# Patient Record
Sex: FEMALE | Race: WHITE | NOT HISPANIC OR LATINO | Employment: OTHER | URBAN - METROPOLITAN AREA
[De-identification: names, ages, dates, MRNs, and addresses within clinical notes are randomized per-mention and may not be internally consistent; named-entity substitution may affect disease eponyms.]

---

## 2017-02-07 DIAGNOSIS — I10 ESSENTIAL HYPERTENSION: ICD-10-CM

## 2017-02-07 DIAGNOSIS — I51.89 DIASTOLIC DYSFUNCTION: ICD-10-CM

## 2017-02-09 RX ORDER — FUROSEMIDE 20 MG/1
20 TABLET ORAL DAILY
Qty: 30 TAB | Refills: 6 | Status: SHIPPED | OUTPATIENT
Start: 2017-02-09 | End: 2017-08-29

## 2017-03-22 ENCOUNTER — OFFICE VISIT (OUTPATIENT)
Dept: CARDIOLOGY | Facility: MEDICAL CENTER | Age: 82
End: 2017-03-22
Payer: MEDICARE

## 2017-03-22 VITALS
BODY MASS INDEX: 32.06 KG/M2 | OXYGEN SATURATION: 91 % | WEIGHT: 192.4 LBS | SYSTOLIC BLOOD PRESSURE: 102 MMHG | HEART RATE: 56 BPM | HEIGHT: 65 IN | DIASTOLIC BLOOD PRESSURE: 60 MMHG

## 2017-03-22 DIAGNOSIS — I65.23 BILATERAL CAROTID ARTERY STENOSIS: ICD-10-CM

## 2017-03-22 DIAGNOSIS — I25.84 CORONARY ARTERY DISEASE DUE TO CALCIFIED CORONARY LESION: ICD-10-CM

## 2017-03-22 DIAGNOSIS — I25.10 CORONARY ARTERY DISEASE DUE TO CALCIFIED CORONARY LESION: ICD-10-CM

## 2017-03-22 DIAGNOSIS — I10 ESSENTIAL HYPERTENSION: ICD-10-CM

## 2017-03-22 DIAGNOSIS — E78.5 HYPERLIPIDEMIA, UNSPECIFIED HYPERLIPIDEMIA TYPE: ICD-10-CM

## 2017-03-22 DIAGNOSIS — I51.89 DIASTOLIC DYSFUNCTION: ICD-10-CM

## 2017-03-22 DIAGNOSIS — I35.0 MODERATE AORTIC STENOSIS: ICD-10-CM

## 2017-03-22 PROCEDURE — G8484 FLU IMMUNIZE NO ADMIN: HCPCS | Performed by: NURSE PRACTITIONER

## 2017-03-22 PROCEDURE — G8419 CALC BMI OUT NRM PARAM NOF/U: HCPCS | Performed by: NURSE PRACTITIONER

## 2017-03-22 PROCEDURE — 1101F PT FALLS ASSESS-DOCD LE1/YR: CPT | Mod: 8P | Performed by: NURSE PRACTITIONER

## 2017-03-22 PROCEDURE — G8598 ASA/ANTIPLAT THER USED: HCPCS | Performed by: NURSE PRACTITIONER

## 2017-03-22 PROCEDURE — 99214 OFFICE O/P EST MOD 30 MIN: CPT | Performed by: NURSE PRACTITIONER

## 2017-03-22 PROCEDURE — 1036F TOBACCO NON-USER: CPT | Performed by: NURSE PRACTITIONER

## 2017-03-22 PROCEDURE — G8432 DEP SCR NOT DOC, RNG: HCPCS | Performed by: NURSE PRACTITIONER

## 2017-03-22 PROCEDURE — 4040F PNEUMOC VAC/ADMIN/RCVD: CPT | Performed by: NURSE PRACTITIONER

## 2017-03-22 RX ORDER — CITALOPRAM 40 MG/1
TABLET ORAL
COMMUNITY
Start: 2017-03-16 | End: 2017-03-22

## 2017-03-22 RX ORDER — AMLODIPINE BESYLATE 5 MG/1
5 TABLET ORAL DAILY
Qty: 30 TAB | Refills: 11 | COMMUNITY
Start: 2017-03-22 | End: 2017-03-22

## 2017-03-22 RX ORDER — METOPROLOL TARTRATE 50 MG/1
50 TABLET, FILM COATED ORAL
Refills: 3 | COMMUNITY
Start: 2017-01-19 | End: 2017-03-22

## 2017-03-22 RX ORDER — ATORVASTATIN CALCIUM 40 MG/1
40 TABLET, FILM COATED ORAL
Refills: 5 | COMMUNITY
Start: 2017-03-05 | End: 2017-03-22

## 2017-03-22 RX ORDER — CHOLECALCIFEROL (VITAMIN D3) 125 MCG
5000 CAPSULE ORAL DAILY
COMMUNITY
End: 2017-08-29

## 2017-03-22 ASSESSMENT — ENCOUNTER SYMPTOMS
ORTHOPNEA: 0
PND: 0
COUGH: 0
DIZZINESS: 0
FEVER: 0
PALPITATIONS: 0
CLAUDICATION: 0
ABDOMINAL PAIN: 0
FALLS: 0
WEAKNESS: 1
SHORTNESS OF BREATH: 1

## 2017-03-22 NOTE — PROGRESS NOTES
Subjective:   Silvia Orellana is a 84 y.o. female who presents today for 3 month follow up.    She is a patient of Dr. Aguirre in our office. Hx of CAD with previous CABG, HTN, HLD, carotid artery disease, and falls with multiple orthopedic surgeries requiring wheelchair for mobility at times.    She has some mild exertional shortness of breath.     Her daughter helps her with her medications. We discussed her medication changes.     She has had no episodes of chest pain, palpitations, orthopnea, or peripheral edema.    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   • Myocardial infarct (CMS-HCC) 2002   • Pneumonia 2014   • Cold 4/2016   • Stroke (CMS-HCC) 2001; 2012     no deficits, general weakness   • Cataract      patria IOL   • Hx of Clostridium difficile infection 2015   • CAD (coronary artery disease)      CABG    • Aortic stenosis      moderate on echo in '16     Past Surgical History   Procedure Laterality Date   • Cholecystectomy     • Lumpectomy       L   • Bladder biopsy     • Pr revise total hip replacement  3/24/2015     R   • Pr revise acetabular part of total hip  2010     L   • Orbital fracture orif Left 7/6/2015     Procedure: ORBITAL FRACTURE ORIF;  Surgeon: Fabrice Daniel Jr., M.D.;  Location: SURGERY [facility];  Service:    • Femur orif Right 8/10/2015     Procedure: FEMUR ORIF;  Surgeon: Umer Guevara M.D.;  Location: SURGERY [facility];  Service:    • Hip arthroplasty total Left    • Angiogram     • Other cardiac surgery  2002     CABG   • Other cardiac surgery   2012     cardiac cath with stent placement   • Gyn surgery  1961     hysterectomy   • Orbital fracture orif Left 5/23/2016     Procedure: ORBITAL FRACTURE ORIF FOR: LATERAL CANTHOPEXY;  Surgeon: Fabrice Daniel Jr., M.D.;  Location: SURGERY [facility];  Service:    • Multiple coronary artery bypass     • Cardiac cath       History reviewed. No pertinent family history.  History   Smoking status   • Never Smoker    Smokeless tobacco   • Never Used     No Known Allergies  Outpatient Encounter Prescriptions as of 3/22/2017   Medication Sig Dispense Refill   • cyanocobalamin (VITAMIN B-12) 500 MCG Tab Take 500 mcg by mouth every day.     • multivitamin (THERAGRAN) Tab Take 1 Tab by mouth every day.     • furosemide (LASIX) 20 MG Tab Take 1 Tab by mouth every day. 30 Tab 6   • lisinopril (PRINIVIL) 20 MG Tab Take 1 Tab by mouth 2 times a day. 60 Tab 6   • clopidogrel (PLAVIX) 75 MG Tab Take 1 Tab by mouth every day. 90 Tab 3   • metoprolol (LOPRESSOR) 25 MG Tab Take 1 Tab by mouth 2 times a day. 90 Tab 3   • atorvastatin (LIPITOR) 20 MG Tab Take 20 mg by mouth every evening.     • calcium citrate (CALCITRATE) 950 MG Tab Take 950 mg by mouth 2 times a day.     • gabapentin (NEURONTIN) 600 MG tablet Take 600 mg by mouth 3 times a day.  5   • meloxicam (MOBIC) 15 MG tablet Take 15 mg by mouth See Admin Instructions. takes Mon thru Fri  5   • aspirin EC (ECOTRIN) 81 MG Tablet Delayed Response Take 1 Tab by mouth every day. 30 Tab 1   • citalopram (CELEXA) 20 MG TABS Take 20 mg by mouth every day.     • carisoprodol (SOMA) 350 MG TABS Take 350 mg by mouth 2 times a day as needed for Muscle Spasms.     • [DISCONTINUED] atorvastatin (LIPITOR) 40 MG Tab Take 40 mg by mouth every day.  5   • [DISCONTINUED] citalopram (CELEXA) 40 MG Tab      • [DISCONTINUED] metoprolol (LOPRESSOR) 50 MG Tab Take 50 mg by mouth every day. TAKE 1 TAB BY MOUTH EVERY DAY.  3   • [DISCONTINUED] CRANBERRY PO Take  by mouth.     • [DISCONTINUED]  "amlodipine (NORVASC) 5 MG Tab Take 1 Tab by mouth every day. 30 Tab 11   • [DISCONTINUED] amlodipine (NORVASC) 5 MG Tab Take 1 Tab by mouth 2 Times a Day. 60 Tab 11   • hydrocodone/acetaminophen (NORCO)  MG Tab Take 1 Tab by mouth every 8 hours as needed. AS NEEDED FOR PAIN  0   • temazepam (RESTORIL) 15 MG CAPS Take 15 mg by mouth at bedtime as needed for Sleep.       No facility-administered encounter medications on file as of 3/22/2017.     Review of Systems   Constitutional: Positive for malaise/fatigue. Negative for fever.   Respiratory: Positive for shortness of breath. Negative for cough.         Exertional mild   Cardiovascular: Negative for chest pain, palpitations, orthopnea, claudication, leg swelling and PND.   Gastrointestinal: Negative for abdominal pain.   Musculoskeletal: Negative for joint pain and falls.   Neurological: Positive for weakness. Negative for dizziness.        Wheelchair for mobility at times   All other systems reviewed and are negative.       Objective:   /60 mmHg  Pulse 56  Ht 1.651 m (5' 5\")  Wt 87.272 kg (192 lb 6.4 oz)  BMI 32.02 kg/m2  SpO2 91%    Physical Exam   Constitutional: She is oriented to person, place, and time. She appears well-developed and well-nourished. No distress.   Gaining weight   HENT:   Head: Normocephalic and atraumatic.   Eyes: EOM are normal.   Neck: Normal range of motion. No JVD present.   Cardiovascular: Regular rhythm, intact distal pulses and normal pulses.  Bradycardia present.    Murmur heard.   Systolic murmur is present with a grade of 1/6   Pulses:       Carotid pulses are on the right side with bruit, and on the left side with bruit.  Pulmonary/Chest: Effort normal and breath sounds normal. No respiratory distress.   Abdominal: Soft. Bowel sounds are normal.   Musculoskeletal: She exhibits no edema.   Wheelchair today for mobility    Neurological: She is alert and oriented to person, place, and time.   Skin: Skin is warm and " dry.   Psychiatric: She has a normal mood and affect.   Nursing note and vitals reviewed.    Reviewed with patient  Lab Results   Component Value Date/Time    CHOLESTEROL, 03/08/2016 10:39 AM    * 03/08/2016 10:39 AM    HDL 49 03/08/2016 10:39 AM    TRIGLYCERIDES 123 03/08/2016 10:39 AM       Lab Results   Component Value Date/Time    SODIUM 136 05/19/2016 12:30 PM    POTASSIUM 5.0 05/19/2016 12:30 PM    CHLORIDE 107 05/19/2016 12:30 PM    CO2 23 05/19/2016 12:30 PM    GLUCOSE 105* 05/19/2016 12:30 PM    BUN 21 05/19/2016 12:30 PM    CREATININE 1.05 05/19/2016 12:30 PM    BUN-CREATININE RATIO 16 03/14/2016 09:36 AM     Lab Results   Component Value Date/Time    ALKALINE PHOSPHATASE 104 03/14/2016 09:36 AM    AST(SGOT) 20 03/14/2016 09:36 AM    ALT(SGPT) 13 03/14/2016 09:36 AM    TOTAL BILIRUBIN 0.6 03/14/2016 09:36 AM      3/4/16 MPI IMPRESSIONS  PVCs on baseline ECG.  No evidence of significant jeopardized viable myocardium or prior myocardial   infarction.  Normal left ventricular wall motion. LV ejection fraction = 71%.  123    3/4/16 CAROTID DUPLEX CONCLUSIONS  <50% bilateral ICA stenoses  Nl subclavians and vertebrals    Assessment:     1. CAD  DISCONTINUED: amlodipine (NORVASC) 5 MG Tab   2. Bilateral carotid artery stenosis     3. Diastolic dysfunction  DISCONTINUED: amlodipine (NORVASC) 5 MG Tab   4. Essential hypertension  DISCONTINUED: amlodipine (NORVASC) 5 MG Tab   5. Hyperlipidemia, unspecified hyperlipidemia type     6. Moderate aortic stenosis  ECHOCARDIOGRAM COMP W/O CONT     Medical Decision Making:  Today's Assessment / Status / Plan:     1. CAD, no angina, NGUYEN mild. Continue ASA, plavix, metoprolol, and lipitor. Follow clinically.    2. Syncope and recurrent falls with lightheadedness, improved with medication changes. Remains bradycardic-follow clinically.    3. HTN, remains borderline hypotensive. Continue lisinopril 20 mg BID, lasix 20 mg QD, and metoprolol 25 mg BID. Stop  amlodipine 5 mg QD. FU with BP log with daughter, SBP goal between 120-140.    4. HLD, Lipitor. FU with lipid and CMP, getting labs done soon.    5. Carotid artery stenosis, mild < 50%. ASA, plavix, and Lipitor.  Follow with duplex every 2-5 years.    6. CKD, stable. Followed by nephro.    7. Moderate aortic stenosis, mild NGUYEN. Recommend repeat echo this year, if gradients have increased and symptoms remain-consider dobutamine stress echo.    FU in clinic in 6 months with TW with repeat echo and labs    Patient verbalizes understanding and agrees with the plan of care.     Collaborating MD: Jose Alejandro MARRERO    Spent 25 minutes in face-to-face patient contact in which greater than 50% of the visit was spent in counseling/coordination of care of medication management, symptom management, re-assurance, med changes, labs, review of symptoms, order echo, repeat labs as planned.

## 2017-03-22 NOTE — Clinical Note
[facility]   [address], [address]  [place] [address]  Phone: [phone]  Fax: [phone]              Silvia Orellana  6/19/1931    Encounter Date: 3/22/2017    CAESAR Santacruz          PROGRESS NOTE:  Subjective:   Silvia Orellana is a 84 y.o. female who presents today for 3 month follow up.    She is a patient of Dr. Aguirre in our office. Hx of CAD with previous CABG, HTN, HLD, carotid artery disease, and falls with multiple orthopedic surgeries requiring wheelchair for mobility at times.    She has some mild exertional shortness of breath.     Her daughter helps her with her medications. We discussed her medication changes.     She has had no episodes of chest pain, palpitations, orthopnea, or peripheral edema.    Past Medical History   Diagnosis Date   • Breast cancer (CMS-[facility])    • Fall    • Hypertension    • Bradycardia    • Chronic pain    • GERD (gastroesophageal reflux disease)    • Arthritis    • Urinary incontinence    • Heart burn    • Indigestion    • Bowel habit changes    • Dental disorder    • Breath shortness    • Cancer (CMS-HCC) 2003     left breast lumpectomy   • Cancer (CMS-[facility]) 2007     bladder ca   • Cancer (CMS-[facility]) 2016     basal cell ca to nose   • Myocardial infarct (CMS-[facility]) 2002   • Pneumonia 2014   • Cold 4/2016   • Stroke (CMS-[facility]) 2001; 2012     no deficits, general weakness   • Cataract      patria IOL   • Hx of Clostridium difficile infection 2015   • CAD (coronary artery disease)      CABG    • Aortic stenosis      moderate on echo in '16     Past Surgical History   Procedure Laterality Date   • Cholecystectomy     • Lumpectomy       L   • Bladder biopsy     • Pr revise total hip replacement  3/24/2015     R   • Pr revise acetabular part of total hip  2010     L   • Orbital fracture orif Left 7/6/2015     Procedure: ORBITAL FRACTURE ORIF;  Surgeon: Fabrice Daniel Jr., M.D.;  Location: SURGERY [facility];  Service:    • Femur orif Right 8/10/2015     Procedure: FEMUR ORIF;  Surgeon: Umer Guevara M.D.;  Location: SURGERY [facility];  Service:    • Hip arthroplasty total Left    • Angiogram     • Other cardiac surgery  2002     CABG   • Other cardiac surgery  2012     cardiac cath with stent placement   • Gyn surgery  1961     hysterectomy   • Orbital fracture orif Left 5/23/2016     Procedure: ORBITAL FRACTURE ORIF FOR: LATERAL CANTHOPEXY;  Surgeon: Fabrice Daniel Jr., M.D.;  Location: SURGERY [facility];  Service:    • Multiple coronary artery bypass     • Cardiac cath       History reviewed. No pertinent family history.  History   Smoking status   • Never Smoker    Smokeless tobacco   • Never Used     No Known Allergies  Outpatient Encounter Prescriptions as of 3/22/2017   Medication Sig Dispense Refill   • cyanocobalamin (VITAMIN B-12) 500 MCG Tab Take 500 mcg by mouth every day.     • multivitamin (THERAGRAN) Tab Take 1 Tab by mouth every day.     • furosemide (LASIX) 20 MG Tab Take 1 Tab by mouth every day. 30 Tab 6   • lisinopril (PRINIVIL) 20 MG Tab Take 1 Tab by mouth 2 times a day. 60 Tab 6   • clopidogrel (PLAVIX) 75 MG Tab Take 1 Tab by mouth every day. 90 Tab 3   • metoprolol (LOPRESSOR) 25 MG Tab Take 1 Tab by mouth 2 times a day. 90 Tab 3   • atorvastatin (LIPITOR) 20 MG Tab Take 20 mg by mouth every evening.     • calcium citrate (CALCITRATE) 950 MG Tab Take 950 mg by mouth 2 times a day.     • gabapentin (NEURONTIN) 600 MG tablet Take 600 mg by mouth 3 times a day.  5   • meloxicam (MOBIC) 15 MG tablet Take 15 mg by mouth See Admin Instructions. takes Mon thru Fri  5   • aspirin EC (ECOTRIN) 81 MG Tablet Delayed Response Take 1 Tab by mouth every day. 30 Tab 1   • citalopram (CELEXA) 20 MG TABS Take 20 mg by mouth every day.     • carisoprodol (SOMA) 350 MG TABS Take 350 mg by mouth 2 times a day as needed for Muscle Spasms.     • [DISCONTINUED] atorvastatin (LIPITOR) 40 MG Tab  "Take 40 mg by mouth every day.  5   • [DISCONTINUED] citalopram (CELEXA) 40 MG Tab      • [DISCONTINUED] metoprolol (LOPRESSOR) 50 MG Tab Take 50 mg by mouth every day. TAKE 1 TAB BY MOUTH EVERY DAY.  3   • [DISCONTINUED] CRANBERRY PO Take  by mouth.     • [DISCONTINUED] amlodipine (NORVASC) 5 MG Tab Take 1 Tab by mouth every day. 30 Tab 11   • [DISCONTINUED] amlodipine (NORVASC) 5 MG Tab Take 1 Tab by mouth 2 Times a Day. 60 Tab 11   • hydrocodone/acetaminophen (NORCO)  MG Tab Take 1 Tab by mouth every 8 hours as needed. AS NEEDED FOR PAIN  0   • temazepam (RESTORIL) 15 MG CAPS Take 15 mg by mouth at bedtime as needed for Sleep.       No facility-administered encounter medications on file as of 3/22/2017.     Review of Systems   Constitutional: Positive for malaise/fatigue. Negative for fever.   Respiratory: Positive for shortness of breath. Negative for cough.         Exertional mild   Cardiovascular: Negative for chest pain, palpitations, orthopnea, claudication, leg swelling and PND.   Gastrointestinal: Negative for abdominal pain.   Musculoskeletal: Negative for joint pain and falls.   Neurological: Positive for weakness. Negative for dizziness.        Wheelchair for mobility at times   All other systems reviewed and are negative.       Objective:   /60 mmHg  Pulse 56  Ht 1.651 m (5' 5\")  Wt 87.272 kg (192 lb 6.4 oz)  BMI 32.02 kg/m2  SpO2 91%    Physical Exam   Constitutional: She is oriented to person, place, and time. She appears well-developed and well-nourished. No distress.   Gaining weight   HENT:   Head: Normocephalic and atraumatic.   Eyes: EOM are normal.   Neck: Normal range of motion. No JVD present.   Cardiovascular: Regular rhythm, intact distal pulses and normal pulses.  Bradycardia present.    Murmur heard.   Systolic murmur is present with a grade of 1/6   Pulses:       Carotid pulses are on the right side with bruit, and on the left side with bruit.  Pulmonary/Chest: Effort " normal and breath sounds normal. No respiratory distress.   Abdominal: Soft. Bowel sounds are normal.   Musculoskeletal: She exhibits no edema.   Wheelchair today for mobility    Neurological: She is alert and oriented to person, place, and time.   Skin: Skin is warm and dry.   Psychiatric: She has a normal mood and affect.   Nursing note and vitals reviewed.    Reviewed with patient  Lab Results   Component Value Date/Time    CHOLESTEROL, 03/08/2016 10:39 AM    * 03/08/2016 10:39 AM    HDL 49 03/08/2016 10:39 AM    TRIGLYCERIDES 123 03/08/2016 10:39 AM       Lab Results   Component Value Date/Time    SODIUM 136 05/19/2016 12:30 PM    POTASSIUM 5.0 05/19/2016 12:30 PM    CHLORIDE 107 05/19/2016 12:30 PM    CO2 23 05/19/2016 12:30 PM    GLUCOSE 105* 05/19/2016 12:30 PM    BUN 21 05/19/2016 12:30 PM    CREATININE 1.05 05/19/2016 12:30 PM    BUN-CREATININE RATIO 16 03/14/2016 09:36 AM     Lab Results   Component Value Date/Time    ALKALINE PHOSPHATASE 104 03/14/2016 09:36 AM    AST(SGOT) 20 03/14/2016 09:36 AM    ALT(SGPT) 13 03/14/2016 09:36 AM    TOTAL BILIRUBIN 0.6 03/14/2016 09:36 AM      3/4/16 MPI IMPRESSIONS  PVCs on baseline ECG.  No evidence of significant jeopardized viable myocardium or prior myocardial   infarction.  Normal left ventricular wall motion. LV ejection fraction = 71%.  123    3/4/16 CAROTID DUPLEX CONCLUSIONS  <50% bilateral ICA stenoses  Nl subclavians and vertebrals    Assessment:     1. CAD  DISCONTINUED: amlodipine (NORVASC) 5 MG Tab   2. Bilateral carotid artery stenosis     3. Diastolic dysfunction  DISCONTINUED: amlodipine (NORVASC) 5 MG Tab   4. Essential hypertension  DISCONTINUED: amlodipine (NORVASC) 5 MG Tab   5. Hyperlipidemia, unspecified hyperlipidemia type     6. Moderate aortic stenosis  ECHOCARDIOGRAM COMP W/O CONT     Medical Decision Making:  Today's Assessment / Status / Plan:     1. CAD, no angina, NGUYEN mild. Continue ASA, plavix, metoprolol, and lipitor.  Follow clinically.    2. Syncope and recurrent falls with lightheadedness, improved with medication changes. Remains bradycardic-follow clinically.    3. HTN, remains borderline hypotensive. Continue lisinopril 20 mg BID, lasix 20 mg QD, and metoprolol 25 mg BID. Stop amlodipine 5 mg QD. FU with BP log with daughter, SBP goal between 120-140.    4. HLD, Lipitor. FU with lipid and CMP, getting labs done soon.    5. Carotid artery stenosis, mild < 50%. ASA, plavix, and Lipitor.  Follow with duplex every 2-5 years.    6. CKD, stable. Followed by nephro.    7. Moderate aortic stenosis, mild NGUYEN. Recommend repeat echo this year, if gradients have increased and symptoms remain-consider dobutamine stress echo.    FU in clinic in 6 months with TW with repeat echo and labs    Patient verbalizes understanding and agrees with the plan of care.     Collaborating MD: Jose Alejandro MARRERO    Spent 25 minutes in face-to-face patient contact in which greater than 50% of the visit was spent in counseling/coordination of care of medication management, symptom management, re-assurance, med changes, labs, review of symptoms, order echo, repeat labs as planned.        No Recipients

## 2017-03-22 NOTE — MR AVS SNAPSHOT
"Silvia Orellana   3/22/2017 11:00 AM   Office Visit   MRN: [id number]    Department:  [facility]   Dept Phone:  [phone]    Description:  Female : 1931   Provider:  CAESAR Santacruz           Reason for Visit     Follow-Up           Allergies as of 3/22/2017     No Known Allergies      You were diagnosed with     Coronary artery disease due to calcified coronary lesion   [0623881]       Bilateral carotid artery stenosis   [878783]       Diastolic dysfunction   [384736]       Essential hypertension   [8348497]       Hyperlipidemia, unspecified hyperlipidemia type   [1823200]       Moderate aortic stenosis   [263378]         Vital Signs     Blood Pressure Pulse Height Weight Body Mass Index Oxygen Saturation    102/60 mmHg 56 1.651 m (5' 5\") 87.272 kg (192 lb 6.4 oz) 32.02 kg/m2 91%    Smoking Status                   Never Smoker            Basic Information     Date Of Birth Sex Race Ethnicity Preferred Language    1931 Female White Non- English      Your appointments     2017  1:15 PM   [place] [place] [facility] [place] [facility]   [place] [facility] [place]    [address] [facility] [address]   [phone]           No prep            Sep 26, 2017 11:00 AM   FOLLOW UP with James Aguirre M.D.   [facility] (--)    [address], [facility] [address]  [facility] [id number] [phone]              Problem List              ICD-10-CM Priority Class Noted - Resolved    Hyperlipidemia E78.5 Medium  3/3/2015 - Present    CKD (chronic kidney disease) stage 3, GFR 30-59 ml/min N18.3 Low  3/3/2015 - Present    Breast cancer, Left radiation therapy C50.919 Low  3/3/2015 - Present    Hip pain M25.559 Low  3/3/2015 - Present    History of hip fracture Z87.81 Low  2015 - Present    HTN (hypertension) I10 Medium  2015 - Present    Orbital fracture (HCC) S02.80XA Low  2015 - Present    GIB (gastrointestinal bleeding) K92.2 Low  " 7/2/2015 - Present    Fall W19.XXXA Low  7/3/2015 - Present    CAD I25.10, I25.84 Medium  7/21/2015 - Present    Carotid artery stenosis I65.29 Medium  7/21/2015 - Present    Femur fracture (CMS-HCC) S72.90XA Low  8/8/2015 - Present    Diastolic dysfunction I51.9 Medium  3/7/2016 - Present    Moderate aortic stenosis I35.0 Medium  3/22/2017 - Present      Health Maintenance        Date Due Completion Dates    IMM DTaP/Tdap/Td Vaccine (1 - Tdap) 6/19/1950 ---    PAP SMEAR 6/19/1952 ---    MAMMOGRAM 6/19/1971 ---    COLONOSCOPY 6/19/1981 ---    IMM ZOSTER VACCINE 6/19/1991 ---    BONE DENSITY 6/19/1996 ---    IMM PNEUMOCOCCAL 65+ (ADULT) LOW/MEDIUM RISK SERIES (2 of 2 - PCV13) 10/1/2015 10/1/2014    IMM INFLUENZA (1) 9/1/2016 10/1/2014            Current Immunizations     Influenza TIV (IM) 10/1/2014    Pneumococcal polysaccharide vaccine (PPSV-23) 10/1/2014      Below and/or attached are the medications your provider expects you to take. Review all of your home medications and newly ordered medications with your provider and/or pharmacist. Follow medication instructions as directed by your provider and/or pharmacist. Please keep your medication list with you and share with your provider. Update the information when medications are discontinued, doses are changed, or new medications (including over-the-counter products) are added; and carry medication information at all times in the event of emergency situations     Allergies:  No Known Allergies          Medications  Valid as of: March 22, 2017 - 11:36 AM    Generic Name Brand Name Tablet Size Instructions for use    Aspirin (Tablet Delayed Response) ECOTRIN 81 MG Take 1 Tab by mouth every day.        Atorvastatin Calcium (Tab) LIPITOR 20 MG Take 20 mg by mouth every evening.        Calcium Citrate (Tab) CALCITRATE 950 MG Take 950 mg by mouth 2 times a day.        Carisoprodol (Tab) SOMA 350 MG Take 350 mg by mouth 2 times a day as needed for Muscle Spasms.         Citalopram Hydrobromide (Tab) CELEXA 20 MG Take 20 mg by mouth every day.        Clopidogrel Bisulfate (Tab) PLAVIX 75 MG Take 1 Tab by mouth every day.        Cyanocobalamin (Tab) VITAMIN B-12 500 MCG Take 500 mcg by mouth every day.        Furosemide (Tab) LASIX 20 MG Take 1 Tab by mouth every day.        Gabapentin (Tab) NEURONTIN 600 MG Take 600 mg by mouth 3 times a day.        Hydrocodone-Acetaminophen (Tab) NORCO  MG Take 1 Tab by mouth every 8 hours as needed. AS NEEDED FOR PAIN        Lisinopril (Tab) PRINIVIL 20 MG Take 1 Tab by mouth 2 times a day.        Meloxicam (Tab) MOBIC 15 MG Take 15 mg by mouth See Admin Instructions. takes Mon thru Fri        Metoprolol Tartrate (Tab) LOPRESSOR 25 MG Take 1 Tab by mouth 2 times a day.        Multiple Vitamin (Tab) THERAGRAN  Take 1 Tab by mouth every day.        Temazepam (Cap) RESTORIL 15 MG Take 15 mg by mouth at bedtime as needed for Sleep.        .                 Medicines prescribed today were sent to:     [facility]/PHARMACY #[id number] - [place], [place] - [address]    [address] [place] [address]    Phone: [phone] Fax: [phone]    Open 24 Hours?: No      Medication refill instructions:       If your prescription bottle indicates you have medication refills left, it is not necessary to call your provider’s office. Please contact your pharmacy and they will refill your medication.    If your prescription bottle indicates you do not have any refills left, you may request refills at any time through one of the following ways: The online [employer] system (except Urgent Care), by calling your provider’s office, or by asking your pharmacy to contact your provider’s office with a refill request. Medication refills are processed only during regular business hours and may not be available until the next business day. Your provider may request additional information or to have a follow-up visit with you prior to refilling your medication.   *Please Note:  Medication refills are assigned a new Rx number when refilled electronically. Your pharmacy may indicate that no refills were authorized even though a new prescription for the same medication is available at the pharmacy. Please request the medicine by name with the pharmacy before contacting your provider for a refill.        Your To Do List     Future Labs/Procedures Complete By Expires    ECHOCARDIOGRAM COMP W/O CONT  As directed 3/23/2018         [employer] Access Code: Activation code not generated  Current [employer] Status: Active

## 2017-04-19 ENCOUNTER — HOSPITAL ENCOUNTER (OUTPATIENT)
Dept: CARDIOLOGY | Facility: MEDICAL CENTER | Age: 82
End: 2017-04-19
Attending: NURSE PRACTITIONER
Payer: MEDICARE

## 2017-04-19 DIAGNOSIS — I35.0 MODERATE AORTIC STENOSIS: ICD-10-CM

## 2017-04-19 LAB
LV EJECT FRACT  99904: 60
LV EJECT FRACT MOD 2C 99903: 61.78
LV EJECT FRACT MOD 4C 99902: 55.91
LV EJECT FRACT MOD BP 99901: 55.84

## 2017-04-19 PROCEDURE — 93306 TTE W/DOPPLER COMPLETE: CPT

## 2017-04-19 PROCEDURE — 93306 TTE W/DOPPLER COMPLETE: CPT | Mod: 26 | Performed by: INTERNAL MEDICINE

## 2017-04-20 DIAGNOSIS — I10 ESSENTIAL HYPERTENSION: ICD-10-CM

## 2017-04-20 LAB
ALBUMIN SERPL-MCNC: 4.1 G/DL (ref 3.5–4.7)
ALBUMIN/GLOB SERPL: 1.6 {RATIO} (ref 1.2–2.2)
ALP SERPL-CCNC: 95 IU/L (ref 39–117)
ALT SERPL-CCNC: 12 IU/L (ref 0–32)
AST SERPL-CCNC: 19 IU/L (ref 0–40)
BILIRUB SERPL-MCNC: 0.4 MG/DL (ref 0–1.2)
BNP SERPL-MCNC: 239.1 PG/ML (ref 0–100)
BUN SERPL-MCNC: 18 MG/DL (ref 8–27)
BUN/CREAT SERPL: 15 (ref 12–28)
CALCIUM SERPL-MCNC: 9.4 MG/DL (ref 8.7–10.3)
CHLORIDE SERPL-SCNC: 103 MMOL/L (ref 96–106)
CHOLEST SERPL-MCNC: 154 MG/DL (ref 100–199)
CO2 SERPL-SCNC: 24 MMOL/L (ref 18–29)
COMMENT 011824: NORMAL
CREAT SERPL-MCNC: 1.18 MG/DL (ref 0.57–1)
GLOBULIN SER CALC-MCNC: 2.5 G/DL (ref 1.5–4.5)
GLUCOSE SERPL-MCNC: 107 MG/DL (ref 65–99)
HDLC SERPL-MCNC: 49 MG/DL
LDLC SERPL CALC-MCNC: 79 MG/DL (ref 0–99)
POTASSIUM SERPL-SCNC: 4.8 MMOL/L (ref 3.5–5.2)
PROT SERPL-MCNC: 6.6 G/DL (ref 6–8.5)
SODIUM SERPL-SCNC: 142 MMOL/L (ref 134–144)
TRIGL SERPL-MCNC: 128 MG/DL (ref 0–149)
VLDLC SERPL CALC-MCNC: 26 MG/DL (ref 5–40)

## 2017-04-21 ENCOUNTER — TELEPHONE (OUTPATIENT)
Dept: CARDIOLOGY | Facility: MEDICAL CENTER | Age: 82
End: 2017-04-21

## 2017-04-21 NOTE — TELEPHONE ENCOUNTER
"S/w pts dtr who describes that the left side of pts neck had some distension or lump so they took her see PCP yesterday. It is slightly tender. Per dtr, they aren't doing any further testing at this point but wanted dtr to update cardiology since she does \"have a valve problem\". Asked if PCP had talked about doing a CT scan to make sure its not malignant. Dtr explains since its a new lump they want to wait and see if it goes away on its own before proceeding with further testing.     Dtr also asking about echo from 4/19. Explained to dtr that echo is stable but does show a slight increase in the aortic valve gradient however it is still moderate in severity. Explained that the results were sent to TW for any further recommendations. Dtr appreciated.     "

## 2017-04-21 NOTE — TELEPHONE ENCOUNTER
"----- Message from Jose Alcantara sent at 4/21/2017  9:38 AM PDT -----  Regarding: Pt daughter wants call back   SC/Abram,    Patient daughter Camila is calling. States they noticed at \"lump\" on left side of neck area and went to PCP did ultrasound. She would please like a call back to discuss at [phone].   "

## 2017-05-01 DIAGNOSIS — I10 ESSENTIAL HYPERTENSION: ICD-10-CM

## 2017-05-01 DIAGNOSIS — I25.84 CORONARY ARTERY DISEASE DUE TO CALCIFIED CORONARY LESION: ICD-10-CM

## 2017-05-01 DIAGNOSIS — I25.10 CORONARY ARTERY DISEASE DUE TO CALCIFIED CORONARY LESION: ICD-10-CM

## 2017-05-01 DIAGNOSIS — I51.89 DIASTOLIC DYSFUNCTION: ICD-10-CM

## 2017-05-01 RX ORDER — LISINOPRIL 20 MG/1
20 TABLET ORAL 2 TIMES DAILY
Qty: 60 TAB | Refills: 3
Start: 2017-05-01 | End: 2017-09-14 | Stop reason: SDUPTHER

## 2017-05-15 NOTE — TELEPHONE ENCOUNTER
Noted.     Message  Received: 6 days ago       BRENDA Ocampo R.N.                   Can wait for her return            Previous Messages       ----- Message -----      From: Patty Hernández R.N.      Sent: 4/21/2017   8:14 AM        To: James Aguirre M.D.     To TW as SC on maternity leave. F/u w/ SC 9/26                         ECHOCARDIOGRAM COMP W/O CONT   Status: Final result     Visible to patient:  Reji     Dx:  Moderate aortic stenosis               Notes Recorded by Patty Hernández R.N. on 4/21/2017 at 8:14 AM  To TW as SC on maternity leave. F/u w/ SC 9/26

## 2017-05-28 DIAGNOSIS — I25.10 CORONARY ARTERY DISEASE INVOLVING NATIVE CORONARY ARTERY OF NATIVE HEART WITHOUT ANGINA PECTORIS: ICD-10-CM

## 2017-05-28 DIAGNOSIS — Z95.1 HX OF CABG: ICD-10-CM

## 2017-05-29 RX ORDER — CLOPIDOGREL BISULFATE 75 MG/1
75 TABLET ORAL DAILY
Qty: 90 TAB | Refills: 3 | OUTPATIENT
Start: 2017-05-29 | End: 2017-08-29

## 2017-05-31 ENCOUNTER — HOSPITAL ENCOUNTER (OUTPATIENT)
Dept: HOSPITAL 8 - CVU | Age: 82
Discharge: HOME | End: 2017-05-31
Attending: PODIATRIST
Payer: MEDICARE

## 2017-05-31 DIAGNOSIS — E78.5: ICD-10-CM

## 2017-05-31 DIAGNOSIS — I25.10: ICD-10-CM

## 2017-05-31 DIAGNOSIS — I77.1: ICD-10-CM

## 2017-05-31 DIAGNOSIS — I10: ICD-10-CM

## 2017-05-31 DIAGNOSIS — G45.9: ICD-10-CM

## 2017-05-31 DIAGNOSIS — Z95.1: ICD-10-CM

## 2017-05-31 DIAGNOSIS — I74.8: ICD-10-CM

## 2017-05-31 DIAGNOSIS — I73.9: Primary | ICD-10-CM

## 2017-05-31 PROCEDURE — 93925 LOWER EXTREMITY STUDY: CPT

## 2017-05-31 PROCEDURE — 93922 UPR/L XTREMITY ART 2 LEVELS: CPT

## 2017-06-20 ENCOUNTER — TELEPHONE (OUTPATIENT)
Dept: CARDIOLOGY | Facility: MEDICAL CENTER | Age: 82
End: 2017-06-20

## 2017-06-20 NOTE — TELEPHONE ENCOUNTER
----- Message from Marva Hansen sent at 6/19/2017 12:29 PM PDT -----  Regarding: code issue for BNP  Contact: [phone]  TW/jan     Pt's son in law Mathew Mert calling for codes to be resubmitted for pt's B Natriuretic Peptide because [facility] charged patient $221.00  [facility] invoice #[id number] dated 5/30/17.      Please call Mathew  when code is resubmitted.    Spoke with daughter in law. Dyspnea on exertion. R06.09      Diastolic Dysfunction   I 51.9         Note with above mailed to patient to send into lab danny.  charlene

## 2017-06-20 NOTE — TELEPHONE ENCOUNTER
----- Message from Marva Hansen sent at 6/19/2017 12:29 PM PDT -----  Regarding: code issue for BNP  Contact: [phone]  TW/jan     Pt's son in law Mathew Mert calling for codes to be resubmitted for pt's B Natriuretic Peptide because [facility] charged patient $221.00  [facility] invoice #[id number] dated 5/30/17.      Please call Mathew  when code is resubmitted.

## 2017-07-14 ENCOUNTER — TELEPHONE (OUTPATIENT)
Dept: CARDIOLOGY | Facility: MEDICAL CENTER | Age: 82
End: 2017-07-14

## 2017-07-14 NOTE — TELEPHONE ENCOUNTER
"S/w pts dtr Guadalupe. Dtr reports she has noticed her mother being more SOB lately and had me talk to her about this. S/w pt who at first said she was feeling fine and talked about having a foot surgery on the 21st. After further discussion pt admitted to not being able to \"do as much\" as she used to and getting SOB when walking up stairs. She would like to see SC sooner. Appt scheduled for 7/19. Pt confirmed and will tell her dtr.   "

## 2017-07-14 NOTE — TELEPHONE ENCOUNTER
----- Message from CAESAR Santacruz sent at 7/13/2017  3:10 PM PDT -----  AS remains moderate but this at rest. If she is symptomatic or worsening, to get in sooner with myself. SC

## 2017-07-18 ENCOUNTER — TELEPHONE (OUTPATIENT)
Dept: CARDIOLOGY | Facility: MEDICAL CENTER | Age: 82
End: 2017-07-18

## 2017-07-19 ENCOUNTER — OFFICE VISIT (OUTPATIENT)
Dept: CARDIOLOGY | Facility: MEDICAL CENTER | Age: 82
End: 2017-07-19
Payer: MEDICARE

## 2017-07-19 VITALS
SYSTOLIC BLOOD PRESSURE: 114 MMHG | BODY MASS INDEX: 35.44 KG/M2 | OXYGEN SATURATION: 93 % | DIASTOLIC BLOOD PRESSURE: 80 MMHG | HEART RATE: 51 BPM | WEIGHT: 200 LBS | HEIGHT: 63 IN

## 2017-07-19 DIAGNOSIS — I25.84 CORONARY ARTERY DISEASE DUE TO CALCIFIED CORONARY LESION: ICD-10-CM

## 2017-07-19 DIAGNOSIS — I25.10 CORONARY ARTERY DISEASE DUE TO CALCIFIED CORONARY LESION: ICD-10-CM

## 2017-07-19 DIAGNOSIS — I35.0 MODERATE AORTIC STENOSIS: ICD-10-CM

## 2017-07-19 DIAGNOSIS — I10 ESSENTIAL HYPERTENSION: ICD-10-CM

## 2017-07-19 DIAGNOSIS — E78.00 PURE HYPERCHOLESTEROLEMIA: ICD-10-CM

## 2017-07-19 DIAGNOSIS — I51.89 DIASTOLIC DYSFUNCTION: ICD-10-CM

## 2017-07-19 DIAGNOSIS — I65.23 BILATERAL CAROTID ARTERY STENOSIS: ICD-10-CM

## 2017-07-19 PROCEDURE — 99214 OFFICE O/P EST MOD 30 MIN: CPT | Performed by: NURSE PRACTITIONER

## 2017-07-19 ASSESSMENT — ENCOUNTER SYMPTOMS
SHORTNESS OF BREATH: 1
FALLS: 0
ABDOMINAL PAIN: 0
COUGH: 0
WEAKNESS: 1
PALPITATIONS: 0
CLAUDICATION: 0
FEVER: 0
ORTHOPNEA: 0
DIZZINESS: 0
PND: 0

## 2017-07-19 NOTE — MR AVS SNAPSHOT
"        Silvia Cha Orellana   2017 12:40 PM   Office Visit   MRN: [id number]    Department:  [facility]   Dept Phone:  [phone]    Description:  Female : 1931   Provider:  CAESAR Santacruz           Reason for Visit     Follow-Up           Allergies as of 2017     No Known Allergies      You were diagnosed with     Coronary artery disease due to calcified coronary lesion   [5011132]       Bilateral carotid artery stenosis   [126203]       Moderate aortic stenosis   [953118]       Pure hypercholesterolemia   [272.0.ICD-9-CM]       Essential hypertension   [1746006]       Diastolic dysfunction   [485345]         Vital Signs     Blood Pressure Pulse Height Weight Body Mass Index Oxygen Saturation    114/80 mmHg 51 1.6 m (5' 2.99\") 90.719 kg (200 lb) 35.44 kg/m2 93%    Smoking Status                   Never Smoker            Basic Information     Date Of Birth Sex Race Ethnicity Preferred Language    1931 Female White Non- English      Problem List              ICD-10-CM Priority Class Noted - Resolved    Hyperlipidemia E78.5 Medium  3/3/2015 - Present    CKD (chronic kidney disease) stage 3, GFR 30-59 ml/min N18.3 Low  3/3/2015 - Present    Breast cancer, Left radiation therapy C50.919 Low  3/3/2015 - Present    Hip pain M25.559 Low  3/3/2015 - Present    History of hip fracture Z87.81 Low  2015 - Present    HTN (hypertension) I10 Medium  2015 - Present    Orbital fracture (CMS-HCC) S02.80XA Low  2015 - Present    GIB (gastrointestinal bleeding) K92.2 Low  2015 - Present    Fall W19.XXXA Low  7/3/2015 - Present    CAD I25.10, I25.84 Medium  2015 - Present    Carotid artery stenosis I65.29 Medium  2015 - Present    Femur fracture (CMS-HCC) S72.90XA Low  2015 - Present    Diastolic dysfunction I51.9 Medium  3/7/2016 - Present    Moderate aortic stenosis I35.0 Medium  3/22/2017 - Present      Health Maintenance        Date Due Completion " Dates    IMM DTaP/Tdap/Td Vaccine (1 - Tdap) 6/19/1950 ---    PAP SMEAR 6/19/1952 ---    MAMMOGRAM 6/19/1971 ---    COLONOSCOPY 6/19/1981 ---    IMM ZOSTER VACCINE 6/19/1991 ---    BONE DENSITY 6/19/1996 ---    IMM PNEUMOCOCCAL 65+ (ADULT) LOW/MEDIUM RISK SERIES (2 of 2 - PCV13) 10/1/2015 10/1/2014    IMM INFLUENZA (1) 9/1/2017 10/1/2014            Current Immunizations     Influenza TIV (IM) 10/1/2014    Pneumococcal polysaccharide vaccine (PPSV-23) 10/1/2014      Below and/or attached are the medications your provider expects you to take. Review all of your home medications and newly ordered medications with your provider and/or pharmacist. Follow medication instructions as directed by your provider and/or pharmacist. Please keep your medication list with you and share with your provider. Update the information when medications are discontinued, doses are changed, or new medications (including over-the-counter products) are added; and carry medication information at all times in the event of emergency situations     Allergies:  No Known Allergies          Medications  Valid as of: July 19, 2017 -  1:17 PM    Generic Name Brand Name Tablet Size Instructions for use    Aspirin (Tablet Delayed Response) ECOTRIN 81 MG Take 1 Tab by mouth every day.        Atorvastatin Calcium (Tab) LIPITOR 20 MG Take 20 mg by mouth every evening.        Calcium Citrate (Tab) CALCITRATE 950 MG Take 950 mg by mouth 2 times a day.        Carisoprodol (Tab) SOMA 350 MG Take 350 mg by mouth 2 times a day as needed for Muscle Spasms.        Citalopram Hydrobromide (Tab) CELEXA 20 MG Take 20 mg by mouth every day.        Clopidogrel Bisulfate (Tab) PLAVIX 75 MG Take 1 Tab by mouth every day.        Cyanocobalamin (Tab) VITAMIN B-12 500 MCG Take 500 mcg by mouth every day.        Furosemide (Tab) LASIX 20 MG Take 1 Tab by mouth every day.        Gabapentin (Tab) NEURONTIN 600 MG Take 600 mg by mouth 3 times a day.         Hydrocodone-Acetaminophen (Tab) NORCO  MG Take 1 Tab by mouth every 8 hours as needed. AS NEEDED FOR PAIN        Lisinopril (Tab) PRINIVIL 20 MG Take 1 Tab by mouth 2 times a day.        Meloxicam (Tab) MOBIC 15 MG Take 15 mg by mouth See Admin Instructions. takes Mon thru Fri        Metoprolol Tartrate (Tab) LOPRESSOR 25 MG Take 1 Tab by mouth 2 times a day.        Multiple Vitamin (Tab) THERAGRAN  Take 1 Tab by mouth every day.        Temazepam (Cap) RESTORIL 15 MG Take 15 mg by mouth at bedtime as needed for Sleep.        .                 Medicines prescribed today were sent to:     [facility]/PHARMACY #[id number] - [place], [place] - [address]    [address] [place] [address]    Phone: [phone] Fax: [phone]    Open 24 Hours?: No      Medication refill instructions:       If your prescription bottle indicates you have medication refills left, it is not necessary to call your provider’s office. Please contact your pharmacy and they will refill your medication.    If your prescription bottle indicates you do not have any refills left, you may request refills at any time through one of the following ways: The online [employer]we[employer] system (except Urgent Care), by calling your provider’s office, or by asking your pharmacy to contact your provider’s office with a refill request. Medication refills are processed only during regular business hours and may not be available until the next business day. Your provider may request additional information or to have a follow-up visit with you prior to refilling your medication.   *Please Note: Medication refills are assigned a new Rx number when refilled electronically. Your pharmacy may indicate that no refills were authorized even though a new prescription for the same medication is available at the pharmacy. Please request the medicine by name with the pharmacy before contacting your provider for a refill.        Your To Do List     Future Labs/Procedures Complete By  Expires    ECHO-REST/STRESS W/ CONTRAST  As directed 7/19/2018         FireID Access Code: Activation code not generated  Current FireID Status: Active

## 2017-07-19 NOTE — PROGRESS NOTES
Subjective:   Silvia Orellana is a 84 y.o. female who presents today for follow up for    She is a patient of Dr. Aguirre in our office. Hx of CAD with previous CABG, HTN, HLD, carotid artery disease, and falls with multiple orthopedic surgeries requiring wheelchair for mobility at times.    Her shortness of breath is now becoming more moderate. She is having more trouble walking up the stairs and doing her daily activities.    She does use a cane and a wheelchair for long distances. She does have some LE edema today.    Her daughter helps her with her medications.     She has had no episodes of chest pain, palpitations, or orthopnea.    Past Medical History   Diagnosis Date   • Breast cancer (CMS-HCC)    • Fall    • Hypertension    • Bradycardia    • Chronic pain    • GERD (gastroesophageal reflux disease)    • Arthritis    • Urinary incontinence    • Heart burn    • Indigestion    • Bowel habit changes    • Dental disorder    • Breath shortness    • Cancer (CMS-[facility]) 2003     left breast lumpectomy   • Cancer (CMS-[facility]) 2007     bladder ca   • Cancer (CMS-[facility]) 2016     basal cell ca to nose   • Myocardial infarct (CMS-[facility]) 2002   • Pneumonia 2014   • Cold 4/2016   • Stroke (CMS-[facility]) 2001; 2012     no deficits, general weakness   • Cataract      patria IOL   • Hx of Clostridium difficile infection 2015   • CAD (coronary artery disease)      CABG    • Aortic stenosis      moderate on echo in '16     Past Surgical History   Procedure Laterality Date   • Cholecystectomy     • Lumpectomy       L   • Bladder biopsy     • Pr revise total hip replacement  3/24/2015     R   • Pr revise acetabular part of total hip  2010     L   • Orbital fracture orif Left 7/6/2015     Procedure: ORBITAL FRACTURE ORIF;  Surgeon: Fabrice Daniel Jr., M.D.;  Location: SURGERY [facility];  Service:    • Femur orif Right 8/10/2015     Procedure: FEMUR ORIF;  Surgeon: Umer Guevara M.D.;  Location: SURGERY [facility];  Service:     • Hip arthroplasty total Left    • Angiogram     • Other cardiac surgery  2002     CABG   • Other cardiac surgery  2012     cardiac cath with stent placement   • Gyn surgery  1961     hysterectomy   • Orbital fracture orif Left 5/23/2016     Procedure: ORBITAL FRACTURE ORIF FOR: LATERAL CANTHOPEXY;  Surgeon: Fabrice Daniel Jr., M.D.;  Location: SURGERY [facility];  Service:    • Multiple coronary artery bypass     • Cardiac cath       History reviewed. No pertinent family history.  History   Smoking status   • Never Smoker    Smokeless tobacco   • Never Used     No Known Allergies  Outpatient Encounter Prescriptions as of 7/19/2017   Medication Sig Dispense Refill   • clopidogrel (PLAVIX) 75 MG Tab Take 1 Tab by mouth every day. 90 Tab 3   • lisinopril (PRINIVIL) 20 MG Tab Take 1 Tab by mouth 2 times a day. 60 Tab 3   • metoprolol (LOPRESSOR) 25 MG Tab Take 1 Tab by mouth 2 times a day. 180 Tab 3   • cyanocobalamin (VITAMIN B-12) 500 MCG Tab Take 500 mcg by mouth every day.     • multivitamin (THERAGRAN) Tab Take 1 Tab by mouth every day.     • furosemide (LASIX) 20 MG Tab Take 1 Tab by mouth every day. 30 Tab 6   • atorvastatin (LIPITOR) 20 MG Tab Take 20 mg by mouth every evening.     • calcium citrate (CALCITRATE) 950 MG Tab Take 950 mg by mouth 2 times a day.     • gabapentin (NEURONTIN) 600 MG tablet Take 600 mg by mouth 3 times a day.  5   • meloxicam (MOBIC) 15 MG tablet Take 15 mg by mouth See Admin Instructions. takes Mon thru Fri  5   • hydrocodone/acetaminophen (NORCO)  MG Tab Take 1 Tab by mouth every 8 hours as needed. AS NEEDED FOR PAIN  0   • aspirin EC (ECOTRIN) 81 MG Tablet Delayed Response Take 1 Tab by mouth every day. 30 Tab 1   • citalopram (CELEXA) 20 MG TABS Take 20 mg by mouth every day.     • carisoprodol (SOMA) 350 MG TABS Take 350 mg by mouth 2 times a day as needed for Muscle Spasms.     • temazepam (RESTORIL) 15 MG CAPS Take 15 mg by mouth at bedtime as needed for  "Sleep.       No facility-administered encounter medications on file as of 7/19/2017.     Review of Systems   Constitutional: Positive for malaise/fatigue. Negative for fever.   Respiratory: Positive for shortness of breath. Negative for cough.         Exertional moderate   Cardiovascular: Positive for leg swelling. Negative for chest pain, palpitations, orthopnea, claudication and PND.        Mild edema both legs   Gastrointestinal: Negative for abdominal pain.   Musculoskeletal: Negative for joint pain and falls.   Neurological: Positive for weakness. Negative for dizziness.        Wheelchair for mobility at times   All other systems reviewed and are negative.       Objective:   /80 mmHg  Pulse 51  Ht 1.6 m (5' 2.99\")  Wt 90.719 kg (200 lb)  BMI 35.44 kg/m2  SpO2 93%    Physical Exam   Constitutional: She is oriented to person, place, and time. She appears well-developed. No distress.   Obese   HENT:   Head: Normocephalic and atraumatic.   Eyes: EOM are normal.   Neck: Normal range of motion. No JVD present.   Cardiovascular: Regular rhythm, intact distal pulses and normal pulses.  Bradycardia present.    Murmur heard.   Systolic murmur is present with a grade of 2/6   Pulses:       Carotid pulses are on the right side with bruit, and on the left side with bruit.  Pulmonary/Chest: Effort normal and breath sounds normal. No respiratory distress.   Abdominal: Soft. Bowel sounds are normal.   Musculoskeletal: She exhibits edema.   Wheelchair today for mobility-cane at home; bilateral LE edema   Neurological: She is alert and oriented to person, place, and time.   Skin: Skin is warm and dry.   Psychiatric: She has a normal mood and affect.   Nursing note and vitals reviewed.    Reviewed with patient  Lab Results   Component Value Date/Time    CHOLESTEROL, 04/19/2017 10:00 AM    LDL 79 04/19/2017 10:00 AM    HDL 49 04/19/2017 10:00 AM    TRIGLYCERIDES 128 04/19/2017 10:00 AM       Lab Results "   Component Value Date/Time    SODIUM 142 04/19/2017 10:00 AM    POTASSIUM 4.8 04/19/2017 10:00 AM    CHLORIDE 103 04/19/2017 10:00 AM    CO2 24 04/19/2017 10:00 AM    GLUCOSE 107* 04/19/2017 10:00 AM    BUN 18 04/19/2017 10:00 AM    CREATININE 1.18* 04/19/2017 10:00 AM    BUN-CREATININE RATIO 15 04/19/2017 10:00 AM     Lab Results   Component Value Date/Time    ALKALINE PHOSPHATASE 95 04/19/2017 10:00 AM    AST(SGOT) 19 04/19/2017 10:00 AM    ALT(SGPT) 12 04/19/2017 10:00 AM    TOTAL BILIRUBIN 0.4 04/19/2017 10:00 AM      3/4/16 MPI IMPRESSIONS  PVCs on baseline ECG.  No evidence of significant jeopardized viable myocardium or prior myocardial   infarction.  Normal left ventricular wall motion. LV ejection fraction = 71%.  123    3/4/16 CAROTID DUPLEX CONCLUSIONS  <50% bilateral ICA stenoses  Nl subclavians and vertebrals    2017 ECHO CONCLUSIONS  Compared to the report of the prior study done 7/4/2015 -  there has   been no significant change.   Normal left ventricular size and systolic function.  Left ventricular ejection fraction is visually estimated to be 65%.  Grade I diastolic dysfunction.  Moderate aortic stenosis.  Transvalvular gradients are - Peak: 34 mmHg, Mean: 21 mmHg.  Mild aortic insufficiency.    Assessment:     1. CAD     2. Bilateral carotid artery stenosis     3. Moderate aortic stenosis  ECHO-REST/STRESS W/ CONTRAST   4. Pure hypercholesterolemia     5. Essential hypertension     6. Diastolic dysfunction       Medical Decision Making:  Today's Assessment / Status / Plan:     1. CAD, no angina, NGUYEN mild-moderate. Continue ASA, plavix, metoprolol, and lipitor. Follow clinically.    2. Syncope and recurrent falls with lightheadedness, improved with medication changes. Remains bradycardic-reduce metoprolol to 12.5 mg BID. Dobutamine stress to assess AV.    3. HTN, good control. Continue lisinopril 20 mg BID, lasix 20 mg QD, and metoprolol 12.5 mg BID. Take BP/HR daily with med change. Okay to take  extra lasix 20 mg in the AM with worsening NGUYEN/edema.    4. HLD, Lipitor. Good control.    5. Carotid artery stenosis, mild < 50%. ASA, plavix, and Lipitor.  Follow with duplex prior to TAVR if warranted.    6. CKD, stable. Followed by nephro.    7. Moderate aortic stenosis, moderate NGUYEN. Dobutamine stress echo ordered for borderline gradients and worsening symptoms. If meets criteria, follow up with Dr. Elder for potential TAVR.    FU in clinic in 1-2 weeks with BRENNA for review of dobutamine stress test and plan of care for potential TAVR; will call with stress test results    Patient verbalizes understanding and agrees with the plan of care.     Collaborating MD: Jose Alejandro MARRERO

## 2017-07-19 NOTE — TELEPHONE ENCOUNTER
----- Message from Jose Alcantara sent at 7/18/2017  1:58 PM PDT -----  Regarding: Pt son calling about coding issue with [employer]   Contact: [phone]  TW/Zack    Pt son Mathew Painting is calling in regards to 6/20 phone call. [facility] [employer] did not receive a call about changing diagnosis code on lab order. He would please like a call back and can be reached at [phone]        Nurses note on 6/20/17 noted ICD codes to be sent to [employer].  Apparently patient did not get these in the mail.   She has an appointment with ROSA MEADE tomorrow and will  copy of note to mail to [employer] at ;  johnnie

## 2017-08-07 ENCOUNTER — HOSPITAL ENCOUNTER (OUTPATIENT)
Dept: CARDIOLOGY | Facility: MEDICAL CENTER | Age: 82
End: 2017-08-07
Attending: NURSE PRACTITIONER
Payer: MEDICARE

## 2017-08-07 VITALS — WEIGHT: 199.96 LBS | BODY MASS INDEX: 35.43 KG/M2

## 2017-08-07 LAB — LV EJECT FRACT  99904: 65

## 2017-08-07 PROCEDURE — 93350 STRESS TTE ONLY: CPT

## 2017-08-07 PROCEDURE — 93350 STRESS TTE ONLY: CPT | Mod: 26 | Performed by: INTERNAL MEDICINE

## 2017-08-07 NOTE — PROGRESS NOTES
Dobutamine Stress Test    PRE-PROCEDURE:   Clinical History: Completed  Medical Diagnosis: {MEDICAL DIAGNOSIS ( STRESS TEST): AV disease  Pertinent Previous Physical Findings: Murmurs  History of Symptoms: Shortness of Breath  Recent Health: Illnesses  Other Habits: Caffeine  Family History: Cardiac  NPO Status: yes    Medications Taken Day of Test: lisinopril, lasix, asa, Plavix, and Lipitor   Consent: Completed  Time Out: Completed    ASSESSMENT:  Neuro: Within Normal Limits  Pulse Rate: Regular   Resting Blood Pressure: 133/66  Lung Auscultation: Clear to Auscultation, Good Aeration, No Wheezes, Rales or Rhonchi  Palpation and Auscultation of Carotid Arteries: done  Auscultation of Heart Sounds: Midsystolic Click  Palpation and Inspection of Lower Extremities: No edema     CONTRAINDICATIONS: none    PROCEDURE PREPARATION:  IV started: # 20 gauge IV started in R Antecubital   Explanation of procedure to patient: done  Crash cart: present    PRE-PROCEDURE:  HR: 80  BP: 133/66    DURING PROCEDURE:  Please refer to flow sheet for vital signs during procedure.  Please refer to MAR for medications.     POST PROCEDURE  HR: 119  BP:137/49  IV Discontinued: Completed    TEST TERMINATION: Patient started to experience jaw pain and dyspnea. Test was terminated at 10 mcg/kg/min. Achieved 85% of Maximum Predicted Heart Rate. Peak 51, mean 30. Velocity 3.6 m/s. After stop of dobutamine, after couple seconds velocity reached 4 m/s.     During recovery, upon sitting up patients blood pressure dropped to SBP 77 briefly,  Patient was lightheaded, and brought back to lying position. Then after 5 minute patient blood pressure came back to baseline.     She was discharged in stable condition, completely asymptomatic

## 2017-08-09 ENCOUNTER — TELEPHONE (OUTPATIENT)
Dept: CARDIOLOGY | Facility: MEDICAL CENTER | Age: 82
End: 2017-08-09

## 2017-08-09 NOTE — TELEPHONE ENCOUNTER
Pt notified of results per SC. She will FU with BRENNA on 8/23 to discuss further.     ECHO-REST/STRESS W/O CONTRAST   Status: Edited Result - FINAL     Visible to patient:  Romat               Notes Recorded by CAESAR Santacruz on 8/8/2017 at 12:55 PM  FU with BRENNA with plan of care with dobutamine stress test. Gradients are borderline but most likely would benefit from TAVR. SC

## 2017-08-23 ENCOUNTER — TELEPHONE (OUTPATIENT)
Dept: CARDIOLOGY | Facility: MEDICAL CENTER | Age: 82
End: 2017-08-23

## 2017-08-23 ENCOUNTER — OFFICE VISIT (OUTPATIENT)
Dept: CARDIOLOGY | Facility: MEDICAL CENTER | Age: 82
End: 2017-08-23
Payer: MEDICARE

## 2017-08-23 VITALS
HEIGHT: 63 IN | DIASTOLIC BLOOD PRESSURE: 70 MMHG | OXYGEN SATURATION: 94 % | WEIGHT: 195 LBS | SYSTOLIC BLOOD PRESSURE: 148 MMHG | BODY MASS INDEX: 34.55 KG/M2 | HEART RATE: 63 BPM

## 2017-08-23 DIAGNOSIS — I65.23 BILATERAL CAROTID ARTERY STENOSIS: ICD-10-CM

## 2017-08-23 DIAGNOSIS — I35.0 SEVERE AORTIC STENOSIS: ICD-10-CM

## 2017-08-23 DIAGNOSIS — Z95.1 HX OF CABG: ICD-10-CM

## 2017-08-23 DIAGNOSIS — R42 LIGHTHEADEDNESS: ICD-10-CM

## 2017-08-23 DIAGNOSIS — Z95.5 STENTED CORONARY ARTERY: ICD-10-CM

## 2017-08-23 DIAGNOSIS — G45.9 TRANSIENT CEREBRAL ISCHEMIA, UNSPECIFIED TYPE: ICD-10-CM

## 2017-08-23 DIAGNOSIS — I25.10 CORONARY ARTERY DISEASE DUE TO CALCIFIED CORONARY LESION: ICD-10-CM

## 2017-08-23 DIAGNOSIS — R06.02 SHORTNESS OF BREATH: ICD-10-CM

## 2017-08-23 DIAGNOSIS — I50.32 CHRONIC DIASTOLIC CHF (CONGESTIVE HEART FAILURE) (HCC): ICD-10-CM

## 2017-08-23 DIAGNOSIS — R42 DIZZINESS: ICD-10-CM

## 2017-08-23 DIAGNOSIS — I25.84 CORONARY ARTERY DISEASE DUE TO CALCIFIED CORONARY LESION: ICD-10-CM

## 2017-08-23 LAB — EKG IMPRESSION: NORMAL

## 2017-08-23 PROCEDURE — 93000 ELECTROCARDIOGRAM COMPLETE: CPT | Performed by: INTERNAL MEDICINE

## 2017-08-23 PROCEDURE — 99205 OFFICE O/P NEW HI 60 MIN: CPT | Mod: 25 | Performed by: INTERNAL MEDICINE

## 2017-08-23 RX ORDER — PROMETHAZINE HYDROCHLORIDE 25 MG/1
25 TABLET ORAL EVERY 6 HOURS PRN
COMMUNITY
End: 2017-08-29

## 2017-08-23 RX ORDER — NITROGLYCERIN 0.4 MG/1
0.4 TABLET SUBLINGUAL
Status: ON HOLD | COMMUNITY
End: 2017-09-25

## 2017-08-23 RX ORDER — LIDOCAINE 50 MG/G
OINTMENT TOPICAL PRN
COMMUNITY
End: 2017-08-29

## 2017-08-23 RX ORDER — DICYCLOMINE HCL 20 MG
20 TABLET ORAL EVERY 6 HOURS
COMMUNITY
End: 2017-08-29

## 2017-08-23 ASSESSMENT — ENCOUNTER SYMPTOMS
BLURRED VISION: 0
WEAKNESS: 1
PND: 0
ORTHOPNEA: 0
ABDOMINAL PAIN: 0
INSOMNIA: 0
PALPITATIONS: 0
MYALGIAS: 0
CHILLS: 0
DIZZINESS: 1
LOSS OF CONSCIOUSNESS: 0
FEVER: 0
SHORTNESS OF BREATH: 1

## 2017-08-23 NOTE — Clinical Note
[facility]   [address]  [place] [address]  Phone: [phone]  Fax: [phone]              Silvia Orellana  6/19/1931    Encounter Date: 8/23/2017    Sherif Elder M.D.          PROGRESS NOTE:  Subjective:   Silvia Orellana is a 86 y.o. female who presents today for interventional consult/TAVR evaluation requested by Pamela Deal for severe symptomatic aortic stenosis.      Thank you for allowing me to evaluate Mrs. Orellana, who as you know is a 86 year old female with severe symptomatic aortic stenosis. She was well until eight months years ago when she began to experience progressive fatigue, shortness of breath, dyspnea on exertion, chest and jaw pain and dizziness. She denies syncope.     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   • Myocardial infarct (CMS-[facility]) 2002   • Pneumonia 2014   • Cold 4/2016   • Stroke (CMS-[facility]) 2001; 2012     no deficits, general weakness   • Cataract      patria IOL   • Hx of Clostridium difficile infection 2015   • CAD (coronary artery disease)      CABG    • Aortic stenosis      moderate on echo in '16     Past Surgical History   Procedure Laterality Date   • Cholecystectomy     • Lumpectomy       L   • Bladder biopsy     • Pr revise total hip replacement  3/24/2015     R   • Pr revise acetabular part of total hip  2010     L   • Orbital fracture orif Left 7/6/2015     Procedure: ORBITAL FRACTURE ORIF;  Surgeon: Fabrice Daniel Jr., M.D.;  Location: SURGERY [facility];   Service:    • Femur orif Right 8/10/2015     Procedure: FEMUR ORIF;  Surgeon: Umer Guevara M.D.;  Location: SURGERY [facility];  Service:    • Hip arthroplasty total Left    • Angiogram     • Other cardiac surgery  2002     CABG   • Other cardiac surgery  2012     cardiac cath with stent placement   • Gyn surgery  1961     hysterectomy   • Orbital fracture orif Left 5/23/2016     Procedure: ORBITAL FRACTURE ORIF FOR: LATERAL CANTHOPEXY;  Surgeon: Fabrice Daniel Jr., M.D.;  Location: SURGERY [facility];  Service:    • Multiple coronary artery bypass     • Cardiac cath       History reviewed. No pertinent family history.  History   Smoking status   • Never Smoker    Smokeless tobacco   • Never Used     No Known Allergies     Medications reviewed.    Outpatient Encounter Prescriptions as of 8/23/2017   Medication Sig Dispense Refill   • dicyclomine (BENTYL) 20 MG Tab Take 20 mg by mouth every 6 hours. 3 TIMES A DAY AS NEEDED     • lidocaine (XYLOCAINE) 5 % Ointment Apply  to affected area(s) as needed.     • nitroglycerin (NITROSTAT) 0.4 MG SL Tab Place 0.4 mg under tongue every 5 minutes as needed for Chest Pain.     • promethazine (PHENERGAN) 25 MG Tab Take 25 mg by mouth every 6 hours as needed for Nausea/Vomiting.     • clopidogrel (PLAVIX) 75 MG Tab Take 1 Tab by mouth every day. 90 Tab 3   • lisinopril (PRINIVIL) 20 MG Tab Take 1 Tab by mouth 2 times a day. 60 Tab 3   • metoprolol (LOPRESSOR) 25 MG Tab Take 1 Tab by mouth 2 times a day. 180 Tab 3   • cyanocobalamin (VITAMIN B-12) 500 MCG Tab Take 5,000 mcg by mouth every day.     • multivitamin (THERAGRAN) Tab Take 1 Tab by mouth every day.     • furosemide (LASIX) 20 MG Tab Take 1 Tab by mouth every day. 30 Tab 6   • atorvastatin (LIPITOR) 20 MG Tab Take 20 mg by mouth every evening.     • calcium citrate (CALCITRATE) 950 MG Tab Take 950 mg by mouth 2 times a day.     • gabapentin (NEURONTIN) 600 MG tablet Take 600 mg by mouth 3 times a day.   "5   • meloxicam (MOBIC) 15 MG tablet Take 15 mg by mouth See Admin Instructions. takes Mon thru Fri  5   • hydrocodone/acetaminophen (NORCO)  MG Tab Take 1 Tab by mouth every 8 hours as needed. AS NEEDED FOR PAIN  0   • aspirin EC (ECOTRIN) 81 MG Tablet Delayed Response Take 1 Tab by mouth every day. 30 Tab 1   • citalopram (CELEXA) 20 MG TABS Take 20 mg by mouth every day.     • carisoprodol (SOMA) 350 MG TABS Take 350 mg by mouth 2 times a day as needed for Muscle Spasms.     • [DISCONTINUED] temazepam (RESTORIL) 15 MG CAPS Take 15 mg by mouth at bedtime as needed for Sleep.       No facility-administered encounter medications on file as of 8/23/2017.     Review of Systems   Constitutional: Positive for malaise/fatigue. Negative for fever and chills.   HENT: Negative for congestion.    Eyes: Negative for blurred vision.   Respiratory: Positive for shortness of breath.    Cardiovascular: Positive for chest pain. Negative for palpitations, orthopnea, leg swelling and PND.   Gastrointestinal: Negative for abdominal pain.   Genitourinary: Positive for frequency. Negative for dysuria.   Musculoskeletal: Positive for joint pain. Negative for myalgias.   Skin: Negative for rash.   Neurological: Positive for dizziness and weakness. Negative for loss of consciousness.   Psychiatric/Behavioral: The patient does not have insomnia.         Objective:   /70 mmHg  Pulse 63  Ht 1.6 m (5' 2.99\")  Wt 88.451 kg (195 lb)  BMI 34.55 kg/m2  SpO2 94%    Physical Exam   Constitutional: She is oriented to person, place, and time. She appears well-developed and well-nourished.   Using wheelchair.   HENT:   Head: Normocephalic and atraumatic.   Eyes: Conjunctivae are normal. Pupils are equal, round, and reactive to light.   Neck: Normal range of motion. Neck supple.   Cardiovascular: Normal rate and regular rhythm.    Murmur heard.  Pulmonary/Chest: Effort normal and breath sounds normal.   Abdominal: Soft. Bowel sounds " are normal.   Musculoskeletal: Normal range of motion. She exhibits edema.   Neurological: She is alert and oriented to person, place, and time.   Skin: Skin is warm and dry.   Psychiatric: She has a normal mood and affect.     CARDIAC STUDIES/PROCEDURES:    CAROTID ULTRASOUND (03/04/16)  <50% bilateral ICA stenoses   Nl subclavians and vertebrals    DOBUTAMINE STRESS ECHOCARDIOGRAM (08/07/17)  Resting Echo: LVEF 65% with AoV Vmax  of 2.52 m/s; peak gradient 25.5 mmHg and mean 16.11 mmHg  Peak Dobutamine: LVEF 80%, Vmax 4.02 m/s; Peak gradient 64.75 mmHg and mean 36.42 mmHg  No wall motion abnormality    ECHOCARDIOGRAM CONCLUSIONS (04/19/17)  Structurally normal tricuspid valve. Mild tricuspid regurgitation.   Estimated right ventricular systolic pressure  is 40 mmHg.  Normal left ventricular systolic function.   Estimated right ventricular systolic pressure  is 40 mmHg.  Compared to the images of the study done - there has been increase in   mean gradient across the aortic valve but still within moderate   severity of aortic stenosis.    EKG performed on (02/10/16) was reviewed: EKG shows sinus bradycardia.    Assessment:     1. Severe aortic stenosis  EKG   2. CAD     3. Hx of CABG     4. Bilateral carotid artery stenosis     5. Transient cerebral ischemia, unspecified type     6. CKD (chronic kidney disease) stage 3, GFR 30-59 ml/min       Medical Decision Making:  Today's Assessment / Status / Plan:     1. 1. Aortic stenosis: She is symptomatic with her severe aortic stenosis, NYHA class III. She does not appear to be a good surgical candidate due to excessive risk. We will schedule her for cardiac catheterization and refer her to Dr. Min for TAVR. She understands the risks and benefits and agrees with plan.  2. Coronary artery disease with prior 3 vessel coronary bypass graft (LIMA to LAD, KINDRA to PDA, vein graft to OM) in [facility], 2002, ostium of the vein graft  stent placement also in 2012: She is  clinically doing well from coronary standpoint. We will continue with current medical care.  3. Carotid artery stenosis: Clinically stable on medical therapy.  4. History of trans-ischemic attack (2012): She remains clinically stable without any new neurological symptoms.  5. Chronic kidney disease: We will continue to follow labs.  More than 45 minutes of time was spent do review all above information, discuss the option of cardiac catheterization and TAVR including, alternative options, risk and benefits.    The risks, benefits, and alternatives to coronary angiography with IV sedation were discussed in great detail. Specific risks mentioned include bleeding, infection, kidney damage, allergic reaction, cardiac perforation with possible tamponade requiring florentin-cardiocentesis or possible open heart surgery. In addition, we discussed that 10% of patients will experience small to moderate bruising at the side of the arterial puncture. Lastly the risks of heart attack, stroke, and death were discussed; the risks of major complications such as heart attack or stroke caused by the angiogram is less than 1%; the risk of death is approximately 1 in 1000. The patient verbalized understanding of these potential complications and wishes to proceed with this procedure.    We will follow up in one month in valve clinic.    Thank you for this consult.    CC James Lockhart and Joshua Acosta      No Recipients

## 2017-08-23 NOTE — PROGRESS NOTES
Subjective:   Silvia Orellana is a 86 y.o. female who presents today for interventional consult/TAVR evaluation requested by Pamela Deal for severe symptomatic aortic stenosis.      Thank you for allowing me to evaluate Mrs. Orellana, who as you know is a 86 year old female with severe symptomatic aortic stenosis. She was well until eight months years ago when she began to experience progressive fatigue, shortness of breath, dyspnea on exertion, chest and jaw pain and dizziness. She denies syncope.     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   • Myocardial infarct (CMS-[facility]) 2002   • Pneumonia 2014   • Cold 4/2016   • Stroke (CMS-[facility]) 2001; 2012     no deficits, general weakness   • Cataract      patria IOL   • Hx of Clostridium difficile infection 2015   • CAD (coronary artery disease)      CABG    • Aortic stenosis      moderate on echo in '16     Past Surgical History   Procedure Laterality Date   • Cholecystectomy     • Lumpectomy       L   • Bladder biopsy     • Pr revise total hip replacement  3/24/2015     R   • Pr revise acetabular part of total hip  2010     L   • Orbital fracture orif Left 7/6/2015     Procedure: ORBITAL FRACTURE ORIF;  Surgeon: Fabrice Daniel Jr., M.D.;  Location: SURGERY [facility];  Service:    • Femur orif Right 8/10/2015     Procedure: FEMUR ORIF;  Surgeon: Umer Guevara M.D.;  Location: SURGERY [facility];  Service:    • Hip arthroplasty total Left    • Angiogram     • Other cardiac surgery  2002     CABG   • Other cardiac surgery   2012     cardiac cath with stent placement   • Gyn surgery  1961     hysterectomy   • Orbital fracture orif Left 5/23/2016     Procedure: ORBITAL FRACTURE ORIF FOR: LATERAL CANTHOPEXY;  Surgeon: Fabrice Daniel Jr., M.D.;  Location: SURGERY [facility];  Service:    • Multiple coronary artery bypass     • Cardiac cath       History reviewed. No pertinent family history.  History   Smoking status   • Never Smoker    Smokeless tobacco   • Never Used     No Known Allergies     Medications reviewed.    Outpatient Encounter Prescriptions as of 8/23/2017   Medication Sig Dispense Refill   • dicyclomine (BENTYL) 20 MG Tab Take 20 mg by mouth every 6 hours. 3 TIMES A DAY AS NEEDED     • lidocaine (XYLOCAINE) 5 % Ointment Apply  to affected area(s) as needed.     • nitroglycerin (NITROSTAT) 0.4 MG SL Tab Place 0.4 mg under tongue every 5 minutes as needed for Chest Pain.     • promethazine (PHENERGAN) 25 MG Tab Take 25 mg by mouth every 6 hours as needed for Nausea/Vomiting.     • clopidogrel (PLAVIX) 75 MG Tab Take 1 Tab by mouth every day. 90 Tab 3   • lisinopril (PRINIVIL) 20 MG Tab Take 1 Tab by mouth 2 times a day. 60 Tab 3   • metoprolol (LOPRESSOR) 25 MG Tab Take 1 Tab by mouth 2 times a day. 180 Tab 3   • cyanocobalamin (VITAMIN B-12) 500 MCG Tab Take 5,000 mcg by mouth every day.     • multivitamin (THERAGRAN) Tab Take 1 Tab by mouth every day.     • furosemide (LASIX) 20 MG Tab Take 1 Tab by mouth every day. 30 Tab 6   • atorvastatin (LIPITOR) 20 MG Tab Take 20 mg by mouth every evening.     • calcium citrate (CALCITRATE) 950 MG Tab Take 950 mg by mouth 2 times a day.     • gabapentin (NEURONTIN) 600 MG tablet Take 600 mg by mouth 3 times a day.  5   • meloxicam (MOBIC) 15 MG tablet Take 15 mg by mouth See Admin Instructions. takes Mon thru Fri  5   • hydrocodone/acetaminophen (NORCO)  MG Tab Take 1 Tab by mouth every 8 hours as needed. AS NEEDED FOR PAIN  0   • aspirin EC (ECOTRIN) 81 MG Tablet  "Delayed Response Take 1 Tab by mouth every day. 30 Tab 1   • citalopram (CELEXA) 20 MG TABS Take 20 mg by mouth every day.     • carisoprodol (SOMA) 350 MG TABS Take 350 mg by mouth 2 times a day as needed for Muscle Spasms.     • [DISCONTINUED] temazepam (RESTORIL) 15 MG CAPS Take 15 mg by mouth at bedtime as needed for Sleep.       No facility-administered encounter medications on file as of 8/23/2017.     Review of Systems   Constitutional: Positive for malaise/fatigue. Negative for fever and chills.   HENT: Negative for congestion.    Eyes: Negative for blurred vision.   Respiratory: Positive for shortness of breath.    Cardiovascular: Positive for chest pain. Negative for palpitations, orthopnea, leg swelling and PND.   Gastrointestinal: Negative for abdominal pain.   Genitourinary: Positive for frequency. Negative for dysuria.   Musculoskeletal: Positive for joint pain. Negative for myalgias.   Skin: Negative for rash.   Neurological: Positive for dizziness and weakness. Negative for loss of consciousness.   Psychiatric/Behavioral: The patient does not have insomnia.         Objective:   /70 mmHg  Pulse 63  Ht 1.6 m (5' 2.99\")  Wt 88.451 kg (195 lb)  BMI 34.55 kg/m2  SpO2 94%    Physical Exam   Constitutional: She is oriented to person, place, and time. She appears well-developed and well-nourished.   Using wheelchair.   HENT:   Head: Normocephalic and atraumatic.   Eyes: Conjunctivae are normal. Pupils are equal, round, and reactive to light.   Neck: Normal range of motion. Neck supple.   Cardiovascular: Normal rate and regular rhythm.    Murmur heard.  Pulmonary/Chest: Effort normal and breath sounds normal.   Abdominal: Soft. Bowel sounds are normal.   Musculoskeletal: Normal range of motion. She exhibits edema.   Neurological: She is alert and oriented to person, place, and time.   Skin: Skin is warm and dry.   Psychiatric: She has a normal mood and affect.     CARDIAC " STUDIES/PROCEDURES:    CAROTID ULTRASOUND (03/04/16)  <50% bilateral ICA stenoses   Nl subclavians and vertebrals    DOBUTAMINE STRESS ECHOCARDIOGRAM (08/07/17)  Resting Echo: LVEF 65% with AoV Vmax  of 2.52 m/s; peak gradient 25.5 mmHg and mean 16.11 mmHg  Peak Dobutamine: LVEF 80%, Vmax 4.02 m/s; Peak gradient 64.75 mmHg and mean 36.42 mmHg  No wall motion abnormality    ECHOCARDIOGRAM CONCLUSIONS (04/19/17)  Structurally normal tricuspid valve. Mild tricuspid regurgitation.   Estimated right ventricular systolic pressure  is 40 mmHg.  Normal left ventricular systolic function.   Estimated right ventricular systolic pressure  is 40 mmHg.  Compared to the images of the study done - there has been increase in   mean gradient across the aortic valve but still within moderate   severity of aortic stenosis.    EKG performed on (02/10/16) was reviewed: EKG shows sinus bradycardia.    Assessment:     1. Severe aortic stenosis  EKG   2. CAD     3. Hx of CABG     4. Bilateral carotid artery stenosis     5. Transient cerebral ischemia, unspecified type     6. CKD (chronic kidney disease) stage 3, GFR 30-59 ml/min       Medical Decision Making:  Today's Assessment / Status / Plan:     1. 1. Aortic stenosis: She is symptomatic with her severe aortic stenosis, NYHA class III. She does not appear to be a good surgical candidate due to excessive risk. We will schedule her for cardiac catheterization and refer her to Dr. Min for TAVR. She understands the risks and benefits and agrees with plan.  2. Coronary artery disease with prior 3 vessel coronary bypass graft (LIMA to LAD, KINDRA to PDA, vein graft to OM) in [facility], 2002, ostium of the vein graft  stent placement also in 2012: She is clinically doing well from coronary standpoint. We will continue with current medical care.  3. Carotid artery stenosis: Clinically stable on medical therapy.  4. History of trans-ischemic attack (2012): She remains clinically stable  without any new neurological symptoms.  5. Chronic kidney disease: We will continue to follow labs.  More than 45 minutes of time was spent do review all above information, discuss the option of cardiac catheterization and TAVR including, alternative options, risk and benefits.    The risks, benefits, and alternatives to coronary angiography with IV sedation were discussed in great detail. Specific risks mentioned include bleeding, infection, kidney damage, allergic reaction, cardiac perforation with possible tamponade requiring florentin-cardiocentesis or possible open heart surgery. In addition, we discussed that 10% of patients will experience small to moderate bruising at the side of the arterial puncture. Lastly the risks of heart attack, stroke, and death were discussed; the risks of major complications such as heart attack or stroke caused by the angiogram is less than 1%; the risk of death is approximately 1 in 1000. The patient verbalized understanding of these potential complications and wishes to proceed with this procedure.    We will follow up in one month in valve clinic.    Thank you for this consult.    CC James Lockhart and Joshua Acosta

## 2017-08-23 NOTE — Clinical Note
[facility]   [address], [address]  [place] [address]  Phone: [phone]  Fax: [phone]              Silvia Orellana  6/19/1931    Encounter Date: 8/23/2017    Sherif Edler M.D.          PROGRESS NOTE:  Subjective:   Silvia Orellana is a 86 y.o. female who presents today for interventional consult/TAVR evaluation requested by Pamela Deal for severe symptomatic aortic stenosis.      Thank you for allowing me to evaluate Mrs. Orellana, who as you know is a 86 year old female with severe symptomatic aortic stenosis.     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   • Myocardial infarct (CMS-HCC) 2002   • Pneumonia 2014   • Cold 4/2016   • Stroke (CMS-HCC) 2001; 2012     no deficits, general weakness   • Cataract      patria IOL   • Hx of Clostridium difficile infection 2015   • CAD (coronary artery disease)      CABG    • Aortic stenosis      moderate on echo in '16     Past Surgical History   Procedure Laterality Date   • Cholecystectomy     • Lumpectomy       L   • Bladder biopsy     • Pr revise total hip replacement  3/24/2015     R   • Pr revise acetabular part of total hip  2010     L   • Orbital fracture orif Left 7/6/2015     Procedure: ORBITAL FRACTURE ORIF;  Surgeon: Fabrice Daniel Jr., M.D.;  Location: SURGERY [facility];  Service:    • Femur orif Right 8/10/2015     Procedure: FEMUR ORIF;  Surgeon: Umer Guevara M.D.;  Location: SURGERY [facility];  Service:    • Hip arthroplasty total Left    •  Angiogram     • Other cardiac surgery  2002     CABG   • Other cardiac surgery  2012     cardiac cath with stent placement   • Gyn surgery  1961     hysterectomy   • Orbital fracture orif Left 5/23/2016     Procedure: ORBITAL FRACTURE ORIF FOR: LATERAL CANTHOPEXY;  Surgeon: Fabrice Daniel Jr., M.D.;  Location: SURGERY [facility];  Service:    • Multiple coronary artery bypass     • Cardiac cath       History reviewed. No pertinent family history.  History   Smoking status   • Never Smoker    Smokeless tobacco   • Never Used     No Known Allergies     Medications reviewed.    Outpatient Encounter Prescriptions as of 8/23/2017   Medication Sig Dispense Refill   • dicyclomine (BENTYL) 20 MG Tab Take 20 mg by mouth every 6 hours. 3 TIMES A DAY AS NEEDED     • lidocaine (XYLOCAINE) 5 % Ointment Apply  to affected area(s) as needed.     • nitroglycerin (NITROSTAT) 0.4 MG SL Tab Place 0.4 mg under tongue every 5 minutes as needed for Chest Pain.     • promethazine (PHENERGAN) 25 MG Tab Take 25 mg by mouth every 6 hours as needed for Nausea/Vomiting.     • clopidogrel (PLAVIX) 75 MG Tab Take 1 Tab by mouth every day. 90 Tab 3   • lisinopril (PRINIVIL) 20 MG Tab Take 1 Tab by mouth 2 times a day. 60 Tab 3   • metoprolol (LOPRESSOR) 25 MG Tab Take 1 Tab by mouth 2 times a day. 180 Tab 3   • cyanocobalamin (VITAMIN B-12) 500 MCG Tab Take 5,000 mcg by mouth every day.     • multivitamin (THERAGRAN) Tab Take 1 Tab by mouth every day.     • furosemide (LASIX) 20 MG Tab Take 1 Tab by mouth every day. 30 Tab 6   • atorvastatin (LIPITOR) 20 MG Tab Take 20 mg by mouth every evening.     • calcium citrate (CALCITRATE) 950 MG Tab Take 950 mg by mouth 2 times a day.     • gabapentin (NEURONTIN) 600 MG tablet Take 600 mg by mouth 3 times a day.  5   • meloxicam (MOBIC) 15 MG tablet Take 15 mg by mouth See Admin Instructions. takes Mon thru Fri  5   • hydrocodone/acetaminophen (NORCO)  MG Tab Take 1 Tab by mouth every 8  "hours as needed. AS NEEDED FOR PAIN  0   • aspirin EC (ECOTRIN) 81 MG Tablet Delayed Response Take 1 Tab by mouth every day. 30 Tab 1   • citalopram (CELEXA) 20 MG TABS Take 20 mg by mouth every day.     • carisoprodol (SOMA) 350 MG TABS Take 350 mg by mouth 2 times a day as needed for Muscle Spasms.     • [DISCONTINUED] temazepam (RESTORIL) 15 MG CAPS Take 15 mg by mouth at bedtime as needed for Sleep.       No facility-administered encounter medications on file as of 8/23/2017.     Review of Systems   Constitutional: Negative for fever and chills.   HENT: Negative for congestion.    Eyes: Negative for blurred vision.   Respiratory: Negative for shortness of breath.    Cardiovascular: Negative for chest pain, palpitations, orthopnea, leg swelling and PND.   Gastrointestinal: Negative for abdominal pain.   Genitourinary: Negative for dysuria.   Musculoskeletal: Negative for myalgias and joint pain.   Skin: Negative for rash.   Neurological: Negative for dizziness and loss of consciousness.   Psychiatric/Behavioral: The patient does not have insomnia.         Objective:   /70 mmHg  Pulse 63  Ht 1.6 m (5' 2.99\")  Wt 88.451 kg (195 lb)  BMI 34.55 kg/m2  SpO2 94%    Physical Exam   Constitutional: She is oriented to person, place, and time. She appears well-developed and well-nourished.   HENT:   Head: Normocephalic and atraumatic.   Eyes: Conjunctivae are normal. Pupils are equal, round, and reactive to light.   Neck: Normal range of motion. Neck supple.   Cardiovascular: Normal rate and regular rhythm.    Murmur heard.  Pulmonary/Chest: Effort normal and breath sounds normal.   Abdominal: Soft. Bowel sounds are normal.   Musculoskeletal: Normal range of motion. She exhibits no edema.   Neurological: She is alert and oriented to person, place, and time.   Skin: Skin is warm and dry.   Psychiatric: She has a normal mood and affect.     CARDIAC STUDIES/PROCEDURES:    CAROTID ULTRASOUND (03/04/16)  <50% " bilateral ICA stenoses   Nl subclavians and vertebrals    DOBUTAMINE STRESS ECHOCARDIOGRAM (04/19/17)  Resting Echo: LVEF 65% with AoV Vmax  of 2.52 m/s; peak gradient 25.5 mmHg and mean 16.11 mmHg  Peak Dobutamine: LVEF 80%, Vmax 4.02 m/s; Peak gradient 64.75 mmHg and mean 36.42 mmHg  No wall motion abnormality    ECHOCARDIOGRAM CONCLUSIONS (08/07/17)  Structurally normal tricuspid valve. Mild tricuspid regurgitation.   Estimated right ventricular systolic pressure  is 40 mmHg.  Normal left ventricular systolic function.   Estimated right ventricular systolic pressure  is 40 mmHg.  Compared to the images of the study done - there has been increase in   mean gradient across the aortic valve but still within moderate   severity of aortic stenosis.    EKG performed on (02/10/16) was reviewed: EKG shows sinus bradycardia.    Assessment:     1. Severe aortic stenosis  EKG   2. CAD     3. Hx of CABG     4. Bilateral carotid artery stenosis     5. Transient cerebral ischemia, unspecified type     6. CKD (chronic kidney disease) stage 3, GFR 30-59 ml/min       Medical Decision Making:  Today's Assessment / Status / Plan:     1. Aortic stenosis: His aortic stenosis has reached severe status on his recent echocardiogram and he is symptomatic, NYHA class ***. We will schedule cardiac catheterization and refer him to Dr. Min. He understands the risks and benefits and agrees with plan.  2. Coronary artery disease with prior coronary bypass graft: He is clinically doing well. We will continue with current medical care.  3. Carotid artery plaque/stenosis: Clinically stable on medical therapy.  4. History of trans-ischemic attack: She remains clinically stable without any new neurological symptoms.  5. Chronic kidney disease: We will continue to follow labs.  More than 45 minutes of time was spent do review all above information, discuss the option of cardiac catheterization and TAVR including, alternative options, risk and  benefits.    We will follow up in one month in valve clinic.    Thank you for this consult.    CC Joshua Acosta         No Recipients

## 2017-08-23 NOTE — MR AVS SNAPSHOT
"        Silvia Hendricksonister   2017 12:40 PM   Office Visit   MRN: [id number]    Department:  [facility]   Dept Phone:  [phone]    Description:  Female : 1931   Provider:  Sherif Elder M.D.           Reason for Visit     New Patient TAVR      Allergies as of 2017     No Known Allergies      You were diagnosed with     Severe aortic stenosis   [571534]       Coronary artery disease due to calcified coronary lesion   [6090458]       Hx of CABG   [295385]       Stented coronary artery   [048182]       Chronic diastolic CHF (congestive heart failure) (CMS-HCC)   [224610]       Bilateral carotid artery stenosis   [061305]       Transient cerebral ischemia, unspecified type   [7786260]         Vital Signs     Blood Pressure Pulse Height Weight Body Mass Index Oxygen Saturation    148/70 mmHg 63 1.6 m (5' 2.99\") 88.451 kg (195 lb) 34.55 kg/m2 94%    Smoking Status                   Never Smoker            Basic Information     Date Of Birth Sex Race Ethnicity Preferred Language    1931 Female White Non- English      Your appointments     Aug 29, 2017  1:45 PM   Scheduled Pre Admission with PREADMIT 2   PREADMIT TESTS [facility] (--)    [address]  [place] [address] [phone]            Sep 26, 2017  2:00 PM   TAVR Established Patient with Sherif Elder M.D.   [facility] (--)    [address]  [place] [address] [phone]              Problem List              ICD-10-CM Priority Class Noted - Resolved    Hyperlipidemia E78.5 Medium  3/3/2015 - Present    CKD (chronic kidney disease) stage 3, GFR 30-59 ml/min N18.3 Low  3/3/2015 - Present    Breast cancer, Left radiation therapy C50.919 Low  3/3/2015 - Present    Hip pain M25.559 Low  3/3/2015 - Present    History of hip fracture Z87.81 Low  2015 - Present    HTN (hypertension) I10 Medium  2015 - Present    Orbital fracture (CMS-HCC) S02.80XA Low  2015 - Present "    GIB (gastrointestinal bleeding) K92.2 Low  7/2/2015 - Present    Fall W19.XXXA Low  7/3/2015 - Present    CAD I25.10, I25.84 Medium  7/21/2015 - Present    Carotid artery stenosis I65.29 Medium  7/21/2015 - Present    Femur fracture (CMS-HCC) S72.90XA Low  8/8/2015 - Present    Diastolic dysfunction I51.9 Medium  3/7/2016 - Present    Severe aortic stenosis I35.0   8/23/2017 - Present    Hx of CABG Z95.1   8/23/2017 - Present    TIA (transient ischemic attack) G45.9   8/23/2017 - Present    Stented coronary artery Z95.5   8/23/2017 - Present    Chronic diastolic CHF (congestive heart failure) (CMS-HCC) I50.32   8/23/2017 - Present      Health Maintenance        Date Due Completion Dates    IMM DTaP/Tdap/Td Vaccine (1 - Tdap) 6/19/1950 ---    PAP SMEAR 6/19/1952 ---    MAMMOGRAM 6/19/1971 ---    COLONOSCOPY 6/19/1981 ---    IMM ZOSTER VACCINE 6/19/1991 ---    BONE DENSITY 6/19/1996 ---    IMM PNEUMOCOCCAL 65+ (ADULT) LOW/MEDIUM RISK SERIES (2 of 2 - PCV13) 10/1/2015 10/1/2014    IMM INFLUENZA (1) 9/1/2017 10/1/2014            Results       Current Immunizations     Influenza TIV (IM) 10/1/2014    Pneumococcal polysaccharide vaccine (PPSV-23) 10/1/2014      Below and/or attached are the medications your provider expects you to take. Review all of your home medications and newly ordered medications with your provider and/or pharmacist. Follow medication instructions as directed by your provider and/or pharmacist. Please keep your medication list with you and share with your provider. Update the information when medications are discontinued, doses are changed, or new medications (including over-the-counter products) are added; and carry medication information at all times in the event of emergency situations     Allergies:  No Known Allergies          Medications  Valid as of: August 23, 2017 -  2:50 PM    Generic Name Brand Name Tablet Size Instructions for use    Aspirin (Tablet Delayed Response) ECOTRIN 81 MG Take  1 Tab by mouth every day.        Atorvastatin Calcium (Tab) LIPITOR 20 MG Take 20 mg by mouth every evening.        Calcium Citrate (Tab) CALCITRATE 950 MG Take 950 mg by mouth 2 times a day.        Carisoprodol (Tab) SOMA 350 MG Take 350 mg by mouth 2 times a day as needed for Muscle Spasms.        Citalopram Hydrobromide (Tab) CELEXA 20 MG Take 20 mg by mouth every day.        Clopidogrel Bisulfate (Tab) PLAVIX 75 MG Take 1 Tab by mouth every day.        Cyanocobalamin (Tab) VITAMIN B-12 500 MCG Take 5,000 mcg by mouth every day.        Dicyclomine HCl (Tab) BENTYL 20 MG Take 20 mg by mouth every 6 hours. 3 TIMES A DAY AS NEEDED        Furosemide (Tab) LASIX 20 MG Take 1 Tab by mouth every day.        Gabapentin (Tab) NEURONTIN 600 MG Take 600 mg by mouth 3 times a day.        Hydrocodone-Acetaminophen (Tab) NORCO  MG Take 1 Tab by mouth every 8 hours as needed. AS NEEDED FOR PAIN        Lidocaine (Ointment) XYLOCAINE 5 % Apply  to affected area(s) as needed.        Lisinopril (Tab) PRINIVIL 20 MG Take 1 Tab by mouth 2 times a day.        Meloxicam (Tab) MOBIC 15 MG Take 15 mg by mouth See Admin Instructions. takes Mon thru Fri        Metoprolol Tartrate (Tab) LOPRESSOR 25 MG Take 1 Tab by mouth 2 times a day.        Multiple Vitamin (Tab) THERAGRAN  Take 1 Tab by mouth every day.        Nitroglycerin (SL Tab) NITROSTAT 0.4 MG Place 0.4 mg under tongue every 5 minutes as needed for Chest Pain.        Promethazine HCl (Tab) PHENERGAN 25 MG Take 25 mg by mouth every 6 hours as needed for Nausea/Vomiting.        .                 Medicines prescribed today were sent to:     [id number] - [place], [place] - [address]    [address] [place] [address]    Phone: [phone] Fax: [phone]    Open 24 Hours?: No      Medication refill instructions:       If your prescription bottle indicates you have medication refills left, it is not necessary to call your provider’s office. Please contact your  pharmacy and they will refill your medication.    If your prescription bottle indicates you do not have any refills left, you may request refills at any time through one of the following ways: The online [employer] system (except Urgent Care), by calling your provider’s office, or by asking your pharmacy to contact your provider’s office with a refill request. Medication refills are processed only during regular business hours and may not be available until the next business day. Your provider may request additional information or to have a follow-up visit with you prior to refilling your medication.   *Please Note: Medication refills are assigned a new Rx number when refilled electronically. Your pharmacy may indicate that no refills were authorized even though a new prescription for the same medication is available at the pharmacy. Please request the medicine by name with the pharmacy before contacting your provider for a refill.           [employer] Access Code: Activation code not generated  Current [employer] Status: Active

## 2017-08-23 NOTE — TELEPHONE ENCOUNTER
Patient scheduled for RHC and LHC w/poss on 8-30-17 at [facility] with Dr. Aguirre.

## 2017-08-23 NOTE — TELEPHONE ENCOUNTER
Pt in office with Dr. Elder. States that codes have not been updated yet and bill is going to be going to collections.   Called [facility] @ [phone] and talked with the billing department.   Codes given: R06.09 (NGUYEN) I51.9 (Dialstolic dysfunction)    Billing  states that codes will be submitted to insurance and bill will be placed on hold while information is being processed. If codes do not cover, pt will be billed again.     S/w pt, educated on information. Pt very appreciative of assistance and will call office if codes do not cover so we can try again.

## 2017-08-25 DIAGNOSIS — R42 DIZZINESS: ICD-10-CM

## 2017-08-25 DIAGNOSIS — R42 LIGHTHEADEDNESS: ICD-10-CM

## 2017-08-25 DIAGNOSIS — R06.02 SHORTNESS OF BREATH: ICD-10-CM

## 2017-08-25 DIAGNOSIS — I35.0 SEVERE AORTIC STENOSIS: ICD-10-CM

## 2017-08-29 DIAGNOSIS — Z01.812 PRE-OPERATIVE LABORATORY EXAMINATION: ICD-10-CM

## 2017-08-29 DIAGNOSIS — Z01.810 PRE-OPERATIVE CARDIOVASCULAR EXAMINATION: ICD-10-CM

## 2017-08-29 LAB
ANION GAP SERPL CALC-SCNC: 8 MMOL/L (ref 0–11.9)
BUN SERPL-MCNC: 19 MG/DL (ref 8–22)
CALCIUM SERPL-MCNC: 9.9 MG/DL (ref 8.5–10.5)
CHLORIDE SERPL-SCNC: 104 MMOL/L (ref 96–112)
CO2 SERPL-SCNC: 27 MMOL/L (ref 20–33)
CREAT SERPL-MCNC: 1.3 MG/DL (ref 0.5–1.4)
EKG IMPRESSION: NORMAL
ERYTHROCYTE [DISTWIDTH] IN BLOOD BY AUTOMATED COUNT: 46.3 FL (ref 35.9–50)
GFR SERPL CREATININE-BSD FRML MDRD: 39 ML/MIN/1.73 M 2
GLUCOSE SERPL-MCNC: 97 MG/DL (ref 65–99)
HCT VFR BLD AUTO: 42.8 % (ref 37–47)
HGB BLD-MCNC: 14.5 G/DL (ref 12–16)
INR PPP: 1 (ref 0.87–1.13)
MCH RBC QN AUTO: 32.6 PG (ref 27–33)
MCHC RBC AUTO-ENTMCNC: 33.9 G/DL (ref 33.6–35)
MCV RBC AUTO: 96.2 FL (ref 81.4–97.8)
PLATELET # BLD AUTO: 236 K/UL (ref 164–446)
PMV BLD AUTO: 9.3 FL (ref 9–12.9)
POTASSIUM SERPL-SCNC: 4.3 MMOL/L (ref 3.6–5.5)
PROTHROMBIN TIME: 13.5 SEC (ref 12–14.6)
RBC # BLD AUTO: 4.45 M/UL (ref 4.2–5.4)
SODIUM SERPL-SCNC: 139 MMOL/L (ref 135–145)
WBC # BLD AUTO: 6.8 K/UL (ref 4.8–10.8)

## 2017-08-29 PROCEDURE — 80048 BASIC METABOLIC PNL TOTAL CA: CPT

## 2017-08-29 PROCEDURE — 93010 ELECTROCARDIOGRAM REPORT: CPT | Performed by: INTERNAL MEDICINE

## 2017-08-29 PROCEDURE — 85610 PROTHROMBIN TIME: CPT

## 2017-08-29 PROCEDURE — 93005 ELECTROCARDIOGRAM TRACING: CPT

## 2017-08-29 PROCEDURE — 85027 COMPLETE CBC AUTOMATED: CPT

## 2017-08-29 PROCEDURE — 36415 COLL VENOUS BLD VENIPUNCTURE: CPT

## 2017-08-29 RX ORDER — ASCORBIC ACID 500 MG
1000 TABLET ORAL EVERY MORNING
COMMUNITY

## 2017-08-29 RX ORDER — FUROSEMIDE 20 MG/1
20 TABLET ORAL EVERY MORNING
Status: ON HOLD | COMMUNITY
End: 2017-09-27

## 2017-08-29 RX ORDER — CLOPIDOGREL BISULFATE 75 MG/1
75 TABLET ORAL EVERY MORNING
Status: ON HOLD | COMMUNITY
End: 2017-09-27

## 2017-08-30 ENCOUNTER — HOSPITAL ENCOUNTER (OUTPATIENT)
Facility: MEDICAL CENTER | Age: 82
End: 2017-08-30
Attending: INTERNAL MEDICINE | Admitting: INTERNAL MEDICINE
Payer: MEDICARE

## 2017-08-30 VITALS
OXYGEN SATURATION: 90 % | DIASTOLIC BLOOD PRESSURE: 59 MMHG | RESPIRATION RATE: 13 BRPM | HEIGHT: 63 IN | HEART RATE: 57 BPM | TEMPERATURE: 97.2 F | SYSTOLIC BLOOD PRESSURE: 120 MMHG | WEIGHT: 199.74 LBS | BODY MASS INDEX: 35.39 KG/M2

## 2017-08-30 PROCEDURE — 93567 NJX CAR CTH SPRVLV AORTGRPHY: CPT

## 2017-08-30 PROCEDURE — C1894 INTRO/SHEATH, NON-LASER: HCPCS

## 2017-08-30 PROCEDURE — C1769 GUIDE WIRE: HCPCS

## 2017-08-30 PROCEDURE — 360979 HCHG DIAGNOSTIC CATH

## 2017-08-30 PROCEDURE — 700101 HCHG RX REV CODE 250

## 2017-08-30 PROCEDURE — 99152 MOD SED SAME PHYS/QHP 5/>YRS: CPT

## 2017-08-30 PROCEDURE — G0278 ILIAC ART ANGIO,CARDIAC CATH: HCPCS

## 2017-08-30 PROCEDURE — 304952 HCHG R 2 PADS

## 2017-08-30 PROCEDURE — 99153 MOD SED SAME PHYS/QHP EA: CPT

## 2017-08-30 PROCEDURE — A9270 NON-COVERED ITEM OR SERVICE: HCPCS | Performed by: INTERNAL MEDICINE

## 2017-08-30 PROCEDURE — 93455 CORONARY ART/GRFT ANGIO S&I: CPT

## 2017-08-30 PROCEDURE — 700111 HCHG RX REV CODE 636 W/ 250 OVERRIDE (IP): Performed by: INTERNAL MEDICINE

## 2017-08-30 PROCEDURE — 700102 HCHG RX REV CODE 250 W/ 637 OVERRIDE(OP): Performed by: INTERNAL MEDICINE

## 2017-08-30 PROCEDURE — 700111 HCHG RX REV CODE 636 W/ 250 OVERRIDE (IP)

## 2017-08-30 PROCEDURE — 160002 HCHG RECOVERY MINUTES (STAT)

## 2017-08-30 RX ORDER — HEPARIN SODIUM,PORCINE 1000/ML
VIAL (ML) INJECTION
Status: COMPLETED
Start: 2017-08-30 | End: 2017-08-30

## 2017-08-30 RX ORDER — HALOPERIDOL 5 MG/ML
1 INJECTION INTRAMUSCULAR EVERY 6 HOURS PRN
Status: DISCONTINUED | OUTPATIENT
Start: 2017-08-30 | End: 2017-08-30 | Stop reason: HOSPADM

## 2017-08-30 RX ORDER — DIPHENHYDRAMINE HYDROCHLORIDE 50 MG/ML
25 INJECTION INTRAMUSCULAR; INTRAVENOUS EVERY 6 HOURS PRN
Status: DISCONTINUED | OUTPATIENT
Start: 2017-08-30 | End: 2017-08-30 | Stop reason: HOSPADM

## 2017-08-30 RX ORDER — DEXAMETHASONE SODIUM PHOSPHATE 4 MG/ML
4 INJECTION, SOLUTION INTRA-ARTICULAR; INTRALESIONAL; INTRAMUSCULAR; INTRAVENOUS; SOFT TISSUE
Status: DISCONTINUED | OUTPATIENT
Start: 2017-08-30 | End: 2017-08-30 | Stop reason: HOSPADM

## 2017-08-30 RX ORDER — LIDOCAINE HYDROCHLORIDE 20 MG/ML
INJECTION, SOLUTION INFILTRATION; PERINEURAL
Status: COMPLETED
Start: 2017-08-30 | End: 2017-08-30

## 2017-08-30 RX ORDER — SCOLOPAMINE TRANSDERMAL SYSTEM 1 MG/1
1 PATCH, EXTENDED RELEASE TRANSDERMAL
Status: DISCONTINUED | OUTPATIENT
Start: 2017-08-30 | End: 2017-08-30 | Stop reason: HOSPADM

## 2017-08-30 RX ORDER — ONDANSETRON 2 MG/ML
4 INJECTION INTRAMUSCULAR; INTRAVENOUS EVERY 4 HOURS PRN
Status: DISCONTINUED | OUTPATIENT
Start: 2017-08-30 | End: 2017-08-30 | Stop reason: HOSPADM

## 2017-08-30 RX ORDER — MIDAZOLAM HYDROCHLORIDE 1 MG/ML
INJECTION INTRAMUSCULAR; INTRAVENOUS
Status: COMPLETED
Start: 2017-08-30 | End: 2017-08-30

## 2017-08-30 RX ADMIN — HEPARIN SODIUM: 1000 INJECTION, SOLUTION INTRAVENOUS; SUBCUTANEOUS at 07:56

## 2017-08-30 RX ADMIN — LIDOCAINE HYDROCHLORIDE: 20 INJECTION, SOLUTION INFILTRATION; PERINEURAL at 07:56

## 2017-08-30 RX ADMIN — NITROGLYCERIN 10 ML: 20 INJECTION INTRAVENOUS at 07:56

## 2017-08-30 RX ADMIN — HEPARIN SODIUM 2000 UNITS: 200 INJECTION, SOLUTION INTRAVENOUS at 07:57

## 2017-08-30 RX ADMIN — FENTANYL CITRATE 50 MCG: 50 INJECTION, SOLUTION INTRAMUSCULAR; INTRAVENOUS at 08:07

## 2017-08-30 RX ADMIN — MIDAZOLAM 2 MG: 1 INJECTION INTRAMUSCULAR; INTRAVENOUS at 07:56

## 2017-08-30 ASSESSMENT — PAIN SCALES - GENERAL
PAINLEVEL_OUTOF10: 0

## 2017-08-30 NOTE — OR NURSING
0897 patient arrived from cath lab s/p [facility] right groin dressing clean patient denies pain, s.o.b, vss, plan of care discussed with patient agreeable   9867 discharge instructions given to patient, patient and family verbalize understanding of the orders, iv discontinued tip intact, prior to dc vss, no c/o pain right groin dressing clean dry intact, patient escorted via w/c with all her personal belongings.

## 2017-08-30 NOTE — PROCEDURES
REFERRING PHYSICIAN: Dr. Sherif Elder    PREOPERATIVE DIAGNOSIS:  1. CAD status post CABG  2. Aortic stenosis  3. Pre-aVR    POSTOPERATIVE DIAGNOSIS:  1. Patent LIMA to LAD  2. Patent KINDRA to PDA  3. Patent vein graft to OM, mild/moderate in-stent restenosis of the ostial stent  4. Mildly tortuous abdominal descending aorta      PROCEDURE PERFORMED:  Selective coronary angiography of the native vessels  Selective coronary angiography of the bypassed vessels  Ascending aortogram  Abdominal and iliac aortogram with runoff    DESCRIPTION OF PROCEDURE:  The risks and benefits of cardiac catheterization as well as the procedure itself, rationale and appropriateness were discussed with the patient today. Complications including but not limited to death, stroke, MI, urgent bypass surgery, contrast nephropathy, vascular complications, bleeding and infection were explained to the patient. The potential outcomes associated with the procedure (possible PCI, possible CABG, possible medical Rx only) were also discussed at length. The patient agreed to proceed.    The patient was transported to the catheterization laboratory in the fasting state. The bilateral groins were prepped and draped in the usual sterile fashion. The right femoral area was anesthetized with 1% Xylocaine and fluoroscopically localized. Following this a single front wall puncture was used to place a 4F side-arm sheath. Through this sheath a JR4/3DRC and JL4 were advanced in succession over a guidewire for cannulation of the coronary arteries. Contrast was administered and multiple images were obtained in the standard planes. Following this the JR4 was then used to engage the vein graft, as well as the LIMA and KINDRA arteries. Following this the catheters were exchanged over a guidewire for an angled pigtail catheter seated in the aortic root contrast was demonstrated at 10 mL/s for 30 mL for ascending and arch aortography. The pigtail was then repositioned  into the distal abdominal aorta and a 2nd injection of 10 mL/s for 20 mL was completed for abdominal aortoiliac with runoff. All catheters and guidewires were removed and manual pressure was applied to the right groin to achieve adequate hemostasis. Patient tolerated the procedure well and left the Cath Lab in stable condition.        FINDINGS:  I. HEMODYNAMICS:    Ao: 85/62 mmHg     II. AORTOGRAM:   No significant stenoses in the abdominal aorta or aortoiliac systems. Mild tortuosity. Mild calcifications.     III. CORONARY ANGIOGRAPHY:  Left Main: Long moderate sized patent bifurcating. No significant CAD.  Left Anterior Descending: Moderate size and tortuous with a diagonal. 2nd CT on the midportion. Fills distally via a patent LIMA to a tiny distal LAD.  Left Circumflex: Moderate to large size vessel giving rise to an obtuse marginal. There is retrograde filling of the touchdown of the vein graft. The circumflex proper continues as a large marginal which is a branching vessel and free from significant disease. There is a 30% proximal stenosis.  Right Coronary: Widely patent and dominant without significant CAD.  The PDA has a  fills via a KINDRA.    IV. GRAFT ANGIOGRAPHY:  Vein graft to OM: Ostial stent. 20-40% in-stent restenosis. Otherwise widely patent.  WHITESIDE to LAD: Widely patent graft. Anastomosis to an exceptionally small LAD without discrete stenosis.  KINDRA to PDA:  Widely patent. Fills a tiny PDA.  COMPLICATIONS: none apparent    CONCLUSIONS:  1. Aortic stenosis, at least moderate.   2. Normal ejection fraction  3. Patent grafts and vein graft stent

## 2017-08-30 NOTE — DISCHARGE INSTRUCTIONS
ACTIVITY: Rest and take it easy for the first 24 hours.  A responsible adult is recommended to remain with you during that time.  It is normal to feel sleepy.  We encourage you to not do anything that requires balance, judgment or coordination.    MILD FLU-LIKE SYMPTOMS ARE NORMAL. YOU MAY EXPERIENCE GENERALIZED MUSCLE ACHES, THROAT IRRITATION, HEADACHE AND/OR SOME NAUSEA.    FOR 24 HOURS DO NOT:  Drive, operate machinery or run household appliances.  Drink beer or alcoholic beverages.   Make important decisions or sign legal documents.    SPECIAL INSTRUCTIONS: follow up with Dr pederson call with any questions [phone]   If you experience chest pain, s.o.b, call 911 return to ER   Follow up with primary care physician call as needed.    DIET: To avoid nausea, slowly advance diet as tolerated, avoiding spicy or greasy foods for the first day.  Add more substantial food to your diet according to your physician's instructions.  Babies can be fed formula or breast milk as soon as they are hungry.  INCREASE FLUIDS AND FIBER TO AVOID CONSTIPATION.    SURGICAL DRESSING/BATHING: keep band aid clean dry intact for 24 hours, you may remove dressing after 24 hours.    FOLLOW-UP APPOINTMENT:  A follow-up appointment should be arranged with your doctor in [phone]; call to schedule.    You should CALL YOUR PHYSICIAN if you develop:  Fever greater than 101 degrees F.  Pain not relieved by medication, or persistent nausea or vomiting.  Excessive bleeding (blood soaking through dressing) or unexpected drainage from the wound.  Extreme redness or swelling around the incision site, drainage of pus or foul smelling drainage.  Inability to urinate or empty your bladder within 8 hours.  Problems with breathing or chest pain.    You should call 911 if you develop problems with breathing or chest pain.  If you are unable to contact your doctor or surgical center, you should go to the nearest emergency room or urgent care center.   Physician's telephone #: [phone]    If any questions arise, call your doctor.  If your doctor is not available, please feel free to call the Surgical Center at [phone].  The Center is open Monday through Friday from 7AM to 7PM.  You can also call the HEALTH HOTLINE open 24 hours/day, 7 days/week and speak to a nurse at [phone], or toll free at [phone].    A registered nurse may call you a few days after your surgery to see how you are doing after your procedure.    MEDICATIONS: Resume taking daily medication.  Take prescribed pain medication with food.  If no medication is prescribed, you may take non-aspirin pain medication if needed.  PAIN MEDICATION CAN BE VERY CONSTIPATING.  Take a stool softener or laxative such as senokot, pericolace, or milk of magnesia if needed.    If your physician has prescribed pain medication that includes Acetaminophen (Tylenol), do not take additional Acetaminophen (Tylenol) while taking the prescribed medication.    Depression / Suicide Risk    As you are discharged from this [facility] facility, it is important to learn how to keep safe from harming yourself.    Recognize the warning signs:  · Abrupt changes in personality, positive or negative- including increase in energy   · Giving away possessions  · Change in eating patterns- significant weight changes-  positive or negative  · Change in sleeping patterns- unable to sleep or sleeping all the time   · Unwillingness or inability to communicate  · Depression  · Unusual sadness, discouragement and loneliness  · Talk of wanting to die  · Neglect of personal appearance   · Rebelliousness- reckless behavior  · Withdrawal from people/activities they love  · Confusion- inability to concentrate     If you or a loved one observes any of these behaviors or has concerns about self-harm, here's what you can do:  · Talk about it- your feelings and reasons for harming yourself  · Remove any means that you might use to  "hurt yourself (examples: pills, rope, extension cords, firearm)  · Get professional help from the community (Mental Health, Substance Abuse, psychological counseling)  · Do not be alone:Call your Safe Contact- someone whom you trust who will be there for you.  · Call your local CRISIS HOTLINE 054-2357 or 094-367-7126  · Call your local Children's Mobile Crisis Response Team [place] (612) 763-4755 or www.[employer]  · Call the toll free National Suicide Prevention Hotlines   · National Suicide Prevention Lifeline 516-404-RZHC (9928)  [place] adaffix Line Network 800-SUICIDE (108-3637)  Post Angiogram Groin Care Instructions     INSTRUCTIONS  2. Examine (look and feel) the site of your incision site TODAY so you can recognize changes that should be called to your doctor (see below).  3. Avoid straining either by lifting or pulling objects for 4-5 days. Avoid lifting over 5 pounds.   4. For at least 72 hours, if you should sneeze or cough, please hold pressure over your groin area.  5. If you should begin to have oozing from the catheterization site, please hold firm pressure and call your doctor's office immediately.  6. If profuse bleeding occurs from the catheterization site, hold firm pressure and call \"606\" immediately for assistance.  7. Remove bandage after 24 hours.     ACTIVITY  2. Limit activity as instructed by your doctor.  3. No driving or very limited driving with frequent stops for one week.   4. If you must take a long car ride, stop every hour and walk around the car.   5. Warm showers or baths are permitted after the bandage is removed. Avoid hot showers, baths, hot tubs, and swimming for one week.    PLEASE CALL YOUR DOCTOR IF:  1. Temperature elevation occurs.  2. Catheterization site becomes reddened or begins to drain.   3. Bruising appears to be new or not resolving. The bruise may move down your leg. This is normal.  4. The small round lump in the groin increases in size.  5. Any leg " numbness, aching, or discomfort (immediately).  6. Increasing discomfort in the leg at the insertion site.  7. Chest pains, even if relieved by Nitroglycerin.    MISCELLANEOUS INSTRUCTIONS  1. Bruising may occur as a result of heart catheterization. Some of the discoloration may travel down the leg, going from blue to green in color.  2. A small round lump under the catheterization site will remain for up to six weeks.  3. If any questions arise call your physician's office. You can also call the [employer] HOTLINE open 24 hours/day, 7 days/week and speak to a nurse at [phone], or toll free at [phone].   4. You should call 501 if you develop problems with breathing or chest pain.    FOR PROBLEMS CALL DR: *** AT: ***    I acknowledge receipt and understanding of these Home Care instructions.  ·

## 2017-09-06 ENCOUNTER — HOSPITAL ENCOUNTER (OUTPATIENT)
Dept: RADIOLOGY | Facility: MEDICAL CENTER | Age: 82
End: 2017-09-06
Attending: INTERNAL MEDICINE
Payer: MEDICARE

## 2017-09-06 DIAGNOSIS — R06.02 SHORTNESS OF BREATH: ICD-10-CM

## 2017-09-06 DIAGNOSIS — I35.0 SEVERE AORTIC STENOSIS: ICD-10-CM

## 2017-09-06 DIAGNOSIS — R42 LIGHTHEADEDNESS: ICD-10-CM

## 2017-09-06 DIAGNOSIS — R42 DIZZINESS: ICD-10-CM

## 2017-09-06 PROCEDURE — 71275 CT ANGIOGRAPHY CHEST: CPT

## 2017-09-06 PROCEDURE — 74174 CTA ABD&PLVS W/CONTRAST: CPT

## 2017-09-06 PROCEDURE — 700117 HCHG RX CONTRAST REV CODE 255: Performed by: INTERNAL MEDICINE

## 2017-09-06 RX ADMIN — IOHEXOL 100 ML: 350 INJECTION, SOLUTION INTRAVENOUS at 16:19

## 2017-09-11 ENCOUNTER — HOSPITAL ENCOUNTER (OUTPATIENT)
Dept: OTHER | Facility: MEDICAL CENTER | Age: 82
End: 2017-09-11
Attending: INTERNAL MEDICINE
Payer: MEDICARE

## 2017-09-11 ASSESSMENT — PULMONARY FUNCTION TESTS
FEV1/FVC: 79
FVC_PERCENT_PREDICTED: 79
FEV1_PREDICTED: 1.73
FEV1/FVC_PERCENT_PREDICTED: 105
FEV1_PERCENT_CHANGE: 15
FEV1: 1.44
FEV1/FVC_PERCENT_CHANGE: 107
FEV1_PERCENT_PREDICTED: 96
FVC_PERCENT_PREDICTED: 91
FVC: 1.83
FEV1: 1.67
FEV1/FVC_PERCENT_PREDICTED: 105
FEV1/FVC: 79.9
FVC: 2.09
FVC_PREDICTED: 2.29
FEV1_PERCENT_PREDICTED: 83
FEV1/FVC_PERCENT_PREDICTED: 76
FEV1_PERCENT_CHANGE: 14

## 2017-09-12 NOTE — PROCEDURES
REASON FOR STUDY:  Shortness of breath, dyspnea on exertion and a history of   severe aortic stenosis.    SPIROMETRY:  FVC is 1.83, 79% predicted.  FEV1 is 1.44, 83% predicted.    FEV1/FVC is 79.  There is a significant response to bronchodilator.    LUNG VOLUMES:  Vital capacity is 97% predicted.  Total lung capacity is 106%   predicted.  Diffusion studies are normal.  Flow volume loops show some mild   coving in the expiratory limb.    IMPRESSION:  Normal exam       ____________________________________     MD FRAN Albarado / NTS    DD:  09/11/2017 17:02:23  DT:  09/11/2017 17:25:27    D#:  [id number]  Job#:  [id number]

## 2017-09-14 DIAGNOSIS — I10 ESSENTIAL HYPERTENSION: ICD-10-CM

## 2017-09-14 DIAGNOSIS — I25.10 CORONARY ARTERY DISEASE DUE TO CALCIFIED CORONARY LESION: ICD-10-CM

## 2017-09-14 DIAGNOSIS — I51.89 DIASTOLIC DYSFUNCTION: ICD-10-CM

## 2017-09-14 DIAGNOSIS — I25.84 CORONARY ARTERY DISEASE DUE TO CALCIFIED CORONARY LESION: ICD-10-CM

## 2017-09-14 RX ORDER — LISINOPRIL 20 MG/1
20 TABLET ORAL 2 TIMES DAILY
Qty: 180 TAB | Refills: 3 | Status: ON HOLD | OUTPATIENT
Start: 2017-09-14 | End: 2017-09-27

## 2017-09-21 ENCOUNTER — HOSPITAL ENCOUNTER (OUTPATIENT)
Dept: RADIOLOGY | Facility: MEDICAL CENTER | Age: 82
DRG: 267 | End: 2017-09-21
Attending: INTERNAL MEDICINE | Admitting: INTERNAL MEDICINE
Payer: MEDICARE

## 2017-09-21 ENCOUNTER — HOSPITAL ENCOUNTER (OUTPATIENT)
Dept: CARDIOLOGY | Facility: MEDICAL CENTER | Age: 82
DRG: 267 | End: 2017-09-21
Attending: INTERNAL MEDICINE
Payer: MEDICARE

## 2017-09-21 LAB
ABO GROUP BLD: NORMAL
ALBUMIN SERPL BCP-MCNC: 4.2 G/DL (ref 3.2–4.9)
ALBUMIN/GLOB SERPL: 1.4 G/DL
ALP SERPL-CCNC: 95 U/L (ref 30–99)
ALT SERPL-CCNC: 13 U/L (ref 2–50)
ANION GAP SERPL CALC-SCNC: 5 MMOL/L (ref 0–11.9)
APPEARANCE UR: CLEAR
AST SERPL-CCNC: 19 U/L (ref 12–45)
BACTERIA #/AREA URNS HPF: NEGATIVE /HPF
BILIRUB SERPL-MCNC: 0.7 MG/DL (ref 0.1–1.5)
BILIRUB UR QL STRIP.AUTO: NEGATIVE
BLD GP AB SCN SERPL QL: NORMAL
BNP SERPL-MCNC: 125 PG/ML (ref 0–100)
BUN SERPL-MCNC: 17 MG/DL (ref 8–22)
CALCIUM SERPL-MCNC: 9.7 MG/DL (ref 8.5–10.5)
CHLORIDE SERPL-SCNC: 104 MMOL/L (ref 96–112)
CO2 SERPL-SCNC: 27 MMOL/L (ref 20–33)
COLOR UR: ABNORMAL
CREAT SERPL-MCNC: 1.3 MG/DL (ref 0.5–1.4)
EKG IMPRESSION: NORMAL
EPI CELLS #/AREA URNS HPF: NORMAL /HPF
ERYTHROCYTE [DISTWIDTH] IN BLOOD BY AUTOMATED COUNT: 46.3 FL (ref 35.9–50)
GFR SERPL CREATININE-BSD FRML MDRD: 39 ML/MIN/1.73 M 2
GLOBULIN SER CALC-MCNC: 2.9 G/DL (ref 1.9–3.5)
GLUCOSE SERPL-MCNC: 126 MG/DL (ref 65–99)
GLUCOSE UR STRIP.AUTO-MCNC: NEGATIVE MG/DL
HCT VFR BLD AUTO: 44.5 % (ref 37–47)
HGB BLD-MCNC: 15.2 G/DL (ref 12–16)
HYALINE CASTS #/AREA URNS LPF: NORMAL /LPF
INR PPP: 1 (ref 0.87–1.13)
KETONES UR STRIP.AUTO-MCNC: NEGATIVE MG/DL
LEUKOCYTE ESTERASE UR QL STRIP.AUTO: ABNORMAL
MCH RBC QN AUTO: 33.3 PG (ref 27–33)
MCHC RBC AUTO-ENTMCNC: 34.2 G/DL (ref 33.6–35)
MCV RBC AUTO: 97.6 FL (ref 81.4–97.8)
MICRO URNS: ABNORMAL
NITRITE UR QL STRIP.AUTO: NEGATIVE
PH UR STRIP.AUTO: 6 [PH]
PLATELET # BLD AUTO: 252 K/UL (ref 164–446)
PMV BLD AUTO: 9.2 FL (ref 9–12.9)
POTASSIUM SERPL-SCNC: 4.7 MMOL/L (ref 3.6–5.5)
PROT SERPL-MCNC: 7.1 G/DL (ref 6–8.2)
PROT UR QL STRIP: NEGATIVE MG/DL
PROTHROMBIN TIME: 13.5 SEC (ref 12–14.6)
RBC # BLD AUTO: 4.56 M/UL (ref 4.2–5.4)
RBC # URNS HPF: NORMAL /HPF
RBC UR QL AUTO: NEGATIVE
RH BLD: NORMAL
SODIUM SERPL-SCNC: 136 MMOL/L (ref 135–145)
SP GR UR STRIP.AUTO: 1.01
UROBILINOGEN UR STRIP.AUTO-MCNC: 0.2 MG/DL
WBC # BLD AUTO: 6.3 K/UL (ref 4.8–10.8)
WBC #/AREA URNS HPF: NORMAL /HPF

## 2017-09-21 PROCEDURE — 80053 COMPREHEN METABOLIC PANEL: CPT

## 2017-09-21 PROCEDURE — 86850 RBC ANTIBODY SCREEN: CPT

## 2017-09-21 PROCEDURE — 85027 COMPLETE CBC AUTOMATED: CPT

## 2017-09-21 PROCEDURE — 93010 ELECTROCARDIOGRAM REPORT: CPT | Performed by: INTERNAL MEDICINE

## 2017-09-21 PROCEDURE — 83880 ASSAY OF NATRIURETIC PEPTIDE: CPT

## 2017-09-21 PROCEDURE — 85610 PROTHROMBIN TIME: CPT

## 2017-09-21 PROCEDURE — 71022 DX-CHEST-PA-LAT - OBLIQUES: CPT

## 2017-09-21 PROCEDURE — 36415 COLL VENOUS BLD VENIPUNCTURE: CPT

## 2017-09-21 PROCEDURE — 81001 URINALYSIS AUTO W/SCOPE: CPT

## 2017-09-21 PROCEDURE — 86900 BLOOD TYPING SEROLOGIC ABO: CPT

## 2017-09-21 PROCEDURE — 86901 BLOOD TYPING SEROLOGIC RH(D): CPT

## 2017-09-21 PROCEDURE — 93005 ELECTROCARDIOGRAM TRACING: CPT | Performed by: INTERNAL MEDICINE

## 2017-09-22 ENCOUNTER — TELEPHONE (OUTPATIENT)
Dept: CARDIOLOGY | Facility: MEDICAL CENTER | Age: 82
End: 2017-09-22

## 2017-09-22 NOTE — TELEPHONE ENCOUNTER
Received voice message from patients daughter stating that patient's urologist added the following medication to patients medication list: MYRBETRIQ 50 mg PO once daily for urinary incontinence.     SC and BRENNA COTO.

## 2017-09-25 ENCOUNTER — APPOINTMENT (OUTPATIENT)
Dept: RADIOLOGY | Facility: MEDICAL CENTER | Age: 82
DRG: 267 | End: 2017-09-25
Attending: NURSE PRACTITIONER
Payer: MEDICARE

## 2017-09-25 ENCOUNTER — APPOINTMENT (OUTPATIENT)
Dept: RADIOLOGY | Facility: MEDICAL CENTER | Age: 82
DRG: 267 | End: 2017-09-25
Attending: INTERNAL MEDICINE
Payer: MEDICARE

## 2017-09-25 ENCOUNTER — HOSPITAL ENCOUNTER (INPATIENT)
Facility: MEDICAL CENTER | Age: 82
LOS: 5 days | DRG: 267 | End: 2017-09-30
Attending: INTERNAL MEDICINE | Admitting: INTERNAL MEDICINE
Payer: MEDICARE

## 2017-09-25 DIAGNOSIS — R53.1 WEAKNESS: ICD-10-CM

## 2017-09-25 DIAGNOSIS — Z95.2 S/P TAVR (TRANSCATHETER AORTIC VALVE REPLACEMENT): ICD-10-CM

## 2017-09-25 PROBLEM — I35.0 NODULAR CALCIFIC AORTIC VALVE STENOSIS: Status: ACTIVE | Noted: 2017-09-25

## 2017-09-25 PROBLEM — I35.0 NODULAR CALCIFIC AORTIC VALVE STENOSIS: Status: RESOLVED | Noted: 2017-09-25 | Resolved: 2017-09-25

## 2017-09-25 PROBLEM — I35.0 SEVERE AORTIC STENOSIS: Status: RESOLVED | Noted: 2017-08-23 | Resolved: 2017-09-25

## 2017-09-25 LAB
ACT BLD: 235 SEC (ref 74–137)
ALBUMIN SERPL BCP-MCNC: 2.8 G/DL (ref 3.2–4.9)
ALBUMIN/GLOB SERPL: 1.3 G/DL
ALP SERPL-CCNC: 70 U/L (ref 30–99)
ALT SERPL-CCNC: 10 U/L (ref 2–50)
ANION GAP SERPL CALC-SCNC: 10 MMOL/L (ref 0–11.9)
AST SERPL-CCNC: 18 U/L (ref 12–45)
BASE EXCESS BLDA CALC-SCNC: 1 MMOL/L (ref -4–3)
BASOPHILS # BLD AUTO: 0.2 % (ref 0–1.8)
BASOPHILS # BLD AUTO: 0.3 % (ref 0–1.8)
BASOPHILS # BLD: 0.02 K/UL (ref 0–0.12)
BASOPHILS # BLD: 0.03 K/UL (ref 0–0.12)
BILIRUB SERPL-MCNC: 0.4 MG/DL (ref 0.1–1.5)
BNP SERPL-MCNC: 63 PG/ML (ref 0–100)
BODY TEMPERATURE: ABNORMAL DEGREES
BUN SERPL-MCNC: 18 MG/DL (ref 8–22)
CA-I BLD ISE-SCNC: 1.12 MMOL/L (ref 1.1–1.3)
CALCIUM SERPL-MCNC: 8.5 MG/DL (ref 8.5–10.5)
CHLORIDE SERPL-SCNC: 107 MMOL/L (ref 96–112)
CO2 BLDA-SCNC: 22 MMOL/L (ref 20–33)
CO2 SERPL-SCNC: 18 MMOL/L (ref 20–33)
CREAT SERPL-MCNC: 1.27 MG/DL (ref 0.5–1.4)
EKG IMPRESSION: NORMAL
EOSINOPHIL # BLD AUTO: 0.03 K/UL (ref 0–0.51)
EOSINOPHIL # BLD AUTO: 0.22 K/UL (ref 0–0.51)
EOSINOPHIL NFR BLD: 0.3 % (ref 0–6.9)
EOSINOPHIL NFR BLD: 2.2 % (ref 0–6.9)
ERYTHROCYTE [DISTWIDTH] IN BLOOD BY AUTOMATED COUNT: 45.2 FL (ref 35.9–50)
ERYTHROCYTE [DISTWIDTH] IN BLOOD BY AUTOMATED COUNT: 45.6 FL (ref 35.9–50)
GFR SERPL CREATININE-BSD FRML MDRD: 40 ML/MIN/1.73 M 2
GLOBULIN SER CALC-MCNC: 2.2 G/DL (ref 1.9–3.5)
GLUCOSE BLD-MCNC: 143 MG/DL (ref 65–99)
GLUCOSE SERPL-MCNC: 144 MG/DL (ref 65–99)
HCO3 BLDA-SCNC: 21.8 MMOL/L (ref 17–25)
HCT VFR BLD AUTO: 33.7 % (ref 37–47)
HCT VFR BLD AUTO: 35.9 % (ref 37–47)
HCT VFR BLD CALC: 32 % (ref 37–47)
HGB BLD-MCNC: 10.9 G/DL (ref 12–16)
HGB BLD-MCNC: 11.6 G/DL (ref 12–16)
HGB BLD-MCNC: 12.3 G/DL (ref 12–16)
IMM GRANULOCYTES # BLD AUTO: 0.02 K/UL (ref 0–0.11)
IMM GRANULOCYTES # BLD AUTO: 0.05 K/UL (ref 0–0.11)
IMM GRANULOCYTES NFR BLD AUTO: 0.2 % (ref 0–0.9)
IMM GRANULOCYTES NFR BLD AUTO: 0.5 % (ref 0–0.9)
LV EJECT FRACT  99904: 65
LYMPHOCYTES # BLD AUTO: 1.29 K/UL (ref 1–4.8)
LYMPHOCYTES # BLD AUTO: 5.21 K/UL (ref 1–4.8)
LYMPHOCYTES NFR BLD: 13.3 % (ref 22–41)
LYMPHOCYTES NFR BLD: 52.6 % (ref 22–41)
MCH RBC QN AUTO: 33 PG (ref 27–33)
MCH RBC QN AUTO: 33.1 PG (ref 27–33)
MCHC RBC AUTO-ENTMCNC: 34.3 G/DL (ref 33.6–35)
MCHC RBC AUTO-ENTMCNC: 34.4 G/DL (ref 33.6–35)
MCV RBC AUTO: 95.7 FL (ref 81.4–97.8)
MCV RBC AUTO: 96.5 FL (ref 81.4–97.8)
MONOCYTES # BLD AUTO: 0.71 K/UL (ref 0–0.85)
MONOCYTES # BLD AUTO: 0.75 K/UL (ref 0–0.85)
MONOCYTES NFR BLD AUTO: 7.3 % (ref 0–13.4)
MONOCYTES NFR BLD AUTO: 7.6 % (ref 0–13.4)
NEUTROPHILS # BLD AUTO: 3.67 K/UL (ref 2–7.15)
NEUTROPHILS # BLD AUTO: 7.58 K/UL (ref 2–7.15)
NEUTROPHILS NFR BLD: 37.1 % (ref 44–72)
NEUTROPHILS NFR BLD: 78.4 % (ref 44–72)
NRBC # BLD AUTO: 0 K/UL
NRBC # BLD AUTO: 0 K/UL
NRBC BLD AUTO-RTO: 0 /100 WBC
NRBC BLD AUTO-RTO: 0 /100 WBC
PCO2 BLDA: 23 MMHG (ref 26–37)
PH BLDA: 7.58 [PH] (ref 7.4–7.5)
PLATELET # BLD AUTO: 192 K/UL (ref 164–446)
PLATELET # BLD AUTO: 199 K/UL (ref 164–446)
PMV BLD AUTO: 9.7 FL (ref 9–12.9)
PMV BLD AUTO: 9.8 FL (ref 9–12.9)
PO2 BLDA: 188 MMHG (ref 64–87)
POTASSIUM BLD-SCNC: 3.8 MMOL/L (ref 3.6–5.5)
POTASSIUM SERPL-SCNC: 3.8 MMOL/L (ref 3.6–5.5)
PROT SERPL-MCNC: 5 G/DL (ref 6–8.2)
RBC # BLD AUTO: 3.52 M/UL (ref 4.2–5.4)
RBC # BLD AUTO: 3.72 M/UL (ref 4.2–5.4)
SAO2 % BLDA: 100 % (ref 93–99)
SODIUM BLD-SCNC: 139 MMOL/L (ref 135–145)
SODIUM SERPL-SCNC: 135 MMOL/L (ref 135–145)
SPECIMEN DRAWN FROM PATIENT: ABNORMAL
WBC # BLD AUTO: 9.7 K/UL (ref 4.8–10.8)
WBC # BLD AUTO: 9.9 K/UL (ref 4.8–10.8)

## 2017-09-25 PROCEDURE — 93308 TTE F-UP OR LMTD: CPT

## 2017-09-25 PROCEDURE — B24BZZ4 ULTRASONOGRAPHY OF HEART WITH AORTA, TRANSESOPHAGEAL: ICD-10-PCS | Performed by: INTERNAL MEDICINE

## 2017-09-25 PROCEDURE — 80053 COMPREHEN METABOLIC PANEL: CPT

## 2017-09-25 PROCEDURE — 503000 HCHG SUTURE, OHS: Performed by: INTERNAL MEDICINE

## 2017-09-25 PROCEDURE — 502240 HCHG MISC OR SUPPLY RC 0272: Performed by: INTERNAL MEDICINE

## 2017-09-25 PROCEDURE — 160042 HCHG SURGERY MINUTES - EA ADDL 1 MIN LEVEL 5: Performed by: INTERNAL MEDICINE

## 2017-09-25 PROCEDURE — 700111 HCHG RX REV CODE 636 W/ 250 OVERRIDE (IP)

## 2017-09-25 PROCEDURE — 160035 HCHG PACU - 1ST 60 MINS PHASE I: Performed by: INTERNAL MEDICINE

## 2017-09-25 PROCEDURE — 71010 DX-CHEST-PORTABLE (1 VIEW): CPT

## 2017-09-25 PROCEDURE — 700101 HCHG RX REV CODE 250

## 2017-09-25 PROCEDURE — C1769 GUIDE WIRE: HCPCS | Performed by: INTERNAL MEDICINE

## 2017-09-25 PROCEDURE — 84132 ASSAY OF SERUM POTASSIUM: CPT | Mod: 91

## 2017-09-25 PROCEDURE — 93010 ELECTROCARDIOGRAM REPORT: CPT | Performed by: INTERNAL MEDICINE

## 2017-09-25 PROCEDURE — 4A133B1 MONITORING OF ARTERIAL PRESSURE, PERIPHERAL, PERCUTANEOUS APPROACH: ICD-10-PCS | Performed by: INTERNAL MEDICINE

## 2017-09-25 PROCEDURE — A6402 STERILE GAUZE <= 16 SQ IN: HCPCS | Performed by: INTERNAL MEDICINE

## 2017-09-25 PROCEDURE — 51798 US URINE CAPACITY MEASURE: CPT

## 2017-09-25 PROCEDURE — 160002 HCHG RECOVERY MINUTES (STAT): Performed by: INTERNAL MEDICINE

## 2017-09-25 PROCEDURE — 93005 ELECTROCARDIOGRAM TRACING: CPT | Performed by: NURSE PRACTITIONER

## 2017-09-25 PROCEDURE — 160009 HCHG ANES TIME/MIN: Performed by: INTERNAL MEDICINE

## 2017-09-25 PROCEDURE — 85025 COMPLETE CBC W/AUTO DIFF WBC: CPT

## 2017-09-25 PROCEDURE — 82803 BLOOD GASES ANY COMBINATION: CPT | Mod: 91

## 2017-09-25 PROCEDURE — 02RF38Z REPLACEMENT OF AORTIC VALVE WITH ZOOPLASTIC TISSUE, PERCUTANEOUS APPROACH: ICD-10-PCS | Performed by: INTERNAL MEDICINE

## 2017-09-25 PROCEDURE — C1894 INTRO/SHEATH, NON-LASER: HCPCS | Performed by: INTERNAL MEDICINE

## 2017-09-25 PROCEDURE — C1760 CLOSURE DEV, VASC: HCPCS | Performed by: INTERNAL MEDICINE

## 2017-09-25 PROCEDURE — 93325 DOPPLER ECHO COLOR FLOW MAPG: CPT

## 2017-09-25 PROCEDURE — 74176 CT ABD & PELVIS W/O CONTRAST: CPT

## 2017-09-25 PROCEDURE — 700102 HCHG RX REV CODE 250 W/ 637 OVERRIDE(OP): Performed by: NURSE PRACTITIONER

## 2017-09-25 PROCEDURE — A9270 NON-COVERED ITEM OR SERVICE: HCPCS | Performed by: NURSE PRACTITIONER

## 2017-09-25 PROCEDURE — 84295 ASSAY OF SERUM SODIUM: CPT | Mod: 91

## 2017-09-25 PROCEDURE — 82330 ASSAY OF CALCIUM: CPT | Mod: 91

## 2017-09-25 PROCEDURE — 500863 HCHG TRANSCATHETER HEART VALVE: Performed by: INTERNAL MEDICINE

## 2017-09-25 PROCEDURE — 770022 HCHG ROOM/CARE - ICU (200)

## 2017-09-25 PROCEDURE — C1725 CATH, TRANSLUMIN NON-LASER: HCPCS | Performed by: INTERNAL MEDICINE

## 2017-09-25 PROCEDURE — 85347 COAGULATION TIME ACTIVATED: CPT | Mod: 91

## 2017-09-25 PROCEDURE — 503001 HCHG PERFUSION: Performed by: INTERNAL MEDICINE

## 2017-09-25 PROCEDURE — 83880 ASSAY OF NATRIURETIC PEPTIDE: CPT

## 2017-09-25 PROCEDURE — 160048 HCHG OR STATISTICAL LEVEL 1-5: Performed by: INTERNAL MEDICINE

## 2017-09-25 PROCEDURE — 82947 ASSAY GLUCOSE BLOOD QUANT: CPT | Mod: 91

## 2017-09-25 PROCEDURE — 93321 DOPPLER ECHO F-UP/LMTD STD: CPT

## 2017-09-25 PROCEDURE — 85014 HEMATOCRIT: CPT | Mod: 91

## 2017-09-25 PROCEDURE — 160031 HCHG SURGERY MINUTES - 1ST 30 MINS LEVEL 5: Performed by: INTERNAL MEDICINE

## 2017-09-25 PROCEDURE — 700105 HCHG RX REV CODE 258: Performed by: INTERNAL MEDICINE

## 2017-09-25 PROCEDURE — 500002 HCHG ADHESIVE, DERMABOND: Performed by: INTERNAL MEDICINE

## 2017-09-25 PROCEDURE — 700111 HCHG RX REV CODE 636 W/ 250 OVERRIDE (IP): Performed by: NURSE PRACTITIONER

## 2017-09-25 PROCEDURE — 3E033XZ INTRODUCTION OF VASOPRESSOR INTO PERIPHERAL VEIN, PERCUTANEOUS APPROACH: ICD-10-PCS | Performed by: INTERNAL MEDICINE

## 2017-09-25 DEVICE — KIT TRANSCATHETER HEART VALVE SAPIEN-3 23MM: Type: IMPLANTABLE DEVICE | Status: FUNCTIONAL

## 2017-09-25 DEVICE — DEVICE CLSR 6FR HMST IMPL SLF STS PLUS ANGIOSEAL (10EA/CA): Type: IMPLANTABLE DEVICE | Status: FUNCTIONAL

## 2017-09-25 RX ORDER — CITALOPRAM 20 MG/1
20 TABLET ORAL EVERY MORNING
Status: DISCONTINUED | OUTPATIENT
Start: 2017-09-25 | End: 2017-09-30 | Stop reason: HOSPADM

## 2017-09-25 RX ORDER — LISINOPRIL 20 MG/1
20 TABLET ORAL 2 TIMES DAILY
Status: DISCONTINUED | OUTPATIENT
Start: 2017-09-25 | End: 2017-09-27

## 2017-09-25 RX ORDER — BUPIVACAINE HYDROCHLORIDE 5 MG/ML
INJECTION, SOLUTION EPIDURAL; INTRACAUDAL
Status: DISCONTINUED | OUTPATIENT
Start: 2017-09-25 | End: 2017-09-25 | Stop reason: HOSPADM

## 2017-09-25 RX ORDER — CLOPIDOGREL BISULFATE 75 MG/1
75 TABLET ORAL EVERY MORNING
Status: DISCONTINUED | OUTPATIENT
Start: 2017-09-25 | End: 2017-09-25

## 2017-09-25 RX ORDER — GABAPENTIN 300 MG/1
600 CAPSULE ORAL 3 TIMES DAILY
Status: DISCONTINUED | OUTPATIENT
Start: 2017-09-25 | End: 2017-09-30 | Stop reason: HOSPADM

## 2017-09-25 RX ORDER — IODIXANOL 270 MG/ML
INJECTION, SOLUTION INTRAVASCULAR
Status: DISCONTINUED | OUTPATIENT
Start: 2017-09-25 | End: 2017-09-25 | Stop reason: HOSPADM

## 2017-09-25 RX ORDER — ONDANSETRON 2 MG/ML
4 INJECTION INTRAMUSCULAR; INTRAVENOUS EVERY 4 HOURS PRN
Status: DISCONTINUED | OUTPATIENT
Start: 2017-09-25 | End: 2017-09-30 | Stop reason: HOSPADM

## 2017-09-25 RX ORDER — SODIUM CHLORIDE 9 MG/ML
INJECTION, SOLUTION INTRAVENOUS CONTINUOUS
Status: ACTIVE | OUTPATIENT
Start: 2017-09-25 | End: 2017-09-25

## 2017-09-25 RX ORDER — DIPHENHYDRAMINE HCL 25 MG
25 TABLET ORAL NIGHTLY PRN
Status: DISCONTINUED | OUTPATIENT
Start: 2017-09-25 | End: 2017-09-30 | Stop reason: HOSPADM

## 2017-09-25 RX ORDER — ASCORBIC ACID 500 MG
500 TABLET ORAL EVERY MORNING
Status: DISCONTINUED | OUTPATIENT
Start: 2017-09-25 | End: 2017-09-30 | Stop reason: HOSPADM

## 2017-09-25 RX ORDER — ATORVASTATIN CALCIUM 20 MG/1
20 TABLET, FILM COATED ORAL NIGHTLY
Status: DISCONTINUED | OUTPATIENT
Start: 2017-09-25 | End: 2017-09-30 | Stop reason: HOSPADM

## 2017-09-25 RX ORDER — ONDANSETRON 2 MG/ML
INJECTION INTRAMUSCULAR; INTRAVENOUS
Status: COMPLETED
Start: 2017-09-25 | End: 2017-09-25

## 2017-09-25 RX ORDER — IBUPROFEN 200 MG
950 CAPSULE ORAL 2 TIMES DAILY
Status: DISCONTINUED | OUTPATIENT
Start: 2017-09-25 | End: 2017-09-30 | Stop reason: HOSPADM

## 2017-09-25 RX ORDER — LIDOCAINE HYDROCHLORIDE 20 MG/ML
INJECTION, SOLUTION INFILTRATION; PERINEURAL
Status: DISCONTINUED | OUTPATIENT
Start: 2017-09-25 | End: 2017-09-25 | Stop reason: HOSPADM

## 2017-09-25 RX ORDER — LIDOCAINE HYDROCHLORIDE 10 MG/ML
INJECTION, SOLUTION INFILTRATION; PERINEURAL
Status: DISPENSED
Start: 2017-09-25 | End: 2017-09-25

## 2017-09-25 RX ORDER — CHOLECALCIFEROL (VITAMIN D3) 125 MCG
5000 CAPSULE ORAL EVERY MORNING
Status: DISCONTINUED | OUTPATIENT
Start: 2017-09-26 | End: 2017-09-30 | Stop reason: HOSPADM

## 2017-09-25 RX ORDER — ACETAMINOPHEN 325 MG/1
650 TABLET ORAL EVERY 6 HOURS PRN
Status: DISCONTINUED | OUTPATIENT
Start: 2017-09-25 | End: 2017-09-30 | Stop reason: HOSPADM

## 2017-09-25 RX ORDER — GABAPENTIN 300 MG/1
600 CAPSULE ORAL EVERY EVENING
Status: DISCONTINUED | OUTPATIENT
Start: 2017-09-25 | End: 2017-09-25

## 2017-09-25 RX ORDER — FUROSEMIDE 20 MG/1
20 TABLET ORAL EVERY MORNING
Status: DISCONTINUED | OUTPATIENT
Start: 2017-09-25 | End: 2017-09-26

## 2017-09-25 RX ORDER — SODIUM CHLORIDE 9 MG/ML
500 INJECTION, SOLUTION INTRAVENOUS ONCE
Status: COMPLETED | OUTPATIENT
Start: 2017-09-25 | End: 2017-09-25

## 2017-09-25 RX ORDER — HYDROCODONE BITARTRATE AND ACETAMINOPHEN 7.5; 325 MG/1; MG/1
1-2 TABLET ORAL EVERY 6 HOURS PRN
Status: DISCONTINUED | OUTPATIENT
Start: 2017-09-25 | End: 2017-09-30 | Stop reason: HOSPADM

## 2017-09-25 RX ORDER — DIPHENHYDRAMINE HYDROCHLORIDE 50 MG/ML
25 INJECTION INTRAMUSCULAR; INTRAVENOUS EVERY 6 HOURS PRN
Status: DISCONTINUED | OUTPATIENT
Start: 2017-09-25 | End: 2017-09-30 | Stop reason: HOSPADM

## 2017-09-25 RX ORDER — HYDRALAZINE HYDROCHLORIDE 20 MG/ML
10 INJECTION INTRAMUSCULAR; INTRAVENOUS
Status: DISCONTINUED | OUTPATIENT
Start: 2017-09-25 | End: 2017-09-30 | Stop reason: HOSPADM

## 2017-09-25 RX ADMIN — HYDROCODONE BITARTRATE AND ACETAMINOPHEN 2 TABLET: 7.5; 325 TABLET ORAL at 16:42

## 2017-09-25 RX ADMIN — EPHEDRINE SULFATE: 50 INJECTION INTRAMUSCULAR; INTRAVENOUS; SUBCUTANEOUS at 12:04

## 2017-09-25 RX ADMIN — GABAPENTIN 600 MG: 300 CAPSULE ORAL at 15:36

## 2017-09-25 RX ADMIN — ONDANSETRON 4 MG: 2 INJECTION INTRAMUSCULAR; INTRAVENOUS at 20:17

## 2017-09-25 RX ADMIN — SODIUM CHLORIDE 500 ML: 9 INJECTION, SOLUTION INTRAVENOUS at 21:14

## 2017-09-25 RX ADMIN — ATORVASTATIN CALCIUM 20 MG: 20 TABLET, FILM COATED ORAL at 20:03

## 2017-09-25 RX ADMIN — GABAPENTIN 600 MG: 300 CAPSULE ORAL at 20:03

## 2017-09-25 RX ADMIN — ONDANSETRON 4 MG: 2 INJECTION INTRAMUSCULAR; INTRAVENOUS at 12:02

## 2017-09-25 RX ADMIN — EPHEDRINE SULFATE: 50 INJECTION INTRAMUSCULAR; INTRAVENOUS; SUBCUTANEOUS at 12:10

## 2017-09-25 RX ADMIN — CALCIUM CITRATE 200 MG (950 MG) TABLET 950 MG: at 23:18

## 2017-09-25 ASSESSMENT — PAIN SCALES - GENERAL
PAINLEVEL_OUTOF10: 3
PAINLEVEL_OUTOF10: 2
PAINLEVEL_OUTOF10: 2
PAINLEVEL_OUTOF10: 7
PAINLEVEL_OUTOF10: 4
PAINLEVEL_OUTOF10: 4
PAINLEVEL_OUTOF10: 9
PAINLEVEL_OUTOF10: 6

## 2017-09-25 ASSESSMENT — ENCOUNTER SYMPTOMS
SHORTNESS OF BREATH: 1
FOCAL WEAKNESS: 0
PSYCHIATRIC NEGATIVE: 1
WEAKNESS: 0
MYALGIAS: 0
BACK PAIN: 1
BRUISES/BLEEDS EASILY: 0
WEAKNESS: 1
DOUBLE VISION: 0
FEVER: 0
CARDIOVASCULAR NEGATIVE: 1
DIZZINESS: 0
MUSCULOSKELETAL NEGATIVE: 1
RESPIRATORY NEGATIVE: 1
CHILLS: 0
SHORTNESS OF BREATH: 0
HEADACHES: 0
EYES NEGATIVE: 1
CONSTITUTIONAL NEGATIVE: 1
CLAUDICATION: 0
DEPRESSION: 0
BLURRED VISION: 0
NERVOUS/ANXIOUS: 0
GASTROINTESTINAL NEGATIVE: 1
VOMITING: 0
NAUSEA: 0
PALPITATIONS: 0
NEUROLOGICAL NEGATIVE: 1
COUGH: 0
WEIGHT LOSS: 0
ABDOMINAL PAIN: 0

## 2017-09-25 ASSESSMENT — LIFESTYLE VARIABLES
DO YOU DRINK ALCOHOL: NO
ALCOHOL_USE: NO
EVER_SMOKED: NEVER
EVER_SMOKED: NEVER

## 2017-09-25 ASSESSMENT — COPD QUESTIONNAIRES
COPD SCREENING SCORE: 3
HAVE YOU SMOKED AT LEAST 100 CIGARETTES IN YOUR ENTIRE LIFE: NO/DON'T KNOW
DURING THE PAST 4 WEEKS HOW MUCH DID YOU FEEL SHORT OF BREATH: NONE/LITTLE OF THE TIME
DO YOU EVER COUGH UP ANY MUCUS OR PHLEGM?: NO/ONLY WITH OCCASIONAL COLDS OR INFECTIONS

## 2017-09-25 ASSESSMENT — PATIENT HEALTH QUESTIONNAIRE - PHQ9
1. LITTLE INTEREST OR PLEASURE IN DOING THINGS: NOT AT ALL
SUM OF ALL RESPONSES TO PHQ9 QUESTIONS 1 AND 2: 0
SUM OF ALL RESPONSES TO PHQ QUESTIONS 1-9: 0
2. FEELING DOWN, DEPRESSED, IRRITABLE, OR HOPELESS: NOT AT ALL

## 2017-09-25 NOTE — PROGRESS NOTES
Pt admitted to the floor at 1300, report was called in by RRT, RN. Admitting dx is TAVR, and was admitted by cardiology Pt is confused, A&Ox 3, c/o left hip pain that is 2/10. At this time the admit profile is not complete. Pt is confused, no family present, assessment complete, pt verbalized education of call lights and phone numbers, appropriate signs have been placed. Bed in locked and low position, call light in reach. Bed alarm placed d/t altered mental status/age/medications given while at hospital. Will continue to monitor pt progress. APN cardiology at bedside. Notified of new Afib. ART line in place

## 2017-09-25 NOTE — H&P
Physician H&P    Patient ID:  Silvia Orellana  [id number]  86 y.o. female  6/19/1931    History:  Primary Diagnosis: Outpatient TAVR.    HPI:  86 year old female with severe symptomatic aortic stenosis. Due to her symptoms she was scheduled for TAVR.    Past Medical History:  has a past medical history of Aortic stenosis; Arthritis; Bowel habit changes; Bradycardia; Breast cancer (CMS-HCC); Breath shortness; CAD (coronary artery disease); Cancer (CMS-HCC) (2003); Cancer (CMS-HCC) (2007); Cancer (CMS-HCC) (2016); Cataract; Chronic pain; Dental disorder; Diverticulosis; Fall; GERD (gastroesophageal reflux disease); Heart burn; Clostridium difficile infection (2015); Hypertension; Indigestion; Myocardial infarct (CMS-HCC) (2002); Pneumonia (2014); Stroke (CMS-HCC) (2001; 2012); Urinary bladder disorder; and Urinary incontinence.  Past Surgical History:  has a past surgical history that includes cholecystectomy; lumpectomy; bladder biopsy; revise total hip replacement (3/24/2015); revise acetabular part of total hip (2010); orbital fracture orif (Left, 7/6/2015); femur orif (Right, 8/10/2015); hip arthroplasty total (Left); angiogram; other cardiac surgery (2002); other cardiac surgery (2012); gyn surgery (1961); orbital fracture orif (Left, 5/23/2016); multiple coronary artery bypass; and cardiac cath.  Past Social History:  reports that she has never smoked. She has never used smokeless tobacco. She reports that she drinks alcohol. She reports that she does not use drugs.  Past Family History: History reviewed. No pertinent family history.  Allergies: Review of patient's allergies indicates no known allergies.    Medications reviewed.    Current Medications:  Prior to Admission medications    Medication Sig Start Date End Date Taking? Authorizing Provider   Mirabegron ER (MYRBETRIQ) 50 MG TABLET SR 24 HR Take 1 Tab by mouth every day.   Yes Not In System Provider   lisinopril (PRINIVIL) 20 MG Tab Take 1 Tab by mouth 2  times a day. 9/14/17   Pamela Appiah A.P.R.N.   aspirin EC (ECOTRIN) 81 MG Tablet Delayed Response Take 81 mg by mouth every bedtime.    Not In System Provider   clopidogrel (PLAVIX) 75 MG Tab Take 75 mg by mouth every morning.    Not In System Provider   furosemide (LASIX) 20 MG Tab Take 20 mg by mouth every morning.    Not In System Provider   Cyanocobalamin (VITAMIN B-12) 5000 MCG SL Tab Place 1 Tab under tongue every morning.    Not In System Provider   metoprolol (LOPRESSOR) 25 MG Tab Take 12.5 mg by mouth 2 times a day.    Not In System Provider   ascorbic acid (ASCORBIC ACID) 500 MG Tab Take 500 mg by mouth every morning.    Not In System Provider   B Complex Vitamins (VITAMIN B COMPLEX PO) Take 1 Tab by mouth every morning.    Not In System Provider   multivitamin (THERAGRAN) Tab Take 1 Tab by mouth every morning.    Not In System Provider   atorvastatin (LIPITOR) 20 MG Tab Take 20 mg by mouth every evening.    Not In System Provider   calcium citrate (CALCITRATE) 950 MG Tab Take 950 mg by mouth 2 times a day.    Not In System Provider   gabapentin (NEURONTIN) 600 MG tablet Take 600 mg by mouth 3 times a day. 4/5/16   Not In System Provider   meloxicam (MOBIC) 15 MG tablet Take 15 mg by mouth See Admin Instructions. takes Mon thru Fri 1/20/16   Not In System Provider   citalopram (CELEXA) 20 MG TABS Take 20 mg by mouth every morning.    Not In System Provider       Review of Systems:  Review of Systems   Constitutional: Positive for malaise/fatigue. Negative for chills, fever and weight loss.   HENT: Negative.  Negative for hearing loss.    Eyes: Negative.  Negative for blurred vision and double vision.   Respiratory: Positive for shortness of breath. Negative for cough.    Cardiovascular: Negative.  Negative for chest pain, palpitations, claudication and leg swelling.   Gastrointestinal: Negative.  Negative for abdominal pain, nausea and vomiting.   Genitourinary: Negative.  Negative for dysuria and  "urgency.   Musculoskeletal: Negative.  Negative for joint pain and myalgias.   Skin: Negative.  Negative for itching and rash.   Neurological: Positive for weakness. Negative for dizziness, focal weakness and headaches.   Endo/Heme/Allergies: Negative.  Does not bruise/bleed easily.   Psychiatric/Behavioral: Negative.  Negative for depression. The patient is not nervous/anxious.      Blood pressure 139/62, pulse 61, resp. rate 17, height 1.6 m (5' 3\"), weight 89.5 kg (197 lb 5 oz), SpO2 93 %.    Physical Examination:  Physical Exam   Constitutional: She is oriented to person, place, and time. She appears well-developed and well-nourished.   HENT:   Head: Normocephalic and atraumatic.   Eyes: Conjunctivae are normal. Pupils are equal, round, and reactive to light.   Neck: Normal range of motion. Neck supple.   Cardiovascular: Normal rate and regular rhythm.    Murmur heard.  Pulmonary/Chest: Effort normal and breath sounds normal.   Abdominal: Soft. Bowel sounds are normal.   Musculoskeletal: Normal range of motion. She exhibits no edema.   Neurological: She is alert and oriented to person, place, and time.   Skin: Skin is warm and dry.   Psychiatric: She has a normal mood and affect.   CARDIAC STUDIES/PROCEDURES:    CARDIAC CATHETERIZATION CONCLUSIONS by Dr. Aguirre (08/30/17)  1. Patent LIMA to LAD  2. Patent KINDRA to PDA  3. Patent vein graft to OM, mild/moderate in-stent restenosis of the ostial stent  4. Mildly tortuous abdominal descending aorta    CAROTID ULTRASOUND (03/04/16)  <50% bilateral ICA stenoses   Nl subclavians and vertebrals    CT OF CHEST AND ABDOMEN (09/06/17)  1.  Aortic annulus area of 412 sq mm.  2.  Coronary artery ostia are greater than 10 mm from the annulus.  3.  Moderate tortuosity and atherosclerotic calcification of the iliofemoral arterial system, worse on the RIGHT, with minimum diameters of 5.1 mm on the RIGHT and 6.9 mm on the LEFT.  4.  Markedly atrophic RIGHT kidney with " apparent moderate to severe proximal RIGHT renal artery stenosis, likely chronic.    DOBUTAMINE STRESS ECHOCARDIOGRAM (08/07/17)  Resting Echo: LVEF 65% with AoV Vmax  of 2.52 m/s; peak gradient 25.5 mmHg and mean 16.11 mmHg  Peak Dobutamine: LVEF 80%, Vmax 4.02 m/s; Peak gradient 64.75 mmHg and mean 36.42 mmHg  No wall motion abnormality     ECHOCARDIOGRAM CONCLUSIONS (04/19/17)  Structurally normal tricuspid valve. Mild tricuspid regurgitation.   Estimated right ventricular systolic pressure  is 40 mmHg.  Normal left ventricular systolic function.   Estimated right ventricular systolic pressure  is 40 mmHg.  Compared to the images of the study done - there has been increase in   mean gradient across the aortic valve but still within moderate   severity of aortic stenosis.     EKG performed on (09/21/16) was reviewed: EKG shows sinus bradycardia.  EKG performed on (02/10/16) was reviewed: EKG shows sinus bradycardia.    PULMONARY FUNCTION INTERPRETATION (09/13/17)  REASON FOR STUDY:  Shortness of breath, dyspnea on exertion and a history of   severe aortic stenosis.  SPIROMETRY:  FVC is 1.83, 79% predicted.  FEV1 is 1.44, 83% predicted.    FEV1/FVC is 79.  There is a significant response to bronchodilator.  LUNG VOLUMES:  Vital capacity is 97% predicted.  Total lung capacity is 106%   predicted.  Diffusion studies are normal.  Flow volume loops show some mild   coving in the expiratory limb.  IMPRESSION:  Normal exam    Impression:    1. Aortic stenosis: She is symptomatic with his severe aortic stenosis, NYHA class III. We will proceed with TAVR. She understands the risks and benefits and agrees with plan.  2. Coronary artery disease with prior 3 vessel coronary bypass graft (LIMA to LAD, KINDRA to PDA, vein graft to OM) in [facility], 2002, ostium of the vein graft  stent placement also in 2012: She is clinically doing well from coronary standpoint. We will continue with current medical care.  3. Carotid artery  stenosis: Clinically stable on medical therapy.  4. History of trans-ischemic attack (2012): She remains clinically stable without any new neurological symptoms.  5. Chronic kidney disease: We will continue to follow labs.     The risks, benefits, and alternatives to coronary angiography with IV sedation were discussed in great detail. Specific risks mentioned include bleeding, infection, kidney damage, allergic reaction, cardiac perforation with possible tamponade requiring florentin-cardiocentesis or possible open heart surgery. In addition, we discussed that 10% of patients will experience small to moderate bruising at the side of the arterial puncture. Lastly the risks of heart attack, stroke, and death were discussed; the risks of major complications such as heart attack or stroke caused by the angiogram is less than 1%; the risk of death is approximately 1 in 1000. The patient verbalized understanding of these potential complications and wishes to proceed with this procedure.     CC Pamela Deal, James Lockhart and Joshua Acosta    Plan:  Outpatient TAVR.    Pre Procedure Assessment:  <EXAMSHORT2>      Pre Procedure update:   No changes.    Sherif Elder  9/25/2017

## 2017-09-25 NOTE — OR NURSING
Pt arrived at 1156 and left PACU at 1239.  Pt arrived to PACU and b/p was 59/29 (48) on arrival and dropped as low as 47/28 (30).   Ephedrine administered by Dr. Acosta with b/p responding back up to the 130's systolically after multiple doses of ephedrine given (70 mg).  Dr. Elder called and reported to bedside shortly after pt's arrival.  Pt given oxygen at 15 lpm via mask.  Pt had a moment where Dr. Acosta felt mental status declined, and returned again quickly.  Pt then remained awake and verbal t/o time in PACU.  Labs sent and labs also monitored by I-stat.  EKG called and did not arrive prior to pt leaving for CT scan.  IV placed by Dr. He on pt's left upper arm, old iv's on left side removed per Dr. Acosta's orders.  Dr. Acosta was present the entire time pt in PACU.  1 liter of fluid infused during pt's time in PACU.  ROSA Appiah APN to speak with family member and update them regarding pt's status.

## 2017-09-25 NOTE — OP REPORT
DATE OF SERVICE:  09/25/2017    REFERRING PHYSICIAN:  James Aguirre MD    PREOPERATIVE DIAGNOSES:  Severe aortic stenosis (calcific or degenerative),   coronary artery disease, status post coronary artery bypass grafting,   peripheral vascular disease, carotid artery stenosis, transient ischemic   attack, chronic kidney disease.    POSTOPERATIVE DIAGNOSES:  Severe aortic stenosis (calcific or degenerative),   coronary artery disease, status post coronary artery bypass grafting,   peripheral vascular disease, carotid artery stenosis, transient ischemic   attack, chronic kidney disease.    PROCEDURES:  Transcatheter aortic valve replacement (TAVR 23 mm S3 Oliveira   pericardial valve), and transthoracic echocardiography.    SURGEON:  Sherif Elder MD and Geraldo Min MD    FIRST ASSISTANT:  Emiliano Estrada MD    ANESTHESIOLOGIST:  Salas Acosta MD    ANESTHESIA:  Conscious sedation.    ESTIMATED BLOOD LOSS:  Minimal.    COMPLICATIONS:  None.    INDICATIONS:  The patient is a pleasant 86-year-old white female with severe   symptomatic aortic stenosis.  Echocardiography (DSE) showed peak and mean   transvalvular gradients of 64 and 36 mmHg respectively and Vmax 4.02 meters per   second.  Her left ventricular ejection fraction was normal and was 65-80%.    DESCRIPTION OF PROCEDURE:  The patient was brought to the operating room and   placed on the operating room table in the supine position.  Conscious sedation   was administered by Dr. Acosta.  She was then prepped and draped in the usual   sterile fashion.  In collaboration with Dr. Elder, bilateral femoral  ultrasound-guided arteriovenous access was obtained.  Dr. Elder positioned a   temporary transvenous pacemaker in the right ventricle and a pigtail catheter   in the right coronary sinus.  The deployment angle was determined.  Two   Perclose sutures were deployed in the left common femoral artery.  A 14-Hungarian   eSheath was then placed in the left common  femoral artery and its tip was   positioned in the abdominal aorta.  Dr. Estrada crossed the aortic valve with a   wire.  Aortic balloon valvuloplasty was performed first.  Dr. Estrada inflated   the balloon during valvuloplasty.  This catheter was exchanged for a valve   deployment catheter with a 23 mm S3 Oliveira pericardial valve at its tip.  Dr. Elder positioned the valve across the annulus and Dr. Estrada inflated the   balloon during valve implantation.  Wires and catheters were removed.    Transthoracic echocardiography did not show any paravalvular or central leaks.    The left femoral eSheath was removed and the Perclose sutures were tightened   down.  When adequate hemostasis had been obtained, the small left groin   incision was closed in 2 layers using Vicryl sutures.  The remainder of the   operation will be dictated by Dr. Elder.  There were no apparent   complications.  The patient tolerated the procedure well and left the   operating room in stable condition.       ____________________________________     MD NETTIE ROBERTSON / ROSALIA    DD:  09/25/2017 11:57:12  DT:  09/25/2017 12:13:51    D#:  [id number]  Job#:  [id number]

## 2017-09-25 NOTE — OR SURGEON
Operative Report    PreOp Diagnosis: AS    PostOp Diagnosis: AS    Procedure(s):  TAVR (23mm S3 Oliveira pericardia valve)  TTE - Wound Class: None    Surgeon(s):  BRENDA Rain M.D.    Assistant:  Emiliano Estrada M.D.    Anesthesiologist/Type of Anesthesia: Conscious sedation  Anesthesiologist: Salas Acosta M.D.  Anesthesia Technician: Tyler Kam    Surgical Staff:  Circulator: Lexie Jiang R.N.  Perfusionist: Nam Bobo  Relief Circulator: Cynthia Watkins R.N.  Scrub Person: Benja Daniel R.N.; NICKIE Perez IV  Sedation/Monitoring Nurse: Barrera Norris R.N.  Radiology Technologist: Doug Ross    Specimens: None    Estimated Blood Loss: Min    Findings: AS    Complications: None        9/25/2017 11:45 AM Grealdo Min

## 2017-09-25 NOTE — OP REPORT
DATE OF PROCEDURE: 09/25/17    REFERRING PHYSICIAN: Pamela Deal, James Lockhart    PROCEDURES:  1. Transcatheter aortic valve replacement.  2. Preclose closure.  3. Ultrasound guided femoral artery and femoral vein access.    PRE PROCEDURAL DIAGNOSIS:  1. Severe symptomatic aortic stenosis, NYHA III.    POST PROCEDURAL DIAGNOSIS:  1. Successful transcatheter aortic valve replacement (TAVR) with # 23 Oliveira Leeann S3 valve, transfemoral approach, under conscious sedation.  2. Successful Preclose closure.    INDICATION:    86 year old female with severe aortic stenosis, coronary artery disease with prior coronary artery bypass graft surgery, Percutaneous intervention with stent placement, history of transient ischemic attack and chronic kidney disease. Due to her symptoms she was scheduled for TAVR.    DESCRIPTION OF PROCEDURE:  After informed consent was signed by the   patient, the patient was brought into the OR 19.  She was prepped and draped in   usual sterile manner.  Prior to the procedure, right radial arterial insertion was  performed by Salas Matos.    The initial timeout was performed.     A 6-Paraguayan sheath was placed in the right femoral artery and a 6-Paraguayan sheath was   placed in the right femoral vein by Emiliano Le. A 6-Paraguayan arterial sheath was   placed in the right femoral artery by myself.  Of note, all sheaths were placed under ultrasound guidance.    Through the venous femoral sheath, a temporary pacemaker was positioned at the   right ventricle.  Pacing was confirmed.  Through the right arterial sheath, a pigtail catheter   was positioned in the distal aorta and aortogram was performed. This catheter was advanced   to the ascending aorta at the valve level and aortogram was repeated. Over the left femoral   sheath PreClose closure was performed with 2 devices. An 8 -Paraguayan Sheath was placed.   Through this 8-Paraguayan sheath, an AL1 was positioned into the  ascending aorta. This catheter   and sheath were removed after the insertion of a Lunderquist wire.  Over the Lunderquist   wire a 14-Tajik expandable-sheath was advanced by Dr. Min.  IV heparin was given.   Through 2the expandable sheath,  an AL1 catheter was positioned at the ascending aorta.  The   Lunderquist wire was removed and exchanged for a straightwire. The AL1 catheter was   used to cross the aortic valve with this wire.  The AL1 catheter was exchanged for an   exchange length J wire.  Over this wire, a 6-Tajik pigtail catheter was positioned into   the left ventricle. Through the pigtail catheter an Amplatz extra stiff wire was positioned   across  the aortic valve. Over the Amplatz wire a 20x40 mm balloon was positioned across   the aortic valve.  Rapid pacing was initiated and valvuloplasty was performed. The balloon   was removed and the Commander delivery system was inserted.  The Leeann S3 valve   stent was loaded onto the balloon and positioned across the aortic valve.  Rapid pacing   was performed again and the stent was deployed. The delivery system was removed.   The valve was deployed by ora.    The patient tolerated the procedure well. At the end of procedure, all pacemaker wire,   pigtail catheters and sheaths were removed.  The left femoral arteriotomy site was closed   via the PreClose system.  The right femoral arteriotomy site was closed via Angio-Seal.   The patient was transferred to PACU in stable condition.    IMPRESSION:  1. Successful transcatheter aortic valve replacement (TAVR) with # 23 Oliveira Leeann S3 valve, transfemoral approach, under conscious sedation.  2. Successful Preclose closure.    RECOMMENDATION: Medical therapy with addition of Plavix for 3 months.

## 2017-09-26 ENCOUNTER — APPOINTMENT (OUTPATIENT)
Dept: RADIOLOGY | Facility: MEDICAL CENTER | Age: 82
DRG: 267 | End: 2017-09-26
Attending: NURSE PRACTITIONER
Payer: MEDICARE

## 2017-09-26 LAB
ALBUMIN SERPL BCP-MCNC: 3 G/DL (ref 3.2–4.9)
ALBUMIN/GLOB SERPL: 1.2 G/DL
ALP SERPL-CCNC: 71 U/L (ref 30–99)
ALT SERPL-CCNC: 10 U/L (ref 2–50)
ANION GAP SERPL CALC-SCNC: 10 MMOL/L (ref 0–11.9)
AST SERPL-CCNC: 37 U/L (ref 12–45)
BILIRUB SERPL-MCNC: 0.8 MG/DL (ref 0.1–1.5)
BUN SERPL-MCNC: 20 MG/DL (ref 8–22)
CALCIUM SERPL-MCNC: 8.1 MG/DL (ref 8.5–10.5)
CHLORIDE SERPL-SCNC: 106 MMOL/L (ref 96–112)
CO2 SERPL-SCNC: 22 MMOL/L (ref 20–33)
CREAT SERPL-MCNC: 1.56 MG/DL (ref 0.5–1.4)
EKG IMPRESSION: NORMAL
ERYTHROCYTE [DISTWIDTH] IN BLOOD BY AUTOMATED COUNT: 48.1 FL (ref 35.9–50)
ERYTHROCYTE [DISTWIDTH] IN BLOOD BY AUTOMATED COUNT: 48.7 FL (ref 35.9–50)
GFR SERPL CREATININE-BSD FRML MDRD: 31 ML/MIN/1.73 M 2
GLOBULIN SER CALC-MCNC: 2.6 G/DL (ref 1.9–3.5)
GLUCOSE SERPL-MCNC: 111 MG/DL (ref 65–99)
HCT VFR BLD AUTO: 35.1 % (ref 37–47)
HCT VFR BLD AUTO: 36.5 % (ref 37–47)
HGB BLD-MCNC: 11.6 G/DL (ref 12–16)
HGB BLD-MCNC: 12.1 G/DL (ref 12–16)
LV EJECT FRACT MOD 2C 99903: 87.34
LV EJECT FRACT MOD 4C 99902: 70.43
LV EJECT FRACT MOD BP 99901: 79.47
MCH RBC QN AUTO: 32.8 PG (ref 27–33)
MCH RBC QN AUTO: 33.2 PG (ref 27–33)
MCHC RBC AUTO-ENTMCNC: 33 G/DL (ref 33.6–35)
MCHC RBC AUTO-ENTMCNC: 33.2 G/DL (ref 33.6–35)
MCV RBC AUTO: 100 FL (ref 81.4–97.8)
MCV RBC AUTO: 99.2 FL (ref 81.4–97.8)
PLATELET # BLD AUTO: 145 K/UL (ref 164–446)
PLATELET # BLD AUTO: 160 K/UL (ref 164–446)
PMV BLD AUTO: 9.6 FL (ref 9–12.9)
PMV BLD AUTO: 9.7 FL (ref 9–12.9)
POTASSIUM SERPL-SCNC: 4 MMOL/L (ref 3.6–5.5)
PROT SERPL-MCNC: 5.6 G/DL (ref 6–8.2)
RBC # BLD AUTO: 3.54 M/UL (ref 4.2–5.4)
RBC # BLD AUTO: 3.65 M/UL (ref 4.2–5.4)
SODIUM SERPL-SCNC: 138 MMOL/L (ref 135–145)
WBC # BLD AUTO: 7.4 K/UL (ref 4.8–10.8)
WBC # BLD AUTO: 7.7 K/UL (ref 4.8–10.8)

## 2017-09-26 PROCEDURE — 700102 HCHG RX REV CODE 250 W/ 637 OVERRIDE(OP): Performed by: NURSE PRACTITIONER

## 2017-09-26 PROCEDURE — 770022 HCHG ROOM/CARE - ICU (200)

## 2017-09-26 PROCEDURE — 71010 DX-CHEST-PORTABLE (1 VIEW): CPT

## 2017-09-26 PROCEDURE — A9270 NON-COVERED ITEM OR SERVICE: HCPCS | Performed by: NURSE PRACTITIONER

## 2017-09-26 PROCEDURE — 93306 TTE W/DOPPLER COMPLETE: CPT | Mod: 26 | Performed by: INTERNAL MEDICINE

## 2017-09-26 PROCEDURE — 80053 COMPREHEN METABOLIC PANEL: CPT

## 2017-09-26 PROCEDURE — 51798 US URINE CAPACITY MEASURE: CPT

## 2017-09-26 PROCEDURE — 93306 TTE W/DOPPLER COMPLETE: CPT

## 2017-09-26 PROCEDURE — 93005 ELECTROCARDIOGRAM TRACING: CPT | Performed by: NURSE PRACTITIONER

## 2017-09-26 PROCEDURE — 700105 HCHG RX REV CODE 258: Performed by: NURSE PRACTITIONER

## 2017-09-26 PROCEDURE — 93010 ELECTROCARDIOGRAM REPORT: CPT | Performed by: INTERNAL MEDICINE

## 2017-09-26 PROCEDURE — 85027 COMPLETE CBC AUTOMATED: CPT | Mod: 91

## 2017-09-26 RX ORDER — SODIUM CHLORIDE 9 MG/ML
INJECTION, SOLUTION INTRAVENOUS CONTINUOUS
Status: DISCONTINUED | OUTPATIENT
Start: 2017-09-26 | End: 2017-09-27

## 2017-09-26 RX ADMIN — CITALOPRAM HYDROBROMIDE 20 MG: 20 TABLET ORAL at 08:13

## 2017-09-26 RX ADMIN — SODIUM CHLORIDE: 9 INJECTION, SOLUTION INTRAVENOUS at 10:25

## 2017-09-26 RX ADMIN — OXYCODONE HYDROCHLORIDE AND ACETAMINOPHEN 500 MG: 500 TABLET ORAL at 08:13

## 2017-09-26 RX ADMIN — GABAPENTIN 600 MG: 300 CAPSULE ORAL at 08:13

## 2017-09-26 RX ADMIN — LISINOPRIL 20 MG: 20 TABLET ORAL at 20:22

## 2017-09-26 RX ADMIN — HYDROCODONE BITARTRATE AND ACETAMINOPHEN 1 TABLET: 7.5; 325 TABLET ORAL at 08:13

## 2017-09-26 RX ADMIN — HYDROCODONE BITARTRATE AND ACETAMINOPHEN 1 TABLET: 7.5; 325 TABLET ORAL at 15:22

## 2017-09-26 RX ADMIN — CALCIUM CITRATE 200 MG (950 MG) TABLET 950 MG: at 23:17

## 2017-09-26 RX ADMIN — HYDROCODONE BITARTRATE AND ACETAMINOPHEN 1 TABLET: 7.5; 325 TABLET ORAL at 04:27

## 2017-09-26 RX ADMIN — SODIUM CHLORIDE: 9 INJECTION, SOLUTION INTRAVENOUS at 22:38

## 2017-09-26 RX ADMIN — ATORVASTATIN CALCIUM 20 MG: 20 TABLET, FILM COATED ORAL at 20:22

## 2017-09-26 RX ADMIN — SODIUM CHLORIDE: 9 INJECTION, SOLUTION INTRAVENOUS at 15:29

## 2017-09-26 RX ADMIN — CALCIUM CITRATE 200 MG (950 MG) TABLET 950 MG: at 11:44

## 2017-09-26 RX ADMIN — GABAPENTIN 600 MG: 300 CAPSULE ORAL at 20:22

## 2017-09-26 RX ADMIN — THERA TABS 1 TABLET: TAB at 08:13

## 2017-09-26 RX ADMIN — GABAPENTIN 600 MG: 300 CAPSULE ORAL at 15:22

## 2017-09-26 ASSESSMENT — ENCOUNTER SYMPTOMS
WEAKNESS: 0
ABDOMINAL PAIN: 0
PSYCHIATRIC NEGATIVE: 1
NERVOUS/ANXIOUS: 0
DIZZINESS: 0
NEUROLOGICAL NEGATIVE: 1
CLAUDICATION: 0
MYALGIAS: 0
NAUSEA: 0
CARDIOVASCULAR NEGATIVE: 1
WEIGHT LOSS: 0
BACK PAIN: 1
EYES NEGATIVE: 1
SHORTNESS OF BREATH: 0
FEVER: 0
COUGH: 0
CONSTITUTIONAL NEGATIVE: 1
FOCAL WEAKNESS: 0
DEPRESSION: 0
DOUBLE VISION: 0
BRUISES/BLEEDS EASILY: 0
CHILLS: 0
GASTROINTESTINAL NEGATIVE: 1
HEADACHES: 0
VOMITING: 0
PALPITATIONS: 0
RESPIRATORY NEGATIVE: 1
BLURRED VISION: 0

## 2017-09-26 ASSESSMENT — PAIN SCALES - GENERAL
PAINLEVEL_OUTOF10: 5
PAINLEVEL_OUTOF10: 3
PAINLEVEL_OUTOF10: 4
PAINLEVEL_OUTOF10: 5
PAINLEVEL_OUTOF10: 3
PAINLEVEL_OUTOF10: 5

## 2017-09-26 NOTE — CARE PLAN
Problem: Nutritional:  Goal: Achieve adequate nutritional intake  Patient will consume 50% of meals and Boost Plus or 3-4 Boost Plus per day.  Outcome: PROGRESSING AS EXPECTED

## 2017-09-26 NOTE — PROGRESS NOTES
MS: ST @ a rate of 103-116 measuring .20/.10/.42 with ectopy of occasional PVC. Afib per physician was low voltage.     12 hour chart check

## 2017-09-26 NOTE — DIETARY
Nutrition Services: Consult cardiac rehab diet education    Pt is an 86 y.o. Female with Dx: Severe aortic stenosis    PMH: AS, CAD, MI, stroke, CABG  BMI= 35.23  Labs: Lipid WNL; no HA1c    Admit day 1. S/p TAVR on 9/25. Cardiac rehab diet education not indicated per RD judgment. Pt is on a cardiac diet with poor appetite. Boost Plus supplements added TID, which pt is drinking. Nutrition Representative spoke with pt and family today about menu options and will continue to see daily.    RD following for adequate PO intake and additional pt needs.

## 2017-09-26 NOTE — CARE PLAN
Problem: Safety  Goal: Will remain free from falls  Outcome: PROGRESSING AS EXPECTED  Bed rest until 1900. Call light in reach. Family at bedside    Problem: Venous Thromboembolism (VTW)/Deep Vein Thrombosis (DVT) Prevention:  Goal: Patient will participate in Venous Thrombosis (VTE)/Deep Vein Thrombosis (DVT)Prevention Measures  Outcome: PROGRESSING AS EXPECTED  SCD per APN    Problem: Pain Management  Goal: Pain level will decrease to patient's comfort goal  Outcome: PROGRESSING AS EXPECTED  See flow sheet.

## 2017-09-26 NOTE — DOCUMENTATION QUERY
"DOCUMENTATION QUERY    PROVIDERS: Please select “Cosign w/ note”to reply to query.    To better represent the severity of illness of your patient, please review the following information and exercise your independent professional judgment in responding to this query.     \"CKD\" is documented in the History and Physical and Progress Notes. Based upon the clinical findings, risk factors, and treatment, can this diagnosis be further specified?    • Chronic Kidney Disease Stage I      • Chronic Kidney Disease Stage II     • Chronic Kidney Disease Stage III    • Chronic Kidney Disease Stage IV    • Chronic Kidney Disease Stage V     • End Stage Renal Disease  • Other explanation of clinical findings  • Unable to determine    The medical record reflects the following:   Clinical Findings - GFR 40 on admission with a decrease to 31 on 9/26/17  - Cr 1.27 on admission with an increase to 1.56 on 9/26/17   Treatment - NS Infusion  - BMP   Risk Factors - hx CKD  - hx HTN  - hx CAD   Location within medical record  History and Physical, Progress Notes, Lab Results and MAR.     Thank you,     Adams Oviedo  Clinical   P: [phone]  "

## 2017-09-26 NOTE — CARE PLAN
Problem: Safety  Goal: Will remain free from falls  Outcome: PROGRESSING AS EXPECTED    Intervention: Implement fall precautions  Pt wearing yellow treaded slipper socks. Bed alarm on. Call light is within reach. Bed is in low, locked position. Mobility assessed.       Problem: Mobility  Goal: Risk for activity intolerance will decrease  Outcome: PROGRESSING SLOWER THAN EXPECTED    Intervention: Assess and monitor signs of activity intolerance  Ambulated pt. Pt's SBP dropped to 78. Allowing pt to rest.

## 2017-09-26 NOTE — PROGRESS NOTES
Cardiology Progress Note               Author: Sherif Elder Date & Time created: 2017  5:06 PM     Interval History:  86 year old female status post TAVR. She is clinically doing well.    Review of Systems   Constitutional: Negative.  Negative for chills, fever, malaise/fatigue and weight loss.   HENT: Negative.  Negative for hearing loss.    Eyes: Negative.  Negative for blurred vision and double vision.   Respiratory: Negative.  Negative for cough and shortness of breath.    Cardiovascular: Negative.  Negative for chest pain, palpitations, claudication and leg swelling.   Gastrointestinal: Negative.  Negative for abdominal pain, nausea and vomiting.   Genitourinary: Negative.  Negative for dysuria and urgency.   Musculoskeletal: Positive for back pain and joint pain. Negative for myalgias.   Skin: Negative.  Negative for itching and rash.   Neurological: Negative.  Negative for dizziness, focal weakness, weakness and headaches.   Endo/Heme/Allergies: Negative.  Does not bruise/bleed easily.   Psychiatric/Behavioral: Negative.  Negative for depression. The patient is not nervous/anxious.        Physical Exam   Constitutional: She is oriented to person, place, and time. She appears well-developed and well-nourished.   HENT:   Head: Normocephalic and atraumatic.   Eyes: Conjunctivae are normal. Pupils are equal, round, and reactive to light.   Neck: Normal range of motion. Neck supple.   Cardiovascular: Normal rate and regular rhythm.    Pulmonary/Chest: Effort normal and breath sounds normal.   Abdominal: Soft. Bowel sounds are normal.   Musculoskeletal: Normal range of motion. She exhibits no edema.   Neurological: She is alert and oriented to person, place, and time.   Skin: Skin is warm and dry.   Psychiatric: She has a normal mood and affect.       Hemodynamics:  Temp (24hrs), Av.5 °C (95.9 °F), Min:35.1 °C (95.1 °F), Max:36 °C (96.8 °F)  Temperature: 35.9 °C (96.6 °F)  Pulse  Av.9  Min: 61  Max:  111Heart Rate (Monitored): (!) 110  Blood Pressure : 139/62, Arterial BP: (!) 124/31, NIBP: 122/47     Respiratory:    Respiration: (!) 21, Pulse Oximetry: 95 %, O2 Daily Delivery Respiratory : Silicone Nasal Cannula     Work Of Breathing / Effort: Mild  RUL Breath Sounds: Diminished, RML Breath Sounds: Diminished, RLL Breath Sounds: Diminished, LING Breath Sounds: Diminished, LLL Breath Sounds: Diminished  Fluids:  Date 09/25/17 0700 - 09/26/17 0659   Shift 9061-2066 2995-5624 5078-1588 24 Hour Total   I  N  T  A  K  E   P.O. 0 150  150    Shift Total 0 150  150   O  U  T  P  U  T   Urine 0 0  0    Shift Total 0 0  0   Weight (kg) 89.5 89.5 89.5 89.5       Weight: 89.5 kg (197 lb 5 oz)  GI/Nutrition:  Orders Placed This Encounter   Procedures   • DIET ORDER     Standing Status:   Standing     Number of Occurrences:   1     Order Specific Question:   Diet:     Answer:   Cardiac [6]     Lab Results:  Recent Labs      09/25/17   1229   WBC  9.9   RBC  3.52*   HEMOGLOBIN  11.6*   HEMATOCRIT  33.7*   MCV  95.7   MCH  33.0   MCHC  34.4   RDW  45.2   PLATELETCT  199   MPV  9.8     Recent Labs      09/25/17   1229   SODIUM  135   POTASSIUM  3.8   CHLORIDE  107   CO2  18*   GLUCOSE  144*   BUN  18   CREATININE  1.27   CALCIUM  8.5         Recent Labs      09/25/17   1229   BNPBTYPENAT  63     Recent Labs      09/25/17   1229   BNPBTYPENAT  63         Medications reviewed.      Current Facility-Administered Medications:   •  LIDOCAINE HCL 1 % INJ SOLN, , , ,   •  ascorbic acid (ascorbic acid) tablet 500 mg, 500 mg, Oral, QAM, Pamela pApiah, A.P.R.N., Stopped at 09/25/17 1200  •  atorvastatin (LIPITOR) tablet 20 mg, 20 mg, Oral, Nightly, Pamela Appiah, A.P.R.N.  •  calcium citrate (CALCITRATE) tablet 950 mg, 950 mg, Oral, BID, Pamela Appiah, A.P.R.N., Stopped at 09/25/17 1200  •  citalopram (CELEXA) tablet 20 mg, 20 mg, Oral, QAM, Pamela Appiah, A.P.R.N., Stopped at 09/25/17 1200  •  [START ON  9/26/2017] cyanocobalamin (VITAMIN B-12) tablet 5,000 mcg, 5,000 mcg, Oral, QAM, Pamela Appiah, A.P.R.N.  •  furosemide (LASIX) tablet 20 mg, 20 mg, Oral, QAM, Pamela Appiah, A.P.R.N., Stopped at 09/25/17 1200  •  lisinopril (PRINIVIL) tablet 20 mg, 20 mg, Oral, BID, Pamela Appiah A.P.R.N., Stopped at 09/25/17 1200  •  multivitamin (THERAGRAN) tablet 1 Tab, 1 Tab, Oral, QAM, Pamela Appiah A.P.R.N., Stopped at 09/25/17 1200  •  Respiratory Care per Protocol, , Nebulization, Continuous RT, Pamela Appiah A.P.R.N.  •  hydrALAZINE (APRESOLINE) injection 10 mg, 10 mg, Intravenous, Q30 MIN PRN, Pamela Appiah A.P.R.N.  •  acetaminophen (TYLENOL) tablet 650 mg, 650 mg, Oral, Q6HRS PRN, Pamela Appiah A.P.R.N.  •  diphenhydrAMINE (BENADRYL) tablet/capsule 25 mg, 25 mg, Oral, HS PRN, Pamela Appiah, A.P.R.N.  •  ondansetron (ZOFRAN) syringe/vial injection 4 mg, 4 mg, Intravenous, Q4HRS PRN, Pamela Appiah A.P.R.N.  •  diphenhydrAMINE (BENADRYL) injection 25 mg, 25 mg, Intravenous, Q6HRS PRN, Pamela Appiah A.P.R.N.  •  gabapentin (NEURONTIN) capsule 600 mg, 600 mg, Oral, TID, Pamela Appiah A.P.R.N., 600 mg at 09/25/17 1536  •  hydrocodone-acetaminophen (NORCO) 7.5-325 MG per tablet 1-2 Tab, 1-2 Tab, Oral, Q6HRS PRN, Pamela Appiah A.P.R.N., 2 Tab at 09/25/17 1642    CARDIAC STUDIES/PROCEDURES:     CARDIAC CATHETERIZATION CONCLUSIONS by Dr. Aguirre (08/30/17)  1. Patent LIMA to LAD  2. Patent KINDRA to PDA  3. Patent vein graft to OM, mild/moderate in-stent restenosis of the ostial stent  4. Mildly tortuous abdominal descending aorta     CAROTID ULTRASOUND (03/04/16)  <50% bilateral ICA stenoses   Nl subclavians and vertebrals     CT OF ABDOMEN (09/25/17)  1.  Small LEFT low anterior abdominal wall hematoma. Ongoing bleeding is not excluded.  2.  Gallbladder decompressed or absent  3.  Duodenal diverticulum  4.  Atrophic RIGHT kidney  5.   Atherosclerosis  6.  Prior hysterectomy  7.  Distal colonic diverticulosis  8.  Small hiatal hernia    CT OF CHEST AND ABDOMEN (09/06/17)  1.  Aortic annulus area of 412 sq mm.  2.  Coronary artery ostia are greater than 10 mm from the annulus.  3.  Moderate tortuosity and atherosclerotic calcification of the iliofemoral arterial system, worse on the RIGHT, with minimum diameters of 5.1 mm on the RIGHT and 6.9 mm on the LEFT.  4.  Markedly atrophic RIGHT kidney with apparent moderate to severe proximal RIGHT renal artery stenosis, likely chronic.     DOBUTAMINE STRESS ECHOCARDIOGRAM (08/07/17)  Resting Echo: LVEF 65% with AoV Vmax  of 2.52 m/s; peak gradient 25.5 mmHg and mean 16.11 mmHg  Peak Dobutamine: LVEF 80%, Vmax 4.02 m/s; Peak gradient 64.75 mmHg and mean 36.42 mmHg  No wall motion abnormality    ECHOCARDIOGRAM CONCLUSIONS (09/25/17)  Results pending.     ECHOCARDIOGRAM CONCLUSIONS (04/19/17)  Structurally normal tricuspid valve. Mild tricuspid regurgitation.   Estimated right ventricular systolic pressure  is 40 mmHg.  Normal left ventricular systolic function.   Estimated right ventricular systolic pressure  is 40 mmHg.  Compared to the images of the study done - there has been increase in   mean gradient across the aortic valve but still within moderate   severity of aortic stenosis.     EKG performed on (09/25/17) was reviewed: EKG shows sinus tachycardia.  EKG performed on (09/21/16) was reviewed: EKG shows sinus bradycardia.  EKG performed on (02/10/16) was reviewed: EKG shows sinus bradycardia.     PULMONARY FUNCTION INTERPRETATION (09/13/17)  REASON FOR STUDY:  Shortness of breath, dyspnea on exertion and a history of   severe aortic stenosis.  SPIROMETRY:  FVC is 1.83, 79% predicted.  FEV1 is 1.44, 83% predicted.    FEV1/FVC is 79.  There is a significant response to bronchodilator.  LUNG VOLUMES:  Vital capacity is 97% predicted.  Total lung capacity is 106%   predicted.  Diffusion studies are  normal.  Flow volume loops show some mild   coving in the expiratory limb.  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 Successful transcatheter aortic valve replacement (TAVR) with # 23 Oliveira Leeann S3 valve, transfemoral approach, under conscious sedation.  2. Coronary artery disease with prior 3 vessel coronary bypass graft (LIMA to LAD, KINDRA to PDA, vein graft to OM) in [facility], 2002, ostium of the vein graft  stent placement also in 2012: She is clinically doing well from coronary standpoint. We will continue with current medical care.  3. Carotid artery stenosis: Clinically stable on medical therapy.  4. History of trans-ischemic attack (2012): She remains clinically stable without any new neurological symptoms.  5. Chronic kidney disease: We will continue to follow labs.  6. Possible protamine reaction: Her hypotension has resolved.  7. Small abdominal wall hematoma: We will hold off on DAPT.    CC Pamela Deal, James Lockhart and Joshua Acosta         Quality-Core Measures

## 2017-09-26 NOTE — PROGRESS NOTES
Cardiology Progress Note               Author: Sherif Elder Date & Time created: 2017  7:29 AM     Interval History:  86 year old female status post TAVR. She is clinically doing well. She had another hypotensive episode last night.    Review of Systems   Constitutional: Negative.  Negative for chills, fever, malaise/fatigue and weight loss.   HENT: Negative.  Negative for hearing loss.    Eyes: Negative.  Negative for blurred vision and double vision.   Respiratory: Negative.  Negative for cough and shortness of breath.    Cardiovascular: Negative.  Negative for chest pain, palpitations, claudication and leg swelling.   Gastrointestinal: Negative.  Negative for abdominal pain, nausea and vomiting.   Genitourinary: Negative.  Negative for dysuria and urgency.   Musculoskeletal: Positive for back pain and joint pain. Negative for myalgias.   Skin: Negative.  Negative for itching and rash.   Neurological: Negative.  Negative for dizziness, focal weakness, weakness and headaches.   Endo/Heme/Allergies: Negative.  Does not bruise/bleed easily.   Psychiatric/Behavioral: Negative.  Negative for depression. The patient is not nervous/anxious.        Physical Exam   Constitutional: She is oriented to person, place, and time. She appears well-developed and well-nourished.   HENT:   Head: Normocephalic and atraumatic.   Eyes: Conjunctivae are normal. Pupils are equal, round, and reactive to light.   Neck: Normal range of motion. Neck supple.   Cardiovascular: Normal rate and regular rhythm.    Pulmonary/Chest: Effort normal and breath sounds normal.   Abdominal: Soft. Bowel sounds are normal.   Musculoskeletal: Normal range of motion. She exhibits no edema.   Neurological: She is alert and oriented to person, place, and time.   Skin: Skin is warm and dry.   Psychiatric: She has a normal mood and affect.       Hemodynamics:  Temp (24hrs), Av.7 °C (96.3 °F), Min:35.1 °C (95.1 °F), Max:36.3 °C (97.3 °F)  Temperature:  35.8 °C (96.5 °F)  Pulse  Av.8  Min: 61  Max: 119Heart Rate (Monitored): 95  Arterial BP: (!) 108/36, NIBP: (!) 105/34     Respiratory:    Respiration: 20, Pulse Oximetry: 93 %, O2 Daily Delivery Respiratory : Silicone Nasal Cannula     Work Of Breathing / Effort: Mild  RUL Breath Sounds: Clear;Diminished, RML Breath Sounds: Clear;Diminished, RLL Breath Sounds: Clear;Diminished, LING Breath Sounds: Clear;Diminished, LLL Breath Sounds: Clear;Diminished  Fluids:  Date 17 0700 - 17 0659   Shift 4020-6742 3531-6470 6512-9736 24 Hour Total   I  N  T  A  K  E   P.O. 0 150  150    Shift Total 0 150  150   O  U  T  P  U  T   Urine 0 0  0    Shift Total 0 0  0   Weight (kg) 89.5 89.5 89.5 89.5       Weight: 90.2 kg (198 lb 13.7 oz)  GI/Nutrition:  Orders Placed This Encounter   Procedures   • DIET ORDER     Standing Status:   Standing     Number of Occurrences:   1     Order Specific Question:   Diet:     Answer:   Cardiac [6]     Lab Results:  Recent Labs      17   1229  17   1716  17   0424   WBC  9.9  9.7  7.7   RBC  3.52*  3.72*  3.54*   HEMOGLOBIN  11.6*  12.3  11.6*   HEMATOCRIT  33.7*  35.9*  35.1*   MCV  95.7  96.5  99.2*   MCH  33.0  33.1*  32.8   MCHC  34.4  34.3  33.0*   RDW  45.2  45.6  48.1   PLATELETCT  199  192  160*   MPV  9.8  9.7  9.6     Recent Labs      17   1229  17   0424   SODIUM  135  138   POTASSIUM  3.8  4.0   CHLORIDE  107  106   CO2  18*  22   GLUCOSE  144*  111*   BUN  18  20   CREATININE  1.27  1.56*   CALCIUM  8.5  8.1*         Recent Labs      17   1229   BNPBTYPENAT  63     Recent Labs      17   1229   BNPBTYPENAT  63         Medications reviewed.      Current Facility-Administered Medications:   •  ascorbic acid (ascorbic acid) tablet 500 mg, 500 mg, Oral, QAM, Pamela A Bijal, A.P.R.N., Stopped at 17  •  atorvastatin (LIPITOR) tablet 20 mg, 20 mg, Oral, Nightly, Pamela Appiah, KIRT.P.R.N., 20 mg at 17  •   calcium citrate (CALCITRATE) tablet 950 mg, 950 mg, Oral, BID, Pamela Appiah, A.P.R.N., 950 mg at 09/25/17 2318  •  citalopram (CELEXA) tablet 20 mg, 20 mg, Oral, QAM, Pamela Appiah, A.P.R.N., Stopped at 09/25/17 1200  •  cyanocobalamin (VITAMIN B-12) tablet 5,000 mcg, 5,000 mcg, Oral, QAM, Pamela Appiah, A.P.R.N.  •  furosemide (LASIX) tablet 20 mg, 20 mg, Oral, QAM, Pamela Appiah A.P.R.N., Stopped at 09/25/17 1200  •  lisinopril (PRINIVIL) tablet 20 mg, 20 mg, Oral, BID, Pamela Appiah A.P.R.N., Stopped at 09/25/17 1200  •  multivitamin (THERAGRAN) tablet 1 Tab, 1 Tab, Oral, QAM, Pamela Appiah, A.P.R.N., Stopped at 09/25/17 1200  •  Respiratory Care per Protocol, , Nebulization, Continuous RT, Pamela Appiah A.P.R.N.  •  hydrALAZINE (APRESOLINE) injection 10 mg, 10 mg, Intravenous, Q30 MIN PRN, Pamela Appiah, A.P.R.N.  •  acetaminophen (TYLENOL) tablet 650 mg, 650 mg, Oral, Q6HRS PRN, Pamela Appiah, A.P.R.N.  •  diphenhydrAMINE (BENADRYL) tablet/capsule 25 mg, 25 mg, Oral, HS PRN, Pamela Appiah, A.P.R.N.  •  ondansetron (ZOFRAN) syringe/vial injection 4 mg, 4 mg, Intravenous, Q4HRS PRN, Pamela Appiah A.P.R.N., 4 mg at 09/25/17 2017  •  diphenhydrAMINE (BENADRYL) injection 25 mg, 25 mg, Intravenous, Q6HRS PRN, Pamela Appiah A.P.R.N.  •  gabapentin (NEURONTIN) capsule 600 mg, 600 mg, Oral, TID, Pamela Appiah, A.P.R.N., 600 mg at 09/25/17 2003  •  hydrocodone-acetaminophen (NORCO) 7.5-325 MG per tablet 1-2 Tab, 1-2 Tab, Oral, Q6HRS PRN, Pamela Appiah A.P.R.N., 1 Tab at 09/26/17 0427    CARDIAC STUDIES/PROCEDURES:     CARDIAC CATHETERIZATION CONCLUSIONS by Dr. Aguirre (08/30/17)  1. Patent LIMA to LAD  2. Patent KINDRA to PDA  3. Patent vein graft to OM, mild/moderate in-stent restenosis of the ostial stent  4. Mildly tortuous abdominal descending aorta     CAROTID ULTRASOUND (03/04/16)  <50% bilateral ICA stenoses   Nl  subclavians and vertebrals     CT OF ABDOMEN (09/25/17)  1.  Small LEFT low anterior abdominal wall hematoma. Ongoing bleeding is not excluded.  2.  Gallbladder decompressed or absent  3.  Duodenal diverticulum  4.  Atrophic RIGHT kidney  5.  Atherosclerosis  6.  Prior hysterectomy  7.  Distal colonic diverticulosis  8.  Small hiatal hernia    CT OF CHEST AND ABDOMEN (09/06/17)  1.  Aortic annulus area of 412 sq mm.  2.  Coronary artery ostia are greater than 10 mm from the annulus.  3.  Moderate tortuosity and atherosclerotic calcification of the iliofemoral arterial system, worse on the RIGHT, with minimum diameters of 5.1 mm on the RIGHT and 6.9 mm on the LEFT.  4.  Markedly atrophic RIGHT kidney with apparent moderate to severe proximal RIGHT renal artery stenosis, likely chronic.     DOBUTAMINE STRESS ECHOCARDIOGRAM (08/07/17)  Resting Echo: LVEF 65% with AoV Vmax  of 2.52 m/s; peak gradient 25.5 mmHg and mean 16.11 mmHg  Peak Dobutamine: LVEF 80%, Vmax 4.02 m/s; Peak gradient 64.75 mmHg and mean 36.42 mmHg  No wall motion abnormality    ECHOCARDIOGRAM CONCLUSIONS by Dr. Acosta (09/25/17)  Intraoperative TTE during TAVR.  Baseline images show normal   biventricular systolic function with EF - 65%.  Severe aortic stenosis.    Post-CPB images show preserved biventricular function.  A 23 mm Oliveira   Leeann 3 valve is seen in the aortic position with normal leaflet   motion, no paravalvular leak, mean gradient of 5 mm Hg, and calculated   effective orifice area of 3.5 cm2.  Findings communicated at the time   of exam.     ECHOCARDIOGRAM CONCLUSIONS (04/19/17)  Structurally normal tricuspid valve. Mild tricuspid regurgitation.   Estimated right ventricular systolic pressure  is 40 mmHg.  Normal left ventricular systolic function.   Estimated right ventricular systolic pressure  is 40 mmHg.  Compared to the images of the study done - there has been increase in   mean gradient across the aortic valve but still within  moderate   severity of aortic stenosis.     EKG performed on (09/26/17) was reviewed: EKG shows sinus tachycardia.  EKG performed on (09/25/17) EKG shows sinus tachycardia.  EKG performed on (09/21/16) EKG shows sinus bradycardia.  EKG performed on (02/10/16) EKG shows sinus bradycardia.     PULMONARY FUNCTION INTERPRETATION (09/13/17)  REASON FOR STUDY:  Shortness of breath, dyspnea on exertion and a history of   severe aortic stenosis.  SPIROMETRY:  FVC is 1.83, 79% predicted.  FEV1 is 1.44, 83% predicted.    FEV1/FVC is 79.  There is a significant response to bronchodilator.  LUNG VOLUMES:  Vital capacity is 97% predicted.  Total lung capacity is 106%   predicted.  Diffusion studies are normal.  Flow volume loops show some mild   coving in the expiratory limb.  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 Successful transcatheter aortic valve replacement (TAVR) with # 23 Oliveira Leeann S3 valve, transfemoral approach, under conscious sedation (09/25/17)  2. Coronary artery disease with prior 3 vessel coronary bypass graft (LIMA to LAD, KINDRA to PDA, vein graft to OM) in [facility], 2002, ostium of the vein graft  stent placement also in 2012: She is clinically doing well from coronary standpoint. We will continue with current medical care.  3. Carotid artery stenosis: Clinically stable on medical therapy.  4. History of trans-ischemic attack (2012): She remains clinically stable without any new neurological symptoms.  5. Chronic kidney disease: We will continue to follow labs.  6. Possible protamine reaction: She suffered another hypotension episode last night. We  will follow clinically.  7. Small abdominal wall hematoma: We will hold off on DAPT.  8. He may be transferred to telemetry. Disposition: We will plan for discharge tomorrow.    CC Pamela Deal, James Lockhart and Joshua Acosta         Quality-Core Measures

## 2017-09-26 NOTE — DISCHARGE PLANNING
S/P TAVR.  85 y/o  female.  DTR bedside. [place] resident.   Medicare and [place].      A/O x4.  Incontinent.    1L  NC.      Likely DC home when cleared.  Lives with family.

## 2017-09-26 NOTE — PROGRESS NOTES
Updated Dr. Cook on patient's SBP dropping to 78 after first walk. Orders to keep patient in ICU and to give 500cc NS bolus received.

## 2017-09-26 NOTE — PROGRESS NOTES
Pamela Appiah paged for low BP in 80's while pt sat up for breakfast, recovered to 120's within 20 minutes. Pt poor appetite. Orders for NS infusion and supplements received and ordered.

## 2017-09-27 PROBLEM — I35.0 NODULAR CALCIFIC AORTIC VALVE STENOSIS: Status: RESOLVED | Noted: 2017-09-25 | Resolved: 2017-09-27

## 2017-09-27 PROBLEM — I95.1 ORTHOSTATIC HYPOTENSION: Status: ACTIVE | Noted: 2017-09-27

## 2017-09-27 LAB
ACT BLD: 120 SEC (ref 74–137)
ALBUMIN SERPL BCP-MCNC: 3 G/DL (ref 3.2–4.9)
ALBUMIN/GLOB SERPL: 1.3 G/DL
ALP SERPL-CCNC: 78 U/L (ref 30–99)
ALT SERPL-CCNC: 8 U/L (ref 2–50)
ANION GAP SERPL CALC-SCNC: 7 MMOL/L (ref 0–11.9)
AST SERPL-CCNC: 38 U/L (ref 12–45)
BASE EXCESS BLDA CALC-SCNC: -1 MMOL/L (ref -4–3)
BILIRUB SERPL-MCNC: 0.4 MG/DL (ref 0.1–1.5)
BODY TEMPERATURE: ABNORMAL DEGREES
BUN SERPL-MCNC: 14 MG/DL (ref 8–22)
CA-I BLD ISE-SCNC: 1.24 MMOL/L (ref 1.1–1.3)
CALCIUM SERPL-MCNC: 8.1 MG/DL (ref 8.5–10.5)
CHLORIDE SERPL-SCNC: 108 MMOL/L (ref 96–112)
CO2 BLDA-SCNC: 30 MMOL/L (ref 20–33)
CO2 SERPL-SCNC: 21 MMOL/L (ref 20–33)
CREAT SERPL-MCNC: 1.02 MG/DL (ref 0.5–1.4)
EKG IMPRESSION: NORMAL
ERYTHROCYTE [DISTWIDTH] IN BLOOD BY AUTOMATED COUNT: 49.3 FL (ref 35.9–50)
GFR SERPL CREATININE-BSD FRML MDRD: 51 ML/MIN/1.73 M 2
GLOBULIN SER CALC-MCNC: 2.3 G/DL (ref 1.9–3.5)
GLUCOSE BLD-MCNC: 105 MG/DL (ref 65–99)
GLUCOSE SERPL-MCNC: 123 MG/DL (ref 65–99)
HCO3 BLDA-SCNC: 28 MMOL/L (ref 17–25)
HCT VFR BLD AUTO: 38.3 % (ref 37–47)
HCT VFR BLD CALC: 35 % (ref 37–47)
HGB BLD-MCNC: 11.9 G/DL (ref 12–16)
HGB BLD-MCNC: 12.4 G/DL (ref 12–16)
MCH RBC QN AUTO: 32.7 PG (ref 27–33)
MCHC RBC AUTO-ENTMCNC: 32.4 G/DL (ref 33.6–35)
MCV RBC AUTO: 101.1 FL (ref 81.4–97.8)
O2/TOTAL GAS SETTING VFR VENT: 100 %
PCO2 BLDA: 67.5 MMHG (ref 26–37)
PH BLDA: 7.23 [PH] (ref 7.4–7.5)
PLATELET # BLD AUTO: 122 K/UL (ref 164–446)
PMV BLD AUTO: 10.1 FL (ref 9–12.9)
PO2 BLDA: 113 MMHG (ref 64–87)
POTASSIUM BLD-SCNC: 3.8 MMOL/L (ref 3.6–5.5)
POTASSIUM SERPL-SCNC: 4.3 MMOL/L (ref 3.6–5.5)
PROT SERPL-MCNC: 5.3 G/DL (ref 6–8.2)
RBC # BLD AUTO: 3.79 M/UL (ref 4.2–5.4)
SAO2 % BLDA: 97 % (ref 93–99)
SODIUM BLD-SCNC: 141 MMOL/L (ref 135–145)
SODIUM SERPL-SCNC: 136 MMOL/L (ref 135–145)
SPECIMEN DRAWN FROM PATIENT: ABNORMAL
WBC # BLD AUTO: 10.5 K/UL (ref 4.8–10.8)

## 2017-09-27 PROCEDURE — 90662 IIV NO PRSV INCREASED AG IM: CPT | Performed by: INTERNAL MEDICINE

## 2017-09-27 PROCEDURE — 85027 COMPLETE CBC AUTOMATED: CPT

## 2017-09-27 PROCEDURE — 700105 HCHG RX REV CODE 258: Performed by: NURSE PRACTITIONER

## 2017-09-27 PROCEDURE — 700102 HCHG RX REV CODE 250 W/ 637 OVERRIDE(OP): Performed by: NURSE PRACTITIONER

## 2017-09-27 PROCEDURE — 80053 COMPREHEN METABOLIC PANEL: CPT

## 2017-09-27 PROCEDURE — 700111 HCHG RX REV CODE 636 W/ 250 OVERRIDE (IP): Performed by: INTERNAL MEDICINE

## 2017-09-27 PROCEDURE — 93010 ELECTROCARDIOGRAM REPORT: CPT | Performed by: INTERNAL MEDICINE

## 2017-09-27 PROCEDURE — 97535 SELF CARE MNGMENT TRAINING: CPT

## 2017-09-27 PROCEDURE — 770022 HCHG ROOM/CARE - ICU (200)

## 2017-09-27 PROCEDURE — G8978 MOBILITY CURRENT STATUS: HCPCS | Mod: CL

## 2017-09-27 PROCEDURE — 90471 IMMUNIZATION ADMIN: CPT

## 2017-09-27 PROCEDURE — 97162 PT EVAL MOD COMPLEX 30 MIN: CPT

## 2017-09-27 PROCEDURE — 3E0234Z INTRODUCTION OF SERUM, TOXOID AND VACCINE INTO MUSCLE, PERCUTANEOUS APPROACH: ICD-10-PCS | Performed by: INTERNAL MEDICINE

## 2017-09-27 PROCEDURE — 93005 ELECTROCARDIOGRAM TRACING: CPT | Performed by: NURSE PRACTITIONER

## 2017-09-27 PROCEDURE — G8979 MOBILITY GOAL STATUS: HCPCS | Mod: CJ

## 2017-09-27 PROCEDURE — A9270 NON-COVERED ITEM OR SERVICE: HCPCS | Performed by: NURSE PRACTITIONER

## 2017-09-27 RX ADMIN — CALCIUM CITRATE 200 MG (950 MG) TABLET 950 MG: at 23:52

## 2017-09-27 RX ADMIN — GABAPENTIN 600 MG: 300 CAPSULE ORAL at 09:08

## 2017-09-27 RX ADMIN — GABAPENTIN 600 MG: 300 CAPSULE ORAL at 20:03

## 2017-09-27 RX ADMIN — CITALOPRAM HYDROBROMIDE 20 MG: 20 TABLET ORAL at 09:09

## 2017-09-27 RX ADMIN — THERA TABS 1 TABLET: TAB at 09:08

## 2017-09-27 RX ADMIN — CYANOCOBALAMIN TAB 500 MCG 5000 MCG: 500 TAB at 11:31

## 2017-09-27 RX ADMIN — CALCIUM CITRATE 200 MG (950 MG) TABLET 950 MG: at 11:31

## 2017-09-27 RX ADMIN — HYDROCODONE BITARTRATE AND ACETAMINOPHEN 1 TABLET: 7.5; 325 TABLET ORAL at 14:29

## 2017-09-27 RX ADMIN — HYDROCODONE BITARTRATE AND ACETAMINOPHEN 1 TABLET: 7.5; 325 TABLET ORAL at 06:13

## 2017-09-27 RX ADMIN — OXYCODONE HYDROCHLORIDE AND ACETAMINOPHEN 500 MG: 500 TABLET ORAL at 09:08

## 2017-09-27 RX ADMIN — GABAPENTIN 600 MG: 300 CAPSULE ORAL at 14:29

## 2017-09-27 RX ADMIN — SODIUM CHLORIDE: 9 INJECTION, SOLUTION INTRAVENOUS at 07:00

## 2017-09-27 RX ADMIN — HYDROCODONE BITARTRATE AND ACETAMINOPHEN 1 TABLET: 7.5; 325 TABLET ORAL at 23:57

## 2017-09-27 RX ADMIN — ATORVASTATIN CALCIUM 20 MG: 20 TABLET, FILM COATED ORAL at 20:03

## 2017-09-27 RX ADMIN — INFLUENZA A VIRUSA/MICHIGAN/45/2015 X-275 (H1N1) ANTIGEN (FORMALDEHYDE INACTIVATED), INFLUENZA A VIRUS A/HONG KONG/4801/2014 X-263B (H3N2) ANTIGEN (FORMALDEHYDE INACTIVATED), AND INFLUENZA B VIRUS B/BRISBANE/60/2008 ANTIGEN (FORMALDEHYDE INACTIVATED) 0.5 ML: 60; 60; 60 INJECTION, SUSPENSION INTRAMUSCULAR at 17:02

## 2017-09-27 ASSESSMENT — ENCOUNTER SYMPTOMS
DEPRESSION: 0
DOUBLE VISION: 0
DIZZINESS: 1
EYES NEGATIVE: 1
CHILLS: 0
BRUISES/BLEEDS EASILY: 0
PSYCHIATRIC NEGATIVE: 1
NAUSEA: 0
SHORTNESS OF BREATH: 0
WEAKNESS: 0
BACK PAIN: 1
CONSTITUTIONAL NEGATIVE: 1
MYALGIAS: 0
FOCAL WEAKNESS: 0
GASTROINTESTINAL NEGATIVE: 1
ABDOMINAL PAIN: 0
NERVOUS/ANXIOUS: 0
FEVER: 0
BLURRED VISION: 0
CARDIOVASCULAR NEGATIVE: 1
VOMITING: 0
RESPIRATORY NEGATIVE: 1
COUGH: 0
PALPITATIONS: 0
WEIGHT LOSS: 0
HEADACHES: 0
CLAUDICATION: 0

## 2017-09-27 ASSESSMENT — PAIN SCALES - GENERAL
PAINLEVEL_OUTOF10: 4
PAINLEVEL_OUTOF10: 4
PAINLEVEL_OUTOF10: 0
PAINLEVEL_OUTOF10: 4
PAINLEVEL_OUTOF10: 5
PAINLEVEL_OUTOF10: 5
PAINLEVEL_OUTOF10: 0
PAINLEVEL_OUTOF10: 2
PAINLEVEL_OUTOF10: 4

## 2017-09-27 ASSESSMENT — GAIT ASSESSMENTS
DEVIATION: ANTALGIC;STEP TO
DISTANCE (FEET): 50
GAIT LEVEL OF ASSIST: CONTACT GUARD ASSIST
ASSISTIVE DEVICE: FRONT WHEEL WALKER

## 2017-09-27 ASSESSMENT — COGNITIVE AND FUNCTIONAL STATUS - GENERAL
MOVING TO AND FROM BED TO CHAIR: UNABLE
SUGGESTED CMS G CODE MODIFIER MOBILITY: CL
WALKING IN HOSPITAL ROOM: A LITTLE
MOVING FROM LYING ON BACK TO SITTING ON SIDE OF FLAT BED: UNABLE
MOBILITY SCORE: 14
CLIMB 3 TO 5 STEPS WITH RAILING: A LOT
STANDING UP FROM CHAIR USING ARMS: A LITTLE

## 2017-09-27 NOTE — FACE TO FACE
Face to Face Supporting Documentation - Home Health    The encounter with this patient was in whole or in part the primary reason for home health admission.    Date of encounter:   Patient:                    MRN:                       YOB: 2017  Silvia Orellana  [id number]  6/19/1931     Home health to see patient for:  Skilled Nursing care for assessment, interventions & education, Home health aide, Physical Therapy evaluation and treatment and Occupational therapy evaluation and treatment    Skilled need for:  Exacerbation of Chronic Disease State aortic stenosis and CAD    Skilled nursing interventions to include:  Comment: monitor     Homebound status evidenced by:  Need the aid of supportive devices such as crutches, canes, wheelchairs or walkers or Needs the assistance of another person in order to leave the home. Leaving home requires a considerable and taxing effort. There is a normal inability to leave the home.    Community Physician to provide follow up care: Joshua Rendon M.D.     Optional Interventions? No      I certify the face to face encounter for this home health care referral meets the CMS requirements and the encounter/clinical assessment with the patient was, in whole, or in part, for the medical condition(s) listed above, which is the primary reason for home health care. Based on my clinical findings: the service(s) are medically necessary, support the need for home health care, and the homebound criteria are met.  I certify that this patient has had a face to face encounter by myself.  CAESAR Teixeira - NPI: [id number]

## 2017-09-27 NOTE — CARE PLAN
Problem: Respiratory:  Goal: Respiratory status will improve    Intervention: Administer and titrate oxygen therapy  Pt requires between 1 and 2 liters 02. Pt encouraged IS use and pulls max of 1250ml. Pt encouraged IS use q15 minutes and educated on importance of compliance.       Problem: Skin Integrity  Goal: Risk for impaired skin integrity will decrease    Intervention: Assess risk factors for impaired skin integrity and/or pressure ulcers  Waffle overlay placed for Aris score of 15. Mepilex dressing replaced. Pillows placed for padding and comfort. No new evidence of skin breakdown noted.

## 2017-09-27 NOTE — CARE PLAN
Problem: Pain Management  Goal: Pain level will decrease to patient's comfort goal  Outcome: PROGRESSING AS EXPECTED  Assess and monitor patient's pain level. Norco ordered prn for pain (see MAR). Allow patient to rest.     Problem: Mobility  Goal: Risk for activity intolerance will decrease  Outcome: PROGRESSING AS EXPECTED  Patient sat up in chair for meals.

## 2017-09-27 NOTE — PROGRESS NOTES
Spoke with Pamela MEADE on the phone regarding pt safety at home/discharge.  Discussed physical therapist recommendation,  recommendation, and patient condition and disposition.  Order received to keep patient over night. SANDRO MEADE recommended keeping patient in [facility] until possible discharge tomorrow.  She did state that if patient did need to be triaged to tele that might be possible.

## 2017-09-27 NOTE — THERAPY
"Physical Therapy Evaluation completed.   Bed Mobility:  Supine to Sit:  (Nt, in chair pre)  Transfers: Sit to Stand: Minimal Assist (with FWW)  Gait: Level Of Assist: Contact Guard Assist with Front-Wheel Walker       Plan of Care: Will benefit from Physical Therapy 4 times per week  Discharge Recommendations: Equipment: No Equipment Needed.     Pt presents s/p TAVR with impaired gait mechanics and LE strength. Pt limited by swelling in LEs and pre morbid orthopedic conditions in left knee/hip; needing min A for standing and balance. Optimally, pt would participate in rehab for higher level intensity therapy, however pt does not wish to do so at this time. It is reasonable for her to return home with increased support from her family and use of w/c for distance ambulation until cleared by home health PT as fall risk is high with acute on chronic left LE weakness. Discussed recommendations with pt/daughter and they wish to return home when medically appropriate to do so and accept home health. Will follow while in house     See \"Rehab Therapy-Acute\" Patient Summary Report for complete documentation.     "

## 2017-09-27 NOTE — CARE PLAN
Problem: Discharge Barriers/Planning  Goal: Patient's continuum of care needs will be met    Intervention: Involve patient and significant other/support system in setting and prioritizing goals for hospital stay and discharge  Discussed goals of ambulation and possible discharge.       Problem: Mobility  Goal: Risk for activity intolerance will decrease    Intervention: Assess and monitor signs of activity intolerance  Pt sitting in chair and educated about importance of activity.

## 2017-09-28 ENCOUNTER — HOME HEALTH ADMISSION (OUTPATIENT)
Dept: HOME HEALTH SERVICES | Facility: HOME HEALTHCARE | Age: 82
End: 2017-09-28
Payer: MEDICARE

## 2017-09-28 LAB
ALBUMIN SERPL BCP-MCNC: 2.6 G/DL (ref 3.2–4.9)
ALBUMIN/GLOB SERPL: 1.1 G/DL
ALP SERPL-CCNC: 68 U/L (ref 30–99)
ALT SERPL-CCNC: 7 U/L (ref 2–50)
ANION GAP SERPL CALC-SCNC: 5 MMOL/L (ref 0–11.9)
APPEARANCE UR: CLEAR
AST SERPL-CCNC: 23 U/L (ref 12–45)
BILIRUB SERPL-MCNC: 0.4 MG/DL (ref 0.1–1.5)
BILIRUB UR QL STRIP.AUTO: NEGATIVE
BUN SERPL-MCNC: 12 MG/DL (ref 8–22)
CALCIUM SERPL-MCNC: 8.3 MG/DL (ref 8.5–10.5)
CHLORIDE SERPL-SCNC: 109 MMOL/L (ref 96–112)
CO2 SERPL-SCNC: 23 MMOL/L (ref 20–33)
COLOR UR: YELLOW
CREAT SERPL-MCNC: 0.82 MG/DL (ref 0.5–1.4)
EKG IMPRESSION: NORMAL
ERYTHROCYTE [DISTWIDTH] IN BLOOD BY AUTOMATED COUNT: 48.9 FL (ref 35.9–50)
GFR SERPL CREATININE-BSD FRML MDRD: >60 ML/MIN/1.73 M 2
GLOBULIN SER CALC-MCNC: 2.3 G/DL (ref 1.9–3.5)
GLUCOSE SERPL-MCNC: 108 MG/DL (ref 65–99)
GLUCOSE UR STRIP.AUTO-MCNC: NEGATIVE MG/DL
HCT VFR BLD AUTO: 30.5 % (ref 37–47)
HCT VFR BLD AUTO: 37 % (ref 37–47)
HGB BLD-MCNC: 10.1 G/DL (ref 12–16)
HGB BLD-MCNC: 12.2 G/DL (ref 12–16)
KETONES UR STRIP.AUTO-MCNC: NEGATIVE MG/DL
LEUKOCYTE ESTERASE UR QL STRIP.AUTO: NEGATIVE
MCH RBC QN AUTO: 33.2 PG (ref 27–33)
MCHC RBC AUTO-ENTMCNC: 33.1 G/DL (ref 33.6–35)
MCV RBC AUTO: 100.3 FL (ref 81.4–97.8)
MICRO URNS: NORMAL
NITRITE UR QL STRIP.AUTO: NEGATIVE
PH UR STRIP.AUTO: 5.5 [PH]
PLATELET # BLD AUTO: 106 K/UL (ref 164–446)
PMV BLD AUTO: 9.8 FL (ref 9–12.9)
POTASSIUM SERPL-SCNC: 4.1 MMOL/L (ref 3.6–5.5)
PROT SERPL-MCNC: 4.9 G/DL (ref 6–8.2)
PROT UR QL STRIP: NEGATIVE MG/DL
RBC # BLD AUTO: 3.04 M/UL (ref 4.2–5.4)
RBC UR QL AUTO: NEGATIVE
SODIUM SERPL-SCNC: 137 MMOL/L (ref 135–145)
SP GR UR STRIP.AUTO: 1.01
UROBILINOGEN UR STRIP.AUTO-MCNC: 0.2 MG/DL
WBC # BLD AUTO: 6.3 K/UL (ref 4.8–10.8)

## 2017-09-28 PROCEDURE — 93005 ELECTROCARDIOGRAM TRACING: CPT | Performed by: NURSE PRACTITIONER

## 2017-09-28 PROCEDURE — 770022 HCHG ROOM/CARE - ICU (200)

## 2017-09-28 PROCEDURE — 700102 HCHG RX REV CODE 250 W/ 637 OVERRIDE(OP): Performed by: NURSE PRACTITIONER

## 2017-09-28 PROCEDURE — A9270 NON-COVERED ITEM OR SERVICE: HCPCS | Performed by: NURSE PRACTITIONER

## 2017-09-28 PROCEDURE — 81003 URINALYSIS AUTO W/O SCOPE: CPT

## 2017-09-28 PROCEDURE — 700102 HCHG RX REV CODE 250 W/ 637 OVERRIDE(OP): Performed by: INTERNAL MEDICINE

## 2017-09-28 PROCEDURE — 93010 ELECTROCARDIOGRAM REPORT: CPT | Performed by: INTERNAL MEDICINE

## 2017-09-28 PROCEDURE — 85018 HEMOGLOBIN: CPT

## 2017-09-28 PROCEDURE — 97530 THERAPEUTIC ACTIVITIES: CPT

## 2017-09-28 PROCEDURE — A9270 NON-COVERED ITEM OR SERVICE: HCPCS | Performed by: INTERNAL MEDICINE

## 2017-09-28 PROCEDURE — 85027 COMPLETE CBC AUTOMATED: CPT

## 2017-09-28 PROCEDURE — 80053 COMPREHEN METABOLIC PANEL: CPT

## 2017-09-28 PROCEDURE — 85014 HEMATOCRIT: CPT

## 2017-09-28 RX ORDER — BISACODYL 10 MG
10 SUPPOSITORY, RECTAL RECTAL
Status: DISCONTINUED | OUTPATIENT
Start: 2017-09-28 | End: 2017-09-30 | Stop reason: HOSPADM

## 2017-09-28 RX ORDER — AMOXICILLIN 250 MG
2 CAPSULE ORAL 2 TIMES DAILY
Status: DISCONTINUED | OUTPATIENT
Start: 2017-09-28 | End: 2017-09-30 | Stop reason: HOSPADM

## 2017-09-28 RX ORDER — POLYETHYLENE GLYCOL 3350 17 G/17G
1 POWDER, FOR SOLUTION ORAL
Status: DISCONTINUED | OUTPATIENT
Start: 2017-09-28 | End: 2017-09-30 | Stop reason: HOSPADM

## 2017-09-28 RX ADMIN — STANDARDIZED SENNA CONCENTRATE AND DOCUSATE SODIUM 2 TABLET: 8.6; 5 TABLET, FILM COATED ORAL at 11:53

## 2017-09-28 RX ADMIN — HYDROCODONE BITARTRATE AND ACETAMINOPHEN 1 TABLET: 7.5; 325 TABLET ORAL at 20:12

## 2017-09-28 RX ADMIN — STANDARDIZED SENNA CONCENTRATE AND DOCUSATE SODIUM 2 TABLET: 8.6; 5 TABLET, FILM COATED ORAL at 20:13

## 2017-09-28 RX ADMIN — CITALOPRAM HYDROBROMIDE 20 MG: 20 TABLET ORAL at 08:36

## 2017-09-28 RX ADMIN — GABAPENTIN 600 MG: 300 CAPSULE ORAL at 15:56

## 2017-09-28 RX ADMIN — CALCIUM CITRATE 200 MG (950 MG) TABLET 950 MG: at 12:02

## 2017-09-28 RX ADMIN — ATORVASTATIN CALCIUM 20 MG: 20 TABLET, FILM COATED ORAL at 20:13

## 2017-09-28 RX ADMIN — GABAPENTIN 600 MG: 300 CAPSULE ORAL at 20:12

## 2017-09-28 RX ADMIN — OXYCODONE HYDROCHLORIDE AND ACETAMINOPHEN 500 MG: 500 TABLET ORAL at 08:36

## 2017-09-28 RX ADMIN — CYANOCOBALAMIN TAB 500 MCG 5000 MCG: 500 TAB at 08:37

## 2017-09-28 RX ADMIN — HYDROCODONE BITARTRATE AND ACETAMINOPHEN 1 TABLET: 7.5; 325 TABLET ORAL at 12:04

## 2017-09-28 RX ADMIN — GABAPENTIN 600 MG: 300 CAPSULE ORAL at 08:37

## 2017-09-28 RX ADMIN — HYDROCODONE BITARTRATE AND ACETAMINOPHEN 1 TABLET: 7.5; 325 TABLET ORAL at 06:02

## 2017-09-28 RX ADMIN — THERA TABS 1 TABLET: TAB at 08:36

## 2017-09-28 ASSESSMENT — PAIN SCALES - GENERAL
PAINLEVEL_OUTOF10: 5
PAINLEVEL_OUTOF10: 5
PAINLEVEL_OUTOF10: 4
PAINLEVEL_OUTOF10: 5
PAINLEVEL_OUTOF10: 5
PAINLEVEL_OUTOF10: 4
PAINLEVEL_OUTOF10: 5

## 2017-09-28 ASSESSMENT — ENCOUNTER SYMPTOMS
WEAKNESS: 1
MYALGIAS: 0
PALPITATIONS: 0
RESPIRATORY NEGATIVE: 1
BLURRED VISION: 0
CHILLS: 0
BRUISES/BLEEDS EASILY: 0
WEIGHT LOSS: 0
PSYCHIATRIC NEGATIVE: 1
CLAUDICATION: 0
SHORTNESS OF BREATH: 0
DOUBLE VISION: 0
HEADACHES: 0
COUGH: 0
DEPRESSION: 0
CARDIOVASCULAR NEGATIVE: 1
DIZZINESS: 0
ABDOMINAL PAIN: 0
FOCAL WEAKNESS: 0
NAUSEA: 0
GASTROINTESTINAL NEGATIVE: 1
EYES NEGATIVE: 1
BACK PAIN: 1
NERVOUS/ANXIOUS: 0
VOMITING: 0
FEVER: 0

## 2017-09-28 ASSESSMENT — COGNITIVE AND FUNCTIONAL STATUS - GENERAL
MOVING FROM LYING ON BACK TO SITTING ON SIDE OF FLAT BED: UNABLE
CLIMB 3 TO 5 STEPS WITH RAILING: A LOT
MOVING TO AND FROM BED TO CHAIR: UNABLE
STANDING UP FROM CHAIR USING ARMS: A LITTLE
WALKING IN HOSPITAL ROOM: A LITTLE
SUGGESTED CMS G CODE MODIFIER MOBILITY: CL
MOBILITY SCORE: 14

## 2017-09-28 ASSESSMENT — GAIT ASSESSMENTS
DISTANCE (FEET): 5
DEVIATION: ANTALGIC
ASSISTIVE DEVICE: FRONT WHEEL WALKER
GAIT LEVEL OF ASSIST: CONTACT GUARD ASSIST

## 2017-09-28 NOTE — DISCHARGE PLANNING
Received choice form from [facility] for [facility]. Referral sent to [facility] at 0811.

## 2017-09-28 NOTE — PROGRESS NOTES
Bedside report received from night shift RN. Patient sleeping in bed, connected to all monitors, alarms set properly. No signs of pain or discomfort. Call light within reach. Bed alarm on.

## 2017-09-28 NOTE — CARE PLAN
Problem: Pain Management  Goal: Pain level will decrease to patient's comfort goal  Outcome: PROGRESSING AS EXPECTED  Assess and monitor pt for pain. Allow pt to rest. Administer prn pain medications as needed and as ordered (see MAR).     Problem: Urinary Elimination:  Goal: Ability to reestablish a normal urinary elimination pattern will improve  Outcome: PROGRESSING SLOWER THAN EXPECTED    Intervention: Assist patient to sit on commode or toilet for voiding  Assisting pt to use bedside commode. Pt incontinent at times.

## 2017-09-28 NOTE — PROGRESS NOTES
Cardiology Progress Note               Author: Sherif Elder Date & Time created: 2017  1:16 PM     Interval History:  86 year old female status post TAVR. She is still weak.    Review of Systems   Constitutional: Negative for chills, fever, malaise/fatigue and weight loss.   HENT: Negative.  Negative for hearing loss.    Eyes: Negative.  Negative for blurred vision and double vision.   Respiratory: Negative.  Negative for cough and shortness of breath.    Cardiovascular: Negative.  Negative for chest pain, palpitations, claudication and leg swelling.   Gastrointestinal: Negative.  Negative for abdominal pain, nausea and vomiting.   Genitourinary: Negative.  Negative for dysuria and urgency.   Musculoskeletal: Positive for back pain and joint pain. Negative for myalgias.   Skin: Negative.  Negative for itching and rash.   Neurological: Positive for weakness. Negative for dizziness, focal weakness and headaches.   Endo/Heme/Allergies: Negative.  Does not bruise/bleed easily.   Psychiatric/Behavioral: Negative.  Negative for depression. The patient is not nervous/anxious.        Physical Exam   Constitutional: She is oriented to person, place, and time. She appears well-developed and well-nourished.   HENT:   Head: Normocephalic and atraumatic.   Eyes: Conjunctivae are normal. Pupils are equal, round, and reactive to light.   Neck: Normal range of motion. Neck supple.   Cardiovascular: Normal rate and regular rhythm.    Pulmonary/Chest: Effort normal and breath sounds normal.   Abdominal: Soft. Bowel sounds are normal.   Musculoskeletal: Normal range of motion. She exhibits no edema.   Neurological: She is alert and oriented to person, place, and time.   Skin: Skin is warm and dry.   Psychiatric: She has a normal mood and affect.       Hemodynamics:  Temp (24hrs), Av.2 °C (97.1 °F), Min:35.9 °C (96.6 °F), Max:36.4 °C (97.5 °F)  Temperature: 36.4 °C (97.5 °F)  Pulse  Av.6  Min: 61  Max: 122Heart Rate  (Monitored): 80  NIBP: 112/50     Respiratory:    Respiration: 13, Pulse Oximetry: 97 % (down to 88 but cannula was rotated out of nose )     Work Of Breathing / Effort: Mild  RUL Breath Sounds: Clear, RML Breath Sounds: Clear, RLL Breath Sounds: Diminished, LING Breath Sounds: Clear, LLL Breath Sounds: Diminished  Fluids:  Date 09/25/17 0700 - 09/26/17 0659   Shift 1903-3762 8168-4177 6350-0554 24 Hour Total   I  N  T  A  K  E   P.O. 0 150  150    Shift Total 0 150  150   O  U  T  P  U  T   Urine 0 0  0    Shift Total 0 0  0   Weight (kg) 89.5 89.5 89.5 89.5       Weight: 91.8 kg (202 lb 6.1 oz)  GI/Nutrition:  Orders Placed This Encounter   Procedures   • DIET ORDER     Standing Status:   Standing     Number of Occurrences:   1     Order Specific Question:   Diet:     Answer:   Cardiac [6]     Lab Results:  Recent Labs      09/26/17   1310  09/27/17   1242  09/28/17   0532  09/28/17   1150   WBC  7.4  10.5  6.3   --    RBC  3.65*  3.79*  3.04*   --    HEMOGLOBIN  12.1  12.4  10.1*  12.2   HEMATOCRIT  36.5*  38.3  30.5*  37.0   MCV  100.0*  101.1*  100.3*   --    MCH  33.2*  32.7  33.2*   --    MCHC  33.2*  32.4*  33.1*   --    RDW  48.7  49.3  48.9   --    PLATELETCT  145*  122*  106*   --    MPV  9.7  10.1  9.8   --      Recent Labs      09/26/17   0424  09/27/17   0435  09/28/17   0532   SODIUM  138  136  137   POTASSIUM  4.0  4.3  4.1   CHLORIDE  106  108  109   CO2  22  21  23   GLUCOSE  111*  123*  108*   BUN  20  14  12   CREATININE  1.56*  1.02  0.82   CALCIUM  8.1*  8.1*  8.3*                     Medications reviewed.      Current Facility-Administered Medications:   •  senna-docusate (PERICOLACE or SENOKOT S) 8.6-50 MG per tablet 2 Tab, 2 Tab, Oral, BID, 2 Tab at 09/28/17 1153 **AND** polyethylene glycol/lytes (MIRALAX) PACKET 1 Packet, 1 Packet, Oral, QDAY PRN **AND** magnesium hydroxide (MILK OF MAGNESIA) suspension 30 mL, 30 mL, Oral, QDAY PRN **AND** bisacodyl (DULCOLAX) suppository 10 mg, 10 mg,  Rectal, QDAY PRN, Donald Luu M.D.  •  ascorbic acid (ascorbic acid) tablet 500 mg, 500 mg, Oral, QAM, Pamela Appiah, A.P.R.N., 500 mg at 09/28/17 0836  •  atorvastatin (LIPITOR) tablet 20 mg, 20 mg, Oral, Nightly, Pamela Appiah, A.P.R.N., 20 mg at 09/27/17 2003  •  calcium citrate (CALCITRATE) tablet 950 mg, 950 mg, Oral, BID, Pamela Appiah, A.P.R.N., 950 mg at 09/28/17 1202  •  citalopram (CELEXA) tablet 20 mg, 20 mg, Oral, QAM, Pamela Appiah, A.P.R.N., 20 mg at 09/28/17 0836  •  cyanocobalamin (VITAMIN B-12) tablet 5,000 mcg, 5,000 mcg, Oral, QAM, Pamela Appiah, A.P.R.N., 5,000 mcg at 09/28/17 0837  •  multivitamin (THERAGRAN) tablet 1 Tab, 1 Tab, Oral, QAM, Pamela Appiah, A.P.R.N., 1 Tab at 09/28/17 0836  •  Respiratory Care per Protocol, , Nebulization, Continuous RT, Pamela Appiah, A.P.R.N.  •  hydrALAZINE (APRESOLINE) injection 10 mg, 10 mg, Intravenous, Q30 MIN PRN, Pamela Appiah, A.P.R.N.  •  acetaminophen (TYLENOL) tablet 650 mg, 650 mg, Oral, Q6HRS PRN, Pamela Appiah, A.P.R.N.  •  diphenhydrAMINE (BENADRYL) tablet/capsule 25 mg, 25 mg, Oral, HS PRN, Pamela Appiah, A.P.R.N.  •  ondansetron (ZOFRAN) syringe/vial injection 4 mg, 4 mg, Intravenous, Q4HRS PRN, Pamela Appiah A.P.R.N., 4 mg at 09/25/17 2017  •  diphenhydrAMINE (BENADRYL) injection 25 mg, 25 mg, Intravenous, Q6HRS PRN, Pamela Appiah, A.P.R.N.  •  gabapentin (NEURONTIN) capsule 600 mg, 600 mg, Oral, TID, Pamela Appiah, A.P.R.N., 600 mg at 09/28/17 0837  •  hydrocodone-acetaminophen (NORCO) 7.5-325 MG per tablet 1-2 Tab, 1-2 Tab, Oral, Q6HRS PRN, Pamela Appiah, A.P.R.N., 1 Tab at 09/28/17 1204    CARDIAC STUDIES/PROCEDURES:     CARDIAC CATHETERIZATION CONCLUSIONS by Dr. Aguirre (08/30/17)  1. Patent LIMA to LAD  2. Patent KINDRA to PDA  3. Patent vein graft to OM, mild/moderate in-stent restenosis of the ostial stent  4. Mildly tortuous abdominal descending  aorta     CAROTID ULTRASOUND (03/04/16)  <50% bilateral ICA stenoses   Nl subclavians and vertebrals     CT OF ABDOMEN (09/25/17)  1.  Small LEFT low anterior abdominal wall hematoma. Ongoing bleeding is not excluded.  2.  Gallbladder decompressed or absent  3.  Duodenal diverticulum  4.  Atrophic RIGHT kidney  5.  Atherosclerosis  6.  Prior hysterectomy  7.  Distal colonic diverticulosis  8.  Small hiatal hernia    CT OF CHEST AND ABDOMEN (09/06/17)  1.  Aortic annulus area of 412 sq mm.  2.  Coronary artery ostia are greater than 10 mm from the annulus.  3.  Moderate tortuosity and atherosclerotic calcification of the iliofemoral arterial system, worse on the RIGHT, with minimum diameters of 5.1 mm on the RIGHT and 6.9 mm on the LEFT.  4.  Markedly atrophic RIGHT kidney with apparent moderate to severe proximal RIGHT renal artery stenosis, likely chronic.     DOBUTAMINE STRESS ECHOCARDIOGRAM (08/07/17)  Resting Echo: LVEF 65% with AoV Vmax  of 2.52 m/s; peak gradient 25.5 mmHg and mean 16.11 mmHg  Peak Dobutamine: LVEF 80%, Vmax 4.02 m/s; Peak gradient 64.75 mmHg and mean 36.42 mmHg  No wall motion abnormality    ECHOCARDIOGRAM CONCLUSIONS (09/26/17)  Hyperdynamic left ventricular systolic function. Left ventricular   ejection fraction is visually estimated to be greater than 75%.  Evidence of increased intracavity gradient, peak velocity is 3.35 m/sec.   Normal regional wall motion. Normal diastolic function.  Moderate mitral stenosis. Mean transvalvular gradient is 5.5 mmHg at a   heart rate of 92 BPM.   Normal functioning bioprosthetic aortic valve with a peak gradient of   22 mmHg and mean gradient of 12 mmHg.  Echo images from 9/25/17 showed normal LV function and normal   functioning bioprosthetic aortic with a peak gradient of 22 mmHg and   mean gradient of 13 mmHg.    ECHOCARDIOGRAM CONCLUSIONS by Dr. Acosta (09/25/17)  Intraoperative TTE during TAVR.  Baseline images show normal   biventricular systolic  function with EF - 65%.  Severe aortic stenosis.    Post-CPB images show preserved biventricular function.  A 23 mm Oliveira   Leeann 3 valve is seen in the aortic position with normal leaflet   motion, no paravalvular leak, mean gradient of 5 mm Hg, and calculated   effective orifice area of 3.5 cm2.  Findings communicated at the time   of exam.     ECHOCARDIOGRAM CONCLUSIONS (04/19/17)  Structurally normal tricuspid valve. Mild tricuspid regurgitation.   Estimated right ventricular systolic pressure  is 40 mmHg.  Normal left ventricular systolic function.   Estimated right ventricular systolic pressure  is 40 mmHg.  Compared to the images of the study done - there has been increase in   mean gradient across the aortic valve but still within moderate   severity of aortic stenosis.     EKG performed on (09/27/17) was reviewed: EKG shows sinus tachycardia.  EKG performed on (09/26/17) EKG shows sinus tachycardia.  EKG performed on (09/25/17) EKG shows sinus tachycardia.  EKG performed on (09/21/16) EKG shows sinus bradycardia.  EKG performed on (02/10/16) EKG shows sinus bradycardia.     PULMONARY FUNCTION INTERPRETATION (09/13/17)  REASON FOR STUDY:  Shortness of breath, dyspnea on exertion and a history of   severe aortic stenosis.  SPIROMETRY:  FVC is 1.83, 79% predicted.  FEV1 is 1.44, 83% predicted.    FEV1/FVC is 79.  There is a significant response to bronchodilator.  LUNG VOLUMES:  Vital capacity is 97% predicted.  Total lung capacity is 106%   predicted.  Diffusion studies are normal.  Flow volume loops show some mild   coving in the expiratory limb.  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 Successful transcatheter aortic valve replacement (TAVR) with # 23 Oliveira Leeann S3 valve, transfemoral approach, under conscious sedation (09/25/17): She is still weak and her hemoglobin and hematocrit levels went down. We will repeat CBC. We will continue to follow clinically.  2. Coronary artery disease with prior 3 vessel coronary bypass graft (LIMA to LAD, KINDRA to PDA, vein graft to OM) in [facility], 2002, ostium of the vein graft  stent placement also in 2012: She is clinically doing well from coronary standpoint. We will continue with current medical care.  3. Carotid artery stenosis: Clinically stable on medical therapy.  4. History of trans-ischemic attack (2012): She remains clinically stable without any new neurological symptoms.  5. Chronic kidney disease: We will continue to follow labs.  6. Small abdominal wall hematoma: We will hold off on DAPT.  7. Disposition: We will plan for discharge tomorrow.    CC Pamela Deal, James Lockhart and Joshua Acosta         Quality-Core Measures

## 2017-09-28 NOTE — DISCHARGE PLANNING
Discussed with DTR about home 02.  Choice form completed for  [facility].  Waiting for SATS and 02 order.

## 2017-09-28 NOTE — PROGRESS NOTES
Cardiology Progress Note               Author: Sherif Elder Date & Time created: 2017  5:11 PM     Interval History:  86 year old female status post TAVR. She is still suffering with dizziness.    Review of Systems   Constitutional: Negative.  Negative for chills, fever, malaise/fatigue and weight loss.   HENT: Negative.  Negative for hearing loss.    Eyes: Negative.  Negative for blurred vision and double vision.   Respiratory: Negative.  Negative for cough and shortness of breath.    Cardiovascular: Negative.  Negative for chest pain, palpitations, claudication and leg swelling.   Gastrointestinal: Negative.  Negative for abdominal pain, nausea and vomiting.   Genitourinary: Negative.  Negative for dysuria and urgency.   Musculoskeletal: Positive for back pain and joint pain. Negative for myalgias.   Skin: Negative.  Negative for itching and rash.   Neurological: Positive for dizziness. Negative for focal weakness, weakness and headaches.   Endo/Heme/Allergies: Negative.  Does not bruise/bleed easily.   Psychiatric/Behavioral: Negative.  Negative for depression. The patient is not nervous/anxious.        Physical Exam   Constitutional: She is oriented to person, place, and time. She appears well-developed and well-nourished.   HENT:   Head: Normocephalic and atraumatic.   Eyes: Conjunctivae are normal. Pupils are equal, round, and reactive to light.   Neck: Normal range of motion. Neck supple.   Cardiovascular: Normal rate and regular rhythm.    Pulmonary/Chest: Effort normal and breath sounds normal.   Abdominal: Soft. Bowel sounds are normal.   Musculoskeletal: Normal range of motion. She exhibits no edema.   Neurological: She is alert and oriented to person, place, and time.   Skin: Skin is warm and dry.   Psychiatric: She has a normal mood and affect.       Hemodynamics:  Temp (24hrs), Av °C (96.8 °F), Min:35.9 °C (96.6 °F), Max:36.3 °C (97.3 °F)  Temperature: 36 °C (96.8 °F)  Pulse  Av.3  Min:  61  Max: 122Heart Rate (Monitored): 82  NIBP: 147/48     Respiratory:    Respiration: 16, Pulse Oximetry: 98 %     Work Of Breathing / Effort: Mild  RUL Breath Sounds: Clear, RML Breath Sounds: Clear, RLL Breath Sounds: Diminished, LING Breath Sounds: Clear, LLL Breath Sounds: Diminished  Fluids:  Date 09/25/17 0700 - 09/26/17 0659   Shift [id number] 3450-9213 3666-2532 24 Hour Total   I  N  T  A  K  E   P.O. 0 150  150    Shift Total 0 150  150   O  U  T  P  U  T   Urine 0 0  0    Shift Total 0 0  0   Weight (kg) 89.5 89.5 89.5 89.5       Weight: 94.3 kg (207 lb 14.3 oz)  GI/Nutrition:  Orders Placed This Encounter   Procedures   • DIET ORDER     Standing Status:   Standing     Number of Occurrences:   1     Order Specific Question:   Diet:     Answer:   Cardiac [6]     Lab Results:  Recent Labs      09/26/17   0424  09/26/17   1310  09/27/17   1242   WBC  7.7  7.4  10.5   RBC  3.54*  3.65*  3.79*   HEMOGLOBIN  11.6*  12.1  12.4   HEMATOCRIT  35.1*  36.5*  38.3   MCV  99.2*  100.0*  101.1*   MCH  32.8  33.2*  32.7   MCHC  33.0*  33.2*  32.4*   RDW  48.1  48.7  49.3   PLATELETCT  160*  145*  122*   MPV  9.6  9.7  10.1     Recent Labs      09/25/17   1229  09/26/17   0424  09/27/17   0435   SODIUM  135  138  136   POTASSIUM  3.8  4.0  4.3   CHLORIDE  107  106  108   CO2  18*  22  21   GLUCOSE  144*  111*  123*   BUN  18  20  14   CREATININE  1.27  1.56*  1.02   CALCIUM  8.5  8.1*  8.1*         Recent Labs      09/25/17   1229   BNPBTYPENAT  63     Recent Labs      09/25/17   1229   BNPBTYPENAT  63         Medications reviewed.      Current Facility-Administered Medications:   •  ascorbic acid (ascorbic acid) tablet 500 mg, 500 mg, Oral, Pamela RODRIGUEZ, A.P.R.N., 500 mg at 09/27/17 0908  •  atorvastatin (LIPITOR) tablet 20 mg, 20 mg, Oral, Nightly, Pamela Appiah, A.P.R.N., 20 mg at 09/26/17 2022  •  calcium citrate (CALCITRATE) tablet 950 mg, 950 mg, Oral, BID, Pamela Appiah, A.P.R.N., 950 mg at  09/27/17 1131  •  citalopram (CELEXA) tablet 20 mg, 20 mg, Oral, QAM, Pamela Appiah, A.P.R.N., 20 mg at 09/27/17 0909  •  cyanocobalamin (VITAMIN B-12) tablet 5,000 mcg, 5,000 mcg, Oral, QAM, Pamela Appiah, A.P.R.N., 5,000 mcg at 09/27/17 1131  •  multivitamin (THERAGRAN) tablet 1 Tab, 1 Tab, Oral, QAM, Pamela Appiah, A.P.R.N., 1 Tab at 09/27/17 0908  •  Respiratory Care per Protocol, , Nebulization, Continuous RT, Pamela Appiah A.P.R.N.  •  hydrALAZINE (APRESOLINE) injection 10 mg, 10 mg, Intravenous, Q30 MIN PRN, Pamela Appiah A.P.R.N.  •  acetaminophen (TYLENOL) tablet 650 mg, 650 mg, Oral, Q6HRS PRN, Pamela Appiah, A.P.R.N.  •  diphenhydrAMINE (BENADRYL) tablet/capsule 25 mg, 25 mg, Oral, HS PRN, Pamela Appiah A.P.R.N.  •  ondansetron (ZOFRAN) syringe/vial injection 4 mg, 4 mg, Intravenous, Q4HRS PRN, Pamela Appiah A.P.R.N., 4 mg at 09/25/17 2017  •  diphenhydrAMINE (BENADRYL) injection 25 mg, 25 mg, Intravenous, Q6HRS PRN, Pamela Appiah A.P.R.N.  •  gabapentin (NEURONTIN) capsule 600 mg, 600 mg, Oral, TID, Pamela Appiah, A.P.R.N., 600 mg at 09/27/17 1429  •  hydrocodone-acetaminophen (NORCO) 7.5-325 MG per tablet 1-2 Tab, 1-2 Tab, Oral, Q6HRS PRN, Pamela Appiah A.P.R.N., 1 Tab at 09/27/17 1429    CARDIAC STUDIES/PROCEDURES:     CARDIAC CATHETERIZATION CONCLUSIONS by Dr. Aguirre (08/30/17)  1. Patent LIMA to LAD  2. Patent KINDRA to PDA  3. Patent vein graft to OM, mild/moderate in-stent restenosis of the ostial stent  4. Mildly tortuous abdominal descending aorta     CAROTID ULTRASOUND (03/04/16)  <50% bilateral ICA stenoses   Nl subclavians and vertebrals     CT OF ABDOMEN (09/25/17)  1.  Small LEFT low anterior abdominal wall hematoma. Ongoing bleeding is not excluded.  2.  Gallbladder decompressed or absent  3.  Duodenal diverticulum  4.  Atrophic RIGHT kidney  5.  Atherosclerosis  6.  Prior hysterectomy  7.  Distal colonic  diverticulosis  8.  Small hiatal hernia    CT OF CHEST AND ABDOMEN (09/06/17)  1.  Aortic annulus area of 412 sq mm.  2.  Coronary artery ostia are greater than 10 mm from the annulus.  3.  Moderate tortuosity and atherosclerotic calcification of the iliofemoral arterial system, worse on the RIGHT, with minimum diameters of 5.1 mm on the RIGHT and 6.9 mm on the LEFT.  4.  Markedly atrophic RIGHT kidney with apparent moderate to severe proximal RIGHT renal artery stenosis, likely chronic.     DOBUTAMINE STRESS ECHOCARDIOGRAM (08/07/17)  Resting Echo: LVEF 65% with AoV Vmax  of 2.52 m/s; peak gradient 25.5 mmHg and mean 16.11 mmHg  Peak Dobutamine: LVEF 80%, Vmax 4.02 m/s; Peak gradient 64.75 mmHg and mean 36.42 mmHg  No wall motion abnormality    ECHOCARDIOGRAM CONCLUSIONS (09/26/17)  Hyperdynamic left ventricular systolic function. Left ventricular   ejection fraction is visually estimated to be greater than 75%.  Evidence of increased intracavity gradient, peak velocity is 3.35 m/sec.   Normal regional wall motion. Normal diastolic function.  Moderate mitral stenosis. Mean transvalvular gradient is 5.5 mmHg at a   heart rate of 92 BPM.   Normal functioning bioprosthetic aortic valve with a peak gradient of   22 mmHg and mean gradient of 12 mmHg.  Echo images from 9/25/17 showed normal LV function and normal   functioning bioprosthetic aortic with a peak gradient of 22 mmHg and   mean gradient of 13 mmHg.    ECHOCARDIOGRAM CONCLUSIONS by Dr. Acosta (09/25/17)  Intraoperative TTE during TAVR.  Baseline images show normal   biventricular systolic function with EF - 65%.  Severe aortic stenosis.    Post-CPB images show preserved biventricular function.  A 23 mm Oliveira   Leeann 3 valve is seen in the aortic position with normal leaflet   motion, no paravalvular leak, mean gradient of 5 mm Hg, and calculated   effective orifice area of 3.5 cm2.  Findings communicated at the time   of exam.     ECHOCARDIOGRAM CONCLUSIONS  (04/19/17)  Structurally normal tricuspid valve. Mild tricuspid regurgitation.   Estimated right ventricular systolic pressure  is 40 mmHg.  Normal left ventricular systolic function.   Estimated right ventricular systolic pressure  is 40 mmHg.  Compared to the images of the study done - there has been increase in   mean gradient across the aortic valve but still within moderate   severity of aortic stenosis.     EKG performed on (09/27/17) was reviewed: EKG shows sinus tachycardia.  EKG performed on (09/26/17) EKG shows sinus tachycardia.  EKG performed on (09/25/17) EKG shows sinus tachycardia.  EKG performed on (09/21/16) EKG shows sinus bradycardia.  EKG performed on (02/10/16) EKG shows sinus bradycardia.     PULMONARY FUNCTION INTERPRETATION (09/13/17)  REASON FOR STUDY:  Shortness of breath, dyspnea on exertion and a history of   severe aortic stenosis.  SPIROMETRY:  FVC is 1.83, 79% predicted.  FEV1 is 1.44, 83% predicted.    FEV1/FVC is 79.  There is a significant response to bronchodilator.  LUNG VOLUMES:  Vital capacity is 97% predicted.  Total lung capacity is 106%   predicted.  Diffusion studies are normal.  Flow volume loops show some mild   coving in the expiratory limb.  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 Successful transcatheter aortic valve replacement (TAVR) with # 23 Oliveira Leeann S3 valve, transfemoral approach, under conscious sedation (09/25/17): She is still suffering with dizziness.We will continue to follow clinically.  2. Coronary artery disease with prior 3 vessel coronary bypass graft (LIMA to LAD, KINDRA to PDA, vein graft to OM)  in [place] [facility], 2002, ostium of the vein graft  stent placement also in 2012: She is clinically doing well from coronary standpoint. We will continue with current medical care.  3. Carotid artery stenosis: Clinically stable on medical therapy.  4. History of trans-ischemic attack (2012): She remains clinically stable without any new neurological symptoms.  5. Chronic kidney disease: We will continue to follow labs.  6. Small abdominal wall hematoma: We will hold off on DAPT.  7. Disposition: We will plan for discharge tomorrow.    CC Pamela Deal, James Lockhart and Joshua Acosta         Quality-Core Measures

## 2017-09-28 NOTE — THERAPY
"Physical Therapy Treatment completed.   Bed Mobility:  Supine to Sit:  (Nt, in chair pre)  Transfers: Sit to Stand: Contact Guard Assist (with FWW )  Gait: Level Of Assist: Contact Guard Assist with Front-Wheel Walker       Plan of Care: Will benefit from Physical Therapy 4 times per week  Discharge Recommendations: Equipment: No Equipment Needed.     Pt with improving upright mechanics but fatigue greater (noted HCT drop); less assist needs for upright; pt continues to feel ready for home d/c when medically appropriate to do so.     See \"Rehab Therapy-Acute\" Patient Summary Report for complete documentation.       "

## 2017-09-28 NOTE — DISCHARGE PLANNING
Received C order.  Discussed with patient and DTR.  Choice form completed for [facility].  Faxed to [facility]. Wait for DC order.

## 2017-09-29 ENCOUNTER — APPOINTMENT (OUTPATIENT)
Dept: RADIOLOGY | Facility: MEDICAL CENTER | Age: 82
DRG: 267 | End: 2017-09-29
Attending: HOSPITALIST
Payer: MEDICARE

## 2017-09-29 ENCOUNTER — APPOINTMENT (OUTPATIENT)
Dept: RADIOLOGY | Facility: MEDICAL CENTER | Age: 82
DRG: 267 | End: 2017-09-29
Attending: NURSE PRACTITIONER
Payer: MEDICARE

## 2017-09-29 PROBLEM — R53.1 WEAKNESS: Status: ACTIVE | Noted: 2017-09-29

## 2017-09-29 LAB
ALBUMIN SERPL BCP-MCNC: 2.7 G/DL (ref 3.2–4.9)
ALBUMIN/GLOB SERPL: 1 G/DL
ALP SERPL-CCNC: 68 U/L (ref 30–99)
ALT SERPL-CCNC: 8 U/L (ref 2–50)
ANION GAP SERPL CALC-SCNC: 6 MMOL/L (ref 0–11.9)
AST SERPL-CCNC: 18 U/L (ref 12–45)
BILIRUB SERPL-MCNC: 0.4 MG/DL (ref 0.1–1.5)
BUN SERPL-MCNC: 14 MG/DL (ref 8–22)
CALCIUM SERPL-MCNC: 8.5 MG/DL (ref 8.5–10.5)
CHLORIDE SERPL-SCNC: 107 MMOL/L (ref 96–112)
CO2 SERPL-SCNC: 24 MMOL/L (ref 20–33)
CREAT SERPL-MCNC: 0.81 MG/DL (ref 0.5–1.4)
ERYTHROCYTE [DISTWIDTH] IN BLOOD BY AUTOMATED COUNT: 48.6 FL (ref 35.9–50)
GFR SERPL CREATININE-BSD FRML MDRD: >60 ML/MIN/1.73 M 2
GLOBULIN SER CALC-MCNC: 2.6 G/DL (ref 1.9–3.5)
GLUCOSE SERPL-MCNC: 120 MG/DL (ref 65–99)
HCT VFR BLD AUTO: 31.2 % (ref 37–47)
HGB BLD-MCNC: 10.3 G/DL (ref 12–16)
HGB BLD-MCNC: 11.1 G/DL (ref 12–16)
MCH RBC QN AUTO: 33.1 PG (ref 27–33)
MCHC RBC AUTO-ENTMCNC: 33 G/DL (ref 33.6–35)
MCV RBC AUTO: 100.3 FL (ref 81.4–97.8)
PLATELET # BLD AUTO: 139 K/UL (ref 164–446)
PMV BLD AUTO: 9.5 FL (ref 9–12.9)
POTASSIUM SERPL-SCNC: 4.2 MMOL/L (ref 3.6–5.5)
PROT SERPL-MCNC: 5.3 G/DL (ref 6–8.2)
RBC # BLD AUTO: 3.11 M/UL (ref 4.2–5.4)
SODIUM SERPL-SCNC: 137 MMOL/L (ref 135–145)
WBC # BLD AUTO: 5.2 K/UL (ref 4.8–10.8)

## 2017-09-29 PROCEDURE — 74176 CT ABD & PELVIS W/O CONTRAST: CPT

## 2017-09-29 PROCEDURE — 700102 HCHG RX REV CODE 250 W/ 637 OVERRIDE(OP): Performed by: INTERNAL MEDICINE

## 2017-09-29 PROCEDURE — 770020 HCHG ROOM/CARE - TELE (206)

## 2017-09-29 PROCEDURE — A9270 NON-COVERED ITEM OR SERVICE: HCPCS | Performed by: INTERNAL MEDICINE

## 2017-09-29 PROCEDURE — A9270 NON-COVERED ITEM OR SERVICE: HCPCS | Performed by: NURSE PRACTITIONER

## 2017-09-29 PROCEDURE — 700111 HCHG RX REV CODE 636 W/ 250 OVERRIDE (IP): Performed by: NURSE PRACTITIONER

## 2017-09-29 PROCEDURE — 85018 HEMOGLOBIN: CPT

## 2017-09-29 PROCEDURE — 700102 HCHG RX REV CODE 250 W/ 637 OVERRIDE(OP): Performed by: NURSE PRACTITIONER

## 2017-09-29 PROCEDURE — 80053 COMPREHEN METABOLIC PANEL: CPT

## 2017-09-29 PROCEDURE — 85027 COMPLETE CBC AUTOMATED: CPT

## 2017-09-29 RX ADMIN — CALCIUM CITRATE 200 MG (950 MG) TABLET 950 MG: at 12:58

## 2017-09-29 RX ADMIN — GABAPENTIN 600 MG: 300 CAPSULE ORAL at 16:25

## 2017-09-29 RX ADMIN — ATORVASTATIN CALCIUM 20 MG: 20 TABLET, FILM COATED ORAL at 20:39

## 2017-09-29 RX ADMIN — STANDARDIZED SENNA CONCENTRATE AND DOCUSATE SODIUM 2 TABLET: 8.6; 5 TABLET, FILM COATED ORAL at 09:16

## 2017-09-29 RX ADMIN — BISACODYL 10 MG: 10 SUPPOSITORY RECTAL at 09:11

## 2017-09-29 RX ADMIN — HYDROCODONE BITARTRATE AND ACETAMINOPHEN 1 TABLET: 7.5; 325 TABLET ORAL at 16:25

## 2017-09-29 RX ADMIN — GABAPENTIN 600 MG: 300 CAPSULE ORAL at 09:15

## 2017-09-29 RX ADMIN — OXYCODONE HYDROCHLORIDE AND ACETAMINOPHEN 500 MG: 500 TABLET ORAL at 09:16

## 2017-09-29 RX ADMIN — CALCIUM CITRATE 200 MG (950 MG) TABLET 950 MG: at 02:02

## 2017-09-29 RX ADMIN — HYDRALAZINE HYDROCHLORIDE 10 MG: 20 INJECTION INTRAMUSCULAR; INTRAVENOUS at 20:40

## 2017-09-29 RX ADMIN — HYDROCODONE BITARTRATE AND ACETAMINOPHEN 1 TABLET: 7.5; 325 TABLET ORAL at 02:57

## 2017-09-29 RX ADMIN — GABAPENTIN 600 MG: 300 CAPSULE ORAL at 20:40

## 2017-09-29 RX ADMIN — THERA TABS 1 TABLET: TAB at 09:16

## 2017-09-29 RX ADMIN — CYANOCOBALAMIN TAB 500 MCG 5000 MCG: 500 TAB at 09:38

## 2017-09-29 RX ADMIN — ACETAMINOPHEN 650 MG: 325 TABLET, FILM COATED ORAL at 20:39

## 2017-09-29 RX ADMIN — HYDROCODONE BITARTRATE AND ACETAMINOPHEN 1 TABLET: 7.5; 325 TABLET ORAL at 09:16

## 2017-09-29 RX ADMIN — CITALOPRAM HYDROBROMIDE 20 MG: 20 TABLET ORAL at 09:16

## 2017-09-29 ASSESSMENT — PAIN SCALES - GENERAL
PAINLEVEL_OUTOF10: 8
PAINLEVEL_OUTOF10: 5
PAINLEVEL_OUTOF10: 8
PAINLEVEL_OUTOF10: 0
PAINLEVEL_OUTOF10: 7
PAINLEVEL_OUTOF10: 8
PAINLEVEL_OUTOF10: 5
PAINLEVEL_OUTOF10: 7
PAINLEVEL_OUTOF10: 5
PAINLEVEL_OUTOF10: 4

## 2017-09-29 ASSESSMENT — ENCOUNTER SYMPTOMS
EYES NEGATIVE: 1
RESPIRATORY NEGATIVE: 1
HEADACHES: 0
CHILLS: 0
FOCAL WEAKNESS: 0
FEVER: 0
DEPRESSION: 0
CARDIOVASCULAR NEGATIVE: 1
SHORTNESS OF BREATH: 0
VOMITING: 0
ABDOMINAL PAIN: 0
BLURRED VISION: 0
DOUBLE VISION: 0
NERVOUS/ANXIOUS: 0
CLAUDICATION: 0
GASTROINTESTINAL NEGATIVE: 1
NAUSEA: 0
COUGH: 0
DIZZINESS: 0
MYALGIAS: 0
PALPITATIONS: 0
WEAKNESS: 1
BACK PAIN: 1
BRUISES/BLEEDS EASILY: 0
WEIGHT LOSS: 0
PSYCHIATRIC NEGATIVE: 1

## 2017-09-29 NOTE — CARE PLAN
Problem: Pain Management  Goal: Pain level will decrease to patient's comfort goal  Outcome: PROGRESSING AS EXPECTED  Assessed patient frequently for signs and symptoms of pain. Administered medication when necessary, see MAR.    Problem: Mobility  Goal: Risk for activity intolerance will decrease  Outcome: PROGRESSING AS EXPECTED  Mobilized patient to chair for all meals and walked with front wheel walker in room. Educated patient on the importance of mobilization to improve her weakness.

## 2017-09-29 NOTE — PROGRESS NOTES
Bedside report received from night shift RN. Patient laying in bed, connected to all monitors, alarms set properly. Patient complains of 8/10 abdominal pain, will medicate and continue bowel protocol. Plan of care discussed, all questions answered. Patient is AOx4 with occasional confusion. Bed alarm on, call light within reach.

## 2017-09-29 NOTE — DISCHARGE PLANNING
Call placed to [facility], spoke to Phil and confirmed that patient has been accepted for SNF.    Received transportation request from [facility] for patient to transfer to [facility] via [facility]. Info sent and call placed to [facility], spoke to Lance and transport time has been arranged for 1630.     [facility] has been notified via phone.

## 2017-09-29 NOTE — CARE PLAN
Problem: Nutritional:  Goal: Achieve adequate nutritional intake  Patient will consume 50% of meals and Boost Plus or 3-4 Boost Plus per day.   Outcome: PROGRESSING AS EXPECTED  PO intake improving, and pt likes Boost supplements. Nutrition Representative seeing pt daily for menu choices.

## 2017-09-29 NOTE — PROGRESS NOTES
Cardiology Progress Note               Author: Sherif Elder Date & Time created: 2017  11:44 AM     Interval History:  86 year old female status post TAVR. She is still weak.    Review of Systems   Constitutional: Negative for chills, fever, malaise/fatigue and weight loss.   HENT: Negative.  Negative for hearing loss.    Eyes: Negative.  Negative for blurred vision and double vision.   Respiratory: Negative.  Negative for cough and shortness of breath.    Cardiovascular: Negative.  Negative for chest pain, palpitations, claudication and leg swelling.   Gastrointestinal: Negative.  Negative for abdominal pain, nausea and vomiting.   Genitourinary: Negative.  Negative for dysuria and urgency.   Musculoskeletal: Positive for back pain and joint pain. Negative for myalgias.   Skin: Negative.  Negative for itching and rash.   Neurological: Positive for weakness. Negative for dizziness, focal weakness and headaches.   Endo/Heme/Allergies: Negative.  Does not bruise/bleed easily.   Psychiatric/Behavioral: Negative.  Negative for depression. The patient is not nervous/anxious.        Physical Exam   Constitutional: She is oriented to person, place, and time. She appears well-developed and well-nourished.   HENT:   Head: Normocephalic and atraumatic.   Eyes: Conjunctivae are normal. Pupils are equal, round, and reactive to light.   Neck: Normal range of motion. Neck supple.   Cardiovascular: Normal rate and regular rhythm.    Pulmonary/Chest: Effort normal and breath sounds normal.   Abdominal: Soft. Bowel sounds are normal.   Musculoskeletal: Normal range of motion. She exhibits no edema.   Neurological: She is alert and oriented to person, place, and time.   Skin: Skin is warm and dry.   Psychiatric: She has a normal mood and affect.       Hemodynamics:  Temp (24hrs), Av.4 °C (97.6 °F), Min:36 °C (96.8 °F), Max:36.8 °C (98.2 °F)  Temperature: 36.8 °C (98.2 °F)  Pulse  Av.5  Min: 61  Max: 122Heart Rate  (Monitored): 91  NIBP: (!) 161/58     Respiratory:    Respiration: 18, Pulse Oximetry: 93 %     Work Of Breathing / Effort: Mild  RUL Breath Sounds: Clear, RML Breath Sounds: Clear, RLL Breath Sounds: Diminished, LING Breath Sounds: Clear, LLL Breath Sounds: Diminished  Fluids:  Date 09/25/17 0700 - 09/26/17 0659   Shift 7810-0847 1218-3050 4747-4689 24 Hour Total   I  N  T  A  K  E   P.O. 0 150  150    Shift Total 0 150  150   O  U  T  P  U  T   Urine 0 0  0    Shift Total 0 0  0   Weight (kg) 89.5 89.5 89.5 89.5          GI/Nutrition:  Orders Placed This Encounter   Procedures   • DIET ORDER     Standing Status:   Standing     Number of Occurrences:   1     Order Specific Question:   Diet:     Answer:   Cardiac [6]     Lab Results:  Recent Labs      09/27/17   1242  09/28/17   0532  09/28/17   1150  09/29/17   0600   WBC  10.5  6.3   --   5.2   RBC  3.79*  3.04*   --   3.11*   HEMOGLOBIN  12.4  10.1*  12.2  10.3*   HEMATOCRIT  38.3  30.5*  37.0  31.2*   MCV  101.1*  100.3*   --   100.3*   MCH  32.7  33.2*   --   33.1*   MCHC  32.4*  33.1*   --   33.0*   RDW  49.3  48.9   --   48.6   PLATELETCT  122*  106*   --   139*   MPV  10.1  9.8   --   9.5     Recent Labs      09/27/17   0435  09/28/17   0532  09/29/17   0600   SODIUM  136  137  137   POTASSIUM  4.3  4.1  4.2   CHLORIDE  108  109  107   CO2  21  23  24   GLUCOSE  123*  108*  120*   BUN  14  12  14   CREATININE  1.02  0.82  0.81   CALCIUM  8.1*  8.3*  8.5                     Medications reviewed.      Current Facility-Administered Medications:   •  senna-docusate (PERICOLACE or SENOKOT S) 8.6-50 MG per tablet 2 Tab, 2 Tab, Oral, BID, 2 Tab at 09/29/17 0916 **AND** polyethylene glycol/lytes (MIRALAX) PACKET 1 Packet, 1 Packet, Oral, QDAY PRN **AND** magnesium hydroxide (MILK OF MAGNESIA) suspension 30 mL, 30 mL, Oral, QDAY PRN **AND** bisacodyl (DULCOLAX) suppository 10 mg, 10 mg, Rectal, QDAY PRN, Donald Luu M.D., 10 mg at 09/29/17 0911  •  ascorbic  acid (ascorbic acid) tablet 500 mg, 500 mg, Oral, QAM, Pamela Appiah, A.P.R.N., 500 mg at 09/29/17 0916  •  atorvastatin (LIPITOR) tablet 20 mg, 20 mg, Oral, Nightly, Pamela Appiah, A.P.R.N., 20 mg at 09/28/17 2013  •  calcium citrate (CALCITRATE) tablet 950 mg, 950 mg, Oral, BID, Pamela Appiah, A.P.R.N., 950 mg at 09/29/17 0202  •  citalopram (CELEXA) tablet 20 mg, 20 mg, Oral, QAM, Pamela Appiah, A.P.R.N., 20 mg at 09/29/17 0916  •  cyanocobalamin (VITAMIN B-12) tablet 5,000 mcg, 5,000 mcg, Oral, QAM, Pamela Appiah, A.P.R.N., 5,000 mcg at 09/29/17 0938  •  multivitamin (THERAGRAN) tablet 1 Tab, 1 Tab, Oral, QAM, Pamela Appiah, A.P.R.N., 1 Tab at 09/29/17 0916  •  Respiratory Care per Protocol, , Nebulization, Continuous RT, Pamela Appiah, A.P.R.N.  •  hydrALAZINE (APRESOLINE) injection 10 mg, 10 mg, Intravenous, Q30 MIN PRN, Pamela Appiah, A.P.R.N.  •  acetaminophen (TYLENOL) tablet 650 mg, 650 mg, Oral, Q6HRS PRN, Pamela Appiah, A.P.R.N.  •  diphenhydrAMINE (BENADRYL) tablet/capsule 25 mg, 25 mg, Oral, HS PRN, Pamela Appiah A.P.R.N.  •  ondansetron (ZOFRAN) syringe/vial injection 4 mg, 4 mg, Intravenous, Q4HRS PRN, Pamela Appiah A.P.R.N., 4 mg at 09/25/17 2017  •  diphenhydrAMINE (BENADRYL) injection 25 mg, 25 mg, Intravenous, Q6HRS PRN, Pamela Appiah, A.P.R.N.  •  gabapentin (NEURONTIN) capsule 600 mg, 600 mg, Oral, TID, Pamela Appiah, A.P.R.N., 600 mg at 09/29/17 0915  •  hydrocodone-acetaminophen (NORCO) 7.5-325 MG per tablet 1-2 Tab, 1-2 Tab, Oral, Q6HRS PRN, Pamela Appiah A.P.R.N., 1 Tab at 09/29/17 0916    CARDIAC STUDIES/PROCEDURES:     CARDIAC CATHETERIZATION CONCLUSIONS by Dr. Aguirre (08/30/17)  1. Patent LIMA to LAD  2. Patent KINDRA to PDA  3. Patent vein graft to OM, mild/moderate in-stent restenosis of the ostial stent  4. Mildly tortuous abdominal descending aorta     CAROTID ULTRASOUND (03/04/16)  <50%  bilateral ICA stenoses   Nl subclavians and vertebrals     CT OF ABDOMEN (09/25/17)  1.  Small LEFT low anterior abdominal wall hematoma. Ongoing bleeding is not excluded.  2.  Gallbladder decompressed or absent  3.  Duodenal diverticulum  4.  Atrophic RIGHT kidney  5.  Atherosclerosis  6.  Prior hysterectomy  7.  Distal colonic diverticulosis  8.  Small hiatal hernia    CT OF CHEST AND ABDOMEN (09/06/17)  1.  Aortic annulus area of 412 sq mm.  2.  Coronary artery ostia are greater than 10 mm from the annulus.  3.  Moderate tortuosity and atherosclerotic calcification of the iliofemoral arterial system, worse on the RIGHT, with minimum diameters of 5.1 mm on the RIGHT and 6.9 mm on the LEFT.  4.  Markedly atrophic RIGHT kidney with apparent moderate to severe proximal RIGHT renal artery stenosis, likely chronic.     DOBUTAMINE STRESS ECHOCARDIOGRAM (08/07/17)  Resting Echo: LVEF 65% with AoV Vmax  of 2.52 m/s; peak gradient 25.5 mmHg and mean 16.11 mmHg  Peak Dobutamine: LVEF 80%, Vmax 4.02 m/s; Peak gradient 64.75 mmHg and mean 36.42 mmHg  No wall motion abnormality    ECHOCARDIOGRAM CONCLUSIONS (09/26/17)  Hyperdynamic left ventricular systolic function. Left ventricular   ejection fraction is visually estimated to be greater than 75%.  Evidence of increased intracavity gradient, peak velocity is 3.35 m/sec.   Normal regional wall motion. Normal diastolic function.  Moderate mitral stenosis. Mean transvalvular gradient is 5.5 mmHg at a   heart rate of 92 BPM.   Normal functioning bioprosthetic aortic valve with a peak gradient of   22 mmHg and mean gradient of 12 mmHg.  Echo images from 9/25/17 showed normal LV function and normal   functioning bioprosthetic aortic with a peak gradient of 22 mmHg and   mean gradient of 13 mmHg.    ECHOCARDIOGRAM CONCLUSIONS by Dr. Acosta (09/25/17)  Intraoperative TTE during TAVR.  Baseline images show normal   biventricular systolic function with EF - 65%.  Severe aortic stenosis.     Post-CPB images show preserved biventricular function.  A 23 mm Oliveira   Leeann 3 valve is seen in the aortic position with normal leaflet   motion, no paravalvular leak, mean gradient of 5 mm Hg, and calculated   effective orifice area of 3.5 cm2.  Findings communicated at the time   of exam.     ECHOCARDIOGRAM CONCLUSIONS (04/19/17)  Structurally normal tricuspid valve. Mild tricuspid regurgitation.   Estimated right ventricular systolic pressure  is 40 mmHg.  Normal left ventricular systolic function.   Estimated right ventricular systolic pressure  is 40 mmHg.  Compared to the images of the study done - there has been increase in   mean gradient across the aortic valve but still within moderate   severity of aortic stenosis.     EKG performed on (09/27/17) was reviewed: EKG shows sinus tachycardia.  EKG performed on (09/26/17) EKG shows sinus tachycardia.  EKG performed on (09/25/17) EKG shows sinus tachycardia.  EKG performed on (09/21/16) EKG shows sinus bradycardia.  EKG performed on (02/10/16) EKG shows sinus bradycardia.     PULMONARY FUNCTION INTERPRETATION (09/13/17)  REASON FOR STUDY:  Shortness of breath, dyspnea on exertion and a history of   severe aortic stenosis.  SPIROMETRY:  FVC is 1.83, 79% predicted.  FEV1 is 1.44, 83% predicted.    FEV1/FVC is 79.  There is a significant response to bronchodilator.  LUNG VOLUMES:  Vital capacity is 97% predicted.  Total lung capacity is 106%   predicted.  Diffusion studies are normal.  Flow volume loops show some mild   coving in the expiratory limb.  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 Successful  transcatheter aortic valve replacement (TAVR) with # 23 Oliveira Leeann S3 valve, transfemoral approach, under conscious sedation (09/25/17): She is still weak and her hemoglobin and hematocrit levels went down. We will repeat CBC. We will continue to follow clinically.  2. Coronary artery disease with prior 3 vessel coronary bypass graft (LIMA to LAD, KINDRA to PDA, vein graft to OM) in [facility], 2002, ostium of the vein graft  stent placement also in 2012: She is clinically doing well from coronary standpoint. We will continue with current medical care.  3. Carotid artery stenosis: Clinically stable on medical therapy.  4. History of trans-ischemic attack (2012): She remains clinically stable without any new neurological symptoms.  5. Chronic kidney disease: We will continue to follow labs.  6. Small abdominal wall hematoma: We will repeat CT. We will continue to hold off on DAPT.  7. Disposition: We will plan for SNF.    CC Pamela Deal, James Lockhart and Joshua Acosta         Quality-Core Measures

## 2017-09-29 NOTE — DISCHARGE PLANNING
[facility]   Transfer scheduled via [facility] 1630.  Waiting on final results for Abd CT.   Patient has BM today.  Chest portable xray 9/26/17.

## 2017-09-29 NOTE — DISCHARGE PLANNING
Received choice form from  [facility] for SNF services, referral has been sent to [facility] per patient request.

## 2017-09-29 NOTE — DISCHARGE PLANNING
Per direction from  Don, [facility] was called to cancel transport and placed on stand by for 9/30.

## 2017-09-29 NOTE — DISCHARGE SUMMARY
PRIMARY DISCHARGE DIAGNOSIS: Status post transcatheter aortic valve replacement.     PROCEDURES:    1. Successful transcatheter aortic valve replacement (TAVR) with #23 Edward Leeann 3 valve, transfemoral approach under conscious sedation on 9/25/17  2. Intraoperative transesophageal echocardiogram showing EF of 65%, no paravalvular leak, mean gradient of 5 mmHg and MARYELLEN of 3.5 cm  3. Echocardiogram on 9/26/17 showing EF of 75%, intracavity increased gradients, moderate mitral stenosis-mean of 5.5 mmHg, normal TAVR functioning valve, peak gradient of 22 mmHg and mean of 12 mmHg  4. CXR on 9/26/17 showing no acute findings  5. EKG on 9/27/17 showing sinus tachycardia rate of 104  6. CT abdomen-pelvis w/o 9/29/17 did not show acute bleed, discussed with Dr. Elder     [facility] COURSE: The patient is a pleasant 85 year old femal with severe symptomatic aortic stenosis, acute hypotension with near syncope post-operatively, left small abdominal wall hematoma, obesity, hip pain from bilateral hip replacements, previous history of hypertension, stroke with no deficits, coronary artery disease with CABG, breast and bladder cancer, and GERD. Due to the patient's symptoms, the patient underwent successful TAVR described as above. Post-procedure, the patient had some small complications. She had some post-operative acute hypotension with SBP reaching down to 40 mmHg. Anesthesiologist was at bedside, gave fluid boluses and ephedrine, her SBP came back up to 140 and she was transferred down to CT of her abdomen. She didn't lose consciousness during any of this time and complained of dizziness/nausea. Her abdomen CT showed small left abdominal hematoma that remained stable with trend in H/H. She continues to have mild orthostatic hypotension, we will make sure she takes it easy upon discharge and encourage fluids. We will follow up with outpatient labs as well.  They were able to ambulate without difficulty. They are now off  oxygen and are [facility].      DISCHARGE MEDICATIONS: No new medications. They will continue their prior home medications of ascorbic acid QD, lipitor 20 mg QD, vitamin B QD, calcium citrate QD, celexa 20 mg QD, gabapentin 600 mg TID, myrbetriq 50 mg QD, and multivitamin QD. They are to stop lisinopril, metoprolo, mobic, lasix, plavix, and ASA.     DISCHARGE INSTRUCTIONS: They are given discharge instructions on potential post-operative complications and symptoms to watch out for. Their groin sites were checked and were clean, dry, and intact. Patient or family to notify us for any complications noted on the discharge instructions. They will follow up with myself, Pamela Appiah, on Monday in our cardiology office. They will get labs on Friday before their follow up appointment. They will then follow up with Dr. Elder with a repeat echocardiogram in one month for post TAVR assessment.           Routing History

## 2017-09-29 NOTE — DISCHARGE PLANNING
Cardiology requesting SNF transfer if CT of Abd is clear.  Family requesting [facility].      Called and they do not have female bed until Monday.  Called [facility] and they do not have female be until Monday.      Called [facility] and likely has available bed today. Discussed [facility] SNF with [facility] and choice form sent.      Called [facility] and they have available bed as second choice.     PASRR  [id number].  Last BM 8/24/17  Has Bowel protocol.

## 2017-09-30 VITALS
BODY MASS INDEX: 35.62 KG/M2 | HEART RATE: 89 BPM | DIASTOLIC BLOOD PRESSURE: 47 MMHG | RESPIRATION RATE: 16 BRPM | SYSTOLIC BLOOD PRESSURE: 116 MMHG | HEIGHT: 63 IN | OXYGEN SATURATION: 93 % | TEMPERATURE: 98.8 F | WEIGHT: 201.06 LBS

## 2017-09-30 LAB
ALBUMIN SERPL BCP-MCNC: 2.7 G/DL (ref 3.2–4.9)
ALBUMIN/GLOB SERPL: 1 G/DL
ALP SERPL-CCNC: 72 U/L (ref 30–99)
ALT SERPL-CCNC: 9 U/L (ref 2–50)
ANION GAP SERPL CALC-SCNC: 7 MMOL/L (ref 0–11.9)
AST SERPL-CCNC: 17 U/L (ref 12–45)
BILIRUB SERPL-MCNC: 0.7 MG/DL (ref 0.1–1.5)
BUN SERPL-MCNC: 13 MG/DL (ref 8–22)
CALCIUM SERPL-MCNC: 8.8 MG/DL (ref 8.5–10.5)
CHLORIDE SERPL-SCNC: 105 MMOL/L (ref 96–112)
CO2 SERPL-SCNC: 25 MMOL/L (ref 20–33)
CREAT SERPL-MCNC: 0.9 MG/DL (ref 0.5–1.4)
ERYTHROCYTE [DISTWIDTH] IN BLOOD BY AUTOMATED COUNT: 47.3 FL (ref 35.9–50)
GFR SERPL CREATININE-BSD FRML MDRD: 59 ML/MIN/1.73 M 2
GLOBULIN SER CALC-MCNC: 2.6 G/DL (ref 1.9–3.5)
GLUCOSE SERPL-MCNC: 122 MG/DL (ref 65–99)
HCT VFR BLD AUTO: 32.5 % (ref 37–47)
HGB BLD-MCNC: 10.9 G/DL (ref 12–16)
MCH RBC QN AUTO: 33.2 PG (ref 27–33)
MCHC RBC AUTO-ENTMCNC: 33.5 G/DL (ref 33.6–35)
MCV RBC AUTO: 99.1 FL (ref 81.4–97.8)
PLATELET # BLD AUTO: 160 K/UL (ref 164–446)
PMV BLD AUTO: 9.6 FL (ref 9–12.9)
POTASSIUM SERPL-SCNC: 4.3 MMOL/L (ref 3.6–5.5)
PROT SERPL-MCNC: 5.3 G/DL (ref 6–8.2)
RBC # BLD AUTO: 3.28 M/UL (ref 4.2–5.4)
SODIUM SERPL-SCNC: 137 MMOL/L (ref 135–145)
WBC # BLD AUTO: 7.4 K/UL (ref 4.8–10.8)

## 2017-09-30 PROCEDURE — 700102 HCHG RX REV CODE 250 W/ 637 OVERRIDE(OP): Performed by: NURSE PRACTITIONER

## 2017-09-30 PROCEDURE — 36415 COLL VENOUS BLD VENIPUNCTURE: CPT

## 2017-09-30 PROCEDURE — 80053 COMPREHEN METABOLIC PANEL: CPT

## 2017-09-30 PROCEDURE — 700111 HCHG RX REV CODE 636 W/ 250 OVERRIDE (IP): Performed by: NURSE PRACTITIONER

## 2017-09-30 PROCEDURE — A9270 NON-COVERED ITEM OR SERVICE: HCPCS | Performed by: NURSE PRACTITIONER

## 2017-09-30 PROCEDURE — 85027 COMPLETE CBC AUTOMATED: CPT

## 2017-09-30 PROCEDURE — 3E0234Z INTRODUCTION OF SERUM, TOXOID AND VACCINE INTO MUSCLE, PERCUTANEOUS APPROACH: ICD-10-PCS | Performed by: INTERNAL MEDICINE

## 2017-09-30 PROCEDURE — 700111 HCHG RX REV CODE 636 W/ 250 OVERRIDE (IP): Performed by: INTERNAL MEDICINE

## 2017-09-30 PROCEDURE — 90471 IMMUNIZATION ADMIN: CPT

## 2017-09-30 PROCEDURE — 90670 PCV13 VACCINE IM: CPT | Performed by: INTERNAL MEDICINE

## 2017-09-30 RX ADMIN — HYDROCODONE BITARTRATE AND ACETAMINOPHEN 2 TABLET: 7.5; 325 TABLET ORAL at 01:14

## 2017-09-30 RX ADMIN — GABAPENTIN 600 MG: 300 CAPSULE ORAL at 08:38

## 2017-09-30 RX ADMIN — THERA TABS 1 TABLET: TAB at 08:38

## 2017-09-30 RX ADMIN — CALCIUM CITRATE 200 MG (950 MG) TABLET 950 MG: at 12:00

## 2017-09-30 RX ADMIN — OXYCODONE HYDROCHLORIDE AND ACETAMINOPHEN 500 MG: 500 TABLET ORAL at 08:38

## 2017-09-30 RX ADMIN — CALCIUM CITRATE 200 MG (950 MG) TABLET 950 MG: at 01:14

## 2017-09-30 RX ADMIN — HYDROCODONE BITARTRATE AND ACETAMINOPHEN 2 TABLET: 7.5; 325 TABLET ORAL at 08:38

## 2017-09-30 RX ADMIN — CITALOPRAM HYDROBROMIDE 20 MG: 20 TABLET ORAL at 08:38

## 2017-09-30 RX ADMIN — HYDRALAZINE HYDROCHLORIDE 10 MG: 20 INJECTION INTRAMUSCULAR; INTRAVENOUS at 08:45

## 2017-09-30 RX ADMIN — CYANOCOBALAMIN TAB 500 MCG 5000 MCG: 500 TAB at 08:39

## 2017-09-30 RX ADMIN — PNEUMOCOCCAL 13-VALENT CONJUGATE VACCINE 0.5 ML: 2.2; 2.2; 2.2; 2.2; 2.2; 4.4; 2.2; 2.2; 2.2; 2.2; 2.2; 2.2; 2.2 INJECTION, SUSPENSION INTRAMUSCULAR at 06:17

## 2017-09-30 ASSESSMENT — PAIN SCALES - GENERAL
PAINLEVEL_OUTOF10: 9
PAINLEVEL_OUTOF10: 9
PAINLEVEL_OUTOF10: 2

## 2017-09-30 ASSESSMENT — LIFESTYLE VARIABLES: DO YOU DRINK ALCOHOL: NO

## 2017-09-30 NOTE — PROGRESS NOTES
Purnima Alonzo Fall Risk Assessment:     Last Known Fall: Within the last six months  Mobility: Use of assistive device/requires assist of two people  Medications: Cardiovascular or central nervous system meds  Mental Status/LOC/Awareness: Awake, alert, and oriented to date, place, and person  Toileting Needs: Incontinence, Diarrhea/frequency/urgency  Volume/Electrolyte Status: No problems  Communication/Sensory: Visual (Glasses)/hearing deficit  Behavior: Appropriate behavior  Purnima Alonzo Fall Risk Total: 17  Fall Risk Level: HIGH RISK    Universal Fall Precautions:  call light/belongings in reach, bed in low position and locked, wheelchairs and assistive devices out of sight, siderails up x 2, use non-slip footwear, adequate lighting, clutter free and spill free environment, educate on level of risk, educate to call for assistance    Fall Risk Level Interventions:     TRIAL (TELE 8, NEURO, MED LEONORA 5) High Fall Risk Interventions  Place yellow fall risk ID band on patient: verified  Provide patient/family education based on risk assessment: completed  Educate patient/family to call staff for assistance when getting out of bed: completed  Place fall precaution signage outside patient door: verified  Place patient in room close to nursing station: verified  Utilize bed/chair fall alarm: verified    Patient Specific Interventions:     Medication: review medications with patient and family and limit combination of prn medications  Mental Status/LOC/Awareness: reorient patient, reinforce falls education, check on patient hourly, utilize bed/chair fall alarm and reinforce the use of call light  Toileting: monitor intake and output/use of appropriate interventions  Volume/Electrolyte Status: monitor abnormal lab values and ensure IV fluids are appropriate  Communication/Sensory: update plan of care on whiteboard and ensure patient has glasses/contacts and hearing aids/dentures  Behavioral: administer medication as  ordered and instruct/reinforce fall program rationale  Mobility: utilize bed/chair fall alarm, ensure bed is locked and in lowest position and provide appropriate assistive device

## 2017-09-30 NOTE — PROGRESS NOTES
Aaox4, c/o abdominal pain, prn meds per MAR,, spoke with family Sydney, POC discussed, awaiting plans for SNF transfer, kept safe.

## 2017-09-30 NOTE — CARE PLAN
Problem: Safety  Goal: Will remain free from falls    Intervention: Assess risk factors for falls  Fall precautions in place. Bed in lowest position. Non-skid socks in place. Personal possessions within reach. Mobility sign on door. Bed-alarm on. Call light within reach. Pt educated regarding fall prevention and states understanding.       Problem: Knowledge Deficit  Goal: Knowledge of disease process/condition, treatment plan, diagnostic tests, and medications will improve  Pt educated regarding plan of care and medications. All questions answered.

## 2017-09-30 NOTE — DISCHARGE PLANNING
Per CAMERON Harrison request, spoke with Isabel at [facility] they will accept the patient today in transfer.  Patient to transfer to [facility] today at 1330 via [facility].  Discharge Summary has been faxed to [facility]. Isabel at [facility] advised of transport time.  CAMERON Harrison advised.

## 2017-09-30 NOTE — PROGRESS NOTES
Pt in bed resting. Pt moaning and complaining of pain in lower abdomen. Will medicate. Will continue to monitor. Call light in place and bed in lowest position

## 2017-09-30 NOTE — DISCHARGE PLANNING
The patient was transferred to [facility] by [facility] ambulance today at 1:30 PM.  Her daughter was here and signed the COBRA transfer form.  I copied the chart and attached the COBRA form and 2 face sheets to it.

## 2017-09-30 NOTE — PROGRESS NOTES
Patient arrived to T816/02 from T617 via bed and ICU RNs. Tele monitor applied, monitor room called to confirm. Bed is locked and in lowest position with call light in reach. Pt A&O x3- disoriented to location, but easily reoriented. Waffle mattress in place. Pt denies pain at this time. Report received from ICU RN

## 2017-09-30 NOTE — PROGRESS NOTES
Pt in bed. States she has severe pain in the lower abdomen. Upon awakening, pt completely disoriented. She was able to be reoriented. Will continue to monitor.

## 2017-09-30 NOTE — CARE PLAN
Problem: Knowledge Deficit  Goal: Knowledge of disease process/condition, treatment plan, diagnostic tests, and medications will improve    Intervention: Explain information regarding disease process/condition, treatment plan, diagnostic tests, and medications and document in education  SNF transfer plans      Problem: Pain Management  Goal: Pain level will decrease to patient's comfort goal    Intervention: Follow pain managment plan developed in collaboration with patient and Interdisciplinary Team  Prn  meds per MAR

## 2017-09-30 NOTE — DISCHARGE INSTRUCTIONS
Discharge Instructions    Discharged to other by ambulance with escort. Discharged via ambulance, hospital escort: Yes.  Special equipment needed: Not Applicable    Be sure to schedule a follow-up appointment with your primary care doctor or any specialists as instructed.     Discharge Plan:   Pneumococcal Vaccine Given - only chart on this line when given: Given (See MAR)  Influenza Vaccine Indication: Indicated: Not available from distributor/  Influenza Vaccine Given - only chart on this line when given: Influenza Vaccine Given (See MAR)    I understand that a diet low in cholesterol, fat, and sodium is recommended for good health. Unless I have been given specific instructions below for another diet, I accept this instruction as my diet prescription.   Other diet: cardiac    Special Instructions:   They are given discharge instructions on potential post-operative complications and symptoms to watch out for. Their groin sites were checked and were clean, dry, and intact.   Patient or family to notify us for any complications noted on the discharge instructions.   They will then follow up with Dr. Elder with a repeat echocardiogram in one month for post TAVR assessment.        · Is patient discharged on Warfarin / Coumadin?   No     · Is patient Post Blood Transfusion?  No    Depression / Suicide Risk    As you are discharged from this [facility] facility, it is important to learn how to keep safe from harming yourself.    Recognize the warning signs:  · Abrupt changes in personality, positive or negative- including increase in energy   · Giving away possessions  · Change in eating patterns- significant weight changes-  positive or negative  · Change in sleeping patterns- unable to sleep or sleeping all the time   · Unwillingness or inability to communicate  · Depression  · Unusual sadness, discouragement and loneliness  · Talk of wanting to die  · Neglect of personal appearance   · Rebelliousness-  reckless behavior  · Withdrawal from people/activities they love  · Confusion- inability to concentrate     If you or a loved one observes any of these behaviors or has concerns about self-harm, here's what you can do:  · Talk about it- your feelings and reasons for harming yourself  · Remove any means that you might use to hurt yourself (examples: pills, rope, extension cords, firearm)  · Get professional help from the community (Mental Health, Substance Abuse, psychological counseling)  · Do not be alone:Call your Safe Contact- someone whom you trust who will be there for you.  · Call your local CRISIS HOTLINE 228-1252 or 374-057-0990  · Call your local Children's Mobile Crisis Response Team [place] (272) 368-9047 or www.cliniq.ly  · Call the toll free National Suicide Prevention Hotlines   · National Suicide Prevention Lifeline 416-081-FXPM (1369)  · National Hope Line Network 800-SUICIDE (553-8393)

## 2017-09-30 NOTE — PROGRESS NOTES
2 RN skin check completed with Jailene OLSON. R ear red and blanching. L ear red and nonblanching. Wound photo taken and uploaded into epic. Wound consult placed. Silicone O2 tubing in place with ear pads. Red spot on R medial ankle. Sacrum red/pink and blanching. Heels red and blanching. Float boots applied. BL LE pitting edema. BL UE bruising. Lower abdominal and groin site bruising. Waffle mattress in place.

## 2017-09-30 NOTE — PROGRESS NOTES
Assumed pt care from daytime RN. Report received. All questions answered at this time. Will continue to monitor

## 2017-10-01 LAB — ACT BLD: 120 SEC (ref 74–137)

## 2017-10-04 ENCOUNTER — OFFICE VISIT (OUTPATIENT)
Dept: CARDIOLOGY | Facility: MEDICAL CENTER | Age: 82
End: 2017-10-04
Payer: MEDICARE

## 2017-10-04 VITALS
OXYGEN SATURATION: 91 % | DIASTOLIC BLOOD PRESSURE: 66 MMHG | HEIGHT: 63 IN | HEART RATE: 65 BPM | WEIGHT: 195.11 LBS | BODY MASS INDEX: 34.57 KG/M2 | SYSTOLIC BLOOD PRESSURE: 120 MMHG

## 2017-10-04 DIAGNOSIS — I65.23 BILATERAL CAROTID ARTERY STENOSIS: ICD-10-CM

## 2017-10-04 DIAGNOSIS — I10 ESSENTIAL HYPERTENSION: ICD-10-CM

## 2017-10-04 DIAGNOSIS — Z95.1 HX OF CABG: ICD-10-CM

## 2017-10-04 DIAGNOSIS — R53.1 WEAKNESS: ICD-10-CM

## 2017-10-04 DIAGNOSIS — I50.32 CHRONIC DIASTOLIC CHF (CONGESTIVE HEART FAILURE) (HCC): ICD-10-CM

## 2017-10-04 DIAGNOSIS — Z95.2 S/P TAVR (TRANSCATHETER AORTIC VALVE REPLACEMENT): ICD-10-CM

## 2017-10-04 DIAGNOSIS — I25.10 CORONARY ARTERY DISEASE DUE TO CALCIFIED CORONARY LESION: ICD-10-CM

## 2017-10-04 DIAGNOSIS — G45.9 TRANSIENT CEREBRAL ISCHEMIA, UNSPECIFIED TYPE: ICD-10-CM

## 2017-10-04 DIAGNOSIS — I95.1 ORTHOSTATIC HYPOTENSION: ICD-10-CM

## 2017-10-04 DIAGNOSIS — E78.00 PURE HYPERCHOLESTEROLEMIA: ICD-10-CM

## 2017-10-04 DIAGNOSIS — I25.84 CORONARY ARTERY DISEASE DUE TO CALCIFIED CORONARY LESION: ICD-10-CM

## 2017-10-04 DIAGNOSIS — Z95.5 STENTED CORONARY ARTERY: ICD-10-CM

## 2017-10-04 PROCEDURE — 99214 OFFICE O/P EST MOD 30 MIN: CPT | Performed by: NURSE PRACTITIONER

## 2017-10-04 RX ORDER — METOPROLOL SUCCINATE 50 MG/1
50 TABLET, EXTENDED RELEASE ORAL DAILY
COMMUNITY
End: 2018-04-16 | Stop reason: SDUPTHER

## 2017-10-04 RX ORDER — LISINOPRIL 10 MG/1
10 TABLET ORAL DAILY
Qty: 30 TAB | Refills: 11 | Status: SHIPPED | OUTPATIENT
Start: 2017-10-04 | End: 2017-10-16 | Stop reason: SDUPTHER

## 2017-10-04 ASSESSMENT — ENCOUNTER SYMPTOMS
SHORTNESS OF BREATH: 1
COUGH: 0
WEAKNESS: 1
ABDOMINAL PAIN: 0
PND: 0
DIZZINESS: 0
ORTHOPNEA: 0
PALPITATIONS: 0
FALLS: 0
FEVER: 0
CLAUDICATION: 0

## 2017-10-04 NOTE — PROGRESS NOTES
Subjective:   Silvia Orellana is a 84 y.o. female who presents today for follow up for hospital follow up S/P TAVR.     She is a patient of Dr. Aguirre in our office. Hx of CAD with previous CABG, HTN, HLD, carotid artery disease, and falls with multiple orthopedic surgeries requiring wheelchair for mobility at times, and now S/P TAVR for AS.    Her shortness of breath is mild but improving with therapies due to de-conditioning.     She is discharged from [facility] to home with her daughter. Her blood pressure has been good but was elevated at [facility], some of her medications were changed.    She does use a cane and a wheelchair for long distances.     She does have some LE edema today.    Her daughter helps her with her medications and ADL's.    She will be starting physical therapy at home first and then is very interested in cardiac rehab.    She has had no episodes of chest pain, palpitations, or orthopnea.    Past Medical History:   Diagnosis Date   • Aortic stenosis     moderate on echo in '16   • Arthritis    • Bowel habit changes    • Bradycardia    • Breast cancer (CMS-[facility])    • Breath shortness    • CAD (coronary artery disease)     CABG    • Cancer (CMS-[facility]) 2003    left breast lumpectomy   • Cancer (CMS-[facility]) 2007    bladder ca   • Cancer (CMS-[facility]) 2016    basal cell ca to nose   • Cataract     patria IOL   • Chronic pain    • Dental disorder     dentures   • Diverticulosis    • Fall    • GERD (gastroesophageal reflux disease)    • Heart burn    • Hx of Clostridium difficile infection 2015   • Hypertension    • Indigestion    • Myocardial infarct 2002    cardiac stent   • Pneumonia 2014   • Stroke (CMS-[facility]) 2001; 2012    no deficits, general weakness   • Urinary bladder disorder    • Urinary incontinence      Past Surgical History:   Procedure Laterality Date   • ANGIOGRAM     • BLADDER BIOPSY     • CARDIAC CATH     • CHOLECYSTECTOMY     • FEMUR ORIF Right 8/10/2015    Procedure: FEMUR ORIF;  Surgeon:  Umer Guevara M.D.;  Location: [facility];  Service:    • GYN SURGERY  1961    hysterectomy   • HIP ARTHROPLASTY TOTAL Left    • LUMPECTOMY      L   • MULTIPLE CORONARY ARTERY BYPASS     • ORBITAL FRACTURE ORIF Left 7/6/2015    Procedure: ORBITAL FRACTURE ORIF;  Surgeon: Fabrice Daniel Jr., M.D.;  Location: SURGERY [facility];  Service:    • ORBITAL FRACTURE ORIF Left 5/23/2016    Procedure: ORBITAL FRACTURE ORIF FOR: LATERAL CANTHOPEXY;  Surgeon: Fabrice Daniel Jr., M.D.;  Location: SURGERY [facility];  Service:    • OTHER CARDIAC SURGERY  2002    CABG   • OTHER CARDIAC SURGERY  2012    cardiac cath with stent placement   • PB REVISE ACETABULAR PART OF TOTAL HIP  2010    L   • PB REVISE TOTAL HIP REPLACEMENT  3/24/2015    R   • TATIANNA  9/25/2017    Procedure: TTE;  Surgeon: Sherif Elder M.D.;  Location: SURGERY [facility];  Service: Cardiac   • TRANSCATHETER AORTIC VALVE REPLACEMENT N/A 9/25/2017    Procedure: TRANSCATHETER AORTIC VALVE REPLACEMENT;  Surgeon: Sherif Elder M.D.;  Location: [facility];  Service: Cardiac     No family history on file.  History   Smoking Status   • Never Smoker   Smokeless Tobacco   • Never Used     No Known Allergies  Outpatient Encounter Prescriptions as of 10/4/2017   Medication Sig Dispense Refill   • metoprolol SR (TOPROL XL) 50 MG TABLET SR 24 HR Take 50 mg by mouth every day.     • lisinopril (PRINIVIL) 10 MG Tab Take 1 Tab by mouth every day. 30 Tab 11   • [DISCONTINUED] CLONIDINE HCL PO Take  by mouth. UNKNOWN DOSE     • Mirabegron ER (MYRBETRIQ) 50 MG TABLET SR 24 HR Take 1 Tab by mouth every day.     • Cyanocobalamin (VITAMIN B-12) 5000 MCG SL Tab Place 1 Tab under tongue every morning.     • ascorbic acid (ASCORBIC ACID) 500 MG Tab Take 500 mg by mouth every morning.     • B Complex Vitamins (VITAMIN B COMPLEX PO) Take 1 Tab by mouth every morning.     • multivitamin (THERAGRAN) Tab Take 1 Tab by mouth every morning.    "  • atorvastatin (LIPITOR) 20 MG Tab Take 20 mg by mouth every evening.     • calcium citrate (CALCITRATE) 950 MG Tab Take 950 mg by mouth 2 times a day.     • gabapentin (NEURONTIN) 600 MG tablet Take 600 mg by mouth 3 times a day.  5   • citalopram (CELEXA) 20 MG TABS Take 20 mg by mouth every morning.       No facility-administered encounter medications on file as of 10/4/2017.      Review of Systems   Constitutional: Positive for malaise/fatigue. Negative for fever.   Respiratory: Positive for shortness of breath. Negative for cough.         Exertional moderate   Cardiovascular: Positive for leg swelling. Negative for chest pain, palpitations, orthopnea, claudication and PND.        Mild edema both legs   Gastrointestinal: Negative for abdominal pain.   Musculoskeletal: Negative for falls and joint pain.   Neurological: Positive for weakness. Negative for dizziness.        Wheelchair for mobility at times   All other systems reviewed and are negative.       Objective:   /66   Pulse 65   Ht 1.6 m (5' 3\")   Wt 88.5 kg (195 lb 1.7 oz)   SpO2 91%   BMI 34.56 kg/m²     Physical Exam   Constitutional: She is oriented to person, place, and time. She appears well-developed. No distress.   Obese   HENT:   Head: Normocephalic and atraumatic.   Eyes: EOM are normal.   Neck: Normal range of motion. No JVD present.   Cardiovascular: Regular rhythm, intact distal pulses and normal pulses.  Bradycardia present.    Murmur heard.   Systolic murmur is present with a grade of 2/6   Pulses:       Carotid pulses are on the right side with bruit, and on the left side with bruit.  Pulmonary/Chest: Effort normal and breath sounds normal. No respiratory distress.   Abdominal: Soft. Bowel sounds are normal.   Musculoskeletal: She exhibits edema.   Wheelchair today for mobility-cane at home; bilateral LE edema   Neurological: She is alert and oriented to person, place, and time.   Skin: Skin is warm and dry.   Psychiatric: She " has a normal mood and affect.   Nursing note and vitals reviewed.    Reviewed with patient  Lab Results   Component Value Date/Time    CHOLSTRLTOT 154 04/19/2017 10:00 AM    LDL 79 04/19/2017 10:00 AM    HDL 49 04/19/2017 10:00 AM    TRIGLYCERIDE 128 04/19/2017 10:00 AM       Lab Results   Component Value Date/Time    SODIUM 137 09/30/2017 03:14 AM    POTASSIUM 4.3 09/30/2017 03:14 AM    CHLORIDE 105 09/30/2017 03:14 AM    CO2 25 09/30/2017 03:14 AM    GLUCOSE 122 (H) 09/30/2017 03:14 AM    BUN 13 09/30/2017 03:14 AM    CREATININE 0.90 09/30/2017 03:14 AM    BUNCREATRAT 15 04/19/2017 10:00 AM     Lab Results   Component Value Date/Time    ALKPHOSPHAT 72 09/30/2017 03:14 AM    ASTSGOT 17 09/30/2017 03:14 AM    ALTSGPT 9 09/30/2017 03:14 AM    TBILIRUBIN 0.7 09/30/2017 03:14 AM      3/4/16 MPI IMPRESSIONS  PVCs on baseline ECG.  No evidence of significant jeopardized viable myocardium or prior myocardial   infarction.  Normal left ventricular wall motion. LV ejection fraction = 71%.  123    3/4/16 CAROTID DUPLEX CONCLUSIONS  <50% bilateral ICA stenoses  Nl subclavians and vertebrals    2017 ECHO CONCLUSIONS  Compared to the report of the prior study done 7/4/2015 -  there has   been no significant change.   Normal left ventricular size and systolic function.  Left ventricular ejection fraction is visually estimated to be 65%.  Grade I diastolic dysfunction.  Moderate aortic stenosis.  Transvalvular gradients are - Peak: 34 mmHg, Mean: 21 mmHg.  Mild aortic insufficiency.    Assessment:     1. CAD  REFERRAL TO INTENSIVE CARDIAC REHAB/CARDIAC REHAB   2. Chronic diastolic CHF (congestive heart failure) (CMS-[facility])  BTYPE NATRIURETIC PEPTIDE    REFERRAL TO INTENSIVE CARDIAC REHAB/CARDIAC REHAB   3. Hx of CABG  REFERRAL TO INTENSIVE CARDIAC REHAB/CARDIAC REHAB   4. Orthostatic hypotension  REFERRAL TO INTENSIVE CARDIAC REHAB/CARDIAC REHAB   5. Postprocedural hematoma of abdominal wall  REFERRAL TO INTENSIVE CARDIAC  REHAB/CARDIAC REHAB   6. S/P TAVR (transcatheter aortic valve replacement)  CBC WITHOUT DIFFERENTIAL    COMP METABOLIC PANEL    REFERRAL TO INTENSIVE CARDIAC REHAB/CARDIAC REHAB   7. Stented coronary artery  REFERRAL TO INTENSIVE CARDIAC REHAB/CARDIAC REHAB   8. Transient cerebral ischemia, unspecified type  REFERRAL TO INTENSIVE CARDIAC REHAB/CARDIAC REHAB   9. Pure hypercholesterolemia  REFERRAL TO INTENSIVE CARDIAC REHAB/CARDIAC REHAB   10. Weakness  CBC WITHOUT DIFFERENTIAL    COMP METABOLIC PANEL    REFERRAL TO INTENSIVE CARDIAC REHAB/CARDIAC REHAB   11. Essential hypertension  REFERRAL TO INTENSIVE CARDIAC REHAB/CARDIAC REHAB   12. Bilateral carotid artery stenosis  REFERRAL TO INTENSIVE CARDIAC REHAB/CARDIAC REHAB     Medical Decision Making:  Today's Assessment / Status / Plan:     1. CAD, no angina, NGUYEN improving. Continue ASA, plavix, metoprolol, and lipitor. Follow clinically.    2. Weakness, improving with therapies.    3. HTN, good control today. FU with BP daily, call for SBP>140. Continue metoprolol. Discuss further med changes over the phone if warranted.    4. HLD, Lipitor. Good control.    5. Carotid artery stenosis, mild < 50%. ASA, plavix, and Lipitor.      6. CKD, stable. Followed by nephro.    7. S/P TAVR, doing well. Did have post-operative hematoma that dropped her Hgb slightly, it improved on its own. We will repeat a CBC in 1 week. Repeat echo 1 month.    FU in clinic in in 2 weeks with SC and 4 weeks with JI with repeat echo, labs before SC apt    Patient verbalizes understanding and agrees with the plan of care.     Collaborating MD: Jose Alejandro MARRERO

## 2017-10-04 NOTE — LETTER
[facility]   [address], [address]  [place] [address]  Phone: [phone]  Fax: [phone]              Silvia Orellana  6/19/1931    Encounter Date: 10/4/2017    CAESAR Santacruz          PROGRESS NOTE:  Subjective:   Silvia Orellana is a 84 y.o. female who presents today for follow up for hospital follow up S/P TAVR.     She is a patient of Dr. Aguirre in our office. Hx of CAD with previous CABG, HTN, HLD, carotid artery disease, and falls with multiple orthopedic surgeries requiring wheelchair for mobility at times, and now S/P TAVR for AS.    Her shortness of breath is mild but improving with therapies due to de-conditioning.     She is discharged from [facility] to home with her daughter. Her blood pressure has been good but was elevated at [facility], some of her medications were changed.    She does use a cane and a wheelchair for long distances.     She does have some LE edema today.    Her daughter helps her with her medications and ADL's.    She will be starting physical therapy at home first and then is very interested in cardiac rehab.    She has had no episodes of chest pain, palpitations, or orthopnea.    Past Medical History:   Diagnosis Date   • Aortic stenosis     moderate on echo in '16   • Arthritis    • Bowel habit changes    • Bradycardia    • Breast cancer (CMS-[facility])    • Breath shortness    • CAD (coronary artery disease)     CABG    • Cancer (CMS-[facility]) 2003    left breast lumpectomy   • Cancer (CMS-[facility]) 2007    bladder ca   • Cancer (CMS-[facility]) 2016    basal cell ca to nose   • Cataract     patria IOL   • Chronic pain    • Dental disorder     dentures   • Diverticulosis    • Fall    • GERD (gastroesophageal reflux disease)    • Heart burn    • Hx of Clostridium difficile infection 2015   • Hypertension    • Indigestion    • Myocardial infarct 2002    cardiac stent   • Pneumonia 2014   • Stroke (CMS-[facility]) 2001; 2012    no deficits,  general weakness   • Urinary bladder disorder    • Urinary incontinence      Past Surgical History:   Procedure Laterality Date   • ANGIOGRAM     • BLADDER BIOPSY     • CARDIAC CATH     • CHOLECYSTECTOMY     • FEMUR ORIF Right 8/10/2015    Procedure: FEMUR ORIF;  Surgeon: Umer Guevara M.D.;  Location: [facility];  Service:    • GYN SURGERY  1961    hysterectomy   • HIP ARTHROPLASTY TOTAL Left    • LUMPECTOMY      L   • MULTIPLE CORONARY ARTERY BYPASS     • ORBITAL FRACTURE ORIF Left 7/6/2015    Procedure: ORBITAL FRACTURE ORIF;  Surgeon: Fabrice Daniel Jr., M.D.;  Location: SURGERY [facility];  Service:    • ORBITAL FRACTURE ORIF Left 5/23/2016    Procedure: ORBITAL FRACTURE ORIF FOR: LATERAL CANTHOPEXY;  Surgeon: Fabrice Daniel Jr., M.D.;  Location: [facility];  Service:    • OTHER CARDIAC SURGERY  2002    CABG   • OTHER CARDIAC SURGERY  2012    cardiac cath with stent placement   • PB REVISE ACETABULAR PART OF TOTAL HIP  2010    L   • PB REVISE TOTAL HIP REPLACEMENT  3/24/2015    R   • TATIANNA  9/25/2017    Procedure: TTE;  Surgeon: Sherif Elder M.D.;  Location: [facility];  Service: Cardiac   • TRANSCATHETER AORTIC VALVE REPLACEMENT N/A 9/25/2017    Procedure: TRANSCATHETER AORTIC VALVE REPLACEMENT;  Surgeon: Sherif Elder M.D.;  Location: [facility];  Service: Cardiac     No family history on file.  History   Smoking Status   • Never Smoker   Smokeless Tobacco   • Never Used     No Known Allergies  Outpatient Encounter Prescriptions as of 10/4/2017   Medication Sig Dispense Refill   • metoprolol SR (TOPROL XL) 50 MG TABLET SR 24 HR Take 50 mg by mouth every day.     • lisinopril (PRINIVIL) 10 MG Tab Take 1 Tab by mouth every day. 30 Tab 11   • [DISCONTINUED] CLONIDINE HCL PO Take  by mouth. UNKNOWN DOSE     • Mirabegron ER (MYRBETRIQ) 50 MG TABLET SR 24 HR Take 1 Tab by mouth every day.     • Cyanocobalamin (VITAMIN B-12) 5000 MCG SL Tab Place  "1 Tab under tongue every morning.     • ascorbic acid (ASCORBIC ACID) 500 MG Tab Take 500 mg by mouth every morning.     • B Complex Vitamins (VITAMIN B COMPLEX PO) Take 1 Tab by mouth every morning.     • multivitamin (THERAGRAN) Tab Take 1 Tab by mouth every morning.     • atorvastatin (LIPITOR) 20 MG Tab Take 20 mg by mouth every evening.     • calcium citrate (CALCITRATE) 950 MG Tab Take 950 mg by mouth 2 times a day.     • gabapentin (NEURONTIN) 600 MG tablet Take 600 mg by mouth 3 times a day.  5   • citalopram (CELEXA) 20 MG TABS Take 20 mg by mouth every morning.       No facility-administered encounter medications on file as of 10/4/2017.      Review of Systems   Constitutional: Positive for malaise/fatigue. Negative for fever.   Respiratory: Positive for shortness of breath. Negative for cough.         Exertional moderate   Cardiovascular: Positive for leg swelling. Negative for chest pain, palpitations, orthopnea, claudication and PND.        Mild edema both legs   Gastrointestinal: Negative for abdominal pain.   Musculoskeletal: Negative for falls and joint pain.   Neurological: Positive for weakness. Negative for dizziness.        Wheelchair for mobility at times   All other systems reviewed and are negative.       Objective:   /66   Pulse 65   Ht 1.6 m (5' 3\")   Wt 88.5 kg (195 lb 1.7 oz)   SpO2 91%   BMI 34.56 kg/m²      Physical Exam   Constitutional: She is oriented to person, place, and time. She appears well-developed. No distress.   Obese   HENT:   Head: Normocephalic and atraumatic.   Eyes: EOM are normal.   Neck: Normal range of motion. No JVD present.   Cardiovascular: Regular rhythm, intact distal pulses and normal pulses.  Bradycardia present.    Murmur heard.   Systolic murmur is present with a grade of 2/6   Pulses:       Carotid pulses are on the right side with bruit, and on the left side with bruit.  Pulmonary/Chest: Effort normal and breath sounds normal. No respiratory " distress.   Abdominal: Soft. Bowel sounds are normal.   Musculoskeletal: She exhibits edema.   Wheelchair today for mobility-cane at home; bilateral LE edema   Neurological: She is alert and oriented to person, place, and time.   Skin: Skin is warm and dry.   Psychiatric: She has a normal mood and affect.   Nursing note and vitals reviewed.    Reviewed with patient  Lab Results   Component Value Date/Time    CHOLSTRLTOT 154 04/19/2017 10:00 AM    LDL 79 04/19/2017 10:00 AM    HDL 49 04/19/2017 10:00 AM    TRIGLYCERIDE 128 04/19/2017 10:00 AM       Lab Results   Component Value Date/Time    SODIUM 137 09/30/2017 03:14 AM    POTASSIUM 4.3 09/30/2017 03:14 AM    CHLORIDE 105 09/30/2017 03:14 AM    CO2 25 09/30/2017 03:14 AM    GLUCOSE 122 (H) 09/30/2017 03:14 AM    BUN 13 09/30/2017 03:14 AM    CREATININE 0.90 09/30/2017 03:14 AM    BUNCREATRAT 15 04/19/2017 10:00 AM     Lab Results   Component Value Date/Time    ALKPHOSPHAT 72 09/30/2017 03:14 AM    ASTSGOT 17 09/30/2017 03:14 AM    ALTSGPT 9 09/30/2017 03:14 AM    TBILIRUBIN 0.7 09/30/2017 03:14 AM      3/4/16 MPI IMPRESSIONS  PVCs on baseline ECG.  No evidence of significant jeopardized viable myocardium or prior myocardial   infarction.  Normal left ventricular wall motion. LV ejection fraction = 71%.  123    3/4/16 CAROTID DUPLEX CONCLUSIONS  <50% bilateral ICA stenoses  Nl subclavians and vertebrals    2017 ECHO CONCLUSIONS  Compared to the report of the prior study done 7/4/2015 -  there has   been no significant change.   Normal left ventricular size and systolic function.  Left ventricular ejection fraction is visually estimated to be 65%.  Grade I diastolic dysfunction.  Moderate aortic stenosis.  Transvalvular gradients are - Peak: 34 mmHg, Mean: 21 mmHg.  Mild aortic insufficiency.    Assessment:     1. CAD  REFERRAL TO INTENSIVE CARDIAC REHAB/CARDIAC REHAB   2. Chronic diastolic CHF (congestive heart failure) (CMS-[facility])  BTYPE NATRIURETIC PEPTIDE     REFERRAL TO INTENSIVE CARDIAC REHAB/CARDIAC REHAB   3. Hx of CABG  REFERRAL TO INTENSIVE CARDIAC REHAB/CARDIAC REHAB   4. Orthostatic hypotension  REFERRAL TO INTENSIVE CARDIAC REHAB/CARDIAC REHAB   5. Postprocedural hematoma of abdominal wall  REFERRAL TO INTENSIVE CARDIAC REHAB/CARDIAC REHAB   6. S/P TAVR (transcatheter aortic valve replacement)  CBC WITHOUT DIFFERENTIAL    COMP METABOLIC PANEL    REFERRAL TO INTENSIVE CARDIAC REHAB/CARDIAC REHAB   7. Stented coronary artery  REFERRAL TO INTENSIVE CARDIAC REHAB/CARDIAC REHAB   8. Transient cerebral ischemia, unspecified type  REFERRAL TO INTENSIVE CARDIAC REHAB/CARDIAC REHAB   9. Pure hypercholesterolemia  REFERRAL TO INTENSIVE CARDIAC REHAB/CARDIAC REHAB   10. Weakness  CBC WITHOUT DIFFERENTIAL    COMP METABOLIC PANEL    REFERRAL TO INTENSIVE CARDIAC REHAB/CARDIAC REHAB   11. Essential hypertension  REFERRAL TO INTENSIVE CARDIAC REHAB/CARDIAC REHAB   12. Bilateral carotid artery stenosis  REFERRAL TO INTENSIVE CARDIAC REHAB/CARDIAC REHAB     Medical Decision Making:  Today's Assessment / Status / Plan:     1. CAD, no angina, NGUYEN improving. Continue ASA, plavix, metoprolol, and lipitor. Follow clinically.    2. Weakness, improving with therapies.    3. HTN, good control today. FU with BP daily, call for SBP>140. Continue metoprolol. Discuss further med changes over the phone if warranted.    4. HLD, Lipitor. Good control.    5. Carotid artery stenosis, mild < 50%. ASA, plavix, and Lipitor.      6. CKD, stable. Followed by nephro.    7. S/P TAVR, doing well. Did have post-operative hematoma that dropped her Hgb slightly, it improved on its own. We will repeat a CBC in 1 week. Repeat echo 1 month.    FU in clinic in in 2 weeks with SC and 4 weeks with JI with repeat echo, labs before SC apt    Patient verbalizes understanding and agrees with the plan of care.     Collaborating MD: Jose Alejandro MARRERO            No Recipients

## 2017-10-05 ENCOUNTER — TELEPHONE (OUTPATIENT)
Dept: CARDIOLOGY | Facility: MEDICAL CENTER | Age: 82
End: 2017-10-05

## 2017-10-05 DIAGNOSIS — I10 ESSENTIAL HYPERTENSION: ICD-10-CM

## 2017-10-05 NOTE — TELEPHONE ENCOUNTER
Guadalupe pts dtr called explaining that during pts appt yesterday Pamela Appiah wanted her to call if pts blood pressure was high: her BP was 131/74 but then almost an hour later she took it again and it was 168/62. These are the only readings she has taken.     Advised dtr to please continue to monitor pts BP twice a day, once in the morning 2 hrs after BP meds then once in the evening before bed and call on Monday to report readings.This will give us a more accurate picture of what pt needs.   Dtr agreed and will call Monday.

## 2017-10-09 NOTE — TELEPHONE ENCOUNTER
Dtr Guadalupe called back to give BP readings over the last 3 days.     10/06: 8am 167/72 HR 87, 11am 120/66, 9pm 177/99 HR 83    10/07: 11am 189/104 HR 88 (was mot feeling well) 3pm 175/97 , 10pm 151/82 HR 75    10/08: 172/75 HR 69, 4pm 189/84 HR 61, 9pm: 191/86 HR 67    10/09: 9am 186/90 HR 60    Reviewed medication list with dtr. To note: pt has been taking Lisinopril 20mg BID (not 10mg daily as it says on the med rec). Also, dtr says pt has not been taking plavix as this was her understand. It was discontinued in the med rec but says to continue in last ov note. Should she be taking it?     To SC: thank you

## 2017-10-09 NOTE — TELEPHONE ENCOUNTER
Notified pts dtr of SCs recommendations. Dtr says she has furosemide 20mg at home from previous rx (she did not take potassium before). She will continue to monitor her BP and call with readings. Dtr is aware of goal: systolic below 160.     --------------------------------------------------------------------------------    Pamela Appiah, MELLRKayleenN.   to Me         10:46 AM   No ASA, plavix until repeat CBC planned. This was an error in my note, I will fix now. Okay to continue lisinopril 20 mg BID. Is she still taking metoprolol 50 mg XL once daily as well? If so, lets re-introduce her lasix back in at 20 mg in the AM. Then have her FU with BID BP readings. Two hours post medication in the AM and one right before bed.     If her systolic stays above 160, have her call our office. SC

## 2017-10-14 LAB
ALBUMIN SERPL-MCNC: 3.9 G/DL (ref 3.5–4.7)
ALBUMIN/GLOB SERPL: 1.3 {RATIO} (ref 1.2–2.2)
ALP SERPL-CCNC: 125 IU/L (ref 39–117)
ALT SERPL-CCNC: 14 IU/L (ref 0–32)
AST SERPL-CCNC: 25 IU/L (ref 0–40)
BILIRUB SERPL-MCNC: 0.5 MG/DL (ref 0–1.2)
BNP SERPL-MCNC: 159.4 PG/ML (ref 0–100)
BUN SERPL-MCNC: 15 MG/DL (ref 8–27)
BUN/CREAT SERPL: 12 (ref 12–28)
CALCIUM SERPL-MCNC: 9.6 MG/DL (ref 8.7–10.3)
CHLORIDE SERPL-SCNC: 101 MMOL/L (ref 96–106)
CO2 SERPL-SCNC: 24 MMOL/L (ref 18–29)
CREAT SERPL-MCNC: 1.23 MG/DL (ref 0.57–1)
ERYTHROCYTE [DISTWIDTH] IN BLOOD BY AUTOMATED COUNT: 14 % (ref 12.3–15.4)
GLOBULIN SER CALC-MCNC: 3.1 G/DL (ref 1.5–4.5)
GLUCOSE SERPL-MCNC: 107 MG/DL (ref 65–99)
HCT VFR BLD AUTO: 41.5 % (ref 34–46.6)
HGB BLD-MCNC: 13.9 G/DL (ref 11.1–15.9)
MCH RBC QN AUTO: 33.2 PG (ref 26.6–33)
MCHC RBC AUTO-ENTMCNC: 33.5 G/DL (ref 31.5–35.7)
MCV RBC AUTO: 99 FL (ref 79–97)
NRBC BLD AUTO-RTO: ABNORMAL %
PLATELET # BLD AUTO: 390 X10E3/UL (ref 150–379)
POTASSIUM SERPL-SCNC: 5.2 MMOL/L (ref 3.5–5.2)
PROT SERPL-MCNC: 7 G/DL (ref 6–8.5)
RBC # BLD AUTO: 4.19 X10E6/UL (ref 3.77–5.28)
SODIUM SERPL-SCNC: 141 MMOL/L (ref 134–144)
WBC # BLD AUTO: 6.4 X10E3/UL (ref 3.4–10.8)

## 2017-10-16 DIAGNOSIS — I10 ESSENTIAL HYPERTENSION: ICD-10-CM

## 2017-10-16 RX ORDER — LISINOPRIL 20 MG/1
20 TABLET ORAL 2 TIMES DAILY
Qty: 60 TAB | Refills: 0
Start: 2017-10-16 | End: 2018-12-24 | Stop reason: SDUPTHER

## 2017-10-16 RX ORDER — FUROSEMIDE 20 MG/1
20 TABLET ORAL DAILY
Qty: 30 TAB | Refills: 0 | COMMUNITY
Start: 2017-10-16 | End: 2017-12-11 | Stop reason: SDUPTHER

## 2017-10-16 RX ORDER — AMLODIPINE BESYLATE 5 MG/1
5 TABLET ORAL DAILY
Qty: 30 TAB | Refills: 11 | OUTPATIENT
Start: 2017-10-16 | End: 2018-08-07 | Stop reason: SDUPTHER

## 2017-10-16 NOTE — TELEPHONE ENCOUNTER
Pt's dtr calling in to report blood pressures after re-introducing 20mg lasix QD on 10/09. Blood pressure has remained elevated with systolic above 160 for most readings.   BP: 10/10: 167/75   10/11: 11am 188/94 HR 61, 940pm: 190/96  10/12: am 166/82, /87 HR 65, pm 192/96 HR 59  10/13: 179/94 HR 51, 182/81 HR 66.  10/14: 164/89 hr 59, /95 HR 59. 166/92 HR 68  10/15:  155/83 HR 72    Dtr reports pt is not as active or social with family as she normally is and c/o some aches and pain.  Confirmed pt is taking medications per med rec list.     To SC      CAESAR Santacruz R.N.             Kidneys are taking a little hit from all the changes right now. BNP only mildly elevated. How is she doing? BP's better? SC

## 2017-10-16 NOTE — TELEPHONE ENCOUNTER
Amlodipine ordered. SC would like pt to start the amlodipine today if possible. rx called into [facility]. Dtr notified. Pt has taken amlodipine before and it was discontinued 3/2017. Dtr will have her take this afternoon.

## 2017-10-16 NOTE — TELEPHONE ENCOUNTER
Pamela COLBERT would like pt to get an EKG as soon as possible and follow up in clinic. Per daughter, she can bring her in tomorrow at 10am. appt arranged.

## 2017-10-17 ENCOUNTER — OFFICE VISIT (OUTPATIENT)
Dept: CARDIOLOGY | Facility: MEDICAL CENTER | Age: 82
End: 2017-10-17
Payer: MEDICARE

## 2017-10-17 VITALS
BODY MASS INDEX: 34.55 KG/M2 | OXYGEN SATURATION: 94 % | WEIGHT: 195 LBS | HEART RATE: 65 BPM | SYSTOLIC BLOOD PRESSURE: 108 MMHG | DIASTOLIC BLOOD PRESSURE: 70 MMHG | HEIGHT: 63 IN

## 2017-10-17 DIAGNOSIS — I25.10 CORONARY ARTERY DISEASE DUE TO CALCIFIED CORONARY LESION: ICD-10-CM

## 2017-10-17 DIAGNOSIS — E78.00 PURE HYPERCHOLESTEROLEMIA: ICD-10-CM

## 2017-10-17 DIAGNOSIS — Z95.5 STENTED CORONARY ARTERY: ICD-10-CM

## 2017-10-17 DIAGNOSIS — R53.1 WEAKNESS: ICD-10-CM

## 2017-10-17 DIAGNOSIS — I50.32 CHRONIC DIASTOLIC CHF (CONGESTIVE HEART FAILURE) (HCC): ICD-10-CM

## 2017-10-17 DIAGNOSIS — Z95.1 HX OF CABG: ICD-10-CM

## 2017-10-17 DIAGNOSIS — I25.84 CORONARY ARTERY DISEASE DUE TO CALCIFIED CORONARY LESION: ICD-10-CM

## 2017-10-17 DIAGNOSIS — G45.9 TRANSIENT CEREBRAL ISCHEMIA, UNSPECIFIED TYPE: ICD-10-CM

## 2017-10-17 DIAGNOSIS — I65.23 BILATERAL CAROTID ARTERY STENOSIS: ICD-10-CM

## 2017-10-17 DIAGNOSIS — Z95.2 S/P TAVR (TRANSCATHETER AORTIC VALVE REPLACEMENT): ICD-10-CM

## 2017-10-17 DIAGNOSIS — I10 HYPERTENSION, UNSPECIFIED TYPE: ICD-10-CM

## 2017-10-17 PROBLEM — I95.1 ORTHOSTATIC HYPOTENSION: Status: RESOLVED | Noted: 2017-09-27 | Resolved: 2017-10-17

## 2017-10-17 LAB — EKG IMPRESSION: NORMAL

## 2017-10-17 PROCEDURE — 93000 ELECTROCARDIOGRAM COMPLETE: CPT | Performed by: NURSE PRACTITIONER

## 2017-10-17 PROCEDURE — 99214 OFFICE O/P EST MOD 30 MIN: CPT | Performed by: NURSE PRACTITIONER

## 2017-10-17 ASSESSMENT — ENCOUNTER SYMPTOMS
NAUSEA: 1
FALLS: 0
COUGH: 0
SHORTNESS OF BREATH: 1
DIZZINESS: 0
PALPITATIONS: 0
PND: 0
ABDOMINAL PAIN: 1
CLAUDICATION: 0
WEAKNESS: 1
FEVER: 0
DIARRHEA: 1
ORTHOPNEA: 0

## 2017-10-17 NOTE — LETTER
[facility]   [address] [place] [address]  RACHEL Love [address]  Phone: [phone]  Fax: [phone]              Silvia Orellana  6/19/1931    Encounter Date: 10/17/2017    CAESAR Santacruz          PROGRESS NOTE:  Subjective:   Silvia Orellana is a 84 y.o. female who presents today for follow up on elevated BP and remaining weak and tired.    She is a patient of Dr. Aguirre in our office. Hx of CAD with previous CABG, HTN, HLD, carotid artery disease, and falls with multiple orthopedic surgeries requiring wheelchair for mobility at times, and now S/P TAVR for severe AS.    Her shortness of breath is mild but improving with therapies due to de-conditioning.     Her BP has been somewhat elevated since discharge from [facility], we have been slowly re-instituting her primary anti-hypertensive's since she has profound hypotension in the hospital.    She does use a cane and a wheelchair for long distances.     Her edema has resolved but now she is having some diarrhea, nausea, and abdominal pain. She thinks she might be getting a stomach bug or flu.    Her daughter helps her with her medications and ADL's.    She will be starting physical therapy at home first and then is very interested in cardiac rehab in a few months once she gains her strength.    She has had no episodes of chest pain, palpitations, or orthopnea.    Past Medical History:   Diagnosis Date   • Aortic stenosis     moderate on echo in '16   • Arthritis    • Bowel habit changes    • Bradycardia    • Breast cancer (CMS-HCC)    • Breath shortness    • CAD (coronary artery disease)     CABG    • Cancer (CMS-HCC) 2003    left breast lumpectomy   • Cancer (CMS-HCC) 2007    bladder ca   • Cancer (CMS-HCC) 2016    basal cell ca to nose   • Cataract     patria IOL   • Chronic pain    • Dental disorder     dentures   • Diverticulosis    • Fall    • GERD (gastroesophageal reflux disease)    • Heart burn       • Hx of Clostridium difficile infection 2015   • Hypertension    • Indigestion    • Myocardial infarct 2002    cardiac stent   • Pneumonia 2014   • Stroke (CMS-[facility]) 2001; 2012    no deficits, general weakness   • Urinary bladder disorder    • Urinary incontinence      Past Surgical History:   Procedure Laterality Date   • ANGIOGRAM     • BLADDER BIOPSY     • CARDIAC CATH     • CHOLECYSTECTOMY     • FEMUR ORIF Right 8/10/2015    Procedure: FEMUR ORIF;  Surgeon: Umer Guevara M.D.;  Location: SURGERY [facility];  Service:    • GYN SURGERY  1961    hysterectomy   • HIP ARTHROPLASTY TOTAL Left    • LUMPECTOMY      L   • MULTIPLE CORONARY ARTERY BYPASS     • ORBITAL FRACTURE ORIF Left 7/6/2015    Procedure: ORBITAL FRACTURE ORIF;  Surgeon: Fabrice Daniel Jr., M.D.;  Location: SURGERY [facility];  Service:    • ORBITAL FRACTURE ORIF Left 5/23/2016    Procedure: ORBITAL FRACTURE ORIF FOR: LATERAL CANTHOPEXY;  Surgeon: Fabrice Daniel Jr., M.D.;  Location: SURGERY [facility];  Service:    • OTHER CARDIAC SURGERY  2002    CABG   • OTHER CARDIAC SURGERY  2012    cardiac cath with stent placement   • PB REVISE ACETABULAR PART OF TOTAL HIP  2010    L   • PB REVISE TOTAL HIP REPLACEMENT  3/24/2015    R   • TATIANNA  9/25/2017    Procedure: TTE;  Surgeon: Sherif Elder M.D.;  Location: SURGERY [facility];  Service: Cardiac   • TRANSCATHETER AORTIC VALVE REPLACEMENT N/A 9/25/2017    Procedure: TRANSCATHETER AORTIC VALVE REPLACEMENT;  Surgeon: Sherif Elder M.D.;  Location: [facility];  Service: Cardiac     No family history on file.  History   Smoking Status   • Never Smoker   Smokeless Tobacco   • Never Used     No Known Allergies  Outpatient Encounter Prescriptions as of 10/17/2017   Medication Sig Dispense Refill   • lisinopril (PRINIVIL) 20 MG Tab Take 1 Tab by mouth 2 times a day. 60 Tab 0   • furosemide (LASIX) 20 MG Tab Take 1 Tab by mouth every day. 30 Tab 0   • amlodipine  "(NORVASC) 5 MG Tab Take 1 Tab by mouth every day. 30 Tab 11   • metoprolol SR (TOPROL XL) 50 MG TABLET SR 24 HR Take 50 mg by mouth every day.     • Mirabegron ER (MYRBETRIQ) 50 MG TABLET SR 24 HR Take 1 Tab by mouth every day.     • Cyanocobalamin (VITAMIN B-12) 5000 MCG SL Tab Place 1 Tab under tongue every morning.     • ascorbic acid (ASCORBIC ACID) 500 MG Tab Take 500 mg by mouth every morning.     • B Complex Vitamins (VITAMIN B COMPLEX PO) Take 1 Tab by mouth every morning.     • multivitamin (THERAGRAN) Tab Take 1 Tab by mouth every morning.     • atorvastatin (LIPITOR) 20 MG Tab Take 20 mg by mouth every evening.     • calcium citrate (CALCITRATE) 950 MG Tab Take 950 mg by mouth 2 times a day.     • gabapentin (NEURONTIN) 600 MG tablet Take 600 mg by mouth 3 times a day.  5   • citalopram (CELEXA) 20 MG TABS Take 20 mg by mouth every morning.       No facility-administered encounter medications on file as of 10/17/2017.      Review of Systems   Constitutional: Positive for malaise/fatigue. Negative for fever.   Respiratory: Positive for shortness of breath. Negative for cough.         Exertional moderate   Cardiovascular: Negative for chest pain, palpitations, orthopnea, claudication, leg swelling and PND.   Gastrointestinal: Positive for abdominal pain, diarrhea and nausea.   Musculoskeletal: Negative for falls and joint pain.   Neurological: Positive for weakness. Negative for dizziness.        Wheelchair for mobility at times   All other systems reviewed and are negative.       Objective:   /70   Pulse 65   Ht 1.6 m (5' 3\")   Wt 88.5 kg (195 lb)   SpO2 94%   BMI 34.54 kg/m²      Physical Exam   Constitutional: She is oriented to person, place, and time. She appears well-developed. No distress.   Obese   HENT:   Head: Normocephalic and atraumatic.   Eyes: EOM are normal.   Neck: Normal range of motion. No JVD present.   Cardiovascular: Regular rhythm, intact distal pulses and normal pulses.  " Bradycardia present.    Murmur heard.   Systolic murmur is present with a grade of 2/6   Pulses:       Carotid pulses are on the right side with bruit, and on the left side with bruit.  Pulmonary/Chest: Effort normal and breath sounds normal. No respiratory distress.   Abdominal: Soft. Bowel sounds are normal.   Musculoskeletal: She exhibits no edema.   Wheelchair today for mobility   Neurological: She is alert and oriented to person, place, and time.   Skin: Skin is warm and dry.   Psychiatric: She has a normal mood and affect.   Nursing note and vitals reviewed.    Reviewed with patient  Lab Results   Component Value Date/Time    CHOLSTRLTOT 154 04/19/2017 10:00 AM    LDL 79 04/19/2017 10:00 AM    HDL 49 04/19/2017 10:00 AM    TRIGLYCERIDE 128 04/19/2017 10:00 AM       Lab Results   Component Value Date/Time    SODIUM 141 10/13/2017 02:16 PM    SODIUM 137 09/30/2017 03:14 AM    POTASSIUM 5.2 10/13/2017 02:16 PM    POTASSIUM 4.3 09/30/2017 03:14 AM    CHLORIDE 101 10/13/2017 02:16 PM    CHLORIDE 105 09/30/2017 03:14 AM    CO2 24 10/13/2017 02:16 PM    CO2 25 09/30/2017 03:14 AM    GLUCOSE 107 (H) 10/13/2017 02:16 PM    GLUCOSE 122 (H) 09/30/2017 03:14 AM    BUN 15 10/13/2017 02:16 PM    BUN 13 09/30/2017 03:14 AM    CREATININE 1.23 (H) 10/13/2017 02:16 PM    CREATININE 0.90 09/30/2017 03:14 AM    BUNCREATRAT 12 10/13/2017 02:16 PM     Lab Results   Component Value Date/Time    ALKPHOSPHAT 125 (H) 10/13/2017 02:16 PM    ALKPHOSPHAT 72 09/30/2017 03:14 AM    ASTSGOT 25 10/13/2017 02:16 PM    ASTSGOT 17 09/30/2017 03:14 AM    ALTSGPT 14 10/13/2017 02:16 PM    ALTSGPT 9 09/30/2017 03:14 AM    TBILIRUBIN 0.5 10/13/2017 02:16 PM    TBILIRUBIN 0.7 09/30/2017 03:14 AM      3/4/16 MPI IMPRESSIONS  PVCs on baseline ECG.  No evidence of significant jeopardized viable myocardium or prior myocardial   infarction.  Normal left ventricular wall motion. LV ejection fraction = 71%.  123    3/4/16 CAROTID DUPLEX CONCLUSIONS  <50%  bilateral ICA stenoses  Nl subclavians and vertebrals    2017 ECHO CONCLUSIONS  Compared to the report of the prior study done 7/4/2015 -  there has   been no significant change.   Normal left ventricular size and systolic function.  Left ventricular ejection fraction is visually estimated to be 65%.  Grade I diastolic dysfunction.  Moderate aortic stenosis.  Transvalvular gradients are - Peak: 34 mmHg, Mean: 21 mmHg.  Mild aortic insufficiency.    Assessment:     1. Hypertension, unspecified type  EKG   2. CAD     3. Bilateral carotid artery stenosis     4. Chronic diastolic CHF (congestive heart failure) (CMS-HCC)     5. Hx of CABG     6. Pure hypercholesterolemia     7. Postprocedural hematoma of abdominal wall     8. S/P TAVR (transcatheter aortic valve replacement)     9. Stented coronary artery     10. Transient cerebral ischemia, unspecified type     11. Weakness       Medical Decision Making:  Today's Assessment / Status / Plan:     1. CAD, no angina, NGUYEN improving. Continue ASA, plavix, metoprolol, and lipitor. Follow clinically.    2. Weakness, improving with therapies. See PCP for possible GI concern.    3. HTN, good control today. Check BP 2 hours after medications. Call Friday with update. SBP goal 120-140. Continue lisinopril 20 mg BID, metoprolol 50 mg XL in the evening, amlodipine 5 mg QAM, and lasix 20 mg QAM. Discontinue lasix if diarrhea/nausea remains and cut metoprolol in half if SBP <110 or HR <60.    4. HLD, Lipitor. Good control.    5. Carotid artery stenosis, mild < 50%. ASA, plavix, and Lipitor.      6. CKD, stable. Followed by nephro.    7. S/P TAVR, doing well but now having some GI complications-recommend PCP review. EKG today shows no heart block. Okay to cut back on metoprolol to 25 mg XL at night if BP remains <110 or HR <60 consistently. Daughter will call with update on Friday on her condition and discussion with PCP. Did have post-operative hematoma that dropped her Hgb slightly,  it is now back to baseline. Echo soon.    FU in clinic in in 2 weeks with JI with repeat echo; recommend urgent visit with PCP for possible recurrence of c diff or flu/stomach bug    Patient verbalizes understanding and agrees with the plan of care.     Collaborating MD: Jael MARRERO            No Recipients

## 2017-10-17 NOTE — PROGRESS NOTES
Subjective:   Silvia Orellana is a 84 y.o. female who presents today for follow up on elevated BP and remaining weak and tired.    She is a patient of Dr. Aguirre in our office. Hx of CAD with previous CABG, HTN, HLD, carotid artery disease, and falls with multiple orthopedic surgeries requiring wheelchair for mobility at times, and now S/P TAVR for severe AS.    Her shortness of breath is mild but improving with therapies due to de-conditioning.     Her BP has been somewhat elevated since discharge from [facility], we have been slowly re-instituting her primary anti-hypertensive's since she has profound hypotension in the hospital.    She does use a cane and a wheelchair for long distances.     Her edema has resolved but now she is having some diarrhea, nausea, and abdominal pain. She thinks she might be getting a stomach bug or flu.    Her daughter helps her with her medications and ADL's.    She will be starting physical therapy at home first and then is very interested in cardiac rehab in a few months once she gains her strength.    She has had no episodes of chest pain, palpitations, or orthopnea.    Past Medical History:   Diagnosis Date   • Aortic stenosis     moderate on echo in '16   • Arthritis    • Bowel habit changes    • Bradycardia    • Breast cancer (CMS-HCC)    • Breath shortness    • CAD (coronary artery disease)     CABG    • Cancer (CMS-HCC) 2003    left breast lumpectomy   • Cancer (CMS-HCC) 2007    bladder ca   • Cancer (CMS-HCC) 2016    basal cell ca to nose   • Cataract     patria IOL   • Chronic pain    • Dental disorder     dentures   • Diverticulosis    • Fall    • GERD (gastroesophageal reflux disease)    • Heart burn    • Hx of Clostridium difficile infection 2015   • Hypertension    • Indigestion    • Myocardial infarct 2002    cardiac stent   • Pneumonia 2014   • Stroke (CMS-HCC) 2001; 2012    no deficits, general weakness   • Urinary bladder disorder    • Urinary incontinence      Past  Surgical History:   Procedure Laterality Date   • ANGIOGRAM     • BLADDER BIOPSY     • CARDIAC CATH     • CHOLECYSTECTOMY     • FEMUR ORIF Right 8/10/2015    Procedure: FEMUR ORIF;  Surgeon: Umer Guevara M.D.;  Location: SURGERY [facility];  Service:    • GYN SURGERY  1961    hysterectomy   • HIP ARTHROPLASTY TOTAL Left    • LUMPECTOMY      L   • MULTIPLE CORONARY ARTERY BYPASS     • ORBITAL FRACTURE ORIF Left 7/6/2015    Procedure: ORBITAL FRACTURE ORIF;  Surgeon: Fabrice Daniel Jr., M.D.;  Location: SURGERY [facility];  Service:    • ORBITAL FRACTURE ORIF Left 5/23/2016    Procedure: ORBITAL FRACTURE ORIF FOR: LATERAL CANTHOPEXY;  Surgeon: Fabrice Daniel Jr., M.D.;  Location: SURGERY [facility];  Service:    • OTHER CARDIAC SURGERY  2002    CABG   • OTHER CARDIAC SURGERY  2012    cardiac cath with stent placement   • PB REVISE ACETABULAR PART OF TOTAL HIP  2010    L   • PB REVISE TOTAL HIP REPLACEMENT  3/24/2015    R   • TATIANNA  9/25/2017    Procedure: TTE;  Surgeon: Sherif Elder M.D.;  Location: [facility];  Service: Cardiac   • TRANSCATHETER AORTIC VALVE REPLACEMENT N/A 9/25/2017    Procedure: TRANSCATHETER AORTIC VALVE REPLACEMENT;  Surgeon: Sherif Elder M.D.;  Location: [facility];  Service: Cardiac     No family history on file.  History   Smoking Status   • Never Smoker   Smokeless Tobacco   • Never Used     No Known Allergies  Outpatient Encounter Prescriptions as of 10/17/2017   Medication Sig Dispense Refill   • lisinopril (PRINIVIL) 20 MG Tab Take 1 Tab by mouth 2 times a day. 60 Tab 0   • furosemide (LASIX) 20 MG Tab Take 1 Tab by mouth every day. 30 Tab 0   • amlodipine (NORVASC) 5 MG Tab Take 1 Tab by mouth every day. 30 Tab 11   • metoprolol SR (TOPROL XL) 50 MG TABLET SR 24 HR Take 50 mg by mouth every day.     • Mirabegron ER (MYRBETRIQ) 50 MG TABLET SR 24 HR Take 1 Tab by mouth every day.     • Cyanocobalamin (VITAMIN B-12) 5000 MCG SL Tab  "Place 1 Tab under tongue every morning.     • ascorbic acid (ASCORBIC ACID) 500 MG Tab Take 500 mg by mouth every morning.     • B Complex Vitamins (VITAMIN B COMPLEX PO) Take 1 Tab by mouth every morning.     • multivitamin (THERAGRAN) Tab Take 1 Tab by mouth every morning.     • atorvastatin (LIPITOR) 20 MG Tab Take 20 mg by mouth every evening.     • calcium citrate (CALCITRATE) 950 MG Tab Take 950 mg by mouth 2 times a day.     • gabapentin (NEURONTIN) 600 MG tablet Take 600 mg by mouth 3 times a day.  5   • citalopram (CELEXA) 20 MG TABS Take 20 mg by mouth every morning.       No facility-administered encounter medications on file as of 10/17/2017.      Review of Systems   Constitutional: Positive for malaise/fatigue. Negative for fever.   Respiratory: Positive for shortness of breath. Negative for cough.         Exertional moderate   Cardiovascular: Negative for chest pain, palpitations, orthopnea, claudication, leg swelling and PND.   Gastrointestinal: Positive for abdominal pain, diarrhea and nausea.   Musculoskeletal: Negative for falls and joint pain.   Neurological: Positive for weakness. Negative for dizziness.        Wheelchair for mobility at times   All other systems reviewed and are negative.       Objective:   /70   Pulse 65   Ht 1.6 m (5' 3\")   Wt 88.5 kg (195 lb)   SpO2 94%   BMI 34.54 kg/m²     Physical Exam   Constitutional: She is oriented to person, place, and time. She appears well-developed. No distress.   Obese   HENT:   Head: Normocephalic and atraumatic.   Eyes: EOM are normal.   Neck: Normal range of motion. No JVD present.   Cardiovascular: Regular rhythm, intact distal pulses and normal pulses.  Bradycardia present.    Murmur heard.   Systolic murmur is present with a grade of 2/6   Pulses:       Carotid pulses are on the right side with bruit, and on the left side with bruit.  Pulmonary/Chest: Effort normal and breath sounds normal. No respiratory distress.   Abdominal: " Soft. Bowel sounds are normal.   Musculoskeletal: She exhibits no edema.   Wheelchair today for mobility   Neurological: She is alert and oriented to person, place, and time.   Skin: Skin is warm and dry.   Psychiatric: She has a normal mood and affect.   Nursing note and vitals reviewed.    Reviewed with patient  Lab Results   Component Value Date/Time    CHOLSTRLTOT 154 04/19/2017 10:00 AM    LDL 79 04/19/2017 10:00 AM    HDL 49 04/19/2017 10:00 AM    TRIGLYCERIDE 128 04/19/2017 10:00 AM       Lab Results   Component Value Date/Time    SODIUM 141 10/13/2017 02:16 PM    SODIUM 137 09/30/2017 03:14 AM    POTASSIUM 5.2 10/13/2017 02:16 PM    POTASSIUM 4.3 09/30/2017 03:14 AM    CHLORIDE 101 10/13/2017 02:16 PM    CHLORIDE 105 09/30/2017 03:14 AM    CO2 24 10/13/2017 02:16 PM    CO2 25 09/30/2017 03:14 AM    GLUCOSE 107 (H) 10/13/2017 02:16 PM    GLUCOSE 122 (H) 09/30/2017 03:14 AM    BUN 15 10/13/2017 02:16 PM    BUN 13 09/30/2017 03:14 AM    CREATININE 1.23 (H) 10/13/2017 02:16 PM    CREATININE 0.90 09/30/2017 03:14 AM    BUNCREATRAT 12 10/13/2017 02:16 PM     Lab Results   Component Value Date/Time    ALKPHOSPHAT 125 (H) 10/13/2017 02:16 PM    ALKPHOSPHAT 72 09/30/2017 03:14 AM    ASTSGOT 25 10/13/2017 02:16 PM    ASTSGOT 17 09/30/2017 03:14 AM    ALTSGPT 14 10/13/2017 02:16 PM    ALTSGPT 9 09/30/2017 03:14 AM    TBILIRUBIN 0.5 10/13/2017 02:16 PM    TBILIRUBIN 0.7 09/30/2017 03:14 AM      3/4/16 MPI IMPRESSIONS  PVCs on baseline ECG.  No evidence of significant jeopardized viable myocardium or prior myocardial   infarction.  Normal left ventricular wall motion. LV ejection fraction = 71%.  123    3/4/16 CAROTID DUPLEX CONCLUSIONS  <50% bilateral ICA stenoses  Nl subclavians and vertebrals    2017 ECHO CONCLUSIONS  Compared to the report of the prior study done 7/4/2015 -  there has   been no significant change.   Normal left ventricular size and systolic function.  Left ventricular ejection fraction is visually  estimated to be 65%.  Grade I diastolic dysfunction.  Moderate aortic stenosis.  Transvalvular gradients are - Peak: 34 mmHg, Mean: 21 mmHg.  Mild aortic insufficiency.    Assessment:     1. Hypertension, unspecified type  EKG   2. CAD     3. Bilateral carotid artery stenosis     4. Chronic diastolic CHF (congestive heart failure) (CMS-HCC)     5. Hx of CABG     6. Pure hypercholesterolemia     7. Postprocedural hematoma of abdominal wall     8. S/P TAVR (transcatheter aortic valve replacement)     9. Stented coronary artery     10. Transient cerebral ischemia, unspecified type     11. Weakness       Medical Decision Making:  Today's Assessment / Status / Plan:     1. CAD, no angina, NGUYEN improving. Continue ASA, plavix, metoprolol, and lipitor. Follow clinically.    2. Weakness, improving with therapies. See PCP for possible GI concern.    3. HTN, good control today. Check BP 2 hours after medications. Call Friday with update. SBP goal 120-140. Continue lisinopril 20 mg BID, metoprolol 50 mg XL in the evening, amlodipine 5 mg QAM, and lasix 20 mg QAM. Discontinue lasix if diarrhea/nausea remains and cut metoprolol in half if SBP <110 or HR <60.    4. HLD, Lipitor. Good control.    5. Carotid artery stenosis, mild < 50%. ASA, plavix, and Lipitor.      6. CKD, stable. Followed by nephro.    7. S/P TAVR, doing well but now having some GI complications-recommend PCP review. EKG today shows no heart block. Okay to cut back on metoprolol to 25 mg XL at night if BP remains <110 or HR <60 consistently. Daughter will call with update on Friday on her condition and discussion with PCP. Did have post-operative hematoma that dropped her Hgb slightly, it is now back to baseline. Echo soon.    FU in clinic in in 2 weeks with JI with repeat echo; recommend urgent visit with PCP for possible recurrence of c diff or flu/stomach bug    Patient verbalizes understanding and agrees with the plan of care.     Collaborating MD: Jael  MD

## 2017-10-23 ENCOUNTER — TELEPHONE (OUTPATIENT)
Dept: CARDIOLOGY | Facility: MEDICAL CENTER | Age: 82
End: 2017-10-23

## 2017-10-23 NOTE — TELEPHONE ENCOUNTER
Dtr calling in regarding BP readings reported below:    10/17: 9pm 180/90 HR 86  10/18: 12pm 151/83 HR71; 9pm: 194/87 HR 90  10/19: 10am 179/87 HR 64 9:30pm: 174/97 HR 84  10/21: 11am 176/94 HR 83 10pm: 186/92 HR 68  10/22: 1pm 151/84 HR 99   4pm 164/84 HR 96  Dtr explains pt hasn't been feeling well, with vague systems but mostly GI related and fatigue.     At pts follow up appt on the 17th at 1040am pts BP was 108/70 HR 65 which is substantially lower than the readings she is getting at home. Dtr agrees that a home blood pressure cuff/HR accuracy check would be a good idea. Pt scheduled to come in tomorrow at 10:15. Will check accuracy of home readings prior to sending to [facility].          ---------------------------------------------------------------------  ----- Message from Doris Freeman sent at 10/23/2017 10:54 AM PDT -----  Regarding: daughter calling with patient's blood pressure readings  [place]    Patient's daughter Delilah is calling with her mother's blood pressure readings. She can be reached at [phone].

## 2017-10-24 ENCOUNTER — APPOINTMENT (OUTPATIENT)
Dept: CARDIOLOGY | Facility: MEDICAL CENTER | Age: 82
End: 2017-10-24
Payer: MEDICARE

## 2017-10-24 NOTE — TELEPHONE ENCOUNTER
Called Dtr to check as appt was cancelled for home blood pressure cuff/HR accuracy check, Dtr unavailable left message to call back

## 2017-10-26 NOTE — TELEPHONE ENCOUNTER
Attempted to call dtr again to follow up and another family member answered. He said that the pt and the dtr Delilah were out and would not be back until later this evening. Explained that the call was to check in/ follow-up as discussed and to please have Delilah call the office back regarding her mom Silvia. He agreed to notify her.

## 2017-10-30 ENCOUNTER — HOSPITAL ENCOUNTER (OUTPATIENT)
Dept: CARDIOLOGY | Facility: MEDICAL CENTER | Age: 82
End: 2017-10-30
Attending: INTERNAL MEDICINE
Payer: MEDICARE

## 2017-10-30 DIAGNOSIS — Z95.2 S/P TAVR (TRANSCATHETER AORTIC VALVE REPLACEMENT): ICD-10-CM

## 2017-10-30 LAB
LV EJECT FRACT  99904: 70
LV EJECT FRACT MOD 2C 99903: 53
LV EJECT FRACT MOD 4C 99902: 67.95
LV EJECT FRACT MOD BP 99901: 61.37

## 2017-10-30 PROCEDURE — 93306 TTE W/DOPPLER COMPLETE: CPT

## 2017-10-30 PROCEDURE — 93306 TTE W/DOPPLER COMPLETE: CPT | Mod: 26 | Performed by: INTERNAL MEDICINE

## 2017-10-31 NOTE — TELEPHONE ENCOUNTER
Dtr called back explaining that she did come in for the blood pressure check on 10/24 but did not know she needed to bring her mom. She thought the cuff could be calibrated without the pt. Cuff was calibrated to her and is under the dtrs chart, [employer]. With calibration, she found that pts wrist cuff is reading about 10 points higher than a danyel upper arm reading. She gave the most recent readings for her mom which were all done with the wrist cuff:    10/25: 11:30am 162/81; HR 78 10pm: 172/90 HR 76  10/27: 848am: 180/89 HR 67; 10pm: 185/93 HR 63  10/28: 11:30am 156/81 HR 67; 10pm: 152/83 HR 65    She is scheduled to see BRENNA 11/02. She will continue to monitor for next 2 days then discuss with BRENNA

## 2017-11-02 ENCOUNTER — OFFICE VISIT (OUTPATIENT)
Dept: CARDIOLOGY | Facility: MEDICAL CENTER | Age: 82
End: 2017-11-02
Payer: MEDICARE

## 2017-11-02 ENCOUNTER — TELEPHONE (OUTPATIENT)
Dept: VASCULAR LAB | Facility: MEDICAL CENTER | Age: 82
End: 2017-11-02

## 2017-11-02 VITALS
WEIGHT: 191 LBS | DIASTOLIC BLOOD PRESSURE: 70 MMHG | HEIGHT: 63 IN | SYSTOLIC BLOOD PRESSURE: 138 MMHG | OXYGEN SATURATION: 95 % | BODY MASS INDEX: 33.84 KG/M2 | HEART RATE: 56 BPM

## 2017-11-02 DIAGNOSIS — Z95.5 STENTED CORONARY ARTERY: ICD-10-CM

## 2017-11-02 DIAGNOSIS — I50.32 CHRONIC DIASTOLIC CHF (CONGESTIVE HEART FAILURE) (HCC): ICD-10-CM

## 2017-11-02 DIAGNOSIS — Z95.2 S/P TAVR (TRANSCATHETER AORTIC VALVE REPLACEMENT): ICD-10-CM

## 2017-11-02 DIAGNOSIS — I65.23 BILATERAL CAROTID ARTERY STENOSIS: ICD-10-CM

## 2017-11-02 DIAGNOSIS — G45.8 OTHER SPECIFIED TRANSIENT CEREBRAL ISCHEMIAS: ICD-10-CM

## 2017-11-02 DIAGNOSIS — Z95.1 HX OF CABG: ICD-10-CM

## 2017-11-02 DIAGNOSIS — I25.84 CORONARY ARTERY DISEASE DUE TO CALCIFIED CORONARY LESION: ICD-10-CM

## 2017-11-02 DIAGNOSIS — I25.10 CORONARY ARTERY DISEASE DUE TO CALCIFIED CORONARY LESION: ICD-10-CM

## 2017-11-02 PROCEDURE — 99214 OFFICE O/P EST MOD 30 MIN: CPT | Performed by: INTERNAL MEDICINE

## 2017-11-02 ASSESSMENT — ENCOUNTER SYMPTOMS
SHORTNESS OF BREATH: 0
WEAKNESS: 1
ABDOMINAL PAIN: 0
PND: 0
CHILLS: 0
INSOMNIA: 0
FEVER: 0
LOSS OF CONSCIOUSNESS: 0
PALPITATIONS: 0
BLURRED VISION: 0
ORTHOPNEA: 0
DIZZINESS: 0
MYALGIAS: 0

## 2017-11-02 NOTE — LETTER
[facility]   [address], [address]  [place] [address]  Phone: [phone]  Fax: [phone]              Silvia Orellana  6/19/1931    Encounter Date: 11/2/2017    Sherif Elder M.D.          PROGRESS NOTE:  Subjective:   Silvia Orellana is a 86 y.o. female who presents today for hospital follow up.    Since the discharge from [facility] on (10/13/17) status post TAVR, she has been doing well. She denies fatigue, shortness of breath, dyspnea on exertion, chest pain, dizziness or syncope.    Past Medical History:   Diagnosis Date   • Aortic stenosis     moderate on echo in '16   • Arthritis    • Bowel habit changes    • Bradycardia    • Breast cancer (CMS-HCC)    • Breath shortness    • CAD (coronary artery disease)     CABG    • Cancer (CMS-[facility]) 2003    left breast lumpectomy   • Cancer (CMS-[facility]) 2007    bladder ca   • Cancer (CMS-[facility]) 2016    basal cell ca to nose   • Cataract     patria IOL   • Chronic pain    • Dental disorder     dentures   • Diverticulosis    • Fall    • GERD (gastroesophageal reflux disease)    • Heart burn    • Hx of Clostridium difficile infection 2015   • Hypertension    • Indigestion    • Myocardial infarct 2002    cardiac stent   • Pneumonia 2014   • Stroke (CMS-[facility]) 2001; 2012    no deficits, general weakness   • Urinary bladder disorder    • Urinary incontinence      Past Surgical History:   Procedure Laterality Date   • TRANSCATHETER AORTIC VALVE REPLACEMENT N/A 9/25/2017    Procedure: TRANSCATHETER AORTIC VALVE REPLACEMENT;  Surgeon: Sherif Elder M.D.;  Location: SURGERY [facility];  Service: Cardiac   • TATIANNA  9/25/2017    Procedure: TTE;  Surgeon: Sherif Elder M.D.;  Location: SURGERY [facility];  Service: Cardiac   • ORBITAL FRACTURE ORIF Left 5/23/2016    Procedure: ORBITAL FRACTURE ORIF FOR: LATERAL CANTHOPEXY;  Surgeon: Fabrice Daniel Jr., M.D.;  Location: [facility];  Service:    •  FEMUR ORIF Right 8/10/2015    Procedure: FEMUR ORIF;  Surgeon: Umer Guevara M.D.;  Location: SURGERY [facility];  Service:    • ORBITAL FRACTURE ORIF Left 7/6/2015    Procedure: ORBITAL FRACTURE ORIF;  Surgeon: Fabrice Daniel Jr., M.D.;  Location: SURGERY [facility];  Service:    • PB REVISE TOTAL HIP REPLACEMENT  3/24/2015    R   • OTHER CARDIAC SURGERY  2012    cardiac cath with stent placement   • PB REVISE ACETABULAR PART OF TOTAL HIP  2010    L   • OTHER CARDIAC SURGERY  2002    CABG   • GYN SURGERY  1961    hysterectomy   • ANGIOGRAM     • BLADDER BIOPSY     • CARDIAC CATH     • CHOLECYSTECTOMY     • HIP ARTHROPLASTY TOTAL Left    • LUMPECTOMY      L   • MULTIPLE CORONARY ARTERY BYPASS       History reviewed. No pertinent family history.  History   Smoking Status   • Never Smoker   Smokeless Tobacco   • Never Used     No Known Allergies     Medications reviewed.    Outpatient Encounter Prescriptions as of 11/2/2017   Medication Sig Dispense Refill   • lisinopril (PRINIVIL) 20 MG Tab Take 1 Tab by mouth 2 times a day. 60 Tab 0   • furosemide (LASIX) 20 MG Tab Take 1 Tab by mouth every day. 30 Tab 0   • amlodipine (NORVASC) 5 MG Tab Take 1 Tab by mouth every day. 30 Tab 11   • metoprolol SR (TOPROL XL) 50 MG TABLET SR 24 HR Take 50 mg by mouth every day.     • Mirabegron ER (MYRBETRIQ) 50 MG TABLET SR 24 HR Take 1 Tab by mouth every day.     • Cyanocobalamin (VITAMIN B-12) 5000 MCG SL Tab Place 1 Tab under tongue every morning.     • ascorbic acid (ASCORBIC ACID) 500 MG Tab Take 500 mg by mouth every morning.     • B Complex Vitamins (VITAMIN B COMPLEX PO) Take 1 Tab by mouth every morning.     • multivitamin (THERAGRAN) Tab Take 1 Tab by mouth every morning.     • atorvastatin (LIPITOR) 20 MG Tab Take 20 mg by mouth every evening.     • calcium citrate (CALCITRATE) 950 MG Tab Take 950 mg by mouth 2 times a day.     • gabapentin (NEURONTIN) 600 MG tablet Take 600 mg by mouth 3 times a day.  5  "  • citalopram (CELEXA) 20 MG TABS Take 20 mg by mouth every morning.       No facility-administered encounter medications on file as of 11/2/2017.      Review of Systems   Constitutional: Negative for chills and fever.   HENT: Negative for congestion.    Eyes: Negative for blurred vision.   Respiratory: Negative for shortness of breath.    Cardiovascular: Negative for chest pain, palpitations, orthopnea, leg swelling and PND.   Gastrointestinal: Negative for abdominal pain.   Genitourinary: Negative for dysuria.   Musculoskeletal: Negative for joint pain and myalgias.   Skin: Negative for rash.   Neurological: Negative for dizziness and loss of consciousness.   Psychiatric/Behavioral: The patient does not have insomnia.         Objective:   /70   Pulse (!) 56   Ht 1.6 m (5' 3\")   Wt 86.6 kg (191 lb)   SpO2 95%   BMI 33.83 kg/m²      Physical Exam   Constitutional: She is oriented to person, place, and time. She appears well-developed and well-nourished.   HENT:   Head: Normocephalic and atraumatic.   Eyes: Conjunctivae are normal. Pupils are equal, round, and reactive to light.   Neck: Normal range of motion. Neck supple.   Cardiovascular: Normal rate and regular rhythm.    Pulmonary/Chest: Effort normal and breath sounds normal.   Abdominal: Soft. Bowel sounds are normal.   Musculoskeletal: Normal range of motion. She exhibits no edema.   Neurological: She is alert and oriented to person, place, and time.   Skin: Skin is warm and dry.   Psychiatric: She has a normal mood and affect.     CARDIAC STUDIES/PROCEDURES:     CARDIAC CATHETERIZATION CONCLUSIONS by Dr. Aguirre (08/30/17)  1. Patent LIMA to LAD  2. Patent KINDRA to PDA  3. Patent vein graft to OM, mild/moderate in-stent restenosis of the ostial stent  4. Mildly tortuous abdominal descending aorta     CAROTID ULTRASOUND (03/04/16)  <50% bilateral ICA stenoses   Nl subclavians and vertebrals    CT OF ABDOMEN (09/28/17)  1.  Mild left " hydronephrosis and hydroureter with definite distal obstructive etiology not identified.  2.  Apparent rectal wall thickening with perirectal stranding which could indicate proctitis.  3.  Trace bilateral pleural effusions with associated compressive atelectasis and/or consolidation. Underlying infection is possible.  4.  Status post cholecystectomy.  5.  Atrophic right kidney.  6.  Right renal cysts.  7.  Diverticulosis without evidence of diverticulitis.  8.  Atherosclerosis.    CT OF ABDOMEN (09/25/17)  1.  Small LEFT low anterior abdominal wall hematoma. Ongoing bleeding is not excluded.  2.  Gallbladder decompressed or absent  3.  Duodenal diverticulum  4.  Atrophic RIGHT kidney  5.  Atherosclerosis  6.  Prior hysterectomy  7.  Distal colonic diverticulosis  8.  Small hiatal hernia     CT OF CHEST AND ABDOMEN (09/06/17)  1.  Aortic annulus area of 412 sq mm.  2.  Coronary artery ostia are greater than 10 mm from the annulus.  3.  Moderate tortuosity and atherosclerotic calcification of the iliofemoral arterial system, worse on the RIGHT, with minimum diameters of 5.1 mm on the RIGHT and 6.9 mm on the LEFT.  4.  Markedly atrophic RIGHT kidney with apparent moderate to severe proximal RIGHT renal artery stenosis, likely chronic.     DOBUTAMINE STRESS ECHOCARDIOGRAM (08/07/17)  Resting Echo: LVEF 65% with AoV Vmax  of 2.52 m/s; peak gradient 25.5 mmHg and mean 16.11 mmHg  Peak Dobutamine: LVEF 80%, Vmax 4.02 m/s; Peak gradient 64.75 mmHg and mean 36.42 mmHg  No wall motion abnormality    ECHOCARDIOGRAM CONCLUSIONS (10/30/17)  Mild concentric left ventricular hypertrophy.  Normal left ventricular systolic function. Left ventricular ejection   fraction is visually estimated to be 70%.  Grade I diastolic dysfunction.  Normal inferior vena cava size and inspiratory collapse.  Mild mitral stenosis with a mean gradient of 3mmHg at a HR of 63 bpm.  Known TAVR aortic valve. Transvalvular gradients are - Peak: 47 mmHg,      Mean: 28  mmHg.   Estimated right ventricular systolic pressure is  20 mmHg.  Compared to the report of the study done 09/26/2017 there has been an   increase in the mean gradient across the prosthetic aortic valve.     ECHOCARDIOGRAM CONCLUSIONS (09/26/17)  Hyperdynamic left ventricular systolic function. Left ventricular   ejection fraction is visually estimated to be greater than 75%.  Evidence of increased intracavity gradient, peak velocity is 3.35 m/sec.   Normal regional wall motion. Normal diastolic function.  Moderate mitral stenosis. Mean transvalvular gradient is 5.5 mmHg at a   heart rate of 92 BPM.   Normal functioning bioprosthetic aortic valve with a peak gradient of   22 mmHg and mean gradient of 12 mmHg.  Echo images from 9/25/17 showed normal LV function and normal   functioning bioprosthetic aortic with a peak gradient of 22 mmHg and   mean gradient of 13 mmHg.     ECHOCARDIOGRAM CONCLUSIONS by Dr. Acosta (09/25/17)  Intraoperative TTE during TAVR.  Baseline images show normal   biventricular systolic function with EF - 65%.  Severe aortic stenosis.    Post-CPB images show preserved biventricular function.  A 23 mm Oliveira   Leeann 3 valve is seen in the aortic position with normal leaflet   motion, no paravalvular leak, mean gradient of 5 mm Hg, and calculated   effective orifice area of 3.5 cm2.  Findings communicated at the time   of exam.     ECHOCARDIOGRAM CONCLUSIONS (04/19/17)  Structurally normal tricuspid valve. Mild tricuspid regurgitation.   Estimated right ventricular systolic pressure  is 40 mmHg.  Normal left ventricular systolic function.   Estimated right ventricular systolic pressure  is 40 mmHg.  Compared to the images of the study done - there has been increase in   mean gradient across the aortic valve but still within moderate   severity of aortic stenosis.     EKG performed on (10/17/17) was reviewed: EKG shows sinus bradycardia.  EKG performed on (09/27/17) EKG shows sinus  tachycardia.  EKG performed on (09/26/17) EKG shows sinus tachycardia.  EKG performed on (09/25/17) EKG shows sinus tachycardia.  EKG performed on (09/21/16) EKG shows sinus bradycardia.  EKG performed on (02/10/16) EKG shows sinus bradycardia.    Laboratory results of (10/13/17) were reviewed. Bun of 15 mg/dl, creatinine levels of 1.23 mg/dl noted.     PULMONARY FUNCTION INTERPRETATION (09/13/17)  REASON FOR STUDY:  Shortness of breath, dyspnea on exertion and a history of   severe aortic stenosis.  SPIROMETRY:  FVC is 1.83, 79% predicted.  FEV1 is 1.44, 83% predicted.    FEV1/FVC is 79.  There is a significant response to bronchodilator.  LUNG VOLUMES:  Vital capacity is 97% predicted.  Total lung capacity is 106%   predicted.  Diffusion studies are normal.  Flow volume loops show some mild   coving in the expiratory limb.  IMPRESSION:  Normal exam    Assessment:     1. S/P TAVR (transcatheter aortic valve replacement)     2. Chronic diastolic CHF (congestive heart failure) (CMS-HCC)     3. CAD     4. Hx of CABG     5. Stented coronary artery     6. Bilateral carotid artery stenosis     7. Other specified transient cerebral ischemias         Medical Decision Making:  Today's Assessment / Status / Plan:     1. Successful transcatheter aortic valve replacement (TAVR) with # 23 Oliveira Leeann S3 valve, transfemoral approach, under conscious sedation (09/25/17): She is still weak and her hemoglobin and hematocrit levels went down. We will repeat CBC. We will continue to follow clinically.  2. Coronary artery disease with prior 3 vessel coronary bypass graft (LIMA to LAD, KINDRA to PDA, vein graft to OM) in [facility], 2002, ostium of the vein graft  stent placement also in 2012: She is clinically doing well from coronary standpoint. We will continue with current medical care.  3. Carotid artery stenosis: Clinically stable on medical therapy.  4. History of trans-ischemic attack (2012): She remains clinically stable  without any new neurological symptoms.  5. Chronic kidney disease: We will continue to follow labs.  6. Small abdominal wall hematoma: Stable     CC Pamela Deal, James Lockhart and Joshua Acosta           No Recipients

## 2017-11-02 NOTE — PROGRESS NOTES
Subjective:   Silvia Orellana is a 86 y.o. female who presents today for hospital follow up.    Since the discharge from [facility] on (10/13/17) status post TAVR, she has been doing well. She admits to mild fatigue. She denies fatigue, shortness of breath, dyspnea on exertion, chest pain, dizziness or syncope.    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   • Cataract     patria IOL   • Chronic pain    • Dental disorder     dentures   • Diverticulosis    • Fall    • GERD (gastroesophageal reflux disease)    • Heart burn    • Hx of Clostridium difficile infection 2015   • Hypertension    • Indigestion    • Myocardial infarct 2002    cardiac stent   • Pneumonia 2014   • Stroke (CMS-HCC) 2001; 2012    no deficits, general weakness   • Urinary bladder disorder    • Urinary incontinence      Past Surgical History:   Procedure Laterality Date   • TRANSCATHETER AORTIC VALVE REPLACEMENT N/A 9/25/2017    Procedure: TRANSCATHETER AORTIC VALVE REPLACEMENT;  Surgeon: Sherif Elder M.D.;  Location: SURGERY [facility];  Service: Cardiac   • TATIANNA  9/25/2017    Procedure: TTE;  Surgeon: Sherif Elder M.D.;  Location: SURGERY [facility];  Service: Cardiac   • ORBITAL FRACTURE ORIF Left 5/23/2016    Procedure: ORBITAL FRACTURE ORIF FOR: LATERAL CANTHOPEXY;  Surgeon: Fabrice Daniel Jr., M.D.;  Location: SURGERY [facility];  Service:    • FEMUR ORIF Right 8/10/2015    Procedure: FEMUR ORIF;  Surgeon: Umer Guevara M.D.;  Location: SURGERY [facility];  Service:    • ORBITAL FRACTURE ORIF Left 7/6/2015    Procedure: ORBITAL FRACTURE ORIF;  Surgeon: Fabrice Flynn  Fredy Conklin M.D.;  Location: SURGERY [facility];  Service:    • PB REVISE TOTAL HIP REPLACEMENT  3/24/2015    R   • OTHER CARDIAC SURGERY  2012    cardiac cath with stent placement   • PB REVISE ACETABULAR PART OF TOTAL HIP  2010    L   • OTHER CARDIAC SURGERY  2002    CABG   • GYN SURGERY  1961    hysterectomy   • ANGIOGRAM     • BLADDER BIOPSY     • CARDIAC CATH     • CHOLECYSTECTOMY     • HIP ARTHROPLASTY TOTAL Left    • LUMPECTOMY      L   • MULTIPLE CORONARY ARTERY BYPASS       History reviewed. No pertinent family history.  History   Smoking Status   • Never Smoker   Smokeless Tobacco   • Never Used     No Known Allergies     Medications reviewed.    Outpatient Encounter Prescriptions as of 11/2/2017   Medication Sig Dispense Refill   • lisinopril (PRINIVIL) 20 MG Tab Take 1 Tab by mouth 2 times a day. 60 Tab 0   • furosemide (LASIX) 20 MG Tab Take 1 Tab by mouth every day. 30 Tab 0   • amlodipine (NORVASC) 5 MG Tab Take 1 Tab by mouth every day. 30 Tab 11   • metoprolol SR (TOPROL XL) 50 MG TABLET SR 24 HR Take 50 mg by mouth every day.     • Mirabegron ER (MYRBETRIQ) 50 MG TABLET SR 24 HR Take 1 Tab by mouth every day.     • Cyanocobalamin (VITAMIN B-12) 5000 MCG SL Tab Place 1 Tab under tongue every morning.     • ascorbic acid (ASCORBIC ACID) 500 MG Tab Take 500 mg by mouth every morning.     • B Complex Vitamins (VITAMIN B COMPLEX PO) Take 1 Tab by mouth every morning.     • multivitamin (THERAGRAN) Tab Take 1 Tab by mouth every morning.     • atorvastatin (LIPITOR) 20 MG Tab Take 20 mg by mouth every evening.     • calcium citrate (CALCITRATE) 950 MG Tab Take 950 mg by mouth 2 times a day.     • gabapentin (NEURONTIN) 600 MG tablet Take 600 mg by mouth 3 times a day.  5   • citalopram (CELEXA) 20 MG TABS Take 20 mg by mouth every morning.       No facility-administered encounter medications on file as of 11/2/2017.      Review of Systems   Constitutional: Positive for malaise/fatigue. Negative for  "chills and fever.   HENT: Negative for congestion.    Eyes: Negative for blurred vision.   Respiratory: Negative for shortness of breath.    Cardiovascular: Negative for chest pain, palpitations, orthopnea, leg swelling and PND.   Gastrointestinal: Negative for abdominal pain.   Genitourinary: Negative for dysuria.   Musculoskeletal: Negative for joint pain and myalgias.   Skin: Negative for rash.   Neurological: Positive for weakness. Negative for dizziness and loss of consciousness.   Psychiatric/Behavioral: The patient does not have insomnia.         Objective:   /70   Pulse (!) 56   Ht 1.6 m (5' 3\")   Wt 86.6 kg (191 lb)   SpO2 95%   BMI 33.83 kg/m²     Physical Exam   Constitutional: She is oriented to person, place, and time. She appears well-developed and well-nourished.   Using wheelchair.   HENT:   Head: Normocephalic and atraumatic.   Eyes: Conjunctivae are normal. Pupils are equal, round, and reactive to light.   Neck: Normal range of motion. Neck supple.   Cardiovascular: Normal rate and regular rhythm.    Pulmonary/Chest: Effort normal and breath sounds normal.   Abdominal: Soft. Bowel sounds are normal.   Musculoskeletal: Normal range of motion. She exhibits no edema.   Neurological: She is alert and oriented to person, place, and time.   Skin: Skin is warm and dry.   Psychiatric: She has a normal mood and affect.     CARDIAC STUDIES/PROCEDURES:     CARDIAC CATHETERIZATION CONCLUSIONS by Dr. Aguirre (08/30/17)  1. Patent LIMA to LAD  2. Patent KINDRA to PDA  3. Patent vein graft to OM, mild/moderate in-stent restenosis of the ostial stent  4. Mildly tortuous abdominal descending aorta     CAROTID ULTRASOUND (03/04/16)  <50% bilateral ICA stenoses   Nl subclavians and vertebrals    CT OF ABDOMEN (09/28/17)  1.  Mild left hydronephrosis and hydroureter with definite distal obstructive etiology not identified.  2.  Apparent rectal wall thickening with perirectal stranding which could indicate " proctitis.  3.  Trace bilateral pleural effusions with associated compressive atelectasis and/or consolidation. Underlying infection is possible.  4.  Status post cholecystectomy.  5.  Atrophic right kidney.  6.  Right renal cysts.  7.  Diverticulosis without evidence of diverticulitis.  8.  Atherosclerosis.    CT OF ABDOMEN (09/25/17)  1.  Small LEFT low anterior abdominal wall hematoma. Ongoing bleeding is not excluded.  2.  Gallbladder decompressed or absent  3.  Duodenal diverticulum  4.  Atrophic RIGHT kidney  5.  Atherosclerosis  6.  Prior hysterectomy  7.  Distal colonic diverticulosis  8.  Small hiatal hernia     CT OF CHEST AND ABDOMEN (09/06/17)  1.  Aortic annulus area of 412 sq mm.  2.  Coronary artery ostia are greater than 10 mm from the annulus.  3.  Moderate tortuosity and atherosclerotic calcification of the iliofemoral arterial system, worse on the RIGHT, with minimum diameters of 5.1 mm on the RIGHT and 6.9 mm on the LEFT.  4.  Markedly atrophic RIGHT kidney with apparent moderate to severe proximal RIGHT renal artery stenosis, likely chronic.     DOBUTAMINE STRESS ECHOCARDIOGRAM (08/07/17)  Resting Echo: LVEF 65% with AoV Vmax  of 2.52 m/s; peak gradient 25.5 mmHg and mean 16.11 mmHg  Peak Dobutamine: LVEF 80%, Vmax 4.02 m/s; Peak gradient 64.75 mmHg and mean 36.42 mmHg  No wall motion abnormality    ECHOCARDIOGRAM CONCLUSIONS (10/30/17)  Mild concentric left ventricular hypertrophy.  Normal left ventricular systolic function. Left ventricular ejection   fraction is visually estimated to be 70%.  Grade I diastolic dysfunction.  Normal inferior vena cava size and inspiratory collapse.  Mild mitral stenosis with a mean gradient of 3mmHg at a HR of 63 bpm.  Known TAVR aortic valve. Transvalvular gradients are - Peak: 47 mmHg,   Mean: 28  mmHg.   Estimated right ventricular systolic pressure is 20 mmHg.  Compared to the report of the study done 09/26/2017 there has been an   increase in the mean  gradient across the prosthetic aortic valve.     ECHOCARDIOGRAM CONCLUSIONS (09/26/17)  Hyperdynamic left ventricular systolic function. Left ventricular   ejection fraction is visually estimated to be greater than 75%.  Evidence of increased intracavity gradient, peak velocity is 3.35 m/sec.   Normal regional wall motion. Normal diastolic function.  Moderate mitral stenosis. Mean transvalvular gradient is 5.5 mmHg at a   heart rate of 92 BPM.   Normal functioning bioprosthetic aortic valve with a peak gradient of   22 mmHg and mean gradient of 12 mmHg.  Echo images from 9/25/17 showed normal LV function and normal   functioning bioprosthetic aortic with a peak gradient of 22 mmHg and   mean gradient of 13 mmHg.     ECHOCARDIOGRAM CONCLUSIONS by Dr. Acosta (09/25/17)  Intraoperative TTE during TAVR.  Baseline images show normal   biventricular systolic function with EF - 65%.  Severe aortic stenosis.    Post-CPB images show preserved biventricular function.  A 23 mm Oliveira   Leeann 3 valve is seen in the aortic position with normal leaflet   motion, no paravalvular leak, mean gradient of 5 mm Hg, and calculated   effective orifice area of 3.5 cm2.  Findings communicated at the time   of exam.     ECHOCARDIOGRAM CONCLUSIONS (04/19/17)  Structurally normal tricuspid valve. Mild tricuspid regurgitation.   Estimated right ventricular systolic pressure  is 40 mmHg.  Normal left ventricular systolic function.   Estimated right ventricular systolic pressure  is 40 mmHg.  Compared to the images of the study done - there has been increase in   mean gradient across the aortic valve but still within moderate   severity of aortic stenosis.     EKG performed on (10/17/17) was reviewed: EKG shows sinus bradycardia.  EKG performed on (09/27/17) EKG shows sinus tachycardia.  EKG performed on (09/26/17) EKG shows sinus tachycardia.  EKG performed on (09/25/17) EKG shows sinus tachycardia.  EKG performed on (09/21/16) EKG shows sinus  bradycardia.  EKG performed on (02/10/16) EKG shows sinus bradycardia.    Laboratory results of (10/13/17) were reviewed. Bun of 15 mg/dl, creatinine levels of 1.23 mg/dl noted.     PULMONARY FUNCTION INTERPRETATION (09/13/17)  REASON FOR STUDY:  Shortness of breath, dyspnea on exertion and a history of   severe aortic stenosis.  SPIROMETRY:  FVC is 1.83, 79% predicted.  FEV1 is 1.44, 83% predicted.    FEV1/FVC is 79.  There is a significant response to bronchodilator.  LUNG VOLUMES:  Vital capacity is 97% predicted.  Total lung capacity is 106%   predicted.  Diffusion studies are normal.  Flow volume loops show some mild   coving in the expiratory limb.  IMPRESSION:  Normal exam    Assessment:     1. S/P TAVR (transcatheter aortic valve replacement)     2. Chronic diastolic CHF (congestive heart failure) (CMS-HCC)     3. CAD     4. Hx of CABG     5. Stented coronary artery     6. Bilateral carotid artery stenosis     7. Other specified transient cerebral ischemias       Medical Decision Making:  Today's Assessment / Status / Plan:     1. Successful transcatheter aortic valve replacement (TAVR) with # 23 Oliveira Leeann S3 valve, transfemoral approach, under conscious sedation (09/25/17): She is clinically doing well, NYHA class I. Her echocardiogram showed increased gradient across the TAVR valve. We will start warfarin and aspirin 81 mg and repeat an echocardiogram in 3 months.  2. Coronary artery disease with prior 3 vessel coronary bypass graft (LIMA to LAD, KINDRA to PDA, vein graft to OM) in [facility], 2002, ostium of the vein graft  stent placement also in 2012: She is clinically doing well from coronary standpoint. We will continue with current medical care.  3. Carotid artery stenosis: Clinically stable on medical therapy.  4. History of trans-ischemic attack (2012): She remains clinically stable without any new neurological symptoms.  5. Chronic kidney disease: We will continue to follow labs.  6. Small  abdominal wall hematoma: Stable    We will follow up in three months with echocardiogram.     CC Pamela Deal, James Lockhart and Joshua Acosta

## 2017-11-13 ENCOUNTER — ANTICOAGULATION VISIT (OUTPATIENT)
Dept: VASCULAR LAB | Facility: MEDICAL CENTER | Age: 82
End: 2017-11-13
Attending: INTERNAL MEDICINE
Payer: MEDICARE

## 2017-11-13 DIAGNOSIS — Z95.2 S/P TAVR (TRANSCATHETER AORTIC VALVE REPLACEMENT): ICD-10-CM

## 2017-11-13 PROCEDURE — 99213 OFFICE O/P EST LOW 20 MIN: CPT | Performed by: NURSE PRACTITIONER

## 2017-11-13 PROCEDURE — 99203 OFFICE O/P NEW LOW 30 MIN: CPT | Performed by: NURSE PRACTITIONER

## 2017-11-13 RX ORDER — WARFARIN SODIUM 2.5 MG/1
TABLET ORAL
Qty: 30 TAB | Refills: 2 | Status: SHIPPED | OUTPATIENT
Start: 2017-11-13 | End: 2017-12-01

## 2017-11-13 NOTE — PROGRESS NOTES
Anticoagulation Summary  As of 11/13/2017    INR goal:   2.0-3.0   TTR:   --   Today's INR:   No new INR was available at the time of this encounter.   Maintenance plan:   2.5 mg (2.5 mg x 1) every day   Weekly total:   17.5 mg   Plan last modified:   RAMON Arreola (11/13/2017)   Next INR check:   11/16/2017   Target end date:       Indications    S/P TAVR (transcatheter aortic valve replacement) [Z95.2]             Anticoagulation Episode Summary     INR check location:       Preferred lab:       Send INR reminders to:       Comments:         Anticoagulation Care Providers     Provider Role Specialty Phone number    Sherif Elder M.D. Referring Cardiology [phone]    [facility] Responsible  [phone]        Anticoagulation Patient Findings      HPI:  Silvia Orellana seen in clinic today for follow up on anticoagulation therapy in the presence of TAVR. She is new to our clinic. S/p TAVR with Dr. Elder 10/13/17. She is to start warfarin and continue baby aspirin. Has not started warfarin yet and does not have a prescription. She remembers taking warfarin for a couple of months in 2002 after bypass surgery in [place], [place]. Hx of TIA in 2012. Per Dr. Elder, patient will take warfarin for 3 months and reassess with ECHO in Feb. Reviewed her medications.    Education given to patient regarding instructions for taking warfarin, food/drug interactions, drug/drug interactions and signs/symptoms of bleeding or thrombosis. Discussed anticoagulation in detail including dosing, INR levels and safety. Discussed avoidance of HRTs. She does not smoke.    A/P:   Rx sent for warfarin 2.5 mg. She will take 1 tab daily.     Follow up appointment in 72 hours.    Next Appointment: Thursday, November 16, 2017 at 4:00 pm.     Peggy MEADE

## 2017-11-14 ENCOUNTER — TELEPHONE (OUTPATIENT)
Dept: VASCULAR LAB | Facility: MEDICAL CENTER | Age: 82
End: 2017-11-14

## 2017-11-15 RX ORDER — ASPIRIN 81 MG/1
81 TABLET, CHEWABLE ORAL DAILY
COMMUNITY

## 2017-11-15 NOTE — TELEPHONE ENCOUNTER
Initial anticoag note and most recent cards note reviewed.    Patient started on AC by cards for TAVR with increased gradient.      Per cards, we will conitnue with 3 months of anticoag and then ask cars whether or not to continue.      Will also continue with indefinite low dose asa as recommended by cards.    Michael Bloch, MD  Anticoagulation Clinic    Cc:   MD NICKIE Mojica

## 2017-11-17 ENCOUNTER — ANTICOAGULATION VISIT (OUTPATIENT)
Dept: VASCULAR LAB | Facility: MEDICAL CENTER | Age: 82
End: 2017-11-17
Attending: INTERNAL MEDICINE
Payer: MEDICARE

## 2017-11-17 VITALS — HEART RATE: 62 BPM | HEIGHT: 63 IN | DIASTOLIC BLOOD PRESSURE: 60 MMHG | SYSTOLIC BLOOD PRESSURE: 124 MMHG

## 2017-11-17 DIAGNOSIS — Z95.2 S/P TAVR (TRANSCATHETER AORTIC VALVE REPLACEMENT): ICD-10-CM

## 2017-11-17 LAB
INR BLD: 1 (ref 0.9–1.2)
INR PPP: 1 (ref 2–3.5)

## 2017-11-17 PROCEDURE — 85610 PROTHROMBIN TIME: CPT

## 2017-11-17 PROCEDURE — 99212 OFFICE O/P EST SF 10 MIN: CPT | Performed by: PHARMACIST

## 2017-11-17 NOTE — PROGRESS NOTES
Anticoagulation Summary  As of 11/17/2017    INR goal:   2.0-3.0   TTR:   --   Today's INR:   1.0!   Maintenance plan:   5 mg (2.5 mg x 2) every day   Weekly total:   35 mg   Plan last modified:   Karime Jones, PharmD (11/17/2017)   Next INR check:   11/21/2017   Target end date:   2/14/2018    Indications    S/P TAVR (transcatheter aortic valve replacement) [Z95.2]             Anticoagulation Episode Summary     INR check location:       Preferred lab:       Send INR reminders to:       Comments:   pt taking ASA 81mg      Anticoagulation Care Providers     Provider Role Specialty Phone number    Sherif Elder M.D. Referring Cardiology [phone]    [facility] Responsible  [phone]        Anticoagulation Patient Findings      HPI:  Silvia Orellana seen in clinic today, on anticoagulation therapy with warfarin and ASA 81mg for s/P TAVR  Changes to current medical/health status since last appt: pt started warfarin on Tuesday night.  Denies signs/symptoms of bleeding and/or thrombosis since the last appt.    Denies any interval changes to diet  Denies any interval changes to medications since last appt.   Denies any complications or cost restrictions with current therapy.   BP recorded in vitals.      A/P   INR  is SUB-therapeutic.   Will have pt increase to 5mg daily until INR on Monday.    Follow up appointment in 3 days.    Karime Jones, PharmD

## 2017-11-20 ENCOUNTER — ANTICOAGULATION VISIT (OUTPATIENT)
Dept: VASCULAR LAB | Facility: MEDICAL CENTER | Age: 82
End: 2017-11-20
Attending: INTERNAL MEDICINE
Payer: MEDICARE

## 2017-11-20 VITALS — DIASTOLIC BLOOD PRESSURE: 55 MMHG | HEART RATE: 70 BPM | SYSTOLIC BLOOD PRESSURE: 133 MMHG

## 2017-11-20 DIAGNOSIS — Z95.2 S/P TAVR (TRANSCATHETER AORTIC VALVE REPLACEMENT): ICD-10-CM

## 2017-11-20 LAB
INR BLD: 1.9 (ref 0.9–1.2)
INR PPP: 1.9 (ref 2–3.5)

## 2017-11-20 PROCEDURE — 99212 OFFICE O/P EST SF 10 MIN: CPT

## 2017-11-20 PROCEDURE — 85610 PROTHROMBIN TIME: CPT

## 2017-11-20 NOTE — PROGRESS NOTES
OP Anticoagulation Service Note    Date: 11/20/2017  Vitals:    11/20/17 1531   BP: 133/55   Pulse: 70       Anticoagulation Summary  As of 11/20/2017    INR goal:   2.0-3.0   TTR:   --   Today's INR:   1.9!   Maintenance plan:   5 mg (2.5 mg x 2) every day   Weekly total:   35 mg   Plan last modified:   Karime Jones, PharmD (11/17/2017)   Next INR check:   11/27/2017   Target end date:   2/14/2018    Indications    S/P TAVR (transcatheter aortic valve replacement) [Z95.2]             Anticoagulation Episode Summary     INR check location:       Preferred lab:       Send INR reminders to:       Comments:   pt taking ASA 81mg      Anticoagulation Care Providers     Provider Role Specialty Phone number    Sherif Elder M.D. Referring Cardiology [phone]    [facility] Responsible  [phone]        Anticoagulation Patient Findings      HPI:   Silvia Eubanks Reyna seen in clinic today, on anticoagulation therapy with warfarin (a high risk medication) for s/p TAVR, CHADS-VASC = 6      Pt is here today for INR check to appropriate titrate up warfarin for an initial start and to ensure they are on proper dose of warfarin and tolerating medication without complications.     Previous INR was 1.0 on 11/17.  That was Day 4 of therapy (2.5mg daily)    Confirmed warfarin dosing regimen  Diet has been consistent with foods rich in vitamin K: No  Changes in ETOH:  No  Changes in smoking status: No  Changes in medication: No   Cost restriction: No  S/s of bleeding:  No  Signs/symptoms  thrombosis since the last appt: No    A/P   INR  sub-therapeutic today, will require dose adjustment today to prevent complication and closer follow up. Will require close follow up as previous INR was sub-therapeutic as well   Will decrease weekly dose by 16% to accomodate rapid increase in INR over past 3 days.  Pt's request to f/u in one week.    Pt educated to contact our clinic with any changes in medications or s/s  of bleeding or thrombosis    Follow up appointment in 1 week(s) to reduce risk of adverse events from warfarin  Iliana AdamesD.  Pharmacy Practice Resident, PGY1

## 2017-11-27 ENCOUNTER — ANTICOAGULATION VISIT (OUTPATIENT)
Dept: VASCULAR LAB | Facility: MEDICAL CENTER | Age: 82
End: 2017-11-27
Attending: INTERNAL MEDICINE
Payer: MEDICARE

## 2017-11-27 DIAGNOSIS — Z95.2 S/P TAVR (TRANSCATHETER AORTIC VALVE REPLACEMENT): ICD-10-CM

## 2017-11-27 LAB
INR BLD: 3.4 (ref 0.9–1.2)
INR PPP: 3.4 (ref 2–3.5)

## 2017-11-27 PROCEDURE — 99212 OFFICE O/P EST SF 10 MIN: CPT | Performed by: NURSE PRACTITIONER

## 2017-11-27 PROCEDURE — 85610 PROTHROMBIN TIME: CPT

## 2017-11-27 NOTE — PROGRESS NOTES
Anticoagulation Summary  As of 11/27/2017    INR goal:   2.0-3.0   TTR:   53.3 % (4 d)   Today's INR:   3.4!   Maintenance plan:   2.5 mg (2.5 mg x 1) on Mon, Wed, Fri; 5 mg (2.5 mg x 2) all other days   Weekly total:   27.5 mg   Plan last modified:   Peggy Andres AKayleenPKayleenNKayleen (11/27/2017)   Next INR check:   12/1/2017   Target end date:   2/14/2018    Indications    S/P TAVR (transcatheter aortic valve replacement) [Z95.2]             Anticoagulation Episode Summary     INR check location:       Preferred lab:       Send INR reminders to:       Comments:   pt taking ASA 81mg      Anticoagulation Care Providers     Provider Role Specialty Phone number    Sherif Elder M.D. Referring Cardiology [phone]    [facility] Responsible  [phone]        Anticoagulation Patient Findings      HPI:  Silvia Orellana seen in clinic today for follow up on anticoagulation therapy in the presence of s/p TAVR. Denies any changes to current medical/health status since last appointment. Denies any medication or diet changes. No current symptoms of bleeding or thrombosis reported.    A/P:   INR supratherapeutic.INR up from 1.9 last week. Will decrease regimen.     Follow up appointment on Friday.    Next Appointment: Friday , December 1, 2017 at 1:45 pm.     Peggy MEADE

## 2017-12-01 ENCOUNTER — TELEPHONE (OUTPATIENT)
Dept: VASCULAR LAB | Facility: MEDICAL CENTER | Age: 82
End: 2017-12-01

## 2017-12-01 ENCOUNTER — ANTICOAGULATION VISIT (OUTPATIENT)
Dept: VASCULAR LAB | Facility: MEDICAL CENTER | Age: 82
End: 2017-12-01
Attending: INTERNAL MEDICINE
Payer: MEDICARE

## 2017-12-01 VITALS — DIASTOLIC BLOOD PRESSURE: 59 MMHG | SYSTOLIC BLOOD PRESSURE: 149 MMHG | HEART RATE: 59 BPM

## 2017-12-01 DIAGNOSIS — G45.8 OTHER SPECIFIED TRANSIENT CEREBRAL ISCHEMIAS: ICD-10-CM

## 2017-12-01 DIAGNOSIS — Z95.2 S/P TAVR (TRANSCATHETER AORTIC VALVE REPLACEMENT): ICD-10-CM

## 2017-12-01 LAB
INR BLD: 2.2 (ref 0.9–1.2)
INR PPP: 2.2 (ref 2–3.5)

## 2017-12-01 PROCEDURE — 99211 OFF/OP EST MAY X REQ PHY/QHP: CPT

## 2017-12-01 PROCEDURE — 85610 PROTHROMBIN TIME: CPT

## 2017-12-01 RX ORDER — WARFARIN SODIUM 2.5 MG/1
TABLET ORAL
Qty: 60 TAB | Refills: 6 | Status: SHIPPED | OUTPATIENT
Start: 2017-12-01 | End: 2018-10-11

## 2017-12-01 NOTE — PROGRESS NOTES
Anticoagulation Summary  As of 12/1/2017    INR goal:   2.0-3.0   TTR:   60.0 % (1.1 wk)   Today's INR:   2.2   Maintenance plan:   2.5 mg (2.5 mg x 1) on Mon, Wed, Fri; 5 mg (2.5 mg x 2) all other days   Weekly total:   27.5 mg   Plan last modified:   Bakari Marrero, PharmD (12/1/2017)   Next INR check:   12/7/2017   Target end date:   2/14/2018    Indications    S/P TAVR (transcatheter aortic valve replacement) [Z95.2]             Anticoagulation Episode Summary     INR check location:       Preferred lab:       Send INR reminders to:       Comments:   pt taking ASA 81mg      Anticoagulation Care Providers     Provider Role Specialty Phone number    Sherif Elder M.D. Referring Cardiology [phone]    [facility] Responsible  [phone]        Anticoagulation Patient Findings  Patient Findings     Positives:   Change in medications    Negatives:   Signs/symptoms of thrombosis, Signs/symptoms of bleeding, Laboratory test error suspected, Change in health, Change in alcohol use, Change in activity, Upcoming invasive procedure, Emergency department visit, Upcoming dental procedure, Missed doses, Extra doses, Change in diet/appetite, Hospital admission, Bruising, Other complaints        History of Present Illness: follow up appointment for chronic anticoagulation with the high risk medication, warfarin for being s/p TAVR    INR therapeutic  No changes to dosing  Pt being started on miconazole cream - no significant interaction due to limited absorption  F/U INR in 1 week  No complaints from pt    Bakari Marrero, PharmD

## 2017-12-07 ENCOUNTER — APPOINTMENT (OUTPATIENT)
Dept: VASCULAR LAB | Facility: MEDICAL CENTER | Age: 82
End: 2017-12-07
Attending: INTERNAL MEDICINE
Payer: MEDICARE

## 2017-12-11 DIAGNOSIS — I10 ESSENTIAL HYPERTENSION: ICD-10-CM

## 2017-12-11 RX ORDER — FUROSEMIDE 20 MG/1
20 TABLET ORAL DAILY
Qty: 30 TAB | Refills: 3 | Status: SHIPPED | OUTPATIENT
Start: 2017-12-11 | End: 2018-04-04 | Stop reason: SDUPTHER

## 2017-12-12 ENCOUNTER — ANTICOAGULATION VISIT (OUTPATIENT)
Dept: VASCULAR LAB | Facility: MEDICAL CENTER | Age: 82
End: 2017-12-12
Attending: INTERNAL MEDICINE
Payer: MEDICARE

## 2017-12-12 VITALS — DIASTOLIC BLOOD PRESSURE: 48 MMHG | SYSTOLIC BLOOD PRESSURE: 118 MMHG | HEART RATE: 60 BPM

## 2017-12-12 DIAGNOSIS — Z95.2 S/P TAVR (TRANSCATHETER AORTIC VALVE REPLACEMENT): ICD-10-CM

## 2017-12-12 LAB
INR BLD: 2.4 (ref 0.9–1.2)
INR PPP: 2.4 (ref 2–3.5)

## 2017-12-12 PROCEDURE — 99211 OFF/OP EST MAY X REQ PHY/QHP: CPT

## 2017-12-12 PROCEDURE — 85610 PROTHROMBIN TIME: CPT

## 2017-12-12 NOTE — PROGRESS NOTES
OP Anticoagulation Service Note    Date: 12/12/2017  Vitals:    12/12/17 1309   BP: 118/48   Pulse: 60       Anticoagulation Summary  As of 12/12/2017    INR goal:   2.0-3.0   TTR:   83.2 % (2.7 wk)   Today's INR:   2.4   Maintenance plan:   2.5 mg (2.5 mg x 1) on Mon, Wed, Fri; 5 mg (2.5 mg x 2) all other days   Weekly total:   27.5 mg   Plan last modified:   Bakari Marrero, PharmD (12/1/2017)   Next INR check:   12/26/2017   Target end date:   2/14/2018    Indications    S/P TAVR (transcatheter aortic valve replacement) [Z95.2]             Anticoagulation Episode Summary     INR check location:       Preferred lab:       Send INR reminders to:       Comments:   pt taking ASA 81mg      Anticoagulation Care Providers     Provider Role Specialty Phone number    Sherif Elder M.D. Referring Cardiology [phone]    [facility] Responsible  [phone]        Anticoagulation Patient Findings  Patient Findings     Negatives:   Signs/symptoms of thrombosis, Signs/symptoms of bleeding, Laboratory test error suspected, Change in health, Change in alcohol use, Change in activity, Upcoming invasive procedure, Emergency department visit, Upcoming dental procedure, Missed doses, Extra doses, Change in medications, Change in diet/appetite, Hospital admission, Bruising, Other complaints          HPI:   Silvia Orellana seen in clinic today, on anticoagulation therapy with warfarin (a high risk medication) for TAVR      Pt is here today to evaluate anticoagulation therapy    Previous INR was 2.2 on 12/1/17,     Confirmed warfarin dosing regimen  Diet has been consistent with foods rich in vitamin K: Yes  Changes in ETOH:  No  Changes in smoking status: No  Changes in medication: No   Cost restriction: No  S/s of bleeding:  No  Signs/symptoms  thrombosis since the last appt: No    A/P   INR  therapeutic today,    Continue with current warfarin dosing regimen     Pt educated to contact our clinic with  any changes in medications or s/s of bleeding or thrombosis    Follow up appointment in 2 week(s) to reduce risk of adverse events from warfarin  Ines Valenzuela, Jim D

## 2017-12-27 ENCOUNTER — APPOINTMENT (OUTPATIENT)
Dept: VASCULAR LAB | Facility: MEDICAL CENTER | Age: 82
End: 2017-12-27
Attending: INTERNAL MEDICINE
Payer: MEDICARE

## 2018-01-02 ENCOUNTER — ANTICOAGULATION VISIT (OUTPATIENT)
Dept: VASCULAR LAB | Facility: MEDICAL CENTER | Age: 83
End: 2018-01-02
Attending: INTERNAL MEDICINE
Payer: MEDICARE

## 2018-01-02 VITALS — RESPIRATION RATE: 14 BRPM | DIASTOLIC BLOOD PRESSURE: 60 MMHG | HEART RATE: 62 BPM | SYSTOLIC BLOOD PRESSURE: 141 MMHG

## 2018-01-02 DIAGNOSIS — Z95.2 S/P TAVR (TRANSCATHETER AORTIC VALVE REPLACEMENT): ICD-10-CM

## 2018-01-02 LAB
INR PPP: 1.8 (ref 2–3.5)
INR PPP: 1.8 (ref 2–3.5)

## 2018-01-02 PROCEDURE — 99212 OFFICE O/P EST SF 10 MIN: CPT | Performed by: NURSE PRACTITIONER

## 2018-01-02 PROCEDURE — 85610 PROTHROMBIN TIME: CPT

## 2018-01-03 LAB
ERYTHROCYTE [DISTWIDTH] IN BLOOD BY AUTOMATED COUNT: 13.8 % (ref 12.3–15.4)
HCT VFR BLD AUTO: 39.7 % (ref 34–46.6)
HGB BLD-MCNC: 13.4 G/DL (ref 11.1–15.9)
INR BLD: 1.8 (ref 0.9–1.2)
MCH RBC QN AUTO: 32.4 PG (ref 26.6–33)
MCHC RBC AUTO-ENTMCNC: 33.8 G/DL (ref 31.5–35.7)
MCV RBC AUTO: 96 FL (ref 79–97)
NRBC BLD AUTO-RTO: NORMAL %
PLATELET # BLD AUTO: 306 X10E3/UL (ref 150–379)
RBC # BLD AUTO: 4.14 X10E6/UL (ref 3.77–5.28)
WBC # BLD AUTO: 7.4 X10E3/UL (ref 3.4–10.8)

## 2018-01-03 NOTE — PROGRESS NOTES
Anticoagulation Summary  As of 1/2/2018    INR goal:   2.0-3.0   TTR:   74.5 % (1.3 mo)   Today's INR:   1.8!   Maintenance plan:   2.5 mg (2.5 mg x 1) on Mon, Wed, Fri; 5 mg (2.5 mg x 2) all other days   Weekly total:   27.5 mg   Plan last modified:   Bakari Marrero, PharmD (12/1/2017)   Next INR check:   1/16/2018   Target end date:   2/14/2018    Indications    S/P TAVR (transcatheter aortic valve replacement) [Z95.2]             Anticoagulation Episode Summary     INR check location:       Preferred lab:       Send INR reminders to:       Comments:   pt taking ASA 81mg      Anticoagulation Care Providers     Provider Role Specialty Phone number    Sherif Elder M.D. Referring Cardiology [phone]    [facility] Anticoagulation Services Responsible  [phone]        Anticoagulation Patient Findings          Silvia Eubanks Reyna seen in clinic today  INR  sub-therapeutic.    Denies signs/symptoms of bleeding and/or thrombosis.    Denies changes to diet or medications.   Follow up appointment in 2 week(s).      Take 5 mg on Wed this week one time only then back to usual dose. The patient is on a high risk medication and is sub- therapeutic. This could lead to clot formation or risk of stroke. Therefore this patient requires close monitoring and follow up.              WILLIAM Knox.

## 2018-01-04 ENCOUNTER — TELEPHONE (OUTPATIENT)
Dept: CARDIOLOGY | Facility: MEDICAL CENTER | Age: 83
End: 2018-01-04

## 2018-01-16 ENCOUNTER — APPOINTMENT (OUTPATIENT)
Dept: VASCULAR LAB | Facility: MEDICAL CENTER | Age: 83
End: 2018-01-16
Attending: INTERNAL MEDICINE
Payer: MEDICARE

## 2018-01-18 ENCOUNTER — ANTICOAGULATION VISIT (OUTPATIENT)
Dept: VASCULAR LAB | Facility: MEDICAL CENTER | Age: 83
End: 2018-01-18
Attending: INTERNAL MEDICINE
Payer: MEDICARE

## 2018-01-18 VITALS — DIASTOLIC BLOOD PRESSURE: 53 MMHG | HEART RATE: 59 BPM | SYSTOLIC BLOOD PRESSURE: 108 MMHG

## 2018-01-18 DIAGNOSIS — Z95.2 S/P TAVR (TRANSCATHETER AORTIC VALVE REPLACEMENT): ICD-10-CM

## 2018-01-18 LAB — INR PPP: 2.2 (ref 2–3.5)

## 2018-01-18 PROCEDURE — 99211 OFF/OP EST MAY X REQ PHY/QHP: CPT

## 2018-01-18 PROCEDURE — 85610 PROTHROMBIN TIME: CPT

## 2018-01-19 NOTE — PROGRESS NOTES
Anticoagulation Summary  As of 1/18/2018    INR goal:   2.0-3.0   TTR:   67.5 % (1.9 mo)   Today's INR:   2.2   Maintenance plan:   2.5 mg (2.5 mg x 1) on Mon, Wed, Fri; 5 mg (2.5 mg x 2) all other days   Weekly total:   27.5 mg   Plan last modified:   Bakari Marrero, PharmD (12/1/2017)   Next INR check:   2/8/2018   Target end date:   2/14/2018    Indications    S/P TAVR (transcatheter aortic valve replacement) [Z95.2]             Anticoagulation Episode Summary     INR check location:       Preferred lab:       Send INR reminders to:       Comments:   pt taking ASA 81mg      Anticoagulation Care Providers     Provider Role Specialty Phone number    Sherif Elder M.D. Referring Cardiology [phone]    [facility]ticoagulation [facility] Responsible  [phone]        Anticoagulation Patient Findings      HPI:  Silvia Cha Orellana seen in clinic today, on anticoagulation therapy with warfarin for TAVR  Changes to current medical/health status since last appt: none  Denies signs/symptoms of bleeding and/or thrombosis since the last appt.    Denies any interval changes to diet  Denies any interval changes to medications since last appt.   Denies any complications or cost restrictions with current therapy.   BP recorded in vitals.  Confirmed dosing regimen.     A/P   INR  therapeutic.   Pt is to continue with current warfarin dosing regimen.     Follow up appointment in 3 week(s).    Adams Mayers, PharmD

## 2018-02-05 ENCOUNTER — HOSPITAL ENCOUNTER (OUTPATIENT)
Dept: CARDIOLOGY | Facility: MEDICAL CENTER | Age: 83
End: 2018-02-05
Attending: INTERNAL MEDICINE
Payer: MEDICARE

## 2018-02-05 DIAGNOSIS — Z95.2 S/P TAVR (TRANSCATHETER AORTIC VALVE REPLACEMENT): ICD-10-CM

## 2018-02-05 LAB
LV EJECT FRACT  99904: 65
LV EJECT FRACT MOD 2C 99903: 60.41
LV EJECT FRACT MOD 4C 99902: 69.25
LV EJECT FRACT MOD BP 99901: 63.18

## 2018-02-05 PROCEDURE — 93306 TTE W/DOPPLER COMPLETE: CPT | Mod: 26 | Performed by: INTERNAL MEDICINE

## 2018-02-05 PROCEDURE — 93306 TTE W/DOPPLER COMPLETE: CPT

## 2018-02-06 ENCOUNTER — TELEPHONE (OUTPATIENT)
Dept: CARDIOLOGY | Facility: MEDICAL CENTER | Age: 83
End: 2018-02-06

## 2018-02-06 DIAGNOSIS — I65.23 BILATERAL CAROTID ARTERY STENOSIS: ICD-10-CM

## 2018-02-06 DIAGNOSIS — I25.10 CORONARY ARTERY DISEASE DUE TO CALCIFIED CORONARY LESION: ICD-10-CM

## 2018-02-06 DIAGNOSIS — Z95.1 HX OF CABG: ICD-10-CM

## 2018-02-06 DIAGNOSIS — Z95.2 S/P TAVR (TRANSCATHETER AORTIC VALVE REPLACEMENT): ICD-10-CM

## 2018-02-06 DIAGNOSIS — I25.84 CORONARY ARTERY DISEASE DUE TO CALCIFIED CORONARY LESION: ICD-10-CM

## 2018-02-06 NOTE — TELEPHONE ENCOUNTER
Pt called with message. No answer, left  for call back. Order placed.  Belén ODEN RN       ----- Message -----  From: Sherif Elder M.D.  Sent: 2/5/2018   3:57 PM  To: Patty Gibbs R.N.    Please call patient with unremarkable echocardiogram results showing normally functioning TAVR valve with normalization of TAVR gradients. Please discontinue warfarin and continue with aspirin only. We will repeat an echocardiogram in September.    Ishmael MATOS

## 2018-02-07 ENCOUNTER — ANTICOAGULATION MONITORING (OUTPATIENT)
Dept: VASCULAR LAB | Facility: MEDICAL CENTER | Age: 83
End: 2018-02-07

## 2018-02-07 DIAGNOSIS — Z95.2 S/P TAVR (TRANSCATHETER AORTIC VALVE REPLACEMENT): ICD-10-CM

## 2018-02-07 NOTE — PROGRESS NOTES
Per Dr. Elder, patient to discontinue anticoagulation and continue with ASA daily.  Pending further contact from patient or cardiology, will discharge patient from anticoagulation clinic.  Ash Simpson, JimD

## 2018-02-07 NOTE — TELEPHONE ENCOUNTER
Pt called back. Notified her of echo results and Dr. Elder's recommendations to stop warfarin but continue aspirin 81mg. Pt agrees and stated understanding. She will call to schedule the 6 month echo when her daughter is available as she manages her appts.     Message sent to the oac clinic notifying them of pt d/cing warfarin.

## 2018-02-08 ENCOUNTER — APPOINTMENT (OUTPATIENT)
Dept: VASCULAR LAB | Facility: MEDICAL CENTER | Age: 83
End: 2018-02-08
Payer: MEDICARE

## 2018-04-04 DIAGNOSIS — I10 ESSENTIAL HYPERTENSION: ICD-10-CM

## 2018-04-05 RX ORDER — FUROSEMIDE 20 MG/1
20 TABLET ORAL DAILY
Qty: 30 TAB | Refills: 6 | Status: SHIPPED | OUTPATIENT
Start: 2018-04-05 | End: 2018-08-07 | Stop reason: SDUPTHER

## 2018-04-16 DIAGNOSIS — I10 ESSENTIAL HYPERTENSION: ICD-10-CM

## 2018-04-17 RX ORDER — METOPROLOL SUCCINATE 50 MG/1
50 TABLET, EXTENDED RELEASE ORAL DAILY
Qty: 30 TAB | Refills: 0 | Status: SHIPPED | OUTPATIENT
Start: 2018-04-17 | End: 2018-05-21 | Stop reason: SDUPTHER

## 2018-05-21 DIAGNOSIS — I10 ESSENTIAL HYPERTENSION: ICD-10-CM

## 2018-05-21 RX ORDER — METOPROLOL SUCCINATE 50 MG/1
50 TABLET, EXTENDED RELEASE ORAL DAILY
Qty: 90 TAB | Refills: 1 | Status: SHIPPED | OUTPATIENT
Start: 2018-05-21 | End: 2018-11-21 | Stop reason: SDUPTHER

## 2018-06-19 ENCOUNTER — TELEPHONE (OUTPATIENT)
Dept: CARDIOLOGY | Facility: MEDICAL CENTER | Age: 83
End: 2018-06-19

## 2018-06-19 NOTE — TELEPHONE ENCOUNTER
"Spoke w/pt's daughter, Guadalupe, about switching her appointment in October from the 12th to the 11th.  Agreeable to change and now pt is scheduled for 10/11 @ 11 am.  Pt's daughter had a question regarding the metoprolol.  She states her mother was on metoprolol tartrate and now she is taking metoprolol succinate and wants to make sure that \"we aren't giving her something that's not good for.\"      Please advise, thank you!  "

## 2018-06-19 NOTE — TELEPHONE ENCOUNTER
Spoke with patients daughter Guadalupe and went over the differences between the two Metoprolol's and told her Pamela recommends staying on Metoprolol XL which is the succinate. Patient appreciated call back. In chart in October when pamela saw patient she was on Succinate.

## 2018-08-07 DIAGNOSIS — I10 ESSENTIAL HYPERTENSION: ICD-10-CM

## 2018-08-07 RX ORDER — FUROSEMIDE 20 MG/1
20 TABLET ORAL DAILY
Qty: 90 TAB | Refills: 0 | Status: SHIPPED | OUTPATIENT
Start: 2018-08-07 | End: 2018-11-21 | Stop reason: SDUPTHER

## 2018-08-07 RX ORDER — AMLODIPINE BESYLATE 5 MG/1
5 TABLET ORAL DAILY
Qty: 90 TAB | Refills: 0 | Status: SHIPPED | OUTPATIENT
Start: 2018-08-07 | End: 2018-11-21 | Stop reason: SDUPTHER

## 2018-09-10 ENCOUNTER — TELEPHONE (OUTPATIENT)
Dept: CARDIOLOGY | Facility: MEDICAL CENTER | Age: 83
End: 2018-09-10

## 2018-09-10 NOTE — TELEPHONE ENCOUNTER
calling to discuss Atorvastatin medication   Received: Today   Message Contents   ALYSIA Da Silva/Abram       Patient's daughter Guadalupe wants to clarify Atorvastatin medication. She said her mother takes 20 MGS, but the pills are 40 MGs, and they are very hard to cut in half. She can be reached at [phone].        We are not filling pts atorvastatin and assume cholesterol is being managed by Dr. Rendon, pts PCP. Notified pts dtr of this and suggested she contact their office regarding specific questions about her prescription dosage. Dtr agreed and appreciated info. She will also talk to PCP about getting orders for pts yearly labs.

## 2018-10-11 ENCOUNTER — OFFICE VISIT (OUTPATIENT)
Dept: CARDIOLOGY | Facility: MEDICAL CENTER | Age: 83
End: 2018-10-11
Payer: MEDICARE

## 2018-10-11 VITALS
DIASTOLIC BLOOD PRESSURE: 80 MMHG | HEART RATE: 60 BPM | OXYGEN SATURATION: 95 % | HEIGHT: 63 IN | SYSTOLIC BLOOD PRESSURE: 118 MMHG

## 2018-10-11 DIAGNOSIS — I25.10 CORONARY ARTERY DISEASE DUE TO CALCIFIED CORONARY LESION: ICD-10-CM

## 2018-10-11 DIAGNOSIS — I25.84 CORONARY ARTERY DISEASE DUE TO CALCIFIED CORONARY LESION: ICD-10-CM

## 2018-10-11 DIAGNOSIS — Z95.2 S/P TAVR (TRANSCATHETER AORTIC VALVE REPLACEMENT): ICD-10-CM

## 2018-10-11 DIAGNOSIS — Z95.1 HX OF CABG: ICD-10-CM

## 2018-10-11 DIAGNOSIS — G45.9 TIA (TRANSIENT ISCHEMIC ATTACK): ICD-10-CM

## 2018-10-11 DIAGNOSIS — I50.32 CHRONIC DIASTOLIC CHF (CONGESTIVE HEART FAILURE) (HCC): ICD-10-CM

## 2018-10-11 DIAGNOSIS — I10 ESSENTIAL HYPERTENSION: ICD-10-CM

## 2018-10-11 DIAGNOSIS — E78.5 DYSLIPIDEMIA: ICD-10-CM

## 2018-10-11 PROCEDURE — 99214 OFFICE O/P EST MOD 30 MIN: CPT | Performed by: INTERNAL MEDICINE

## 2018-10-11 RX ORDER — CARISOPRODOL 350 MG/1
350 TABLET ORAL 4 TIMES DAILY
COMMUNITY
End: 2019-11-15

## 2018-10-11 RX ORDER — NITROGLYCERIN 0.4 MG/1
0.4 TABLET SUBLINGUAL
COMMUNITY
End: 2021-01-19

## 2018-10-11 RX ORDER — DICYCLOMINE HCL 20 MG
20 TABLET ORAL EVERY 6 HOURS
COMMUNITY
End: 2022-07-25

## 2018-10-11 RX ORDER — MELOXICAM 15 MG/1
TABLET ORAL
COMMUNITY
Start: 2018-10-07 | End: 2019-11-15

## 2018-10-11 RX ORDER — HYDROCODONE BITARTRATE AND ACETAMINOPHEN 10; 325 MG/1; MG/1
1-2 TABLET ORAL EVERY 6 HOURS PRN
COMMUNITY

## 2018-10-11 ASSESSMENT — ENCOUNTER SYMPTOMS
CHILLS: 0
BACK PAIN: 1
MYALGIAS: 0
RESPIRATORY NEGATIVE: 1
FEVER: 0
CLAUDICATION: 0
FOCAL WEAKNESS: 0
DOUBLE VISION: 0
WEAKNESS: 0
NEUROLOGICAL NEGATIVE: 1
CONSTITUTIONAL NEGATIVE: 1
EYES NEGATIVE: 1
COUGH: 0
GASTROINTESTINAL NEGATIVE: 1
PALPITATIONS: 0
BLURRED VISION: 0
WEIGHT LOSS: 0
CARDIOVASCULAR NEGATIVE: 1
DIZZINESS: 0
DEPRESSION: 1
HEADACHES: 0
BRUISES/BLEEDS EASILY: 0
ABDOMINAL PAIN: 0
NAUSEA: 0
VOMITING: 0
NERVOUS/ANXIOUS: 0
SHORTNESS OF BREATH: 0

## 2018-10-11 NOTE — LETTER
[facility]   [address]  [place] [address]  Phone: [phone]  Fax: [phone]              Silvia Orellana  6/19/1931    Encounter Date: 10/11/2018    Sherif Elder M.D.          PROGRESS NOTE:  Chief Complaint   Patient presents with   • Aortic Stenosis       Subjective:   Silvia Orellana is a 87 y.o. female who presents today for annual follow up of TAVR, study result review and medication change evaluation.    Since the patient's last visit on 11/02/17, she has been doing well clinically. She admits to depression and lack of motivation following the loss oh her . She denies fatigue, shortness of breath, dyspnea on exertion, chest pain, dizziness or syncope. On the last visit she was started on warfarin for increased gradient across TAVR and is tolerating this medication well without side effects.    Past Medical History:   Diagnosis Date   • Aortic stenosis     moderate on echo in '16   • Arthritis    • Bowel habit changes    • Bradycardia    • Breast cancer (HCC)    • Breath shortness    • CAD (coronary artery disease)     CABG    • Cancer (HCC) 2003    left breast lumpectomy   • Cancer (HCC) 2007    bladder ca   • Cancer (HCC) 2016    basal cell ca to nose   • Cataract     patria IOL   • Chronic pain    • Dental disorder     dentures   • Diverticulosis    • Fall    • GERD (gastroesophageal reflux disease)    • Heart burn    • Hx of Clostridium difficile infection 2015   • Hypertension    • Indigestion    • Myocardial infarct (HCC) 2002    cardiac stent   • Pneumonia 2014   • Stroke (HCC) 2001; 2012    no deficits, general weakness   • Urinary bladder disorder    • Urinary incontinence      Past Surgical History:   Procedure Laterality Date   • TRANSCATHETER AORTIC VALVE REPLACEMENT N/A 9/25/2017    Procedure: TRANSCATHETER AORTIC VALVE REPLACEMENT;  Surgeon: Sherif Elder M.D.;  Location: SURGERY [facility];  Service: Cardiac   •  TATIANNA  9/25/2017    Procedure: TTE;  Surgeon: Sherif Elder M.D.;  Location: SURGERY [facility];  Service: Cardiac   • ORBITAL FRACTURE ORIF Left 5/23/2016    Procedure: ORBITAL FRACTURE ORIF FOR: LATERAL CANTHOPEXY;  Surgeon: Fabrice Daniel Jr., M.D.;  Location: SURGERY [facility];  Service:    • FEMUR ORIF Right 8/10/2015    Procedure: FEMUR ORIF;  Surgeon: Umer Guevara M.D.;  Location: SURGERY [facility];  Service:    • ORBITAL FRACTURE ORIF Left 7/6/2015    Procedure: ORBITAL FRACTURE ORIF;  Surgeon: Fabrice Daniel Jr., M.D.;  Location: SURGERY [facility];  Service:    • PB REVISE TOTAL HIP REPLACEMENT  3/24/2015    R   • OTHER CARDIAC SURGERY  2012    cardiac cath with stent placement   • PB REVISE ACETABULAR PART OF TOTAL HIP  2010    L   • OTHER CARDIAC SURGERY  2002    CABG   • GYN SURGERY  1961    hysterectomy   • ANGIOGRAM     • BLADDER BIOPSY     • CARDIAC CATH     • CHOLECYSTECTOMY     • HIP ARTHROPLASTY TOTAL Left    • LUMPECTOMY      L   • MULTIPLE CORONARY ARTERY BYPASS       History reviewed. No pertinent family history.  Social History     Social History   • Marital status:      Spouse name: N/A   • Number of children: N/A   • Years of education: N/A     Occupational History   • Not on file.     Social History Main Topics   • Smoking status: Never Smoker   • Smokeless tobacco: Never Used   • Alcohol use Yes      Comment: maybe once a month   • Drug use: No   • Sexual activity: Not on file     Other Topics Concern   • Not on file     Social History Narrative   • No narrative on file     No Known Allergies     Medications reviewed.    Outpatient Encounter Prescriptions as of 10/11/2018   Medication Sig Dispense Refill   • carisoprodol (SOMA) 350 MG Tab Take 350 mg by mouth 4 times a day.     • dicyclomine (BENTYL) 20 MG Tab Take 20 mg by mouth every 6 hours.     • HYDROcodone/acetaminophen (NORCO)  MG Tab Take 1-2 Tabs by mouth every 6 hours as needed.        • meloxicam (MOBIC) 15 MG tablet      • nitroglycerin (NITROSTAT) 0.4 MG SL Tab Place 0.4 mg under tongue every 5 minutes as needed for Chest Pain.     • lansoprazole (PREVACID) 15 MG TABLET DISPERSIBLE Take 15 mg by mouth every day.     • Promethazine HCl (PHENERGAN PO) Take 25 mg by mouth.     • amLODIPine (NORVASC) 5 MG Tab Take 1 Tab by mouth every day. 90 Tab 0   • furosemide (LASIX) 20 MG Tab Take 1 Tab by mouth every day. 90 Tab 0   • metoprolol SR (TOPROL XL) 50 MG TABLET SR 24 HR Take 1 Tab by mouth every day. 90 Tab 1   • aspirin (ASA) 81 MG Chew Tab chewable tablet Take 81 mg by mouth every day.     • lisinopril (PRINIVIL) 20 MG Tab Take 1 Tab by mouth 2 times a day. 60 Tab 0   • Cyanocobalamin (VITAMIN B-12) 5000 MCG SL Tab Place 1 Tab under tongue every morning.     • ascorbic acid (ASCORBIC ACID) 500 MG Tab Take 500 mg by mouth every morning.     • B Complex Vitamins (VITAMIN B COMPLEX PO) Take 1 Tab by mouth every morning.     • multivitamin (THERAGRAN) Tab Take 1 Tab by mouth every morning.     • atorvastatin (LIPITOR) 20 MG Tab Take 20 mg by mouth every evening.     • calcium citrate (CALCITRATE) 950 MG Tab Take 950 mg by mouth 2 times a day.     • gabapentin (NEURONTIN) 600 MG tablet Take 600 mg by mouth 3 times a day.  5   • citalopram (CELEXA) 20 MG TABS Take 20 mg by mouth every morning.     • warfarin (COUMADIN) 2.5 MG Tab Take 1-2 tabs po q day or as directed by clinic 60 Tab 6   • Mirabegron ER (MYRBETRIQ) 50 MG TABLET SR 24 HR Take 1 Tab by mouth every day.       No facility-administered encounter medications on file as of 10/11/2018.      Review of Systems   Constitutional: Negative.  Negative for chills, fever, malaise/fatigue and weight loss.   HENT: Negative.  Negative for hearing loss.    Eyes: Negative.  Negative for blurred vision and double vision.   Respiratory: Negative.  Negative for cough and shortness of breath.    Cardiovascular: Negative.  Negative for chest pain,  "palpitations, claudication and leg swelling.   Gastrointestinal: Negative.  Negative for abdominal pain, nausea and vomiting.   Genitourinary: Negative.  Negative for dysuria and urgency.   Musculoskeletal: Negative.  Negative for joint pain and myalgias.   Skin: Negative.  Negative for itching and rash.   Neurological: Negative.  Negative for dizziness, focal weakness, weakness and headaches.   Endo/Heme/Allergies: Negative.  Does not bruise/bleed easily.   Psychiatric/Behavioral: Positive for depression. The patient is not nervous/anxious.         Objective:   /80 (BP Location: Left arm, Patient Position: Sitting, BP Cuff Size: Adult)   Pulse 60   Ht 1.6 m (5' 2.99\")   SpO2 95%     Physical Exam   Constitutional: She is oriented to person, place, and time. She appears well-developed and well-nourished.   HENT:   Head: Normocephalic and atraumatic.   Eyes: Pupils are equal, round, and reactive to light. Conjunctivae are normal.   Neck: Normal range of motion. Neck supple.   Cardiovascular: Normal rate and regular rhythm.    No murmur heard.  Pulmonary/Chest: Effort normal and breath sounds normal.   Abdominal: Soft. Bowel sounds are normal.   Musculoskeletal: Normal range of motion. She exhibits no edema.   Neurological: She is alert and oriented to person, place, and time.   Skin: Skin is warm and dry.   Psychiatric: She has a normal mood and affect.     CARDIAC STUDIES/PROCEDURES:     CARDIAC CATHETERIZATION CONCLUSIONS by Dr. Aguirre (08/30/17)  1. Patent LIMA to LAD  2. Patent KINDRA to PDA  3. Patent vein graft to OM, mild/moderate in-stent restenosis of the ostial stent  4. Mildly tortuous abdominal descending aorta     CAROTID ULTRASOUND (03/04/16)  <50% bilateral ICA stenoses   Nl subclavians and vertebrals     CT OF ABDOMEN (09/28/17)  1.  Mild left hydronephrosis and hydroureter with definite distal obstructive etiology not identified.  2.  Apparent rectal wall thickening with perirectal " stranding which could indicate proctitis.  3.  Trace bilateral pleural effusions with associated compressive atelectasis and/or consolidation. Underlying infection is possible.  4.  Status post cholecystectomy.  5.  Atrophic right kidney.  6.  Right renal cysts.  7.  Diverticulosis without evidence of diverticulitis.  8.  Atherosclerosis.     CT OF ABDOMEN (09/25/17)  1.  Small LEFT low anterior abdominal wall hematoma. Ongoing bleeding is not excluded.  2.  Gallbladder decompressed or absent  3.  Duodenal diverticulum  4.  Atrophic RIGHT kidney  5.  Atherosclerosis  6.  Prior hysterectomy  7.  Distal colonic diverticulosis  8.  Small hiatal hernia     CT OF CHEST AND ABDOMEN (09/06/17)  1.  Aortic annulus area of 412 sq mm.  2.  Coronary artery ostia are greater than 10 mm from the annulus.  3.  Moderate tortuosity and atherosclerotic calcification of the iliofemoral arterial system, worse on the RIGHT, with minimum diameters of 5.1 mm on the RIGHT and 6.9 mm on the LEFT.  4.  Markedly atrophic RIGHT kidney with apparent moderate to severe proximal RIGHT renal artery stenosis, likely chronic.     DOBUTAMINE STRESS ECHOCARDIOGRAM (08/07/17)  Resting Echo: LVEF 65% with AoV Vmax  of 2.52 m/s; peak gradient 25.5 mmHg and mean 16.11 mmHg  Peak Dobutamine: LVEF 80%, Vmax 4.02 m/s; Peak gradient 64.75 mmHg and mean 36.42 mmHg  No wall motion abnormality    ECHOCARDIOGRAM CONCLUSIONS (02/05/18)  Compared to the report of the study done 10/30/17 - there has been   normalization of TAVR gradient.  Normal left ventricular systolic function.  Left ventricular ejection fraction is visually estimated to be 65%.  Grade I diastolic dysfunction.  Mild mitral regurgitation.  Known TAVR aortic valve that is functioning normally with normal   transvalvular gradients.  Max is 2.64 m/s. Transvalvular gradients are - Peak: 28 mmHg,  Mean: 18 mmHg.   No evidence of paravalvular leak is noted.  Mild pulmonic insufficiency.  (study result  reviewed)     ECHOCARDIOGRAM CONCLUSIONS (10/30/17)  Mild concentric left ventricular hypertrophy.  Normal left ventricular systolic function. Left ventricular ejection   fraction is visually estimated to be 70%.  Grade I diastolic dysfunction.  Normal inferior vena cava size and inspiratory collapse.  Mild mitral stenosis with a mean gradient of 3mmHg at a HR of 63 bpm.  Known TAVR aortic valve. Transvalvular gradients are - Peak: 47 mmHg,   Mean: 28  mmHg.   Estimated right ventricular systolic pressure is 20 mmHg.  Compared to the report of the study done 09/26/2017 there has been an   increase in the mean gradient across the prosthetic aortic valve.     ECHOCARDIOGRAM CONCLUSIONS (09/26/17)  Hyperdynamic left ventricular systolic function. Left ventricular   ejection fraction is visually estimated to be greater than 75%.  Evidence of increased intracavity gradient, peak velocity is 3.35 m/sec.   Normal regional wall motion. Normal diastolic function.  Moderate mitral stenosis. Mean transvalvular gradient is 5.5 mmHg at a   heart rate of 92 BPM.   Normal functioning bioprosthetic aortic valve with a peak gradient of   22 mmHg and mean gradient of 12 mmHg.  Echo images from 9/25/17 showed normal LV function and normal   functioning bioprosthetic aortic with a peak gradient of 22 mmHg and   mean gradient of 13 mmHg.     ECHOCARDIOGRAM CONCLUSIONS by Dr. Acosta (09/25/17)  Intraoperative TTE during TAVR.  Baseline images show normal   biventricular systolic function with EF - 65%.  Severe aortic stenosis.    Post-CPB images show preserved biventricular function.  A 23 mm Oliveira   Leeann 3 valve is seen in the aortic position with normal leaflet   motion, no paravalvular leak, mean gradient of 5 mm Hg, and calculated   effective orifice area of 3.5 cm2.  Findings communicated at the time   of exam.     ECHOCARDIOGRAM CONCLUSIONS (04/19/17)  Structurally normal tricuspid valve. Mild tricuspid regurgitation.      Estimated right ventricular systolic pressure  is 40 mmHg.  Normal left ventricular systolic function.   Estimated right ventricular systolic pressure  is 40 mmHg.  Compared to the images of the study done - there has been increase in   mean gradient across the aortic valve but still within moderate   severity of aortic stenosis.     EKG performed on (10/17/17) was reviewed: EKG shows sinus bradycardia.  EKG performed on (09/27/17) EKG shows sinus tachycardia.  EKG performed on (09/26/17) EKG shows sinus tachycardia.  EKG performed on (09/25/17) EKG shows sinus tachycardia.  EKG performed on (09/21/16) EKG shows sinus bradycardia.  EKG performed on (02/10/16) EKG shows sinus bradycardia.     Laboratory results of (10/13/17) Bun of 15 mg/dl, creatinine levels of 1.23 mg/dl noted.     PULMONARY FUNCTION INTERPRETATION (09/13/17)  REASON FOR STUDY:  Shortness of breath, dyspnea on exertion and a history of   severe aortic stenosis.  SPIROMETRY:  FVC is 1.83, 79% predicted.  FEV1 is 1.44, 83% predicted.    FEV1/FVC is 79.  There is a significant response to bronchodilator.  LUNG VOLUMES:  Vital capacity is 97% predicted.  Total lung capacity is 106%   predicted.  Diffusion studies are normal.  Flow volume loops show some mild   coving in the expiratory limb.  IMPRESSION:  Normal exam      Assessment:     1. S/P TAVR (transcatheter aortic valve replacement)     2. Chronic diastolic CHF (congestive heart failure) (HCC)     3. CAD     4. Hx of CABG     5. Essential hypertension     6. Dyslipidemia     7. TIA (transient ischemic attack)         Medical Decision Making:  Today's Assessment / Status / Plan:     1. Successful transcatheter aortic valve replacement (TAVR) with # 23 Oliveira Leeann S3 valve, transfemoral approach, under conscious sedation (09/25/17): She is clinically doing well, NYHA class I. Her echocardiogram showed increased gradient across the TAVR valve. We will start warfarin and aspirin 81 mg and repeat  an echocardiogram in 3 months.  2. Coronary artery disease with prior 3 vessel coronary bypass graft (LIMA to LAD, KINDRA to PDA, vein graft to OM) in [facility], 2002, ostium of the vein graft  stent placement also in 2012: She is clinically doing well from coronary standpoint. We will continue with current medical care.  3. Carotid artery stenosis: Clinically stable on medical therapy.  4. History of trans-ischemic attack (2012): She remains clinically stable without any new neurological symptoms.  5. Chronic kidney disease: We will continue to follow labs.  6. Small abdominal wall hematoma: Stable     We will follow up in three months with echocardiogram.     CC Pamela Deal, James Lockhart and Joshua Acosta           No Recipients

## 2018-10-11 NOTE — PROGRESS NOTES
Chief Complaint   Patient presents with   • Aortic Stenosis       Subjective:   Silvia Orellana is a 87 y.o. female who presents today for annual follow up of TAVR, study result review and medication change evaluation.    Since the patient's last visit on 11/02/17, she has been doing well clinically. She admits to depression and lack of motivation following the loss oh her  in 2014 and moving from her home in [place] to with her daughter. She denies fatigue, shortness of breath, dyspnea on exertion, chest pain, dizziness or syncope. On the last visit she was started on warfarin for increased gradient across TAVR which normalized her valve pressures and she has been off of this medication.    Past Medical History:   Diagnosis Date   • Aortic stenosis     moderate on echo in '16   • Arthritis    • Bowel habit changes    • Bradycardia    • Breast cancer (HCC)    • Breath shortness    • CAD (coronary artery disease)     CABG    • Cancer (HCC) 2003    left breast lumpectomy   • Cancer (HCC) 2007    bladder ca   • Cancer (HCC) 2016    basal cell ca to nose   • Cataract     patria IOL   • Chronic pain    • Dental disorder     dentures   • Diverticulosis    • Fall    • GERD (gastroesophageal reflux disease)    • Heart burn    • Hx of Clostridium difficile infection 2015   • Hypertension    • Indigestion    • Myocardial infarct (HCC) 2002    cardiac stent   • Pneumonia 2014   • Stroke (HCC) 2001; 2012    no deficits, general weakness   • Urinary bladder disorder    • Urinary incontinence      Past Surgical History:   Procedure Laterality Date   • TRANSCATHETER AORTIC VALVE REPLACEMENT N/A 9/25/2017    Procedure: TRANSCATHETER AORTIC VALVE REPLACEMENT;  Surgeon: Sherif Elder M.D.;  Location: SURGERY [facility];  Service: Cardiac   • TATIANNA  9/25/2017    Procedure: TTE;  Surgeon: Sherif Elder M.D.;  Location: SURGERY [facility];  Service: Cardiac   • ORBITAL FRACTURE ORIF Left 5/23/2016    Procedure: ORBITAL  FRACTURE ORIF FOR: LATERAL CANTHOPEXY;  Surgeon: Fabrice Daniel Jr., M.D.;  Location: SURGERY [facility];  Service:    • FEMUR ORIF Right 8/10/2015    Procedure: FEMUR ORIF;  Surgeon: Umer Guevara M.D.;  Location: SURGERY [facility];  Service:    • ORBITAL FRACTURE ORIF Left 7/6/2015    Procedure: ORBITAL FRACTURE ORIF;  Surgeon: Fabrice Daniel Jr., M.D.;  Location: SURGERY [facility];  Service:    • PB REVISE TOTAL HIP REPLACEMENT  3/24/2015    R   • OTHER CARDIAC SURGERY  2012    cardiac cath with stent placement   • PB REVISE ACETABULAR PART OF TOTAL HIP  2010    L   • OTHER CARDIAC SURGERY  2002    CABG   • GYN SURGERY  1961    hysterectomy   • ANGIOGRAM     • BLADDER BIOPSY     • CARDIAC CATH     • CHOLECYSTECTOMY     • HIP ARTHROPLASTY TOTAL Left    • LUMPECTOMY      L   • MULTIPLE CORONARY ARTERY BYPASS       History reviewed. No pertinent family history.  Social History     Social History   • Marital status:      Spouse name: N/A   • Number of children: N/A   • Years of education: N/A     Occupational History   • Not on file.     Social History Main Topics   • Smoking status: Never Smoker   • Smokeless tobacco: Never Used   • Alcohol use Yes      Comment: maybe once a month   • Drug use: No   • Sexual activity: Not on file     Other Topics Concern   • Not on file     Social History Narrative   • No narrative on file     No Known Allergies     Medications reviewed.    Outpatient Encounter Prescriptions as of 10/11/2018   Medication Sig Dispense Refill   • carisoprodol (SOMA) 350 MG Tab Take 350 mg by mouth 4 times a day.     • dicyclomine (BENTYL) 20 MG Tab Take 20 mg by mouth every 6 hours.     • HYDROcodone/acetaminophen (NORCO)  MG Tab Take 1-2 Tabs by mouth every 6 hours as needed.     • meloxicam (MOBIC) 15 MG tablet      • nitroglycerin (NITROSTAT) 0.4 MG SL Tab Place 0.4 mg under tongue every 5 minutes as needed for Chest Pain.     • lansoprazole (PREVACID) 15  MG TABLET DISPERSIBLE Take 15 mg by mouth every day.     • Promethazine HCl (PHENERGAN PO) Take 25 mg by mouth.     • amLODIPine (NORVASC) 5 MG Tab Take 1 Tab by mouth every day. 90 Tab 0   • furosemide (LASIX) 20 MG Tab Take 1 Tab by mouth every day. 90 Tab 0   • metoprolol SR (TOPROL XL) 50 MG TABLET SR 24 HR Take 1 Tab by mouth every day. 90 Tab 1   • aspirin (ASA) 81 MG Chew Tab chewable tablet Take 81 mg by mouth every day.     • lisinopril (PRINIVIL) 20 MG Tab Take 1 Tab by mouth 2 times a day. 60 Tab 0   • Cyanocobalamin (VITAMIN B-12) 5000 MCG SL Tab Place 1 Tab under tongue every morning.     • ascorbic acid (ASCORBIC ACID) 500 MG Tab Take 500 mg by mouth every morning.     • B Complex Vitamins (VITAMIN B COMPLEX PO) Take 1 Tab by mouth every morning.     • multivitamin (THERAGRAN) Tab Take 1 Tab by mouth every morning.     • atorvastatin (LIPITOR) 20 MG Tab Take 20 mg by mouth every evening.     • calcium citrate (CALCITRATE) 950 MG Tab Take 950 mg by mouth 2 times a day.     • gabapentin (NEURONTIN) 600 MG tablet Take 600 mg by mouth 3 times a day.  5   • citalopram (CELEXA) 20 MG TABS Take 20 mg by mouth every morning.     • warfarin (COUMADIN) 2.5 MG Tab Take 1-2 tabs po q day or as directed by clinic 60 Tab 6   • Mirabegron ER (MYRBETRIQ) 50 MG TABLET SR 24 HR Take 1 Tab by mouth every day.       No facility-administered encounter medications on file as of 10/11/2018.      Review of Systems   Constitutional: Negative.  Negative for chills, fever, malaise/fatigue and weight loss.   HENT: Negative.  Negative for hearing loss.    Eyes: Negative.  Negative for blurred vision and double vision.   Respiratory: Negative.  Negative for cough and shortness of breath.    Cardiovascular: Negative.  Negative for chest pain, palpitations, claudication and leg swelling.   Gastrointestinal: Negative.  Negative for abdominal pain, nausea and vomiting.   Genitourinary: Negative.  Negative for dysuria and urgency.  "  Musculoskeletal: Positive for back pain. Negative for joint pain and myalgias.   Skin: Negative.  Negative for itching and rash.   Neurological: Negative.  Negative for dizziness, focal weakness, weakness and headaches.   Endo/Heme/Allergies: Negative.  Does not bruise/bleed easily.   Psychiatric/Behavioral: Positive for depression. The patient is not nervous/anxious.         Objective:   /80 (BP Location: Left arm, Patient Position: Sitting, BP Cuff Size: Adult)   Pulse 60   Ht 1.6 m (5' 2.99\")   SpO2 95%     Physical Exam   Constitutional: She is oriented to person, place, and time. She appears well-developed and well-nourished.   Using cane.   HENT:   Head: Normocephalic and atraumatic.   Eyes: Pupils are equal, round, and reactive to light. Conjunctivae are normal.   Neck: Normal range of motion. Neck supple.   Cardiovascular: Normal rate and regular rhythm.    No murmur heard.  Pulmonary/Chest: Effort normal and breath sounds normal.   Abdominal: Soft. Bowel sounds are normal.   Musculoskeletal: Normal range of motion. She exhibits no edema.   Neurological: She is alert and oriented to person, place, and time.   Skin: Skin is warm and dry.   Psychiatric: She has a normal mood and affect.     CARDIAC STUDIES/PROCEDURES:     CARDIAC CATHETERIZATION CONCLUSIONS by Dr. Aguirre (08/30/17)  1. Patent LIMA to LAD  2. Patent KINDRA to PDA  3. Patent vein graft to OM, mild/moderate in-stent restenosis of the ostial stent  4. Mildly tortuous abdominal descending aorta     CAROTID ULTRASOUND (03/04/16)  <50% bilateral ICA stenoses   Nl subclavians and vertebrals     CT OF ABDOMEN (09/28/17)  1.  Mild left hydronephrosis and hydroureter with definite distal obstructive etiology not identified.  2.  Apparent rectal wall thickening with perirectal stranding which could indicate proctitis.  3.  Trace bilateral pleural effusions with associated compressive atelectasis and/or consolidation. Underlying infection is " possible.  4.  Status post cholecystectomy.  5.  Atrophic right kidney.  6.  Right renal cysts.  7.  Diverticulosis without evidence of diverticulitis.  8.  Atherosclerosis.     CT OF ABDOMEN (09/25/17)  1.  Small LEFT low anterior abdominal wall hematoma. Ongoing bleeding is not excluded.  2.  Gallbladder decompressed or absent  3.  Duodenal diverticulum  4.  Atrophic RIGHT kidney  5.  Atherosclerosis  6.  Prior hysterectomy  7.  Distal colonic diverticulosis  8.  Small hiatal hernia     CT OF CHEST AND ABDOMEN (09/06/17)  1.  Aortic annulus area of 412 sq mm.  2.  Coronary artery ostia are greater than 10 mm from the annulus.  3.  Moderate tortuosity and atherosclerotic calcification of the iliofemoral arterial system, worse on the RIGHT, with minimum diameters of 5.1 mm on the RIGHT and 6.9 mm on the LEFT.  4.  Markedly atrophic RIGHT kidney with apparent moderate to severe proximal RIGHT renal artery stenosis, likely chronic.     DOBUTAMINE STRESS ECHOCARDIOGRAM (08/07/17)  Resting Echo: LVEF 65% with AoV Vmax  of 2.52 m/s; peak gradient 25.5 mmHg and mean 16.11 mmHg  Peak Dobutamine: LVEF 80%, Vmax 4.02 m/s; Peak gradient 64.75 mmHg and mean 36.42 mmHg  No wall motion abnormality    ECHOCARDIOGRAM CONCLUSIONS (02/05/18)  Compared to the report of the study done 10/30/17 - there has been   normalization of TAVR gradient.  Normal left ventricular systolic function.  Left ventricular ejection fraction is visually estimated to be 65%.  Grade I diastolic dysfunction.  Mild mitral regurgitation.  Known TAVR aortic valve that is functioning normally with normal   transvalvular gradients.  Max is 2.64 m/s. Transvalvular gradients are - Peak: 28 mmHg,  Mean: 18 mmHg.   No evidence of paravalvular leak is noted.  Mild pulmonic insufficiency.  (study result reviewed)     ECHOCARDIOGRAM CONCLUSIONS (10/30/17)  Mild concentric left ventricular hypertrophy.  Normal left ventricular systolic function. Left ventricular  ejection   fraction is visually estimated to be 70%.  Grade I diastolic dysfunction.  Normal inferior vena cava size and inspiratory collapse.  Mild mitral stenosis with a mean gradient of 3mmHg at a HR of 63 bpm.  Known TAVR aortic valve. Transvalvular gradients are - Peak: 47 mmHg, Mean: 28  mmHg.   Estimated right ventricular systolic pressure is 20 mmHg.  Compared to the report of the study done 09/26/2017 there has been an   increase in the mean gradient across the prosthetic aortic valve.     ECHOCARDIOGRAM CONCLUSIONS (09/26/17)  Hyperdynamic left ventricular systolic function. Left ventricular   ejection fraction is visually estimated to be greater than 75%.  Evidence of increased intracavity gradient, peak velocity is 3.35 m/sec.   Normal regional wall motion. Normal diastolic function.  Moderate mitral stenosis. Mean transvalvular gradient is 5.5 mmHg at a   heart rate of 92 BPM.   Normal functioning bioprosthetic aortic valve with a peak gradient of   22 mmHg and mean gradient of 12 mmHg.  Echo images from 9/25/17 showed normal LV function and normal   functioning bioprosthetic aortic with a peak gradient of 22 mmHg and   mean gradient of 13 mmHg.     ECHOCARDIOGRAM CONCLUSIONS by Dr. Acosta (09/25/17)  Intraoperative TTE during TAVR.  Baseline images show normal   biventricular systolic function with EF - 65%.  Severe aortic stenosis.    Post-CPB images show preserved biventricular function.  A 23 mm Oliveira   Leeann 3 valve is seen in the aortic position with normal leaflet   motion, no paravalvular leak, mean gradient of 5 mm Hg, and calculated   effective orifice area of 3.5 cm2.  Findings communicated at the time   of exam.     ECHOCARDIOGRAM CONCLUSIONS (04/19/17)  Structurally normal tricuspid valve. Mild tricuspid regurgitation.   Estimated right ventricular systolic pressure  is 40 mmHg.  Normal left ventricular systolic function.   Estimated right ventricular systolic pressure  is 40  mmHg.  Compared to the images of the study done - there has been increase in   mean gradient across the aortic valve but still within moderate   severity of aortic stenosis.     EKG performed on (10/17/17) was reviewed: EKG shows sinus bradycardia.  EKG performed on (09/27/17) EKG shows sinus tachycardia.  EKG performed on (09/26/17) EKG shows sinus tachycardia.  EKG performed on (09/25/17) EKG shows sinus tachycardia.  EKG performed on (09/21/16) EKG shows sinus bradycardia.  EKG performed on (02/10/16) EKG shows sinus bradycardia.    Laboratory results of (10/05/18) were reviewed. Bun of 13 mg/dl, creatinine levels of 1.18 mg/dl noted. Cholesterol profile of 130/134/41/62 noted.    Laboratory results of (10/13/17) Bun of 15 mg/dl, creatinine levels of 1.23 mg/dl noted.     PULMONARY FUNCTION INTERPRETATION (09/13/17)  REASON FOR STUDY:  Shortness of breath, dyspnea on exertion and a history of   severe aortic stenosis.  SPIROMETRY:  FVC is 1.83, 79% predicted.  FEV1 is 1.44, 83% predicted.    FEV1/FVC is 79.  There is a significant response to bronchodilator.  LUNG VOLUMES:  Vital capacity is 97% predicted.  Total lung capacity is 106%   predicted.  Diffusion studies are normal.  Flow volume loops show some mild   coving in the expiratory limb.  IMPRESSION:  Normal exam    Assessment:     1. S/P TAVR (transcatheter aortic valve replacement)     2. Chronic diastolic CHF (congestive heart failure) (HCC)     3. CAD     4. Hx of CABG     5. Essential hypertension     6. Dyslipidemia     7. TIA (transient ischemic attack)         Medical Decision Making:  Today's Assessment / Status / Plan:     1. Successful transcatheter aortic valve replacement (TAVR) with # 23 Oliveira Leeann S3 valve, transfemoral approach, under conscious sedation (09/25/17): She is clinically doing well, NYHA class I. Her echocardiogram showed increased gradient across the TAVR valve which has resolved off of warfarin. We repeat an  echocardiogram.  2. Coronary artery disease with prior 3 vessel coronary bypass graft (LIMA to LAD, KINDRA to PDA, vein graft to OM) in [facility], 2002, ostium of the vein graft  stent placement also in 2012: She is clinically doing well from coronary standpoint. We will continue with current medical care.  3. Carotid artery stenosis: Clinically stable on medical therapy.  4. History of trans-ischemic attack (2012): She remains clinically stable without any new neurological symptoms.  5. Chronic kidney disease: We will continue to follow labs.  6. Small abdominal wall hematoma: Stable      We will follow up with her in PRN from TAVR standpoint.     CC Pamela Deal and Joshua Acosta

## 2018-10-15 ENCOUNTER — TELEPHONE (OUTPATIENT)
Dept: CARDIOLOGY | Facility: MEDICAL CENTER | Age: 83
End: 2018-10-15

## 2018-10-15 NOTE — TELEPHONE ENCOUNTER
Valve Program Functional Assessment: 10/11/18 1 year post-Op    [id number]   1a) Showering/bathing: 3  1b) Walking 1 block on ground: 3  1c) Hurrying or joggin  2) Swellin  3) Fatigue: 3  4) Shortness of breath: 3  5) Sleep sitting up: 5  6) Limited enjoyment of life: 4  7) Spend the rest of your life with HF: 4  8a) Hobbies, recreational activities:5  8b) Working or doing household chores:2   8c) Visiting family or friends: 2    5 meter walk test  1) _9.26_____ s/5m  2) _10.18_____ s/5m  3) _12.08_____ s/5m     Strength   1) _20_____ kg  2) _18_____ kg  3) _10_____ kg    [place] ADLs  Patient independently preforms...   - Bathing: Yes   - Dressing: Yes   - Toileting: Yes   - Transferring: Yes   - Continence: Yes   - Feeding: Yes     Living Situation  Patient lives:  with adult child     Mobility Aids   Patient uses: cane

## 2018-10-25 ENCOUNTER — APPOINTMENT (OUTPATIENT)
Dept: CARDIOLOGY | Facility: MEDICAL CENTER | Age: 83
End: 2018-10-25
Attending: INTERNAL MEDICINE
Payer: MEDICARE

## 2018-11-13 ENCOUNTER — HOSPITAL ENCOUNTER (OUTPATIENT)
Dept: CARDIOLOGY | Facility: MEDICAL CENTER | Age: 83
End: 2018-11-13
Attending: INTERNAL MEDICINE
Payer: MEDICARE

## 2018-11-13 DIAGNOSIS — Z95.2 S/P TAVR (TRANSCATHETER AORTIC VALVE REPLACEMENT): ICD-10-CM

## 2018-11-13 LAB
LV EJECT FRACT  99904: 60
LV EJECT FRACT MOD 2C 99903: 64.6
LV EJECT FRACT MOD 4C 99902: 61.6
LV EJECT FRACT MOD BP 99901: 62.78

## 2018-11-13 PROCEDURE — 93306 TTE W/DOPPLER COMPLETE: CPT

## 2018-11-13 PROCEDURE — 93306 TTE W/DOPPLER COMPLETE: CPT | Mod: 26 | Performed by: INTERNAL MEDICINE

## 2018-11-14 ENCOUNTER — TELEPHONE (OUTPATIENT)
Dept: CARDIOLOGY | Facility: MEDICAL CENTER | Age: 83
End: 2018-11-14

## 2018-11-15 NOTE — TELEPHONE ENCOUNTER
Message   Received: Yesterday   Message Contents   Sherif Elder M.D.  Patty Gibbs R.N.             Please call patient with unremarkable echocardiogram results showing normally functioning TAVR valve.     Thanks.  JI.      Pt notified. She said she has been feeling fatigued lately but admits that she isn't exercising enough. Advised pt to discuss with her primary care provider as Dr. Rendon may want to order some additional testing but reassured her that her heart function was good. Pt appreciated and agrees to contact her PCP.

## 2018-11-21 DIAGNOSIS — I10 ESSENTIAL HYPERTENSION: ICD-10-CM

## 2018-11-26 RX ORDER — METOPROLOL SUCCINATE 50 MG/1
50 TABLET, EXTENDED RELEASE ORAL DAILY
Qty: 90 TAB | Refills: 3 | Status: SHIPPED | OUTPATIENT
Start: 2018-11-26 | End: 2019-11-15

## 2018-11-26 RX ORDER — AMLODIPINE BESYLATE 5 MG/1
5 TABLET ORAL DAILY
Qty: 90 TAB | Refills: 3 | Status: SHIPPED | OUTPATIENT
Start: 2018-11-26 | End: 2019-11-19 | Stop reason: SDUPTHER

## 2018-11-26 RX ORDER — FUROSEMIDE 20 MG/1
20 TABLET ORAL DAILY
Qty: 90 TAB | Refills: 3 | Status: SHIPPED | OUTPATIENT
Start: 2018-11-26 | End: 2019-11-19 | Stop reason: SDUPTHER

## 2018-12-24 DIAGNOSIS — I10 ESSENTIAL HYPERTENSION: Primary | ICD-10-CM

## 2018-12-24 RX ORDER — LISINOPRIL 20 MG/1
20 TABLET ORAL 2 TIMES DAILY
Qty: 180 TAB | Refills: 3 | Status: SHIPPED | OUTPATIENT
Start: 2018-12-24 | End: 2019-10-07 | Stop reason: SDUPTHER

## 2019-05-01 ENCOUNTER — HOSPITAL ENCOUNTER (OUTPATIENT)
Dept: RADIOLOGY | Facility: MEDICAL CENTER | Age: 84
End: 2019-05-01
Attending: INTERNAL MEDICINE
Payer: MEDICARE

## 2019-05-01 DIAGNOSIS — R13.12 OROPHARYNGEAL DYSPHAGIA: ICD-10-CM

## 2019-05-01 DIAGNOSIS — K57.30 DIVERTICULOSIS OF LARGE INTESTINE WITHOUT DIVERTICULITIS: ICD-10-CM

## 2019-05-01 DIAGNOSIS — R10.32 ABDOMINAL PAIN, LEFT LOWER QUADRANT: ICD-10-CM

## 2019-05-01 DIAGNOSIS — R12 HEARTBURN: ICD-10-CM

## 2019-05-01 DIAGNOSIS — R13.19 CONSTANT LOW-GRADE DYSPHAGIA: ICD-10-CM

## 2019-05-01 DIAGNOSIS — R13.11 DYSPHAGIA, ORAL PHASE: ICD-10-CM

## 2019-05-01 PROCEDURE — 74230 X-RAY XM SWLNG FUNCJ C+: CPT

## 2019-05-01 PROCEDURE — 92611 MOTION FLUOROSCOPY/SWALLOW: CPT

## 2019-05-01 NOTE — OP THERAPY EVALUATION
[facility]  Speech-Language Pathology Department  Modified Barium Swallow Study Summary    Patient: Silvia Orellana     Date of Evaluation: 5/1/19    Referring Provider:  Dr. Pérez Kang; Fax: [phone]    Radiologist:  Dr. Amaya    Patient History/Reason for Referral: Pt was referred for MBSS due to 'oral phase dysphagia, esophageal dysphagia, heartburn.' Pt with PMHx notable for CVA (vs. TIA, per patient), PNA, MI, indigestion, HTN, heartburn, GERD, falls, dental disorder, CHF, CAD,SOB and CA. Pt last seen by SLP services on 8/13/15 and was recommended for D2/thins diet.    Pt arrived on time accompanied by her daughter, Guadalupe. Pt reported that she coughs and chokes while eating and/or drinking. Pt reported that she coughs while drinking water, and coughs/chokes while eating all foods, except mashed potatoes. Pt endorsed some difficulties swallowing pills as well. Pt denied recent hx of PNA and/or odynophagia; however, endorsed some swollen/tender tonsils.      Oral Mechanism Exam:   -Dentition: Intact; upper denture plate and natural dentition on bottom  -Labial: WFL  -Lingual: WFL  -Palatal Elevation: WFL  -Laryngeal Elevation: Reduced, complete  -Cough: Adequate  -Vocal Quality: Normal, slightly hoarse  - Circumlaryngeal Palpation: Pt reported pain level of '3 or 4' on scale of 0-10 (with 0=no pain and 10=worst pain imaginable.)    Procedure Results:  The examination was conducted with videofluoroscopy from a   Lateral and Anterior view.  The following textures were presented:   Pudding, Cookie and Thin Liquid     Structural Abnormalities:  N/A    The following observations were made:   (WFL unless otherwise noted)    Oral Phase Thin Liquids  Puree Solid   Lip Closure      Tongue Control During Bolus Hold      Premature spillage into the valleculae      Premature spillage into the pyriform sinus      Bolus Preparation/ Mastication      Bolus Transport/ Lingual Motion      Oral  Residue after swallow X  trace X  collection X  trace   Initiation of Pharyngeal Swallow X  Delay to piriforms X  Delay to valleculae       Other Observations:            Pharyngeal Phase Thin Liquids  Puree Solid   Soft Palate Elevation      Laryngeal Elevation      Anterior Hyoid Excursion X  reduced X  reduced X  reduced   Epiglottic Inversion       Laryngeal Vestibular Closure      Pharyngeal Stripping Wave      Pharyngoesophageal Segment Opening (PES)      Tongue Base Retraction (BOT) and/or BOT Residue      Residue in valleculae X  collection X  trace X  trace   Residue in piriform sinus(es) X  collection     Residue on posterior pharyngeal wall (PPW)      Penetration X  Trace (during serial sips only)     Aspiration        Other Observations:      Penetration Aspiration Scale:   Score of 1: Neither penetration nor aspiration  Score of 2-5: Penetration  Score of 6-8: Aspiration    1: Material does not enter airway   2: Material enters airway, but remains above vocal folds; Ejected from airway; no stasis  3: Material remains above vocal folds; visible stasis remains  4: Material contacts vocal folds, but is ejected; no stasis  5: Material contacts vocal folds, and is not ejected; visible stasis remains  6: Material passes glottis, but is ejected from airway; No visible subglottic stasis  7: Material passes glottis, but is not ejected from airway; visible subglottic stasis despite patient’s response  8: Material passes glottis, and is not ejected; visible subglottic stasis; Absent patient response                          Esophageal Phase:  Mid-esophageal retention with retrograde flow below UES                                Impressions:  Pt with trace penetration during large liquid bolus, serial sips only. No other penetration and/or aspiration events appreciated across remainder of today's session. Oral dysphagia characterized by trace-collection oral residue after the swallow (which Pt cleared with cue to  take secondary swallow) and delayed swallow initiation (to piriforms for liquids and to valleculae for puree.) Pharyngeal dysphagia characterized by reduced anterior hyoid excursion, trace-collection vallecular residue and collection residue in piriform sinuses. Pt was able to successfully clear pharyngeal residue with cue to take secondary swallow. Esophageal swallow phase characterized by mid-esophageal retention with retrograde flow below UES.      Recommendations: Diet:     Regular        Liquid:     Thin                                        Medications:   Whole with liquid wash (or floated in puree)            Compensatory Strategies:   Upright 90 degrees, Small bites/sips, Alternate bites/sips, Secondary swallows, Slow feeding rate, Concentrate on swallowing, Remain upright for >30-min after eating/drinking        Your patient may benefit from a referral for:  - GI/Barium Esophagram to further evaluate esophageal swallow function               Thank you for the referral.    For further questions, please call [phone].       Nhung Sam, SLP

## 2019-05-13 ENCOUNTER — HOSPITAL ENCOUNTER (OUTPATIENT)
Dept: RADIOLOGY | Facility: MEDICAL CENTER | Age: 84
End: 2019-05-13
Attending: INTERNAL MEDICINE
Payer: MEDICARE

## 2019-05-13 DIAGNOSIS — R13.11 DYSPHAGIA, ORAL PHASE: ICD-10-CM

## 2019-05-13 DIAGNOSIS — R13.19 CONSTANT LOW-GRADE DYSPHAGIA: ICD-10-CM

## 2019-05-13 DIAGNOSIS — K57.30 DIVERTICULOSIS OF LARGE INTESTINE WITHOUT DIVERTICULITIS: ICD-10-CM

## 2019-05-13 DIAGNOSIS — R12 HEARTBURN: ICD-10-CM

## 2019-05-13 PROCEDURE — 700101 HCHG RX REV CODE 250: Performed by: INTERNAL MEDICINE

## 2019-05-13 PROCEDURE — 74220 X-RAY XM ESOPHAGUS 1CNTRST: CPT

## 2019-05-13 RX ADMIN — BARIUM SULFATE 700 MG: 700 TABLET ORAL at 09:45

## 2019-10-07 DIAGNOSIS — I10 ESSENTIAL HYPERTENSION: ICD-10-CM

## 2019-10-08 RX ORDER — LISINOPRIL 20 MG/1
TABLET ORAL
Qty: 180 TAB | Refills: 0 | Status: SHIPPED | OUTPATIENT
Start: 2019-10-08 | End: 2020-03-12

## 2019-10-17 ENCOUNTER — HOSPITAL ENCOUNTER (OUTPATIENT)
Dept: CARDIOLOGY | Facility: MEDICAL CENTER | Age: 84
End: 2019-10-17
Attending: INTERNAL MEDICINE
Payer: MEDICARE

## 2019-10-17 DIAGNOSIS — Z95.2 S/P TAVR (TRANSCATHETER AORTIC VALVE REPLACEMENT): ICD-10-CM

## 2019-10-17 PROCEDURE — 93306 TTE W/DOPPLER COMPLETE: CPT

## 2019-10-18 LAB
LV EJECT FRACT  99904: 65
LV EJECT FRACT MOD 2C 99903: 72.85
LV EJECT FRACT MOD 4C 99902: 65.5
LV EJECT FRACT MOD BP 99901: 69.69

## 2019-10-18 PROCEDURE — 93306 TTE W/DOPPLER COMPLETE: CPT | Mod: 26 | Performed by: INTERNAL MEDICINE

## 2019-11-15 ENCOUNTER — OFFICE VISIT (OUTPATIENT)
Dept: CARDIOLOGY | Facility: MEDICAL CENTER | Age: 84
End: 2019-11-15
Payer: MEDICARE

## 2019-11-15 VITALS
HEIGHT: 63 IN | WEIGHT: 209.44 LBS | OXYGEN SATURATION: 94 % | DIASTOLIC BLOOD PRESSURE: 66 MMHG | SYSTOLIC BLOOD PRESSURE: 122 MMHG | BODY MASS INDEX: 37.11 KG/M2 | HEART RATE: 65 BPM

## 2019-11-15 DIAGNOSIS — I25.84 CORONARY ARTERY DISEASE DUE TO CALCIFIED CORONARY LESION: ICD-10-CM

## 2019-11-15 DIAGNOSIS — I65.23 BILATERAL CAROTID ARTERY STENOSIS: ICD-10-CM

## 2019-11-15 DIAGNOSIS — E78.5 DYSLIPIDEMIA: ICD-10-CM

## 2019-11-15 DIAGNOSIS — G45.9 TIA (TRANSIENT ISCHEMIC ATTACK): ICD-10-CM

## 2019-11-15 DIAGNOSIS — Z95.2 S/P TAVR (TRANSCATHETER AORTIC VALVE REPLACEMENT): ICD-10-CM

## 2019-11-15 DIAGNOSIS — I10 ESSENTIAL HYPERTENSION: ICD-10-CM

## 2019-11-15 DIAGNOSIS — I25.10 CORONARY ARTERY DISEASE DUE TO CALCIFIED CORONARY LESION: ICD-10-CM

## 2019-11-15 DIAGNOSIS — Z95.1 HX OF CABG: ICD-10-CM

## 2019-11-15 DIAGNOSIS — I50.32 CHRONIC DIASTOLIC CHF (CONGESTIVE HEART FAILURE) (HCC): ICD-10-CM

## 2019-11-15 PROCEDURE — 99215 OFFICE O/P EST HI 40 MIN: CPT | Performed by: INTERNAL MEDICINE

## 2019-11-15 RX ORDER — FUROSEMIDE 20 MG/1
TABLET ORAL
COMMUNITY
End: 2019-11-15

## 2019-11-15 RX ORDER — CELECOXIB 100 MG/1
CAPSULE ORAL
COMMUNITY
End: 2019-11-15

## 2019-11-15 RX ORDER — METOPROLOL TARTRATE 50 MG/1
TABLET, FILM COATED ORAL
COMMUNITY
End: 2019-11-19

## 2019-11-15 RX ORDER — CITALOPRAM 40 MG/1
TABLET ORAL
COMMUNITY
End: 2019-11-15

## 2019-11-15 RX ORDER — ATORVASTATIN CALCIUM 20 MG/1
TABLET, FILM COATED ORAL
COMMUNITY
End: 2019-11-15

## 2019-11-15 RX ORDER — METOPROLOL SUCCINATE 50 MG/1
TABLET, EXTENDED RELEASE ORAL
COMMUNITY
End: 2019-11-15

## 2019-11-15 RX ORDER — CITALOPRAM 20 MG/1
20 TABLET ORAL EVERY MORNING
COMMUNITY
End: 2024-01-03

## 2019-11-15 RX ORDER — OMEPRAZOLE 40 MG/1
40 CAPSULE, DELAYED RELEASE ORAL DAILY
COMMUNITY

## 2019-11-15 RX ORDER — LISINOPRIL 20 MG/1
TABLET ORAL
COMMUNITY
End: 2019-11-15

## 2019-11-15 RX ORDER — HYDROCODONE BITARTRATE AND ACETAMINOPHEN 10; 325 MG/1; MG/1
TABLET ORAL
COMMUNITY
End: 2019-11-15

## 2019-11-15 RX ORDER — AMLODIPINE BESYLATE 5 MG/1
TABLET ORAL
COMMUNITY
End: 2019-11-15

## 2019-11-15 RX ORDER — MELOXICAM 15 MG/1
TABLET ORAL
COMMUNITY
End: 2019-11-15

## 2019-11-15 RX ORDER — GABAPENTIN 600 MG/1
TABLET ORAL
COMMUNITY
End: 2019-11-15

## 2019-11-15 RX ORDER — CITALOPRAM 40 MG/1
TABLET ORAL
COMMUNITY
Start: 2019-08-28 | End: 2019-11-15

## 2019-11-15 RX ORDER — LISINOPRIL 10 MG/1
TABLET ORAL
COMMUNITY
End: 2019-11-15

## 2019-11-15 RX ORDER — ATORVASTATIN CALCIUM 40 MG/1
TABLET, FILM COATED ORAL
COMMUNITY
End: 2019-11-15

## 2019-11-15 ASSESSMENT — ENCOUNTER SYMPTOMS
NAUSEA: 0
FOCAL WEAKNESS: 0
WEAKNESS: 0
FEVER: 0
COUGH: 0
CLAUDICATION: 0
NERVOUS/ANXIOUS: 0
SHORTNESS OF BREATH: 1
VOMITING: 0
CARDIOVASCULAR NEGATIVE: 1
DOUBLE VISION: 0
BRUISES/BLEEDS EASILY: 0
CHILLS: 0
HEADACHES: 0
EYES NEGATIVE: 1
ABDOMINAL PAIN: 0
MYALGIAS: 0
DEPRESSION: 1
GASTROINTESTINAL NEGATIVE: 1
PALPITATIONS: 0
NEUROLOGICAL NEGATIVE: 1
DIZZINESS: 0
WEIGHT LOSS: 0
BLURRED VISION: 0

## 2019-11-19 DIAGNOSIS — I10 ESSENTIAL HYPERTENSION: ICD-10-CM

## 2019-11-19 RX ORDER — FUROSEMIDE 20 MG/1
TABLET ORAL
Qty: 90 TAB | Refills: 3 | Status: SHIPPED | OUTPATIENT
Start: 2019-11-19 | End: 2021-01-19 | Stop reason: SDUPTHER

## 2019-11-19 RX ORDER — METOPROLOL SUCCINATE 50 MG/1
TABLET, EXTENDED RELEASE ORAL
Qty: 90 TAB | Refills: 3 | Status: SHIPPED | OUTPATIENT
Start: 2019-11-19 | End: 2021-01-19 | Stop reason: SDUPTHER

## 2019-11-19 RX ORDER — AMLODIPINE BESYLATE 5 MG/1
TABLET ORAL
Qty: 90 TAB | Refills: 3 | Status: SHIPPED | OUTPATIENT
Start: 2019-11-19 | End: 2021-01-14 | Stop reason: SDUPTHER

## 2020-03-12 DIAGNOSIS — I10 ESSENTIAL HYPERTENSION: ICD-10-CM

## 2020-03-12 RX ORDER — LISINOPRIL 20 MG/1
TABLET ORAL
Qty: 180 TAB | Refills: 2 | Status: SHIPPED | OUTPATIENT
Start: 2020-03-12 | End: 2021-01-19 | Stop reason: SDUPTHER

## 2021-01-14 ENCOUNTER — TELEPHONE (OUTPATIENT)
Dept: CARDIOLOGY | Facility: MEDICAL CENTER | Age: 86
End: 2021-01-14

## 2021-01-14 DIAGNOSIS — I10 ESSENTIAL HYPERTENSION: ICD-10-CM

## 2021-01-14 DIAGNOSIS — Z23 NEED FOR VACCINATION: ICD-10-CM

## 2021-01-14 RX ORDER — AMLODIPINE BESYLATE 5 MG/1
5 TABLET ORAL
Qty: 90 TAB | Refills: 3 | Status: SHIPPED | OUTPATIENT
Start: 2021-01-14 | End: 2022-02-01

## 2021-01-14 NOTE — TELEPHONE ENCOUNTER
JI    Patient is all out of their amLODIPine (NORVASC) 5 MG Tab. Pt has an appt on 1/19.    Thank you,  Glenys ARREOLA

## 2021-01-14 NOTE — TELEPHONE ENCOUNTER
Pt has an upcoming appt scheduled with Dr. Elder 1/19/2021, last seen 11/2019.     Amlodipine refilled, pt notified.

## 2021-01-19 ENCOUNTER — OFFICE VISIT (OUTPATIENT)
Dept: CARDIOLOGY | Facility: MEDICAL CENTER | Age: 86
End: 2021-01-19
Payer: MEDICARE

## 2021-01-19 VITALS
HEIGHT: 64 IN | BODY MASS INDEX: 34.35 KG/M2 | HEART RATE: 61 BPM | DIASTOLIC BLOOD PRESSURE: 60 MMHG | WEIGHT: 201.2 LBS | SYSTOLIC BLOOD PRESSURE: 128 MMHG | OXYGEN SATURATION: 93 %

## 2021-01-19 DIAGNOSIS — I10 ESSENTIAL HYPERTENSION: ICD-10-CM

## 2021-01-19 DIAGNOSIS — E78.5 DYSLIPIDEMIA: ICD-10-CM

## 2021-01-19 DIAGNOSIS — Z95.2 S/P TAVR (TRANSCATHETER AORTIC VALVE REPLACEMENT): ICD-10-CM

## 2021-01-19 DIAGNOSIS — I65.23 BILATERAL CAROTID ARTERY STENOSIS: ICD-10-CM

## 2021-01-19 DIAGNOSIS — Z95.1 HX OF CABG: ICD-10-CM

## 2021-01-19 DIAGNOSIS — I50.32 CHRONIC DIASTOLIC CHF (CONGESTIVE HEART FAILURE) (HCC): ICD-10-CM

## 2021-01-19 PROCEDURE — 99214 OFFICE O/P EST MOD 30 MIN: CPT | Performed by: INTERNAL MEDICINE

## 2021-01-19 RX ORDER — FUROSEMIDE 20 MG/1
20 TABLET ORAL
Qty: 90 TAB | Refills: 3 | Status: SHIPPED | OUTPATIENT
Start: 2021-01-19 | End: 2022-01-28

## 2021-01-19 RX ORDER — CITALOPRAM 40 MG/1
40 TABLET ORAL
COMMUNITY
Start: 2020-11-17 | End: 2021-01-19

## 2021-01-19 RX ORDER — ATORVASTATIN CALCIUM 40 MG/1
40 TABLET, FILM COATED ORAL
COMMUNITY
Start: 2020-11-17 | End: 2022-03-02

## 2021-01-19 RX ORDER — METOPROLOL SUCCINATE 50 MG/1
50 TABLET, EXTENDED RELEASE ORAL
Qty: 90 TAB | Refills: 3 | Status: SHIPPED | OUTPATIENT
Start: 2021-01-19 | End: 2022-01-06

## 2021-01-19 RX ORDER — LISINOPRIL 20 MG/1
20 TABLET ORAL 2 TIMES DAILY
Qty: 180 TAB | Refills: 3 | Status: SHIPPED | OUTPATIENT
Start: 2021-01-19 | End: 2022-02-01

## 2021-01-19 SDOH — HEALTH STABILITY: MENTAL HEALTH: HOW OFTEN DO YOU HAVE A DRINK CONTAINING ALCOHOL?: MONTHLY OR LESS

## 2021-01-19 ASSESSMENT — ENCOUNTER SYMPTOMS
CLAUDICATION: 0
HEADACHES: 0
MUSCULOSKELETAL NEGATIVE: 1
MYALGIAS: 0
NAUSEA: 0
VOMITING: 0
NEUROLOGICAL NEGATIVE: 1
CHILLS: 0
DIZZINESS: 0
NERVOUS/ANXIOUS: 0
ABDOMINAL PAIN: 0
BRUISES/BLEEDS EASILY: 0
DEPRESSION: 0
FEVER: 0
WEAKNESS: 0
PSYCHIATRIC NEGATIVE: 1
DOUBLE VISION: 0
EYES NEGATIVE: 1
WEIGHT LOSS: 0
FOCAL WEAKNESS: 0
CARDIOVASCULAR NEGATIVE: 1
BLURRED VISION: 0
PALPITATIONS: 0
GASTROINTESTINAL NEGATIVE: 1
SHORTNESS OF BREATH: 0
COUGH: 1

## 2021-01-19 NOTE — PROGRESS NOTES
Chief Complaint   Patient presents with   • Coronary Artery Disease   • Carotid Artery Stenosis   • Coronary Artery Bypass Graft (CABG)   • CHF (Diastolic)   • HTN (Controlled)       Subjective:   Silvia Orellana is a 89 y.o. female who presents today for annual TAVR follow up.    Since the patient's last visit on 11/15/19, she has been experiencing persistent cough. She denies fatigue, shortness of breath, dyspnea on exertion, chest pain, dizziness or syncope. She is keeping safe at home.     Past Medical History:   Diagnosis Date   • Arthritis    • Bowel habit changes    • Bradycardia    • Breast cancer (HCC)    • Breath shortness    • CAD (coronary artery disease)     CABG    • Cancer (HCC) 2003    left breast lumpectomy   • Cancer (HCC) 2007    bladder ca   • Cancer (HCC) 2016    basal cell ca to nose   • Cataract     patria IOL   • CHF (congestive heart failure) (HCC)    • Chronic pain    • Dental disorder     dentures   • Diverticulosis    • Fall    • GERD (gastroesophageal reflux disease)    • Heart burn    • Hx of Clostridium difficile infection 2015   • Hyperlipidemia    • Hypertension    • Indigestion    • Myocardial infarct (HCC) 2002    cardiac stent   • Pneumonia 2014   • Stroke (HCC) 2001; 2012    no deficits, general weakness   • Urinary bladder disorder    • Urinary incontinence      Past Surgical History:   Procedure Laterality Date   • TRANSCATHETER AORTIC VALVE REPLACEMENT N/A 9/25/2017    Procedure: TRANSCATHETER AORTIC VALVE REPLACEMENT;  Surgeon: Sherif Elder M.D.;  Location: SURGERY [facility];  Service: Cardiac   • TATIANNA  9/25/2017    Procedure: TTE;  Surgeon: Sherif Elder M.D.;  Location: SURGERY [facility];  Service: Cardiac   • ORBITAL FRACTURE ORIF Left 5/23/2016    Procedure: ORBITAL FRACTURE ORIF FOR: LATERAL CANTHOPEXY;  Surgeon: Fabrice Daniel Jr., M.D.;  Location: SURGERY [facility];  Service:    • FEMUR ORIF Right 8/10/2015    Procedure: FEMUR ORIF;  Surgeon:  Umer Guevara M.D.;  Location: SURGERY [facility];  Service:    • ORBITAL FRACTURE ORIF Left 7/6/2015    Procedure: ORBITAL FRACTURE ORIF;  Surgeon: Fabrice Daniel Jr., M.D.;  Location: SURGERY [facility];  Service:    • PB REVISE TOTAL HIP REPLACEMENT  3/24/2015    R   • OTHER CARDIAC SURGERY  2012    cardiac cath with stent placement   • PB REVISE ACETABULAR PART OF TOTAL HIP  2010    L   • OTHER CARDIAC SURGERY  2002    CABG   • GYN SURGERY  1961    hysterectomy   • ANGIOGRAM     • BLADDER BIOPSY     • CHOLECYSTECTOMY     • HIP ARTHROPLASTY TOTAL Left    • LUMPECTOMY      L   • MULTIPLE CORONARY ARTERY BYPASS     • ZZZ CARDIAC CATH       Family History   Problem Relation Age of Onset   • Heart Attack Neg Hx      Social History     Socioeconomic History   • Marital status:      Spouse name: Not on file   • Number of children: Not on file   • Years of education: Not on file   • Highest education level: Not on file   Occupational History   • Not on file   Social Needs   • Financial resource strain: Not on file   • Food insecurity     Worry: Not on file     Inability: Not on file   • Transportation needs     Medical: Not on file     Non-medical: Not on file   Tobacco Use   • Smoking status: Never Smoker   • Smokeless tobacco: Never Used   Substance and Sexual Activity   • Alcohol use: Yes     Frequency: Monthly or less     Comment: maybe once a month   • Drug use: No   • Sexual activity: Not on file   Lifestyle   • Physical activity     Days per week: Not on file     Minutes per session: Not on file   • Stress: Not on file   Relationships   • Social connections     Talks on phone: Not on file     Gets together: Not on file     Attends Yazdanism service: Not on file     Active member of club or organization: Not on file     Attends meetings of clubs or organizations: Not on file     Relationship status: Not on file   • Intimate partner violence     Fear of current or ex partner: Not on file      Emotionally abused: Not on file     Physically abused: Not on file     Forced sexual activity: Not on file   Other Topics Concern   • Not on file   Social History Narrative   • Not on file     No Known Allergies     (Medications reviewed.)  Outpatient Encounter Medications as of 1/19/2021   Medication Sig Dispense Refill   • furosemide (LASIX) 20 MG Tab Take 1 Tab by mouth every day. 90 Tab 3   • lisinopril (PRINIVIL) 20 MG Tab Take 1 Tab by mouth 2 times a day. TAKE 1 TABLET BY MOUTH TWICE A  Tab 3   • metoprolol SR (TOPROL XL) 50 MG TABLET SR 24 HR Take 1 Tab by mouth every day. 90 Tab 3   • amLODIPine (NORVASC) 5 MG Tab Take 1 Tab by mouth every day. 90 Tab 3   • miconazole (MONISTAT) 2 % Cream Miconazole 7 2 % vaginal cream   APPLY 1 APPLICATION TOP TWICE A DAY     • omeprazole (PRILOSEC) 40 MG delayed-release capsule omeprazole 40 mg capsule,delayed release     • citalopram (CELEXA) 20 MG Tab citalopram 20 mg tablet     • dicyclomine (BENTYL) 20 MG Tab Take 20 mg by mouth every 6 hours.     • HYDROcodone/acetaminophen (NORCO)  MG Tab Take 1-2 Tabs by mouth every 6 hours as needed.     • aspirin (ASA) 81 MG Chew Tab chewable tablet Take 81 mg by mouth every day.     • Cyanocobalamin (VITAMIN B-12) 5000 MCG SL Tab Place 1 Tab under tongue every morning.     • ascorbic acid (ASCORBIC ACID) 500 MG Tab Take 500 mg by mouth every morning.     • B Complex Vitamins (VITAMIN B COMPLEX PO) Take 1 Tab by mouth every morning.     • multivitamin (THERAGRAN) Tab Take 1 Tab by mouth every morning.     • atorvastatin (LIPITOR) 20 MG Tab Take 20 mg by mouth every evening.     • calcium citrate (CALCITRATE) 950 MG Tab Take 600 mg by mouth 2 times a day.     • gabapentin (NEURONTIN) 600 MG tablet Take 1,800 mg by mouth 3 times a day.  5   • atorvastatin (LIPITOR) 40 MG Tab Take 40 mg by mouth every day.     • [DISCONTINUED] citalopram (CELEXA) 40 MG Tab Take 40 mg by mouth every day.     • [DISCONTINUED] Zoster  "Vac Recomb Adjuvanted (SHINGRIX) 50 MCG/0.5ML Recon Susp Shingrix (PF) 50 mcg/0.5 mL intramuscular suspension, kit     • [DISCONTINUED] lisinopril (PRINIVIL) 20 MG Tab TAKE 1 TABLET BY MOUTH TWICE A  Tab 2   • [DISCONTINUED] furosemide (LASIX) 20 MG Tab TAKE 1 TABLET BY MOUTH EVERY DAY 90 Tab 3   • [DISCONTINUED] metoprolol SR (TOPROL XL) 50 MG TABLET SR 24 HR TAKE 1 TABLET BY MOUTH EVERY DAY 90 Tab 3   • [DISCONTINUED] nitroglycerin (NITROSTAT) 0.4 MG SL Tab Place 0.4 mg under tongue every 5 minutes as needed for Chest Pain.       No facility-administered encounter medications on file as of 1/19/2021.      Review of Systems   Constitutional: Positive for malaise/fatigue. Negative for chills, fever and weight loss.   HENT: Negative.  Negative for hearing loss.    Eyes: Negative.  Negative for blurred vision and double vision.   Respiratory: Positive for cough. Negative for shortness of breath.    Cardiovascular: Negative.  Negative for chest pain, palpitations, claudication and leg swelling.   Gastrointestinal: Negative.  Negative for abdominal pain, nausea and vomiting.   Genitourinary: Negative.  Negative for dysuria and urgency.   Musculoskeletal: Negative.  Negative for joint pain and myalgias.   Skin: Negative.  Negative for itching and rash.   Neurological: Negative.  Negative for dizziness, focal weakness, weakness and headaches.   Endo/Heme/Allergies: Negative.  Does not bruise/bleed easily.   Psychiatric/Behavioral: Negative.  Negative for depression. The patient is not nervous/anxious.         Objective:   /60 (BP Location: Left arm, Patient Position: Sitting, BP Cuff Size: Adult)   Pulse 61   Ht 1.626 m (5' 4\")   Wt 91.3 kg (201 lb 3.2 oz)   SpO2 93%   BMI 34.54 kg/m²     Physical Exam   Constitutional: She is oriented to person, place, and time. She appears well-developed and well-nourished.   HENT:   Head: Normocephalic and atraumatic.   Eyes: EOM are normal.   Neck: No JVD present. "   Cardiovascular: Normal rate, regular rhythm and normal heart sounds.   Pulmonary/Chest: Effort normal and breath sounds normal.   Abdominal: Soft. Bowel sounds are normal.   No hepatosplenomegaly.   Musculoskeletal: Normal range of motion.   Lymphadenopathy:     She has no cervical adenopathy.   Neurological: She is alert and oriented to person, place, and time.   Skin: Skin is warm and dry.   Psychiatric: She has a normal mood and affect.   Using walker.    CARDIAC STUDIES/PROCEDURES:     CARDIAC CATHETERIZATION CONCLUSIONS by Dr. Aguirre (08/30/17)  1. Patent LIMA to LAD  2. Patent KINDRA to PDA  3. Patent vein graft to OM, mild/moderate in-stent restenosis of the ostial stent  4. Mildly tortuous abdominal descending aorta     CAROTID ULTRASOUND (03/04/16)  <50% bilateral ICA stenoses   Nl subclavians and vertebrals     CT OF ABDOMEN (09/28/17)  1.  Mild left hydronephrosis and hydroureter with definite distal obstructive etiology not identified.  2.  Apparent rectal wall thickening with perirectal stranding which could indicate proctitis.  3.  Trace bilateral pleural effusions with associated compressive atelectasis and/or consolidation. Underlying infection is possible.  4.  Status post cholecystectomy.  5.  Atrophic right kidney.  6.  Right renal cysts.  7.  Diverticulosis without evidence of diverticulitis.  8.  Atherosclerosis.     CT OF ABDOMEN (09/25/17)  1.  Small LEFT low anterior abdominal wall hematoma. Ongoing bleeding is not excluded.  2.  Gallbladder decompressed or absent  3.  Duodenal diverticulum  4.  Atrophic RIGHT kidney  5.  Atherosclerosis  6.  Prior hysterectomy  7.  Distal colonic diverticulosis  8.  Small hiatal hernia     CT OF CHEST AND ABDOMEN (09/06/17)  1.  Aortic annulus area of 412 sq mm.  2.  Coronary artery ostia are greater than 10 mm from the annulus.  3.  Moderate tortuosity and atherosclerotic calcification of the iliofemoral arterial system, worse on the RIGHT, with minimum  diameters of 5.1 mm on the RIGHT and 6.9 mm on the LEFT.  4.  Markedly atrophic RIGHT kidney with apparent moderate to severe proximal RIGHT renal artery stenosis, likely chronic.     DOBUTAMINE STRESS ECHOCARDIOGRAM (08/07/17)  Resting Echo: LVEF 65% with AoV Vmax  of 2.52 m/s; peak gradient 25.5 mmHg and mean 16.11 mmHg  Peak Dobutamine: LVEF 80%, Vmax 4.02 m/s; Peak gradient 64.75 mmHg and mean 36.42 mmHg  No wall motion abnormality.     ECHOCARDIOGRAM CONCLUSIONS (10/17/19)  Prior 11/13/18, no significant changes are noted.  Known TAVR aortic valve that is functioning normally with normal transvalvular gradients.  Vmax is 2.8  m/s. Transvalvular gradients are - Peak: 31 mmHg,  Mean: 15 mmHg. Mild paravalvular leak is noted.  Mild paravalvular leak is noted.  Left ventricular ejection fraction is visually estimated to be 65%.  Grade II diastolic dysfunction.  Estimated right ventricular systolic pressure  is 40 mmHg.    ECHOCARDIOGRAM CONCLUSIONS (02/05/18)  Compared to the report of the study done 10/30/17 - there has been   normalization of TAVR gradient.  Normal left ventricular systolic function.  Left ventricular ejection fraction is visually estimated to be 65%.  Grade I diastolic dysfunction.  Mild mitral regurgitation.  Known TAVR aortic valve that is functioning normally with normal   transvalvular gradients.  Max is 2.64 m/s. Transvalvular gradients are - Peak: 28 mmHg,  Mean: 18 mmHg.   No evidence of paravalvular leak is noted.  Mild pulmonic insufficiency.     ECHOCARDIOGRAM CONCLUSIONS (10/30/17)  Mild concentric left ventricular hypertrophy.  Normal left ventricular systolic function. Left ventricular ejection   fraction is visually estimated to be 70%.  Grade I diastolic dysfunction.  Normal inferior vena cava size and inspiratory collapse.  Mild mitral stenosis with a mean gradient of 3mmHg at a HR of 63 bpm.  Known TAVR aortic valve. Transvalvular gradients are - Peak: 47 mmHg, Mean: 28  mmHg.    Estimated right ventricular systolic pressure is 20 mmHg.  Compared to the report of the study done 09/26/2017 there has been an   increase in the mean gradient across the prosthetic aortic valve.     ECHOCARDIOGRAM CONCLUSIONS (09/26/17)  Hyperdynamic left ventricular systolic function. Left ventricular   ejection fraction is visually estimated to be greater than 75%.  Evidence of increased intracavity gradient, peak velocity is 3.35 m/sec.   Normal regional wall motion. Normal diastolic function.  Moderate mitral stenosis. Mean transvalvular gradient is 5.5 mmHg at a   heart rate of 92 BPM.   Normal functioning bioprosthetic aortic valve with a peak gradient of   22 mmHg and mean gradient of 12 mmHg.  Echo images from 9/25/17 showed normal LV function and normal   functioning bioprosthetic aortic with a peak gradient of 22 mmHg and   mean gradient of 13 mmHg.     ECHOCARDIOGRAM CONCLUSIONS by Dr. Acosta (09/25/17)  Intraoperative TTE during TAVR.  Baseline images show normal   biventricular systolic function with EF - 65%.  Severe aortic stenosis.    Post-CPB images show preserved biventricular function.  A 23 mm Oliveira   Leeann 3 valve is seen in the aortic position with normal leaflet   motion, no paravalvular leak, mean gradient of 5 mm Hg, and calculated   effective orifice area of 3.5 cm2.  Findings communicated at the time   of exam.     ECHOCARDIOGRAM CONCLUSIONS (04/19/17)  Structurally normal tricuspid valve. Mild tricuspid regurgitation.   Estimated right ventricular systolic pressure  is 40 mmHg.  Normal left ventricular systolic function.   Estimated right ventricular systolic pressure  is 40 mmHg.  Compared to the images of the study done - there has been increase in   mean gradient across the aortic valve but still within moderate   severity of aortic stenosis.     EKG performed on (10/17/17) EKG shows sinus bradycardia.  EKG performed on (09/27/17) EKG shows sinus tachycardia.  EKG performed on  (09/26/17) EKG shows sinus tachycardia.  EKG performed on (09/25/17) EKG shows sinus tachycardia.  EKG performed on (09/21/16) EKG shows sinus bradycardia.  EKG performed on (02/10/16) EKG shows sinus bradycardia.     Laboratory results of (10/05/18) Bun of 13 mg/dl, creatinine levels of 1.18 mg/dl noted. Cholesterol profile of 130/134/41/62 noted.     Laboratory results of (10/13/17) Bun of 15 mg/dl, creatinine levels of 1.23 mg/dl noted.     PULMONARY FUNCTION INTERPRETATION (09/13/17)  REASON FOR STUDY:  Shortness of breath, dyspnea on exertion and a history of   severe aortic stenosis.  SPIROMETRY:  FVC is 1.83, 79% predicted.  FEV1 is 1.44, 83% predicted.    FEV1/FVC is 79.  There is a significant response to bronchodilator.  LUNG VOLUMES:  Vital capacity is 97% predicted.  Total lung capacity is 106%   predicted.  Diffusion studies are normal.  Flow volume loops show some mild   coving in the expiratory limb.  IMPRESSION:  Normal exam     Assessment:     1. S/P TAVR (transcatheter aortic valve replacement)     2. Chronic diastolic CHF (congestive heart failure) (HCC)     3. Hx of CABG     4. Essential hypertension  furosemide (LASIX) 20 MG Tab    lisinopril (PRINIVIL) 20 MG Tab    metoprolol SR (TOPROL XL) 50 MG TABLET SR 24 HR   5. Dyslipidemia     6. Bilateral carotid artery stenosis       Medical Decision Making:  Today's Assessment / Status / Plan:     1. Status post transcatheter aortic valve replacement (TAVR) with # 23 Oliveira Leeann S3 valve, transfemoral approach, under conscious sedation (09/25/17): She is clinically doing well from valvular standpoint.  2. Chronic diastolic congestive heart failure: The overall volume status is adequate.  3. Coronary artery disease with prior coronary bypass graft (x 3 with LIMA to LAD, KINDRA to PDA, vein graft to OM) in [facility], 2002, ostium of the vein graft  stent placement also in 2012): She is clinically doing well. I will continue with current medical care  including aspirin, metoprolol, lisinopril and atorvastatin.  4. Hypertension: Blood pressure is well controlled. We will continue with beta blockade therapy and ace inhibitor therapy..  5. Hyperlipidemia: She is doing well on statin therapy without myalgia symptoms.  6. Carotid artery stenosis: Clinically stable on above medical therapy.  7. History of trans-ischemic attack (2012): She remains clinically stable without any new neurological symptoms.  8. Chronic kidney disease: We will continue to follow labs.  9. Small abdominal wall hematoma: Stable  10. Health maintenance: She admits to depression and lack of motivation following the loss oh her  in 2014 and moving from her home in [place] to with her daughter.      We will follow up the patient in one year.      CC Pamela Deal and Joshua Acosta

## 2021-04-09 ENCOUNTER — TELEPHONE (OUTPATIENT)
Dept: CARDIOLOGY | Facility: MEDICAL CENTER | Age: 86
End: 2021-04-09

## 2021-04-09 NOTE — TELEPHONE ENCOUNTER
OK to proceed with surgery. She is due for her echocardiogram though, although this isn't necessary to proceed with surgery. OK to hold ASA 5 days prior to procedure if wanted. SC

## 2021-04-09 NOTE — LETTER
PROCEDURE/SURGERY CLEARANCE FORM      Encounter Date: 4/9/2021    Patient: Silvia Orellana  YOB: 1931    CARDIOLOGIST:  Sherif Elder MD & CAESAR Santacruz    REFERRING DOCTOR:  Navi James MD      The above patient is okay to proceed with surgery.  Okay to hold ASA 5 days prior to procedure if warranted                                                            MD Signature   MELL SantacruzRBETO.

## 2021-05-05 ENCOUNTER — HOSPITAL ENCOUNTER (OUTPATIENT)
Dept: HOSPITAL 8 - STAR | Age: 86
Discharge: HOME | End: 2021-05-05
Attending: ORTHOPAEDIC SURGERY
Payer: MEDICARE

## 2021-05-05 DIAGNOSIS — Z01.812: Primary | ICD-10-CM

## 2021-05-05 DIAGNOSIS — Z20.822: ICD-10-CM
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
## 2021-05-05 DIAGNOSIS — S63.003D: ICD-10-CM

## 2021-05-05 LAB
ALBUMIN SERPL-MCNC: 3.4 G/DL (ref 3.4–5)
ALP SERPL-CCNC: 93 U/L (ref 45–117)
ALT SERPL-CCNC: 23 U/L (ref 12–78)
ANION GAP SERPL CALC-SCNC: 5 MMOL/L (ref 5–15)
BILIRUB SERPL-MCNC: 0.4 MG/DL (ref 0.2–1)
CALCIUM SERPL-MCNC: 8.9 MG/DL (ref 8.5–10.1)
CHLORIDE SERPL-SCNC: 108 MMOL/L (ref 98–107)
CREAT SERPL-MCNC: 1.08 MG/DL (ref 0.55–1.02)
PROT SERPL-MCNC: 6.8 G/DL (ref 6.4–8.2)

## 2021-05-05 PROCEDURE — U0003 INFECTIOUS AGENT DETECTION BY NUCLEIC ACID (DNA OR RNA); SEVERE ACUTE RESPIRATORY SYNDROME CORONAVIRUS 2 (SARS-COV-2) (CORONAVIRUS DISEASE [COVID-19]), AMPLIFIED PROBE TECHNIQUE, MAKING USE OF HIGH THROUGHPUT TECHNOLOGIES AS DESCRIBED BY CMS-2020-01-R: HCPCS

## 2021-05-05 PROCEDURE — 93005 ELECTROCARDIOGRAM TRACING: CPT

## 2021-05-05 PROCEDURE — 80053 COMPREHEN METABOLIC PANEL: CPT

## 2021-05-05 PROCEDURE — 36415 COLL VENOUS BLD VENIPUNCTURE: CPT

## 2021-05-11 ENCOUNTER — HOSPITAL ENCOUNTER (OUTPATIENT)
Dept: HOSPITAL 8 - OUT | Age: 86
Discharge: HOME | End: 2021-05-11
Attending: ORTHOPAEDIC SURGERY
Payer: MEDICARE

## 2021-05-11 VITALS — SYSTOLIC BLOOD PRESSURE: 170 MMHG | DIASTOLIC BLOOD PRESSURE: 77 MMHG

## 2021-05-11 VITALS — BODY MASS INDEX: 36.72 KG/M2 | WEIGHT: 207.23 LBS | HEIGHT: 63 IN

## 2021-05-11 DIAGNOSIS — I11.0: ICD-10-CM

## 2021-05-11 DIAGNOSIS — Z79.1: ICD-10-CM

## 2021-05-11 DIAGNOSIS — Z79.899: ICD-10-CM

## 2021-05-11 DIAGNOSIS — Z98.890: ICD-10-CM

## 2021-05-11 DIAGNOSIS — E78.5: ICD-10-CM

## 2021-05-11 DIAGNOSIS — S66.517A: Primary | ICD-10-CM

## 2021-05-11 DIAGNOSIS — I25.2: ICD-10-CM

## 2021-05-11 DIAGNOSIS — Z79.891: ICD-10-CM
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
## 2021-05-11 DIAGNOSIS — S66.513A: ICD-10-CM

## 2021-05-11 DIAGNOSIS — S66.515A: ICD-10-CM

## 2021-05-11 DIAGNOSIS — I25.10: ICD-10-CM

## 2021-05-11 DIAGNOSIS — Z95.2: ICD-10-CM

## 2021-05-11 DIAGNOSIS — I50.9: ICD-10-CM

## 2021-05-11 DIAGNOSIS — Z82.49: ICD-10-CM
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
## 2021-05-11 PROCEDURE — 26437 REALIGNMENT OF TENDONS: CPT

## 2021-09-14 NOTE — DISCHARGE PLANNING
ABD pain and cramping this AM.  Not feeling well.  Bowel protocol.     Need 02 Order and F2F.    Referral completed to [facility].    - continue home xarelto 20mg daily

## 2022-01-06 DIAGNOSIS — I10 ESSENTIAL HYPERTENSION: ICD-10-CM

## 2022-01-06 RX ORDER — METOPROLOL SUCCINATE 50 MG/1
TABLET, EXTENDED RELEASE ORAL
Qty: 90 TABLET | Refills: 0 | Status: SHIPPED | OUTPATIENT
Start: 2022-01-06 | End: 2022-02-01

## 2022-01-07 ENCOUNTER — TELEPHONE (OUTPATIENT)
Dept: CARDIOLOGY | Facility: MEDICAL CENTER | Age: 87
End: 2022-01-07

## 2022-01-07 NOTE — TELEPHONE ENCOUNTER
----- Message from Isha Waters R.N. sent at 1/6/2022  4:03 PM PST -----  Pt overdue for f/u. Please contact to schedule appt.  Thank you!

## 2022-01-28 DIAGNOSIS — I10 ESSENTIAL HYPERTENSION: ICD-10-CM

## 2022-01-28 DIAGNOSIS — Z00.00 ANNUAL PHYSICAL EXAM: ICD-10-CM

## 2022-01-28 DIAGNOSIS — I25.84 CORONARY ARTERY DISEASE DUE TO CALCIFIED CORONARY LESION: ICD-10-CM

## 2022-01-28 DIAGNOSIS — E78.5 DYSLIPIDEMIA: ICD-10-CM

## 2022-01-28 DIAGNOSIS — I25.10 CORONARY ARTERY DISEASE DUE TO CALCIFIED CORONARY LESION: ICD-10-CM

## 2022-01-28 DIAGNOSIS — N18.31 STAGE 3A CHRONIC KIDNEY DISEASE: ICD-10-CM

## 2022-01-28 RX ORDER — FUROSEMIDE 20 MG/1
20 TABLET ORAL
Qty: 90 TABLET | Refills: 0 | Status: SHIPPED | OUTPATIENT
Start: 2022-01-28 | End: 2022-03-02

## 2022-01-31 DIAGNOSIS — I10 ESSENTIAL HYPERTENSION: ICD-10-CM

## 2022-02-03 RX ORDER — LISINOPRIL 20 MG/1
20 TABLET ORAL 2 TIMES DAILY
Qty: 180 TABLET | Refills: 0 | Status: SHIPPED | OUTPATIENT
Start: 2022-02-03 | End: 2022-03-02

## 2022-02-03 RX ORDER — AMLODIPINE BESYLATE 5 MG/1
TABLET ORAL
Qty: 90 TABLET | Refills: 0 | Status: SHIPPED | OUTPATIENT
Start: 2022-02-03 | End: 2022-03-02

## 2022-02-03 RX ORDER — METOPROLOL SUCCINATE 50 MG/1
TABLET, EXTENDED RELEASE ORAL
Qty: 90 TABLET | Refills: 0 | Status: SHIPPED | OUTPATIENT
Start: 2022-02-03 | End: 2022-03-31

## 2022-03-01 DIAGNOSIS — I10 ESSENTIAL HYPERTENSION: ICD-10-CM

## 2022-03-02 ENCOUNTER — OFFICE VISIT (OUTPATIENT)
Dept: CARDIOLOGY | Facility: MEDICAL CENTER | Age: 87
End: 2022-03-02
Payer: MEDICARE

## 2022-03-02 VITALS
SYSTOLIC BLOOD PRESSURE: 126 MMHG | WEIGHT: 203 LBS | HEART RATE: 91 BPM | RESPIRATION RATE: 16 BRPM | DIASTOLIC BLOOD PRESSURE: 66 MMHG | OXYGEN SATURATION: 97 % | HEIGHT: 64 IN | BODY MASS INDEX: 34.66 KG/M2

## 2022-03-02 DIAGNOSIS — I25.10 CORONARY ARTERY DISEASE DUE TO CALCIFIED CORONARY LESION: ICD-10-CM

## 2022-03-02 DIAGNOSIS — E78.00 PURE HYPERCHOLESTEROLEMIA: ICD-10-CM

## 2022-03-02 DIAGNOSIS — I10 PRIMARY HYPERTENSION: ICD-10-CM

## 2022-03-02 DIAGNOSIS — Z95.5 STENTED CORONARY ARTERY: ICD-10-CM

## 2022-03-02 DIAGNOSIS — Z95.1 HX OF CABG: ICD-10-CM

## 2022-03-02 DIAGNOSIS — I65.23 BILATERAL CAROTID ARTERY STENOSIS: Chronic | ICD-10-CM

## 2022-03-02 DIAGNOSIS — I50.32 CHRONIC DIASTOLIC CHF (CONGESTIVE HEART FAILURE) (HCC): ICD-10-CM

## 2022-03-02 DIAGNOSIS — I25.84 CORONARY ARTERY DISEASE DUE TO CALCIFIED CORONARY LESION: ICD-10-CM

## 2022-03-02 DIAGNOSIS — Z95.2 S/P TAVR (TRANSCATHETER AORTIC VALVE REPLACEMENT): ICD-10-CM

## 2022-03-02 PROCEDURE — 99214 OFFICE O/P EST MOD 30 MIN: CPT | Performed by: INTERNAL MEDICINE

## 2022-03-02 RX ORDER — FUROSEMIDE 20 MG/1
20 TABLET ORAL
Qty: 90 TABLET | Refills: 0 | Status: SHIPPED | OUTPATIENT
Start: 2022-03-02 | End: 2022-07-21

## 2022-03-02 RX ORDER — AMLODIPINE BESYLATE 5 MG/1
TABLET ORAL
Qty: 90 TABLET | Refills: 0 | Status: SHIPPED | OUTPATIENT
Start: 2022-03-02 | End: 2022-04-29

## 2022-03-02 RX ORDER — LISINOPRIL 20 MG/1
TABLET ORAL
Qty: 180 TABLET | Refills: 0 | Status: SHIPPED | OUTPATIENT
Start: 2022-03-02 | End: 2022-04-29

## 2022-03-02 ASSESSMENT — ENCOUNTER SYMPTOMS
CARDIOVASCULAR NEGATIVE: 1
DIZZINESS: 0
FOCAL WEAKNESS: 0
PSYCHIATRIC NEGATIVE: 1
ABDOMINAL PAIN: 0
GASTROINTESTINAL NEGATIVE: 1
WEIGHT LOSS: 0
WEAKNESS: 0
CHILLS: 0
FEVER: 0
DOUBLE VISION: 0
DEPRESSION: 0
NEUROLOGICAL NEGATIVE: 1
PALPITATIONS: 0
VOMITING: 0
EYES NEGATIVE: 1
SHORTNESS OF BREATH: 1
NERVOUS/ANXIOUS: 0
BRUISES/BLEEDS EASILY: 0
MUSCULOSKELETAL NEGATIVE: 1
BLURRED VISION: 0
NAUSEA: 0
HEADACHES: 0
MYALGIAS: 0
COUGH: 1
CLAUDICATION: 0

## 2022-03-02 NOTE — PROGRESS NOTES
Chief Complaint   Patient presents with   • Hypertension     & S/P TAVR (transcatheter aortic valve replacement)   • Hyperlipidemia       Subjective     Silvia Orellana is a 89 y.o. female who presents today for annual TAVR follow up.    Since the patient's last visit on 01/19/21, she has been doing well clinically from cardiac standpoint. She is experiencing colored productive cough with fatigue and shortness of breath. She denies chest pain, dizziness or syncope.     Past Medical History:   Diagnosis Date   • Arthritis    • Bowel habit changes    • Bradycardia    • Breast cancer (HCC)    • Breath shortness    • CAD (coronary artery disease)     CABG    • Cancer (HCC) 2003    left breast lumpectomy   • Cancer (HCC) 2007    bladder ca   • Cancer (HCC) 2016    basal cell ca to nose   • Cataract     patria IOL   • CHF (congestive heart failure) (HCC)    • Chronic pain    • Dental disorder     dentures   • Diverticulosis    • Fall    • GERD (gastroesophageal reflux disease)    • Heart burn    • Hx of Clostridium difficile infection 2015   • Hyperlipidemia    • Hypertension    • Indigestion    • Myocardial infarct (HCC) 2002    cardiac stent   • Pneumonia 2014   • Stroke (HCC) 2001; 2012    no deficits, general weakness   • Urinary bladder disorder    • Urinary incontinence      Past Surgical History:   Procedure Laterality Date   • TRANSCATHETER AORTIC VALVE REPLACEMENT N/A 9/25/2017    Procedure: TRANSCATHETER AORTIC VALVE REPLACEMENT;  Surgeon: Sherif Elder M.D.;  Location: SURGERY [facility];  Service: Cardiac   • TATIANNA  9/25/2017    Procedure: TTE;  Surgeon: Sherif Elder M.D.;  Location: SURGERY [facility];  Service: Cardiac   • ORBITAL FRACTURE ORIF Left 5/23/2016    Procedure: ORBITAL FRACTURE ORIF FOR: LATERAL CANTHOPEXY;  Surgeon: Fabrice Daniel Jr., M.D.;  Location: [facility];  Service:    • ORIF, FRACTURE, FEMUR Right 8/10/2015    Procedure: FEMUR ORIF;  Surgeon: Umer GUERRERO  BRENDA Guevara;  Location: SURGERY [facility];  Service:    • ORBITAL FRACTURE ORIF Left 7/6/2015    Procedure: ORBITAL FRACTURE ORIF;  Surgeon: Fabrice Daniel Jr., M.D.;  Location: SURGERY [facility];  Service:    • PB REVISE TOTAL HIP REPLACEMENT  3/24/2015    R   • OTHER CARDIAC SURGERY  2012    cardiac cath with stent placement   • PB REVISE ACETABULAR PART OF TOTAL HIP  2010    L   • OTHER CARDIAC SURGERY  2002    CABG   • GYN SURGERY  1961    hysterectomy   • ANGIOGRAM     • BLADDER BIOPSY     • CHOLECYSTECTOMY     • HIP ARTHROPLASTY TOTAL Left    • LUMPECTOMY      L   • MULTIPLE CORONARY ARTERY BYPASS     • ZZZ CARDIAC CATH       Family History   Problem Relation Age of Onset   • Heart Attack Neg Hx      Social History     Socioeconomic History   • Marital status:      Spouse name: Not on file   • Number of children: Not on file   • Years of education: Not on file   • Highest education level: Not on file   Occupational History   • Not on file   Tobacco Use   • Smoking status: Never Smoker   • Smokeless tobacco: Never Used   Substance and Sexual Activity   • Alcohol use: Yes     Comment: maybe once a month   • Drug use: No   • Sexual activity: Not on file   Other Topics Concern   • Not on file   Social History Narrative   • Not on file     Social Determinants of Health     Financial Resource Strain: Not on file   Food Insecurity: Not on file   Transportation Needs: Not on file   Physical Activity: Not on file   Stress: Not on file   Social Connections: Not on file   Intimate Partner Violence: Not on file   Housing Stability: Not on file     No Known Allergies     (Medications reviewed.)  Outpatient Encounter Medications as of 3/2/2022   Medication Sig Dispense Refill   • amLODIPine (NORVASC) 5 MG Tab TAKE 1 TABLET BY MOUTH EVERY DAY 90 Tablet 0   • metoprolol SR (TOPROL XL) 50 MG TABLET SR 24 HR TAKE 1 TABLET BY MOUTH EVERY DAY 90 Tablet 0   • lisinopril (PRINIVIL) 20 MG Tab TAKE 1 TAB BY  MOUTH 2 TIMES A DAY. TAKE 1 TABLET BY MOUTH TWICE A  Tablet 0   • furosemide (LASIX) 20 MG Tab Take 1 Tablet by mouth every day. 90 Tablet 0   • miconazole (MONISTAT) 2 % Cream Miconazole 7 2 % vaginal cream   APPLY 1 APPLICATION TOP TWICE A DAY     • omeprazole (PRILOSEC) 40 MG delayed-release capsule omeprazole 40 mg capsule,delayed release     • citalopram (CELEXA) 20 MG Tab citalopram 20 mg tablet     • dicyclomine (BENTYL) 20 MG Tab Take 20 mg by mouth every 6 hours.     • HYDROcodone/acetaminophen (NORCO)  MG Tab Take 1-2 Tabs by mouth every 6 hours as needed.     • aspirin (ASA) 81 MG Chew Tab chewable tablet Take 81 mg by mouth every day.     • Cyanocobalamin (VITAMIN B-12) 5000 MCG SL Tab Place 1 Tab under tongue every morning.     • ascorbic acid (ASCORBIC ACID) 500 MG Tab Take 500 mg by mouth every morning.     • multivitamin (THERAGRAN) Tab Take 1 Tab by mouth every morning.     • atorvastatin (LIPITOR) 20 MG Tab Take 20 mg by mouth every evening.     • calcium citrate (CALCITRATE) 950 MG Tab Take 600 mg by mouth 2 times a day.     • gabapentin (NEURONTIN) 600 MG tablet Take 1,800 mg by mouth 3 times a day.  5   • [DISCONTINUED] atorvastatin (LIPITOR) 40 MG Tab Take 40 mg by mouth every day. (Patient not taking: Reported on 3/2/2022)     • [DISCONTINUED] B Complex Vitamins (VITAMIN B COMPLEX PO) Take 1 Tab by mouth every morning. (Patient not taking: Reported on 3/2/2022)       No facility-administered encounter medications on file as of 3/2/2022.     Review of Systems   Constitutional: Positive for malaise/fatigue. Negative for chills, fever and weight loss.   HENT: Negative.  Negative for hearing loss.    Eyes: Negative.  Negative for blurred vision and double vision.   Respiratory: Positive for cough and shortness of breath.    Cardiovascular: Negative.  Negative for chest pain, palpitations, claudication and leg swelling.   Gastrointestinal: Negative.  Negative for abdominal pain, nausea  "and vomiting.   Genitourinary: Negative.  Negative for dysuria and urgency.   Musculoskeletal: Negative.  Negative for joint pain and myalgias.   Skin: Negative.  Negative for itching and rash.   Neurological: Negative.  Negative for dizziness, focal weakness, weakness and headaches.   Endo/Heme/Allergies: Negative.  Does not bruise/bleed easily.   Psychiatric/Behavioral: Negative.  Negative for depression. The patient is not nervous/anxious.               Objective     /66 (BP Location: Left arm, Patient Position: Sitting, BP Cuff Size: Adult)   Pulse 91   Resp 16   Ht 1.626 m (5' 4\")   Wt 92.1 kg (203 lb)   SpO2 97%   BMI 34.84 kg/m²     Physical Exam  Constitutional:       Appearance: She is well-developed.   HENT:      Head: Normocephalic and atraumatic.   Neck:      Vascular: No JVD.   Cardiovascular:      Rate and Rhythm: Normal rate and regular rhythm.      Heart sounds: Normal heart sounds.   Pulmonary:      Effort: Pulmonary effort is normal.      Breath sounds: Wheezing present.   Abdominal:      General: Bowel sounds are normal.      Palpations: Abdomen is soft.      Comments: No hepatosplenomegaly.   Musculoskeletal:         General: Normal range of motion.   Lymphadenopathy:      Cervical: No cervical adenopathy.   Skin:     General: Skin is warm and dry.   Neurological:      Mental Status: She is alert and oriented to person, place, and time.     Using wheelchair.       CARDIAC STUDIES/PROCEDURES:     CARDIAC CATHETERIZATION CONCLUSIONS by Dr. Aguirre (08/30/17)  1. Patent LIMA to LAD  2. Patent KINDRA to PDA  3. Patent vein graft to OM, mild/moderate in-stent restenosis of the ostial stent  4. Mildly tortuous abdominal descending aorta     CAROTID ULTRASOUND (03/04/16)  <50% bilateral ICA stenoses   Nl subclavians and vertebrals     CT OF ABDOMEN (09/28/17)  1.  Mild left hydronephrosis and hydroureter with definite distal obstructive etiology not identified.  2.  Apparent rectal wall " thickening with perirectal stranding which could indicate proctitis.  3.  Trace bilateral pleural effusions with associated compressive atelectasis and/or consolidation. Underlying infection is possible.  4.  Status post cholecystectomy.  5.  Atrophic right kidney.  6.  Right renal cysts.  7.  Diverticulosis without evidence of diverticulitis.  8.  Atherosclerosis.     CT OF ABDOMEN (09/25/17)  1.  Small LEFT low anterior abdominal wall hematoma. Ongoing bleeding is not excluded.  2.  Gallbladder decompressed or absent  3.  Duodenal diverticulum  4.  Atrophic RIGHT kidney  5.  Atherosclerosis  6.  Prior hysterectomy  7.  Distal colonic diverticulosis  8.  Small hiatal hernia     CT OF CHEST AND ABDOMEN (09/06/17)  1.  Aortic annulus area of 412 sq mm.  2.  Coronary artery ostia are greater than 10 mm from the annulus.  3.  Moderate tortuosity and atherosclerotic calcification of the iliofemoral arterial system, worse on the RIGHT, with minimum diameters of 5.1 mm on the RIGHT and 6.9 mm on the LEFT.  4.  Markedly atrophic RIGHT kidney with apparent moderate to severe proximal RIGHT renal artery stenosis, likely chronic.     DOBUTAMINE STRESS ECHOCARDIOGRAM (08/07/17)  Resting Echo: LVEF 65% with AoV Vmax  of 2.52 m/s; peak gradient 25.5 mmHg and mean 16.11 mmHg  Peak Dobutamine: LVEF 80%, Vmax 4.02 m/s; Peak gradient 64.75 mmHg and mean 36.42 mmHg  No wall motion abnormality.     ECHOCARDIOGRAM CONCLUSIONS (10/17/19)  Prior 11/13/18, no significant changes are noted.  Known TAVR aortic valve that is functioning normally with normal transvalvular gradients.  Vmax is 2.8  m/s. Transvalvular gradients are - Peak: 31 mmHg,  Mean: 15 mmHg. Mild paravalvular leak is noted.  Mild paravalvular leak is noted.  Left ventricular ejection fraction is visually estimated to be 65%.  Grade II diastolic dysfunction.  Estimated right ventricular systolic pressure  is 40 mmHg.     ECHOCARDIOGRAM CONCLUSIONS (02/05/18)  Compared to  the report of the study done 10/30/17 - there has been   normalization of TAVR gradient.  Normal left ventricular systolic function.  Left ventricular ejection fraction is visually estimated to be 65%.  Grade I diastolic dysfunction.  Mild mitral regurgitation.  Known TAVR aortic valve that is functioning normally with normal   transvalvular gradients.  Max is 2.64 m/s. Transvalvular gradients are - Peak: 28 mmHg,  Mean: 18 mmHg.   No evidence of paravalvular leak is noted.  Mild pulmonic insufficiency.     ECHOCARDIOGRAM CONCLUSIONS (10/30/17)  Mild concentric left ventricular hypertrophy.  Normal left ventricular systolic function. Left ventricular ejection   fraction is visually estimated to be 70%.  Grade I diastolic dysfunction.  Normal inferior vena cava size and inspiratory collapse.  Mild mitral stenosis with a mean gradient of 3mmHg at a HR of 63 bpm.  Known TAVR aortic valve. Transvalvular gradients are - Peak: 47 mmHg, Mean: 28  mmHg.   Estimated right ventricular systolic pressure is 20 mmHg.  Compared to the report of the study done 09/26/2017 there has been an   increase in the mean gradient across the prosthetic aortic valve.     ECHOCARDIOGRAM CONCLUSIONS (09/26/17)  Hyperdynamic left ventricular systolic function. Left ventricular   ejection fraction is visually estimated to be greater than 75%.  Evidence of increased intracavity gradient, peak velocity is 3.35 m/sec.   Normal regional wall motion. Normal diastolic function.  Moderate mitral stenosis. Mean transvalvular gradient is 5.5 mmHg at a   heart rate of 92 BPM.   Normal functioning bioprosthetic aortic valve with a peak gradient of   22 mmHg and mean gradient of 12 mmHg.  Echo images from 9/25/17 showed normal LV function and normal   functioning bioprosthetic aortic with a peak gradient of 22 mmHg and   mean gradient of 13 mmHg.     ECHOCARDIOGRAM CONCLUSIONS by Dr. Acosta (09/25/17)  Intraoperative TTE during TAVR.  Baseline images show  normal   biventricular systolic function with EF - 65%.  Severe aortic stenosis.    Post-CPB images show preserved biventricular function.  A 23 mm Oliveira   Leeann 3 valve is seen in the aortic position with normal leaflet   motion, no paravalvular leak, mean gradient of 5 mm Hg, and calculated   effective orifice area of 3.5 cm2.  Findings communicated at the time   of exam.     ECHOCARDIOGRAM CONCLUSIONS (04/19/17)  Structurally normal tricuspid valve.   Mild tricuspid regurgitation.   Estimated right ventricular systolic pressure  is 40 mmHg.  Normal left ventricular systolic function.   Compared to the images of the study done - there has been increase in   mean gradient across the aortic valve but still within moderate   severity of aortic stenosis.     EKG performed on (10/17/17) EKG shows sinus bradycardia.  EKG performed on (09/27/17) EKG shows sinus tachycardia.  EKG performed on (09/26/17) EKG shows sinus tachycardia.  EKG performed on (09/25/17) EKG shows sinus tachycardia.  EKG performed on (09/21/16) EKG shows sinus bradycardia.  EKG performed on (02/10/16) EKG shows sinus bradycardia.     Laboratory results of (10/05/18) Bun of 13 mg/dl, creatinine levels of 1.18 mg/dl noted. Cholesterol profile of 130/134/41/62 noted.     Laboratory results of (10/13/17) Bun of 15 mg/dl, creatinine levels of 1.23 mg/dl noted.     PULMONARY FUNCTION INTERPRETATION (09/13/17)  REASON FOR STUDY:  Shortness of breath, dyspnea on exertion and a history of   severe aortic stenosis.  SPIROMETRY:  FVC is 1.83, 79% predicted.  FEV1 is 1.44, 83% predicted.    FEV1/FVC is 79.  There is a significant response to bronchodilator.  LUNG VOLUMES:  Vital capacity is 97% predicted.  Total lung capacity is 106%   predicted.  Diffusion studies are normal.  Flow volume loops show some mild   coving in the expiratory limb.  IMPRESSION:  Normal exam     Assessment & Plan     1. S/P TAVR (transcatheter aortic valve replacement)     2.  Chronic diastolic CHF (congestive heart failure) (HCC)     3. CAD     4. Hx of CABG     5. Stented coronary artery     6. Primary hypertension     7. Pure hypercholesterolemia     8. Bilateral carotid artery stenosis         Medical Decision Making: Today's Assessment/Status/Plan:        1. Status post transcatheter aortic valve replacement (TAVR) with # 23 Oliveira Leeann S3 valve, transfemoral approach, under conscious sedation (09/25/17): She is clinically doing well.  2. Chronic diastolic congestive heart failure: The overall volume status is adequate.  3. Coronary artery disease with prior coronary bypass graft (x 3 with LIMA to LAD, KINDRA to PDA, vein graft to OM) in [facility], 2002, ostium of the vein graft  stent placement also in 2012):  She is clinically doing well. I will continue with current medical care including aspirin, metoprolol, lisinopril and atorvastatin.  4. Hypertension: Blood pressure is well controlled. We will continue with beta blockade therapy and ace inhibitor therapy.  5. Hyperlipidemia: She is doing well on statin therapy without myalgia symptoms.  6. Carotid artery stenosis: Clinically stable on above medical therapy.  7. History of trans-ischemic attack (2012): She remains clinically stable without any new neurological symptoms.  8. Chronic kidney disease: We will continue to follow labs.  9. Small abdominal wall hematoma: Stable  10. Health maintenance: She admits to depression and lack of motivation following the loss of her  in 2014 and moving from her home in [place] to with her daughter.      We will follow up in 3 months.     CC Pamela Deal and Joshua Acosta

## 2022-03-03 ENCOUNTER — TELEPHONE (OUTPATIENT)
Dept: CARDIOLOGY | Facility: MEDICAL CENTER | Age: 87
End: 2022-03-03
Payer: COMMERCIAL

## 2022-03-03 LAB
ALBUMIN SERPL-MCNC: 4.1 G/DL (ref 3.5–4.6)
ALBUMIN/GLOB SERPL: 1.8 {RATIO} (ref 1.2–2.2)
ALP SERPL-CCNC: 113 IU/L (ref 44–121)
ALT SERPL-CCNC: 19 IU/L (ref 0–32)
AST SERPL-CCNC: 30 IU/L (ref 0–40)
BASOPHILS # BLD AUTO: 0 X10E3/UL (ref 0–0.2)
BASOPHILS NFR BLD AUTO: 1 %
BILIRUB SERPL-MCNC: 0.6 MG/DL (ref 0–1.2)
BUN SERPL-MCNC: 13 MG/DL (ref 10–36)
BUN/CREAT SERPL: 10 (ref 12–28)
CALCIUM SERPL-MCNC: 9.9 MG/DL (ref 8.7–10.3)
CHLORIDE SERPL-SCNC: 103 MMOL/L (ref 96–106)
CHOLEST SERPL-MCNC: 141 MG/DL (ref 100–199)
CO2 SERPL-SCNC: 22 MMOL/L (ref 20–29)
CREAT SERPL-MCNC: 1.25 MG/DL (ref 0.57–1)
EGFRCR SERPLBLD CKD-EPI 2021: 41 ML/MIN/1.73
EOSINOPHIL # BLD AUTO: 0.3 X10E3/UL (ref 0–0.4)
EOSINOPHIL NFR BLD AUTO: 6 %
ERYTHROCYTE [DISTWIDTH] IN BLOOD BY AUTOMATED COUNT: 12.8 % (ref 11.7–15.4)
GLOBULIN SER CALC-MCNC: 2.3 G/DL (ref 1.5–4.5)
GLUCOSE SERPL-MCNC: 124 MG/DL (ref 65–99)
HCT VFR BLD AUTO: 41.9 % (ref 34–46.6)
HDLC SERPL-MCNC: 38 MG/DL
HGB BLD-MCNC: 14.9 G/DL (ref 11.1–15.9)
IMM GRANULOCYTES # BLD AUTO: 0 X10E3/UL (ref 0–0.1)
IMM GRANULOCYTES NFR BLD AUTO: 1 %
IMMATURE CELLS  115398: ABNORMAL
LABORATORY COMMENT REPORT: ABNORMAL
LDLC SERPL CALC-MCNC: 86 MG/DL (ref 0–99)
LYMPHOCYTES # BLD AUTO: 1.9 X10E3/UL (ref 0.7–3.1)
LYMPHOCYTES NFR BLD AUTO: 38 %
MCH RBC QN AUTO: 33.7 PG (ref 26.6–33)
MCHC RBC AUTO-ENTMCNC: 35.6 G/DL (ref 31.5–35.7)
MCV RBC AUTO: 95 FL (ref 79–97)
MONOCYTES # BLD AUTO: 0.4 X10E3/UL (ref 0.1–0.9)
MONOCYTES NFR BLD AUTO: 9 %
MORPHOLOGY BLD-IMP: ABNORMAL
NEUTROPHILS # BLD AUTO: 2.3 X10E3/UL (ref 1.4–7)
NEUTROPHILS NFR BLD AUTO: 45 %
NRBC BLD AUTO-RTO: ABNORMAL %
PLATELET # BLD AUTO: 203 X10E3/UL (ref 150–450)
POTASSIUM SERPL-SCNC: 3.8 MMOL/L (ref 3.5–5.2)
PROT SERPL-MCNC: 6.4 G/DL (ref 6–8.5)
RBC # BLD AUTO: 4.42 X10E6/UL (ref 3.77–5.28)
SODIUM SERPL-SCNC: 142 MMOL/L (ref 134–144)
TRIGL SERPL-MCNC: 89 MG/DL (ref 0–149)
VLDLC SERPL CALC-MCNC: 17 MG/DL (ref 5–40)
WBC # BLD AUTO: 5 X10E3/UL (ref 3.4–10.8)

## 2022-03-03 NOTE — TELEPHONE ENCOUNTER
JI    Pts daughter Delilah called stating Pt is in the hospital at [facility]. Delilah states they would like her to find out what Pts ejection fraction was on her last echo so they can compare it to the echo they did today to help with Pts treatment. Please call Delilah back at [phone].    Thank you

## 2022-03-03 NOTE — TELEPHONE ENCOUNTER
Spoke to Delilah over phone who states her mother is currently in the hospital at [facility]. Patient had Echo done this morning and request was sent to medical records. [facility] would like to compare the results. Delilah was hoping to find out patient's EF. Advised that post TAVR on 10/17/19, EF was 65%. Delilah verbalizes understanding and will reach out with any additional questions.

## 2022-03-07 ENCOUNTER — TELEPHONE (OUTPATIENT)
Dept: CARDIOLOGY | Facility: MEDICAL CENTER | Age: 87
End: 2022-03-07
Payer: COMMERCIAL

## 2022-03-07 DIAGNOSIS — E78.5 DYSLIPIDEMIA: ICD-10-CM

## 2022-03-07 RX ORDER — ATORVASTATIN CALCIUM 40 MG/1
40 TABLET, FILM COATED ORAL NIGHTLY
Qty: 90 TABLET | Refills: 3 | Status: SHIPPED | OUTPATIENT
Start: 2022-03-07 | End: 2024-01-03 | Stop reason: SDUPTHER

## 2022-03-07 NOTE — TELEPHONE ENCOUNTER
----- Message from CAESAR Santacruz sent at 3/7/2022  9:04 AM PST -----  Her kidney function is worsening slightly, recommend PCP review this. Cholesterol not as ideal with stroke history, recommend increase atorvastatin to 40 mg QPM. SC

## 2022-03-07 NOTE — TELEPHONE ENCOUNTER
"RX sent to preferred pharmacy. Spoke to patient's daughter, Delilah, over phone. Results reviewed and recommendations given. Delilah verbalizes understanding.     Delilah states following 03/02/22 appointment with BRENNA, patient was hospitalized at [facility]. Per Delilah, testing confirmed patient was in \"kidney failure with congestive heart failure.\" Her \"lungs were white and fluffy.\" During hospitalization, patient had Echo done. Dleilah is wondering if patient should keep scheduled Echo on 04/21/22. Advised that I would request Echo for BRENNA to review and let her know if appointment should be kept. Request faxed to [facility] medical records at [phone]. Receipt confirmed.         "

## 2022-03-08 NOTE — TELEPHONE ENCOUNTER
Spoke to representative at [facility] in medical records who indicates all records were sent. She cannot say over the phone if patient had Echo done or not. Representative will review records and send Echo if it was completed.

## 2022-03-17 NOTE — TELEPHONE ENCOUNTER
Echo not yet received. Re-reviewed records previously sent. Testing in records include labs, x-ray, renal US, and EKG.    Spoke to representative in medical records at [facility] who states echo was not completed during hospitalization.     Spoke to Delilah over phone and gave update. Delilah verbalizes understanding.

## 2022-03-30 DIAGNOSIS — I10 ESSENTIAL HYPERTENSION: ICD-10-CM

## 2022-04-01 RX ORDER — METOPROLOL SUCCINATE 50 MG/1
TABLET, EXTENDED RELEASE ORAL
Qty: 90 TABLET | Refills: 3 | Status: SHIPPED | OUTPATIENT
Start: 2022-04-01 | End: 2023-03-01 | Stop reason: SDUPTHER

## 2022-04-21 ENCOUNTER — HOSPITAL ENCOUNTER (OUTPATIENT)
Dept: CARDIOLOGY | Facility: MEDICAL CENTER | Age: 87
End: 2022-04-21
Attending: INTERNAL MEDICINE
Payer: MEDICARE

## 2022-04-21 DIAGNOSIS — Z95.2 S/P TAVR (TRANSCATHETER AORTIC VALVE REPLACEMENT): ICD-10-CM

## 2022-04-21 PROCEDURE — 93306 TTE W/DOPPLER COMPLETE: CPT

## 2022-04-22 LAB
LV EJECT FRACT  99904: 65
LV EJECT FRACT MOD 2C 99903: 68.6

## 2022-04-22 PROCEDURE — 93306 TTE W/DOPPLER COMPLETE: CPT | Mod: 26 | Performed by: INTERNAL MEDICINE

## 2022-04-28 DIAGNOSIS — I10 ESSENTIAL HYPERTENSION: ICD-10-CM

## 2022-04-29 RX ORDER — LISINOPRIL 20 MG/1
TABLET ORAL
Qty: 180 TABLET | Refills: 3 | Status: SHIPPED | OUTPATIENT
Start: 2022-04-29 | End: 2023-03-01 | Stop reason: SDUPTHER

## 2022-04-29 RX ORDER — AMLODIPINE BESYLATE 5 MG/1
TABLET ORAL
Qty: 90 TABLET | Refills: 3 | Status: SHIPPED | OUTPATIENT
Start: 2022-04-29 | End: 2023-03-01

## 2022-07-25 ENCOUNTER — OFFICE VISIT (OUTPATIENT)
Dept: CARDIOLOGY | Facility: MEDICAL CENTER | Age: 87
End: 2022-07-25
Payer: MEDICARE

## 2022-07-25 VITALS
OXYGEN SATURATION: 92 % | HEART RATE: 60 BPM | HEIGHT: 64 IN | SYSTOLIC BLOOD PRESSURE: 110 MMHG | WEIGHT: 211 LBS | RESPIRATION RATE: 16 BRPM | DIASTOLIC BLOOD PRESSURE: 60 MMHG | BODY MASS INDEX: 36.02 KG/M2

## 2022-07-25 DIAGNOSIS — Z95.5 STENTED CORONARY ARTERY: ICD-10-CM

## 2022-07-25 DIAGNOSIS — Z95.2 S/P TAVR (TRANSCATHETER AORTIC VALVE REPLACEMENT): ICD-10-CM

## 2022-07-25 DIAGNOSIS — I25.84 CORONARY ARTERY DISEASE DUE TO CALCIFIED CORONARY LESION: ICD-10-CM

## 2022-07-25 DIAGNOSIS — Z95.1 HX OF CABG: ICD-10-CM

## 2022-07-25 DIAGNOSIS — E78.5 DYSLIPIDEMIA: ICD-10-CM

## 2022-07-25 DIAGNOSIS — I50.32 CHRONIC DIASTOLIC CHF (CONGESTIVE HEART FAILURE) (HCC): ICD-10-CM

## 2022-07-25 DIAGNOSIS — I25.10 CORONARY ARTERY DISEASE DUE TO CALCIFIED CORONARY LESION: ICD-10-CM

## 2022-07-25 DIAGNOSIS — I10 PRIMARY HYPERTENSION: ICD-10-CM

## 2022-07-25 PROCEDURE — 99214 OFFICE O/P EST MOD 30 MIN: CPT | Performed by: INTERNAL MEDICINE

## 2022-07-25 RX ORDER — MIRABEGRON 50 MG/1
50 TABLET, FILM COATED, EXTENDED RELEASE ORAL DAILY
COMMUNITY
End: 2023-04-06

## 2022-07-25 ASSESSMENT — ENCOUNTER SYMPTOMS
WEIGHT LOSS: 0
ABDOMINAL PAIN: 0
CHILLS: 0
NAUSEA: 0
CARDIOVASCULAR NEGATIVE: 1
COUGH: 0
GASTROINTESTINAL NEGATIVE: 1
WEAKNESS: 0
HEADACHES: 0
SHORTNESS OF BREATH: 0
FOCAL WEAKNESS: 0
FEVER: 0
BRUISES/BLEEDS EASILY: 0
EYES NEGATIVE: 1
PSYCHIATRIC NEGATIVE: 1
MYALGIAS: 0
VOMITING: 0
CLAUDICATION: 0
RESPIRATORY NEGATIVE: 1
BLURRED VISION: 0
DEPRESSION: 0
PALPITATIONS: 0
DIZZINESS: 0
NERVOUS/ANXIOUS: 0
NEUROLOGICAL NEGATIVE: 1
DOUBLE VISION: 0

## 2022-07-25 ASSESSMENT — FIBROSIS 4 INDEX: FIB4 SCORE: 3.09

## 2022-07-25 NOTE — PROGRESS NOTES
Chief Complaint   Patient presents with   • Carotid Artery Stenosis   • CHF (Diastolic)     F/V Dx: Chronic diastolic CHF (congestive heart failure) (HCC)   • Hypertension       Subjective     Silvia Orellana is a 89 y.o. female who presents today for follow up of hypertension and hyperlipidemia, history of TAVR.    Since the patient's last visit on 03/02/22, she has been doing well clinically. She denies fatigue, shortness of breath, dyspnea on exertion, chest pain, dizziness or syncope. She has some frustration with her energy level. She takes frequent naps which helps. She keeps active reading and with crossword puzzle.     Past Medical History:   Diagnosis Date   • Arthritis    • Bowel habit changes    • Bradycardia    • Breast cancer (HCC)    • Breath shortness    • CAD (coronary artery disease)     CABG    • Cancer (HCC) 2003    left breast lumpectomy   • Cancer (HCC) 2007    bladder ca   • Cancer (HCC) 2016    basal cell ca to nose   • Cataract     patria IOL   • CHF (congestive heart failure) (HCC)    • Chronic pain    • Dental disorder     dentures   • Diverticulosis    • Fall    • GERD (gastroesophageal reflux disease)    • Heart burn    • Hx of Clostridium difficile infection 2015   • Hyperlipidemia    • Hypertension    • Indigestion    • Myocardial infarct (HCC) 2002    cardiac stent   • Pneumonia 2014   • Stroke (HCC) 2001; 2012    no deficits, general weakness   • Urinary bladder disorder    • Urinary incontinence      Past Surgical History:   Procedure Laterality Date   • TRANSCATHETER AORTIC VALVE REPLACEMENT N/A 9/25/2017    Procedure: TRANSCATHETER AORTIC VALVE REPLACEMENT;  Surgeon: Sherif Elder M.D.;  Location: [facility];  Service: Cardiac   • TATIANNA  9/25/2017    Procedure: TTE;  Surgeon: Sherif Elder M.D.;  Location: SURGERY [facility];  Service: Cardiac   • ORBITAL FRACTURE ORIF Left 5/23/2016    Procedure: ORBITAL FRACTURE ORIF FOR: LATERAL CANTHOPEXY;  Surgeon: Fabrice  Gee Daniel Jr., M.D.;  Location: SURGERY [facility];  Service:    • ORIF, FRACTURE, FEMUR Right 8/10/2015    Procedure: FEMUR ORIF;  Surgeon: Umer Guevara M.D.;  Location: SURGERY [facility];  Service:    • ORBITAL FRACTURE ORIF Left 7/6/2015    Procedure: ORBITAL FRACTURE ORIF;  Surgeon: Fabrice Daniel Jr., M.D.;  Location: SURGERY [facility];  Service:    • PB REVISE TOTAL HIP REPLACEMENT  3/24/2015    R   • OTHER CARDIAC SURGERY  2012    cardiac cath with stent placement   • PB REVISE ACETABULAR PART OF TOTAL HIP  2010    L   • OTHER CARDIAC SURGERY  2002    CABG   • GYN SURGERY  1961    hysterectomy   • ANGIOGRAM     • BLADDER BIOPSY     • CHOLECYSTECTOMY     • HIP ARTHROPLASTY TOTAL Left    • LUMPECTOMY      L   • MULTIPLE CORONARY ARTERY BYPASS     • ZZZ CARDIAC CATH       Family History   Problem Relation Age of Onset   • Heart Attack Neg Hx      Social History     Socioeconomic History   • Marital status:      Spouse name: Not on file   • Number of children: Not on file   • Years of education: Not on file   • Highest education level: Not on file   Occupational History   • Not on file   Tobacco Use   • Smoking status: Never Smoker   • Smokeless tobacco: Never Used   Substance and Sexual Activity   • Alcohol use: Not Currently   • Drug use: No   • Sexual activity: Not on file   Other Topics Concern   • Not on file   Social History Narrative   • Not on file     Social Determinants of Health     Financial Resource Strain: Not on file   Food Insecurity: Not on file   Transportation Needs: Not on file   Physical Activity: Not on file   Stress: Not on file   Social Connections: Not on file   Intimate Partner Violence: Not on file   Housing Stability: Not on file     No Known Allergies     (Medications reviewed.)  Outpatient Encounter Medications as of 7/25/2022   Medication Sig Dispense Refill   • Mirabegron ER (MYRBETRIQ) 50 MG TABLET SR 24 HR Take 50 mg by mouth every day.     •  furosemide (LASIX) 20 MG Tab TAKE 1 TABLET BY MOUTH EVERY DAY 90 Tablet 2   • amLODIPine (NORVASC) 5 MG Tab TAKE 1 TABLET BY MOUTH EVERY DAY 90 Tablet 3   • lisinopril (PRINIVIL) 20 MG Tab TAKE 1 TABLET BY MOUTH TWICE A DAY (Patient taking differently: Take 20 mg by mouth 2 times a day.) 180 Tablet 3   • metoprolol SR (TOPROL XL) 50 MG TABLET SR 24 HR TAKE 1 TABLET BY MOUTH EVERY DAY 90 Tablet 3   • atorvastatin (LIPITOR) 40 MG Tab Take 1 Tablet by mouth every evening. 90 Tablet 3   • miconazole (MONISTAT) 2 % Cream Miconazole 7 2 % vaginal cream   APPLY 1 APPLICATION TOP TWICE A DAY     • omeprazole (PRILOSEC) 40 MG delayed-release capsule Take 40 mg by mouth every day.     • citalopram (CELEXA) 20 MG Tab Take 20 mg by mouth every morning.     • HYDROcodone/acetaminophen (NORCO)  MG Tab Take 1-2 Tabs by mouth every 6 hours as needed.     • aspirin (ASA) 81 MG Chew Tab chewable tablet Take 81 mg by mouth every day.     • Cyanocobalamin (VITAMIN B-12) 5000 MCG SL Tab Place 1 Tab under tongue every morning.     • ascorbic acid (ASCORBIC ACID) 500 MG Tab Take 1,000 mg by mouth every morning.     • multivitamin (THERAGRAN) Tab Take 1 Tab by mouth every morning.     • calcium citrate (CALCITRATE) 950 MG Tab Take 600 mg by mouth 2 times a day.     • gabapentin (NEURONTIN) 600 MG tablet Take 1,800 mg by mouth 3 times a day.  5   • [DISCONTINUED] dicyclomine (BENTYL) 20 MG Tab Take 20 mg by mouth every 6 hours. (Patient not taking: Reported on 7/25/2022)       No facility-administered encounter medications on file as of 7/25/2022.     Review of Systems   Constitutional: Positive for malaise/fatigue. Negative for chills, fever and weight loss.   HENT: Negative.  Negative for hearing loss.    Eyes: Negative.  Negative for blurred vision and double vision.   Respiratory: Negative.  Negative for cough and shortness of breath.    Cardiovascular: Negative.  Negative for chest pain, palpitations, claudication and leg  "swelling.   Gastrointestinal: Negative.  Negative for abdominal pain, nausea and vomiting.   Genitourinary: Negative.  Negative for dysuria and urgency.   Musculoskeletal: Positive for joint pain. Negative for myalgias.   Skin: Negative.  Negative for itching and rash.   Neurological: Negative.  Negative for dizziness, focal weakness, weakness and headaches.   Endo/Heme/Allergies: Negative.  Does not bruise/bleed easily.   Psychiatric/Behavioral: Negative.  Negative for depression. The patient is not nervous/anxious.               Objective     /60 (BP Location: Left arm, Patient Position: Sitting, BP Cuff Size: Adult)   Pulse 60   Resp 16   Ht 1.626 m (5' 4\")   Wt 95.7 kg (211 lb)   SpO2 92%   BMI 36.22 kg/m²     Physical Exam  Constitutional:       Appearance: She is well-developed.   HENT:      Head: Normocephalic and atraumatic.   Neck:      Vascular: No JVD.   Cardiovascular:      Rate and Rhythm: Normal rate and regular rhythm.      Heart sounds: Normal heart sounds.   Pulmonary:      Effort: Pulmonary effort is normal.      Breath sounds: Normal breath sounds.   Abdominal:      General: Bowel sounds are normal.      Palpations: Abdomen is soft.      Comments: No hepatosplenomegaly.   Musculoskeletal:         General: Normal range of motion.   Lymphadenopathy:      Cervical: No cervical adenopathy.   Skin:     General: Skin is warm and dry.   Neurological:      Mental Status: She is alert and oriented to person, place, and time.     Using wheelchair.       CARDIAC STUDIES/PROCEDURES:     CARDIAC CATHETERIZATION CONCLUSIONS by Dr. Aguirre (08/30/17)  1. Patent LIMA to LAD  2. Patent KINDRA to PDA  3. Patent vein graft to OM, mild/moderate in-stent restenosis of the ostial stent  4. Mildly tortuous abdominal descending aorta     CAROTID ULTRASOUND (03/04/16)  <50% bilateral ICA stenoses   Nl subclavians and vertebrals     CT OF ABDOMEN (09/28/17)  1.  Mild left hydronephrosis and hydroureter with " definite distal obstructive etiology not identified.  2.  Apparent rectal wall thickening with perirectal stranding which could indicate proctitis.  3.  Trace bilateral pleural effusions with associated compressive atelectasis and/or consolidation. Underlying infection is possible.  4.  Status post cholecystectomy.  5.  Atrophic right kidney.  6.  Right renal cysts.  7.  Diverticulosis without evidence of diverticulitis.  8.  Atherosclerosis.     CT OF ABDOMEN (09/25/17)  1.  Small LEFT low anterior abdominal wall hematoma. Ongoing bleeding is not excluded.  2.  Gallbladder decompressed or absent  3.  Duodenal diverticulum  4.  Atrophic RIGHT kidney  5.  Atherosclerosis  6.  Prior hysterectomy  7.  Distal colonic diverticulosis  8.  Small hiatal hernia     CT OF CHEST AND ABDOMEN (09/06/17)  1.  Aortic annulus area of 412 sq mm.  2.  Coronary artery ostia are greater than 10 mm from the annulus.  3.  Moderate tortuosity and atherosclerotic calcification of the iliofemoral arterial system, worse on the RIGHT, with minimum diameters of 5.1 mm on the RIGHT and 6.9 mm on the LEFT.  4.  Markedly atrophic RIGHT kidney with apparent moderate to severe proximal RIGHT renal artery stenosis, likely chronic.     DOBUTAMINE STRESS ECHOCARDIOGRAM (08/07/17)  Resting Echo: LVEF 65% with AoV Vmax  of 2.52 m/s; peak gradient 25.5 mmHg and mean 16.11 mmHg  Peak Dobutamine: LVEF 80%, Vmax 4.02 m/s; Peak gradient 64.75 mmHg and mean 36.42 mmHg  No wall motion abnormality.     ECHOCARDIOGRAM CONCLUSIONS (10/17/19)  Prior 11/13/18, no significant changes are noted.  Known TAVR aortic valve that is functioning normally with normal transvalvular gradients.  Vmax is 2.8  m/s. Transvalvular gradients are - Peak: 31 mmHg,  Mean: 15 mmHg. Mild paravalvular leak is noted.  Mild paravalvular leak is noted.  Left ventricular ejection fraction is visually estimated to be 65%.  Grade II diastolic dysfunction.  Estimated right ventricular systolic  pressure  is 40 mmHg.     ECHOCARDIOGRAM CONCLUSIONS (02/05/18)  Compared to the report of the study done 10/30/17 - there has been   normalization of TAVR gradient.  Normal left ventricular systolic function.  Left ventricular ejection fraction is visually estimated to be 65%.  Grade I diastolic dysfunction.  Mild mitral regurgitation.  Known TAVR aortic valve that is functioning normally with normal   transvalvular gradients.  Max is 2.64 m/s. Transvalvular gradients are - Peak: 28 mmHg,  Mean: 18 mmHg.   No evidence of paravalvular leak is noted.  Mild pulmonic insufficiency.     ECHOCARDIOGRAM CONCLUSIONS (10/30/17)  Mild concentric left ventricular hypertrophy.  Normal left ventricular systolic function. Left ventricular ejection   fraction is visually estimated to be 70%.  Grade I diastolic dysfunction.  Normal inferior vena cava size and inspiratory collapse.  Mild mitral stenosis with a mean gradient of 3mmHg at a HR of 63 bpm.  Known TAVR aortic valve. Transvalvular gradients are - Peak: 47 mmHg, Mean: 28  mmHg.   Estimated right ventricular systolic pressure is 20 mmHg.  Compared to the report of the study done 09/26/2017 there has been an   increase in the mean gradient across the prosthetic aortic valve.     ECHOCARDIOGRAM CONCLUSIONS (09/26/17)  Hyperdynamic left ventricular systolic function. Left ventricular   ejection fraction is visually estimated to be greater than 75%.  Evidence of increased intracavity gradient, peak velocity is 3.35 m/sec.   Normal regional wall motion. Normal diastolic function.  Moderate mitral stenosis. Mean transvalvular gradient is 5.5 mmHg at a   heart rate of 92 BPM.   Normal functioning bioprosthetic aortic valve with a peak gradient of   22 mmHg and mean gradient of 12 mmHg.  Echo images from 9/25/17 showed normal LV function and normal   functioning bioprosthetic aortic with a peak gradient of 22 mmHg and   mean gradient of 13 mmHg.     ECHOCARDIOGRAM CONCLUSIONS by  Dr. Acosta (09/25/17)  Intraoperative TTE during TAVR.  Baseline images show normal   biventricular systolic function with EF - 65%.  Severe aortic stenosis.    Post-CPB images show preserved biventricular function.  A 23 mm Oliveira   Leeann 3 valve is seen in the aortic position with normal leaflet   motion, no paravalvular leak, mean gradient of 5 mm Hg, and calculated   effective orifice area of 3.5 cm2.  Findings communicated at the time   of exam.     ECHOCARDIOGRAM CONCLUSIONS (04/19/17)  Structurally normal tricuspid valve.   Mild tricuspid regurgitation.   Estimated right ventricular systolic pressure  is 40 mmHg.  Normal left ventricular systolic function.   Compared to the images of the study done - there has been increase in   mean gradient across the aortic valve but still within moderate   severity of aortic stenosis.     EKG performed on (10/17/17) EKG shows sinus bradycardia.  EKG performed on (09/27/17) EKG shows sinus tachycardia.  EKG performed on (09/26/17) EKG shows sinus tachycardia.  EKG performed on (09/25/17) EKG shows sinus tachycardia.  EKG performed on (09/21/16) EKG shows sinus bradycardia.  EKG performed on (02/10/16) EKG shows sinus bradycardia.     Laboratory results of (10/05/18) Bun of 13 mg/dl, creatinine levels of 1.18 mg/dl noted. Cholesterol profile of 130/134/41/62 noted.     Laboratory results of (10/13/17) Bun of 15 mg/dl, creatinine levels of 1.23 mg/dl noted.     PULMONARY FUNCTION INTERPRETATION (09/13/17)  REASON FOR STUDY:  Shortness of breath, dyspnea on exertion and a history of   severe aortic stenosis.  SPIROMETRY:  FVC is 1.83, 79% predicted.  FEV1 is 1.44, 83% predicted.    FEV1/FVC is 79.  There is a significant response to bronchodilator.  LUNG VOLUMES:  Vital capacity is 97% predicted.  Total lung capacity is 106%   predicted.  Diffusion studies are normal.  Flow volume loops show some mild   coving in the expiratory limb.  IMPRESSION:  Normal exam         Assessment & Plan     1. S/P TAVR (transcatheter aortic valve replacement)     2. Chronic diastolic CHF (congestive heart failure) ([facility])     3. CAD     4. Hx of CABG     5. Stented coronary artery     6. Primary hypertension     7. Dyslipidemia         Medical Decision Making: Today's Assessment/Status/Plan:        1. Status post transcatheter aortic valve replacement (TAVR) with # 23 Oliveira Leeann S3 valve, transfemoral approach, under conscious sedation (09/25/17): She is clinically doing well. She will continue to work on her fatigue.   2. Chronic diastolic congestive heart failure: The overall volume status is adequate.  3. Coronary artery disease with prior coronary bypass graft (x 3 with LIMA to LAD, KINDRA to PDA, vein graft to OM) in [facility], 2002, ostium of the vein graft  stent placement also in 2012): She is clinically doing well. I will continue with current medical care including aspirin, metoprolol, lisinopril and atorvastatin..  4. Hypertension: Blood pressure is well controlled. We will continue with beta blockade therapy and ace inhibitor therapy.  5. Hyperlipidemia: She is doing well on statin therapy without myalgia symptoms.  6. Carotid artery stenosis: Clinically stable on above medical therapy.  7. History of trans-ischemic attack (2012): She remains clinically stable without any new neurological symptoms.  8. Chronic kidney disease: We will continue to follow labs.  9. Small abdominal wall hematoma: Stable  10. Health maintenance: She admits to depression and lack of motivation following the loss of her  in 2014 and moving from her home in [place] to with her daughter.      We will follow up in 6 months.     CC Pamela Deal and Joshua Acosta

## 2022-11-11 ENCOUNTER — PATIENT MESSAGE (OUTPATIENT)
Dept: HEALTH INFORMATION MANAGEMENT | Facility: OTHER | Age: 87
End: 2022-11-11

## 2023-03-01 ENCOUNTER — OFFICE VISIT (OUTPATIENT)
Dept: CARDIOLOGY | Facility: MEDICAL CENTER | Age: 88
End: 2023-03-01
Payer: MEDICARE

## 2023-03-01 VITALS
OXYGEN SATURATION: 89 % | HEIGHT: 64 IN | HEART RATE: 64 BPM | WEIGHT: 198 LBS | RESPIRATION RATE: 16 BRPM | BODY MASS INDEX: 33.8 KG/M2 | SYSTOLIC BLOOD PRESSURE: 150 MMHG | DIASTOLIC BLOOD PRESSURE: 70 MMHG

## 2023-03-01 DIAGNOSIS — Z95.5 STENTED CORONARY ARTERY: ICD-10-CM

## 2023-03-01 DIAGNOSIS — Z79.899 HIGH RISK MEDICATION USE: ICD-10-CM

## 2023-03-01 DIAGNOSIS — I10 PRIMARY HYPERTENSION: ICD-10-CM

## 2023-03-01 DIAGNOSIS — I25.84 CORONARY ARTERY DISEASE DUE TO CALCIFIED CORONARY LESION: ICD-10-CM

## 2023-03-01 DIAGNOSIS — N18.31 STAGE 3A CHRONIC KIDNEY DISEASE: ICD-10-CM

## 2023-03-01 DIAGNOSIS — J90 PLEURAL EFFUSION: ICD-10-CM

## 2023-03-01 DIAGNOSIS — Z95.2 S/P TAVR (TRANSCATHETER AORTIC VALVE REPLACEMENT): ICD-10-CM

## 2023-03-01 DIAGNOSIS — I10 ESSENTIAL HYPERTENSION: ICD-10-CM

## 2023-03-01 DIAGNOSIS — E78.5 DYSLIPIDEMIA: ICD-10-CM

## 2023-03-01 DIAGNOSIS — Z95.1 HX OF CABG: ICD-10-CM

## 2023-03-01 DIAGNOSIS — J96.11 CHRONIC RESPIRATORY FAILURE WITH HYPOXIA (HCC): ICD-10-CM

## 2023-03-01 DIAGNOSIS — I25.10 CORONARY ARTERY DISEASE DUE TO CALCIFIED CORONARY LESION: ICD-10-CM

## 2023-03-01 DIAGNOSIS — I50.32 CHRONIC DIASTOLIC CHF (CONGESTIVE HEART FAILURE) (HCC): ICD-10-CM

## 2023-03-01 PROCEDURE — 99214 OFFICE O/P EST MOD 30 MIN: CPT | Performed by: NURSE PRACTITIONER

## 2023-03-01 RX ORDER — FUROSEMIDE 20 MG/1
20 TABLET ORAL
Qty: 90 TABLET | Refills: 3 | Status: SHIPPED | OUTPATIENT
Start: 2023-03-01 | End: 2024-01-03 | Stop reason: SDUPTHER

## 2023-03-01 RX ORDER — POTASSIUM CHLORIDE 750 MG/1
CAPSULE, EXTENDED RELEASE ORAL
Qty: 180 CAPSULE | Refills: 1 | Status: SHIPPED | OUTPATIENT
Start: 2023-03-01 | End: 2023-09-08

## 2023-03-01 RX ORDER — POTASSIUM CHLORIDE 750 MG/1
CAPSULE, EXTENDED RELEASE ORAL
COMMUNITY
Start: 2023-02-16 | End: 2023-03-01 | Stop reason: SDUPTHER

## 2023-03-01 RX ORDER — LISINOPRIL 20 MG/1
20 TABLET ORAL 2 TIMES DAILY
Qty: 180 TABLET | Refills: 3 | Status: SHIPPED | OUTPATIENT
Start: 2023-03-01 | End: 2024-01-03 | Stop reason: SDUPTHER

## 2023-03-01 RX ORDER — METOPROLOL SUCCINATE 50 MG/1
50 TABLET, EXTENDED RELEASE ORAL
Qty: 90 TABLET | Refills: 3 | Status: SHIPPED | OUTPATIENT
Start: 2023-03-01 | End: 2024-01-03 | Stop reason: SDUPTHER

## 2023-03-01 ASSESSMENT — FIBROSIS 4 INDEX: FIB4 SCORE: 3.09

## 2023-03-01 NOTE — PROGRESS NOTES
Chief Complaint   Patient presents with    Aortic Stenosis     F/v dx: S/P TAVR (transcatheter aortic valve replacement)    Hypertension    Congestive Heart Failure     F/v dx: Chronic diastolic CHF (congestive heart failure) (HCC)       Subjective     Silvia Orellana is a 89 y.o. female who presents today cardiac follow-up with her daughter Guadalupe, and granddaughter as status post TAVR, hypertension, hyperlipidemia. Patient was seen on 7/25/2022 by Dr. Elder.    Since patient was last seen, she was admitted for acute respiratory failure and bilateral pleural effusions at [facility] on 2/13/2023.  Patient now on supplemental oxygen and diuretic therapy.  Patient has follow-up with primary care provider in 2 weeks.  In the meantime, her antihypertensive medications were held due to diuretic therapy.  Patient does report orthopnea since discharge from the hospital.  Patient also notes blood pressures have slowly been increasing 150s-160s/60s with heart rate of 60 on her blood pressure log which is scanned into her chart today.  Otherwise, Patient denies chest pain, shortness of breath, palpitations, dizziness/lightheadedness, PND or Edema.      Lungs sound clear throughout on exam today.  Patient appears euvolemic.    We will have patient continue to hold amlodipine, but we will have her resume lisinopril.  We will have her continue Lasix 20 mg daily with supplemental potassium.  We will reevaluate renal electrolyte function in 1 week for high risk medication use.  We will also have patient complete 2 view chest x-ray prior to next appointment for potentially discontinuing Lasix.      We discussed deferring etiology of pleural effusions to primary care as echocardiogram completed during hospitalization showed normal EF, grade 1 diastolic dysfunction, normal functioning TAVR.  As well as chest CTA negative for PE.  Patient also brought in her POLST today, this will be scanned into her  chart as well.   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    Cataract     patria IOL    CHF (congestive heart failure) (HCC)     Chronic pain     Dental disorder     dentures    Diverticulosis     Fall     GERD (gastroesophageal reflux disease)     Heart burn     Hx of Clostridium difficile infection 2015    Hyperlipidemia     Hypertension     Indigestion     Myocardial infarct (HCC) 2002    cardiac stent    Pneumonia 2014    Stroke ([facility]) 2001; 2012    no deficits, general weakness    Urinary bladder disorder     Urinary incontinence      Past Surgical History:   Procedure Laterality Date    TRANSCATHETER AORTIC VALVE REPLACEMENT N/A 9/25/2017    Procedure: TRANSCATHETER AORTIC VALVE REPLACEMENT;  Surgeon: Sherif Elder M.D.;  Location: SURGERY [facility];  Service: Cardiac    TATIANNA  9/25/2017    Procedure: TTE;  Surgeon: Sherif Elder M.D.;  Location: SURGERY [facility];  Service: Cardiac    ORBITAL FRACTURE ORIF Left 5/23/2016    Procedure: ORBITAL FRACTURE ORIF FOR: LATERAL CANTHOPEXY;  Surgeon: Fabrice Daniel Jr., M.D.;  Location: SURGERY [facility];  Service:     ORIF, FRACTURE, FEMUR Right 8/10/2015    Procedure: FEMUR ORIF;  Surgeon: Umer Guevara M.D.;  Location: SURGERY [facility];  Service:     ORBITAL FRACTURE ORIF Left 7/6/2015    Procedure: ORBITAL FRACTURE ORIF;  Surgeon: Fabrice Daniel Jr., M.D.;  Location: SURGERY [facility];  Service:     PB REVISE TOTAL HIP REPLACEMENT  3/24/2015    R    OTHER CARDIAC SURGERY  2012    cardiac cath with stent placement    PB REVISE ACETABULAR PART OF TOTAL HIP  2010    L    OTHER CARDIAC SURGERY  2002    CABG    GYN SURGERY  1961    hysterectomy    ANGIOGRAM       BLADDER BIOPSY      CHOLECYSTECTOMY      HIP ARTHROPLASTY TOTAL Left     LUMPECTOMY      L    MULTIPLE CORONARY ARTERY BYPASS      ZZZ CARDIAC CATH       Family History   Problem Relation Age of Onset    Heart Attack Neg Hx      Social History     Socioeconomic History    Marital status:      Spouse name: Not on file    Number of children: Not on file    Years of education: Not on file    Highest education level: Not on file   Occupational History    Not on file   Tobacco Use    Smoking status: Never    Smokeless tobacco: Never   Substance and Sexual Activity    Alcohol use: Not Currently    Drug use: No    Sexual activity: Not on file   Other Topics Concern    Not on file   Social History Narrative    Not on file     Social Determinants of Health     Financial Resource Strain: Not on file   Food Insecurity: Not on file   Transportation Needs: Not on file   Physical Activity: Not on file   Stress: Not on file   Social Connections: Not on file   Intimate Partner Violence: Not on file   Housing Stability: Not on file     No Known Allergies  Outpatient Encounter Medications as of 3/1/2023   Medication Sig Dispense Refill    furosemide (LASIX) 20 MG Tab Take 1 Tablet by mouth every day. 90 Tablet 3    metoprolol SR (TOPROL XL) 50 MG TABLET SR 24 HR Take 1 Tablet by mouth every day. 90 Tablet 3    lisinopril (PRINIVIL) 20 MG Tab Take 1 Tablet by mouth 2 times a day. 180 Tablet 3    potassium chloride (MICRO-K) 10 MEQ capsule TAKE 2 CAPS ORAL DAILY 180 Capsule 1    atorvastatin (LIPITOR) 40 MG Tab Take 1 Tablet by mouth every evening. 90 Tablet 3    omeprazole (PRILOSEC) 40 MG delayed-release capsule Take 40 mg by mouth every day.      citalopram (CELEXA) 20 MG Tab Take 20 mg by mouth every morning.      HYDROcodone/acetaminophen (NORCO)  MG Tab Take 1-2 Tabs by mouth every 6 hours as needed.      aspirin (ASA) 81 MG Chew Tab chewable tablet Take 81 mg by mouth every day.      Cyanocobalamin (VITAMIN B-12)  "5000 MCG SL Tab Place 1 Tab under tongue every morning.      ascorbic acid (ASCORBIC ACID) 500 MG Tab Take 1,000 mg by mouth every morning.      multivitamin (THERAGRAN) Tab Take 1 Tab by mouth every morning.      calcium citrate (CALCITRATE) 950 MG Tab Take 600 mg by mouth 2 times a day.      gabapentin (NEURONTIN) 600 MG tablet Take 1,800 mg by mouth 3 times a day.  5    [DISCONTINUED] potassium chloride (MICRO-K) 10 MEQ capsule TAKE 1 CAPS ORAL DAILY,X30 DAYS, ONLY RESUME WHEN YOU RESTART LASIX (FUROSEMIDE)      Mirabegron ER (MYRBETRIQ) 50 MG TABLET SR 24 HR Take 50 mg by mouth every day. (Patient not taking: Reported on 3/1/2023)      [DISCONTINUED] furosemide (LASIX) 20 MG Tab TAKE 1 TABLET BY MOUTH EVERY DAY 90 Tablet 2    [DISCONTINUED] amLODIPine (NORVASC) 5 MG Tab TAKE 1 TABLET BY MOUTH EVERY DAY 90 Tablet 3    [DISCONTINUED] lisinopril (PRINIVIL) 20 MG Tab TAKE 1 TABLET BY MOUTH TWICE A DAY (Patient taking differently: Take 20 mg by mouth 2 times a day.) 180 Tablet 3    [DISCONTINUED] metoprolol SR (TOPROL XL) 50 MG TABLET SR 24 HR TAKE 1 TABLET BY MOUTH EVERY DAY 90 Tablet 3    miconazole (MONISTAT) 2 % Cream Miconazole 7 2 % vaginal cream   APPLY 1 APPLICATION TOP TWICE A DAY (Patient not taking: Reported on 3/1/2023)       No facility-administered encounter medications on file as of 3/1/2023.     ROS complete review of systems negative except as noted above           Objective     BP (!) 150/70 (BP Location: Left arm, Patient Position: Sitting, BP Cuff Size: Adult)   Pulse 64   Resp 16   Ht 1.626 m (5' 4\")   Wt 89.8 kg (198 lb)   SpO2 89%   BMI 33.99 kg/m²     Physical Exam  Vitals reviewed.   Constitutional:       Appearance: She is well-developed.   HENT:      Head: Normocephalic and atraumatic.   Eyes:      Pupils: Pupils are equal, round, and reactive to light.   Neck:      Vascular: No JVD.   Cardiovascular:      Rate and Rhythm: Normal rate and regular rhythm.      Heart sounds: Normal " heart sounds. No murmur heard.    No friction rub. No gallop.   Pulmonary:      Effort: Pulmonary effort is normal. No respiratory distress.      Breath sounds: Normal breath sounds.   Abdominal:      General: Bowel sounds are normal. There is no distension.      Palpations: Abdomen is soft.   Musculoskeletal:      Right lower leg: No edema.      Left lower leg: No edema.   Skin:     General: Skin is warm and dry.      Findings: No erythema.   Neurological:      Mental Status: She is alert and oriented to person, place, and time.   Psychiatric:         Behavior: Behavior normal.          Lab Results   Component Value Date/Time    CHOLSTRLTOT 141 03/02/2022 06:37 AM    LDL 79 04/19/2017 10:00 AM    HDL 38 (L) 03/02/2022 06:37 AM    TRIGLYCERIDE 89 03/02/2022 06:37 AM       Lab Results   Component Value Date/Time    SODIUM 142 03/02/2022 06:37 AM    SODIUM 137 09/30/2017 03:14 AM    POTASSIUM 3.8 03/02/2022 06:37 AM    POTASSIUM 4.3 09/30/2017 03:14 AM    CHLORIDE 103 03/02/2022 06:37 AM    CHLORIDE 105 09/30/2017 03:14 AM    CO2 22 03/02/2022 06:37 AM    CO2 25 09/30/2017 03:14 AM    GLUCOSE 124 (H) 03/02/2022 06:37 AM    GLUCOSE 122 (H) 09/30/2017 03:14 AM    BUN 13 03/02/2022 06:37 AM    BUN 13 09/30/2017 03:14 AM    CREATININE 1.25 (H) 03/02/2022 06:37 AM    CREATININE 0.90 09/30/2017 03:14 AM    BUNCREATRAT 10 (L) 03/02/2022 06:37 AM     Lab Results   Component Value Date/Time    ALKPHOSPHAT 113 03/02/2022 06:37 AM    ALKPHOSPHAT 72 09/30/2017 03:14 AM    ASTSGOT 30 03/02/2022 06:37 AM    ASTSGOT 17 09/30/2017 03:14 AM    ALTSGPT 19 03/02/2022 06:37 AM    ALTSGPT 9 09/30/2017 03:14 AM    TBILIRUBIN 0.6 03/02/2022 06:37 AM    TBILIRUBIN 0.7 09/30/2017 03:14 AM      ECHOCARDIOGRAM CONCLUSIONS (02/05/18)  Compared to the report of the study done 10/30/17 - there has been   normalization of TAVR gradient.  Normal left ventricular systolic function.  Left ventricular ejection fraction is visually estimated to be  65%.  Grade I diastolic dysfunction.  Mild mitral regurgitation.  Known TAVR aortic valve that is functioning normally with normal   transvalvular gradients.  Max is 2.64 m/s. Transvalvular gradients are - Peak: 28 mmHg,  Mean: 18 mmHg.   No evidence of paravalvular leak is noted.  Mild pulmonic insufficiency.    Assessment & Plan     1. S/P TAVR (transcatheter aortic valve replacement)  furosemide (LASIX) 20 MG Tab    metoprolol SR (TOPROL XL) 50 MG TABLET SR 24 HR    lisinopril (PRINIVIL) 20 MG Tab    potassium chloride (MICRO-K) 10 MEQ capsule    Basic Metabolic Panel    DX-CHEST-2 VIEWS      2. Chronic diastolic CHF (congestive heart failure) (HCC)  furosemide (LASIX) 20 MG Tab    metoprolol SR (TOPROL XL) 50 MG TABLET SR 24 HR    lisinopril (PRINIVIL) 20 MG Tab    potassium chloride (MICRO-K) 10 MEQ capsule    Basic Metabolic Panel    DX-CHEST-2 VIEWS      3. CAD  furosemide (LASIX) 20 MG Tab    metoprolol SR (TOPROL XL) 50 MG TABLET SR 24 HR    lisinopril (PRINIVIL) 20 MG Tab    potassium chloride (MICRO-K) 10 MEQ capsule    Basic Metabolic Panel    DX-CHEST-2 VIEWS      4. Hx of CABG  furosemide (LASIX) 20 MG Tab    metoprolol SR (TOPROL XL) 50 MG TABLET SR 24 HR    lisinopril (PRINIVIL) 20 MG Tab    potassium chloride (MICRO-K) 10 MEQ capsule    Basic Metabolic Panel    DX-CHEST-2 VIEWS      5. Stented coronary artery  furosemide (LASIX) 20 MG Tab    metoprolol SR (TOPROL XL) 50 MG TABLET SR 24 HR    lisinopril (PRINIVIL) 20 MG Tab    potassium chloride (MICRO-K) 10 MEQ capsule    Basic Metabolic Panel    DX-CHEST-2 VIEWS      6. Primary hypertension  furosemide (LASIX) 20 MG Tab    metoprolol SR (TOPROL XL) 50 MG TABLET SR 24 HR    lisinopril (PRINIVIL) 20 MG Tab    potassium chloride (MICRO-K) 10 MEQ capsule    Basic Metabolic Panel    DX-CHEST-2 VIEWS      7. Dyslipidemia  furosemide (LASIX) 20 MG Tab    metoprolol SR (TOPROL XL) 50 MG TABLET SR 24 HR    lisinopril (PRINIVIL) 20 MG Tab    potassium  chloride (MICRO-K) 10 MEQ capsule    Basic Metabolic Panel    DX-CHEST-2 VIEWS      8. Essential hypertension  furosemide (LASIX) 20 MG Tab    metoprolol SR (TOPROL XL) 50 MG TABLET SR 24 HR    lisinopril (PRINIVIL) 20 MG Tab    potassium chloride (MICRO-K) 10 MEQ capsule    Basic Metabolic Panel    DX-CHEST-2 VIEWS      9. Stage 3a chronic kidney disease (HCC)  furosemide (LASIX) 20 MG Tab    metoprolol SR (TOPROL XL) 50 MG TABLET SR 24 HR    lisinopril (PRINIVIL) 20 MG Tab    potassium chloride (MICRO-K) 10 MEQ capsule    Basic Metabolic Panel    DX-CHEST-2 VIEWS      10. Chronic respiratory failure with hypoxia (HCC)        11. Pleural effusion        12. 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  -Hold amlodipine for now  -Continue aspirin    History TIA; CKD  -CTM  -BMP in 1 week    FU in clinic in 4 weeks with Dr. Elder or care team. Sooner if needed.    Patient verbalizes understanding and agrees with the plan of care.     I personally spent a total of 37 minutes which includes face-to-face time and non-face-to-face time spent on preparing to see the patient, reviewing prior notes and tests, obtaining history from the patient, performing a medically appropriate exam, counseling and educating the patient, ordering  medications/tests/procedures/referrals as clinically indicated, and documenting information in the electronic medical record.    DEEPA Mccoy.   [facility]  [phone]    PLEASE NOTE: This Note was created using voice recognition Software. I have made every reasonable attempt to correct obvious errors, but I expect that there are errors of grammar and possibly content that I did not discover before finalizing the note

## 2023-03-15 ENCOUNTER — APPOINTMENT (OUTPATIENT)
Dept: RADIOLOGY | Facility: MEDICAL CENTER | Age: 88
End: 2023-03-15
Attending: NURSE PRACTITIONER
Payer: MEDICARE

## 2023-03-22 ENCOUNTER — APPOINTMENT (OUTPATIENT)
Dept: RADIOLOGY | Facility: MEDICAL CENTER | Age: 88
End: 2023-03-22
Attending: NURSE PRACTITIONER
Payer: MEDICARE

## 2023-03-22 DIAGNOSIS — I50.32 CHRONIC DIASTOLIC CHF (CONGESTIVE HEART FAILURE) (HCC): ICD-10-CM

## 2023-03-22 DIAGNOSIS — I25.84 CORONARY ARTERY DISEASE DUE TO CALCIFIED CORONARY LESION: ICD-10-CM

## 2023-03-22 DIAGNOSIS — I25.10 CORONARY ARTERY DISEASE DUE TO CALCIFIED CORONARY LESION: ICD-10-CM

## 2023-03-22 DIAGNOSIS — Z95.5 STENTED CORONARY ARTERY: ICD-10-CM

## 2023-03-22 DIAGNOSIS — Z95.1 HX OF CABG: ICD-10-CM

## 2023-03-22 DIAGNOSIS — I10 ESSENTIAL HYPERTENSION: ICD-10-CM

## 2023-03-22 DIAGNOSIS — N18.31 STAGE 3A CHRONIC KIDNEY DISEASE: ICD-10-CM

## 2023-03-22 DIAGNOSIS — Z95.2 S/P TAVR (TRANSCATHETER AORTIC VALVE REPLACEMENT): ICD-10-CM

## 2023-03-22 DIAGNOSIS — E78.5 DYSLIPIDEMIA: ICD-10-CM

## 2023-03-22 DIAGNOSIS — I10 PRIMARY HYPERTENSION: ICD-10-CM

## 2023-03-22 PROCEDURE — 71046 X-RAY EXAM CHEST 2 VIEWS: CPT

## 2023-03-30 LAB
BUN SERPL-MCNC: 11 MG/DL (ref 10–36)
BUN/CREAT SERPL: 13 (ref 12–28)
CALCIUM SERPL-MCNC: 9.3 MG/DL (ref 8.7–10.3)
CHLORIDE SERPL-SCNC: 102 MMOL/L (ref 96–106)
CO2 SERPL-SCNC: 26 MMOL/L (ref 20–29)
CREAT SERPL-MCNC: 0.85 MG/DL (ref 0.57–1)
EGFRCR SERPLBLD CKD-EPI 2021: 65 ML/MIN/1.73
GLUCOSE SERPL-MCNC: 116 MG/DL (ref 70–99)
POTASSIUM SERPL-SCNC: 4.5 MMOL/L (ref 3.5–5.2)
SODIUM SERPL-SCNC: 141 MMOL/L (ref 134–144)

## 2023-04-06 ENCOUNTER — OFFICE VISIT (OUTPATIENT)
Dept: CARDIOLOGY | Facility: MEDICAL CENTER | Age: 88
End: 2023-04-06
Payer: MEDICARE

## 2023-04-06 VITALS
HEART RATE: 59 BPM | RESPIRATION RATE: 14 BRPM | DIASTOLIC BLOOD PRESSURE: 50 MMHG | HEIGHT: 64 IN | OXYGEN SATURATION: 91 % | SYSTOLIC BLOOD PRESSURE: 132 MMHG | WEIGHT: 189.7 LBS | BODY MASS INDEX: 32.39 KG/M2

## 2023-04-06 DIAGNOSIS — Z95.5 STENTED CORONARY ARTERY: ICD-10-CM

## 2023-04-06 DIAGNOSIS — I10 PRIMARY HYPERTENSION: ICD-10-CM

## 2023-04-06 DIAGNOSIS — Z95.1 HX OF CABG: ICD-10-CM

## 2023-04-06 DIAGNOSIS — Z95.2 S/P TAVR (TRANSCATHETER AORTIC VALVE REPLACEMENT): ICD-10-CM

## 2023-04-06 DIAGNOSIS — G45.9 TIA (TRANSIENT ISCHEMIC ATTACK): ICD-10-CM

## 2023-04-06 DIAGNOSIS — I65.23 BILATERAL CAROTID ARTERY STENOSIS: Chronic | ICD-10-CM

## 2023-04-06 DIAGNOSIS — I50.32 CHRONIC DIASTOLIC CHF (CONGESTIVE HEART FAILURE) (HCC): ICD-10-CM

## 2023-04-06 DIAGNOSIS — I25.84 CORONARY ARTERY DISEASE DUE TO CALCIFIED CORONARY LESION: ICD-10-CM

## 2023-04-06 DIAGNOSIS — I25.10 CORONARY ARTERY DISEASE DUE TO CALCIFIED CORONARY LESION: ICD-10-CM

## 2023-04-06 DIAGNOSIS — E78.00 PURE HYPERCHOLESTEROLEMIA: ICD-10-CM

## 2023-04-06 DIAGNOSIS — R53.1 WEAKNESS: ICD-10-CM

## 2023-04-06 PROBLEM — E78.5 DYSLIPIDEMIA: Status: RESOLVED | Noted: 2018-10-11 | Resolved: 2023-04-06

## 2023-04-06 PROCEDURE — 99214 OFFICE O/P EST MOD 30 MIN: CPT | Performed by: NURSE PRACTITIONER

## 2023-04-06 RX ORDER — CITALOPRAM 40 MG/1
40 TABLET ORAL
COMMUNITY
Start: 2023-01-18

## 2023-04-06 ASSESSMENT — ENCOUNTER SYMPTOMS
PND: 0
DIARRHEA: 1
SHORTNESS OF BREATH: 1
ABDOMINAL PAIN: 1
COUGH: 0
WEAKNESS: 1
DIZZINESS: 0
ORTHOPNEA: 0
FEVER: 0
PALPITATIONS: 0
CLAUDICATION: 0
NAUSEA: 1
FALLS: 0

## 2023-04-06 ASSESSMENT — FIBROSIS 4 INDEX: FIB4 SCORE: 3.09

## 2023-04-06 NOTE — PROGRESS NOTES
Subjective:   Silvia Orellana is a 89 y.o. female who presents today for follow up from her last visit with Latisha MEADE one month ago for recent hospitalization for volume overload.    She is a patient of Dr. Aguirre in our office. Hx of CAD with previous CABG, HTN, HLD, carotid artery disease, and falls with multiple orthopedic surgeries requiring wheelchair for mobility at times, and S/P TAVR for severe AS.    Her shortness of breath is present and she now is on oxygen daily at 2L NC continuous. She thinks her overall breathing status is improving though.    Her mobility is still limited and she doesn't leave her room or house much.    She has a great support system with her daughter and grandchildren, she lives with her daughter and has home health nursing as well.    She has had no episodes of chest pain, palpitations, or orthopnea.    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    Cataract     patria IOL    CHF (congestive heart failure) (HCC)     Chronic pain     Dental disorder     dentures    Diverticulosis     Fall     GERD (gastroesophageal reflux disease)     Heart burn     Hx of Clostridium difficile infection 2015    Hyperlipidemia     Hypertension     Indigestion     Myocardial infarct (HCC) 2002    cardiac stent    Pneumonia 2014    Stroke (HCC) 2001; 2012    no deficits, general weakness    Urinary bladder disorder     Urinary incontinence      Past Surgical History:   Procedure Laterality Date    TRANSCATHETER AORTIC VALVE REPLACEMENT N/A 9/25/2017    Procedure: TRANSCATHETER AORTIC VALVE REPLACEMENT;  Surgeon: Sherif Elder M.D.;  Location: SURGERY [facility];  Service: Cardiac    TATIANNA  9/25/2017    Procedure: TTE;  Surgeon:  Sherif Elder M.D.;  Location: SURGERY [facility];  Service: Cardiac    ORBITAL FRACTURE ORIF Left 5/23/2016    Procedure: ORBITAL FRACTURE ORIF FOR: LATERAL CANTHOPEXY;  Surgeon: Fabrice Daniel Jr., M.D.;  Location: SURGERY [facility];  Service:     ORIF, FRACTURE, FEMUR Right 8/10/2015    Procedure: FEMUR ORIF;  Surgeon: Umer Guevara M.D.;  Location: SURGERY [facility];  Service:     ORBITAL FRACTURE ORIF Left 7/6/2015    Procedure: ORBITAL FRACTURE ORIF;  Surgeon: Fabrice Daniel Jr., M.D.;  Location: SURGERY [facility];  Service:     PB REVISE TOTAL HIP REPLACEMENT  3/24/2015    R    OTHER CARDIAC SURGERY  2012    cardiac cath with stent placement    PB REVISE ACETABULAR PART OF TOTAL HIP  2010    L    OTHER CARDIAC SURGERY  2002    CABG    GYN SURGERY  1961    hysterectomy    ANGIOGRAM      BLADDER BIOPSY      CHOLECYSTECTOMY      HIP ARTHROPLASTY TOTAL Left     LUMPECTOMY      L    MULTIPLE CORONARY ARTERY BYPASS      ZZZ CARDIAC CATH       Family History   Problem Relation Age of Onset    Heart Attack Neg Hx      Social History     Tobacco Use   Smoking Status Never   Smokeless Tobacco Never     No Known Allergies  Outpatient Encounter Medications as of 4/6/2023   Medication Sig Dispense Refill    citalopram (CELEXA) 40 MG Tab Take 40 mg by mouth every day.      furosemide (LASIX) 20 MG Tab Take 1 Tablet by mouth every day. 90 Tablet 3    metoprolol SR (TOPROL XL) 50 MG TABLET SR 24 HR Take 1 Tablet by mouth every day. 90 Tablet 3    lisinopril (PRINIVIL) 20 MG Tab Take 1 Tablet by mouth 2 times a day. 180 Tablet 3    potassium chloride (MICRO-K) 10 MEQ capsule TAKE 2 CAPS ORAL DAILY 180 Capsule 1    atorvastatin (LIPITOR) 40 MG Tab Take 1 Tablet by mouth every evening. 90 Tablet 3    omeprazole (PRILOSEC) 40 MG delayed-release capsule Take 40 mg by mouth every day.      citalopram (CELEXA) 20 MG Tab Take 20 mg by mouth every morning.      HYDROcodone/acetaminophen (NORCO)  " MG Tab Take 1-2 Tabs by mouth every 6 hours as needed.      aspirin (ASA) 81 MG Chew Tab chewable tablet Take 81 mg by mouth every day.      Cyanocobalamin (VITAMIN B-12) 5000 MCG SL Tab Place 1 Tab under tongue every morning.      ascorbic acid (ASCORBIC ACID) 500 MG Tab Take 1,000 mg by mouth every morning.      multivitamin (THERAGRAN) Tab Take 1 Tab by mouth every morning.      calcium citrate (CALCITRATE) 950 MG Tab Take 600 mg by mouth 2 times a day.      gabapentin (NEURONTIN) 600 MG tablet Take 1,800 mg by mouth 3 times a day.  5    [DISCONTINUED] Mirabegron ER (MYRBETRIQ) 50 MG TABLET SR 24 HR Take 50 mg by mouth every day. (Patient not taking: Reported on 3/1/2023)      [DISCONTINUED] miconazole (MONISTAT) 2 % Cream Miconazole 7 2 % vaginal cream   APPLY 1 APPLICATION TOP TWICE A DAY (Patient not taking: Reported on 3/1/2023)       No facility-administered encounter medications on file as of 4/6/2023.     Review of Systems   Constitutional:  Positive for malaise/fatigue. Negative for fever.   Respiratory:  Positive for shortness of breath. Negative for cough.         Exertional moderate   Cardiovascular:  Negative for chest pain, palpitations, orthopnea, claudication, leg swelling and PND.   Gastrointestinal:  Positive for abdominal pain, diarrhea and nausea.   Musculoskeletal:  Negative for falls and joint pain.   Neurological:  Positive for weakness. Negative for dizziness.        Wheelchair for mobility at times   All other systems reviewed and are negative.     Objective:   /50 (BP Location: Left arm, Patient Position: Sitting, BP Cuff Size: Adult)   Pulse (!) 59   Resp 14   Ht 1.626 m (5' 4\")   Wt 86 kg (189 lb 11.2 oz)   SpO2 91%   BMI 32.56 kg/m²     Physical Exam  Vitals and nursing note reviewed.   Constitutional:       General: She is not in acute distress.     Appearance: Normal appearance. She is well-developed. She is obese.      Comments: Obese   HENT:      Head: " Normocephalic and atraumatic.   Neck:      Vascular: No JVD.   Cardiovascular:      Rate and Rhythm: Regular rhythm. Bradycardia present.      Pulses: Normal pulses.           Carotid pulses are  on the right side with bruit and  on the left side with bruit.     Heart sounds: No murmur heard.  Pulmonary:      Effort: Pulmonary effort is normal. No respiratory distress.      Breath sounds: Normal breath sounds.      Comments: Oxygen at 2L continuous  Abdominal:      General: Bowel sounds are normal.      Palpations: Abdomen is soft.   Musculoskeletal:      Cervical back: Normal range of motion.      Comments: Wheelchair today for mobility   Skin:     General: Skin is warm and dry.      Capillary Refill: Capillary refill takes less than 2 seconds.   Neurological:      General: No focal deficit present.      Mental Status: She is alert and oriented to person, place, and time. Mental status is at baseline.   Psychiatric:         Mood and Affect: Mood normal.         Behavior: Behavior normal.         Thought Content: Thought content normal.         Judgment: Judgment normal.     Reviewed with patient  Lab Results   Component Value Date/Time    CHOLSTRLTOT 141 03/02/2022 06:37 AM    LDL 79 04/19/2017 10:00 AM    HDL 38 (L) 03/02/2022 06:37 AM    TRIGLYCERIDE 89 03/02/2022 06:37 AM       Lab Results   Component Value Date/Time    SODIUM 141 03/29/2023 11:10 AM    SODIUM 137 09/30/2017 03:14 AM    POTASSIUM 4.5 03/29/2023 11:10 AM    POTASSIUM 4.3 09/30/2017 03:14 AM    CHLORIDE 102 03/29/2023 11:10 AM    CHLORIDE 105 09/30/2017 03:14 AM    CO2 26 03/29/2023 11:10 AM    CO2 25 09/30/2017 03:14 AM    GLUCOSE 116 (H) 03/29/2023 11:10 AM    GLUCOSE 122 (H) 09/30/2017 03:14 AM    BUN 11 03/29/2023 11:10 AM    BUN 13 09/30/2017 03:14 AM    CREATININE 0.85 03/29/2023 11:10 AM    CREATININE 0.90 09/30/2017 03:14 AM    BUNCREATRAT 13 03/29/2023 11:10 AM     Lab Results   Component Value Date/Time    ALKPHOSPHAT 113 03/02/2022  06:37 AM    ALKPHOSPHAT 72 09/30/2017 03:14 AM    ASTSGOT 30 03/02/2022 06:37 AM    ASTSGOT 17 09/30/2017 03:14 AM    ALTSGPT 19 03/02/2022 06:37 AM    ALTSGPT 9 09/30/2017 03:14 AM    TBILIRUBIN 0.6 03/02/2022 06:37 AM    TBILIRUBIN 0.7 09/30/2017 03:14 AM      3/4/16 MPI IMPRESSIONS  PVCs on baseline ECG.  No evidence of significant jeopardized viable myocardium or prior myocardial   infarction.  Normal left ventricular wall motion. LV ejection fraction = 71%.  123    3/4/16 CAROTID DUPLEX CONCLUSIONS  <50% bilateral ICA stenoses  Nl subclavians and vertebrals    2017 ECHO CONCLUSIONS  Compared to the report of the prior study done 7/4/2015 -  there has   been no significant change.   Normal left ventricular size and systolic function.  Left ventricular ejection fraction is visually estimated to be 65%.  Grade I diastolic dysfunction.  Moderate aortic stenosis.  Transvalvular gradients are - Peak: 34 mmHg, Mean: 21 mmHg.  Mild aortic insufficiency.    Assessment:     1. CAD        2. Bilateral carotid artery stenosis        3. Chronic diastolic CHF (congestive heart failure) (HCC)        4. Primary hypertension        5. Hx of CABG        6. Pure hypercholesterolemia        7. S/P TAVR (transcatheter aortic valve replacement)        8. Stented coronary artery        9. TIA (transient ischemic attack)        10. Weakness          Medical Decision Making:  Today's Assessment / Status / Plan:     1. HLD with CAD  -no angina or NGUYEN  -continue ASA, statin    2. Weakness/frailty  -chronic with age and debility  -working with home health  -consider palliative care if symptoms progress?    3. HTN  -good control on furosemide, toprol, and lisinopril  -BP goal <130/80    4. Carotid artery stenosis  -cont asa, statin    5. CKD  -stable,previously followed by nephro  -labs stable    6. S/P TAVR  -echo shows normal EF but grade I diastolic dysfunction (normal for aging process)  -follow clinically  -cont  lasix/potassium  -follow clinically    Patient is to follow up with Pamela MEADE  or Dr. Elder in 3-6 months  with no labs or testing.

## 2023-10-11 ENCOUNTER — APPOINTMENT (OUTPATIENT)
Dept: CARDIOLOGY | Facility: MEDICAL CENTER | Age: 88
End: 2023-10-11
Attending: NURSE PRACTITIONER
Payer: MEDICARE

## 2023-10-20 NOTE — PROGRESS NOTES
Chief Complaint   Patient presents with   • Aortic Stenosis     F/V DX: S/P TAVR       Subjective:   Silvia Orellana is a 88 y.o. female who presents today for annual TAVR follow up.    Since the patient's last visit on 10/11/18, she has been experiencing fatigue, shortness of breath and dyspnea on exertion. She denies fatigue, shortness of breath, dyspnea on exertion, chest pain, dizziness or syncope. Her depression.     Past Medical History:   Diagnosis Date   • Arthritis    • Bowel habit changes    • Bradycardia    • Breast cancer (HCC)    • Breath shortness    • CAD (coronary artery disease)     CABG    • Cancer (HCC) 2003    left breast lumpectomy   • Cancer (HCC) 2007    bladder ca   • Cancer (HCC) 2016    basal cell ca to nose   • Cataract     patria IOL   • CHF (congestive heart failure) (HCC)    • Chronic pain    • Dental disorder     dentures   • Diverticulosis    • Fall    • GERD (gastroesophageal reflux disease)    • Heart burn    • Hx of Clostridium difficile infection 2015   • Hyperlipidemia    • Hypertension    • Indigestion    • Myocardial infarct (Former[facility]) 2002    cardiac stent   • Pneumonia 2014   • Stroke ([facility]) 2001; 2012    no deficits, general weakness   • Urinary bladder disorder    • Urinary incontinence      Past Surgical History:   Procedure Laterality Date   • TRANSCATHETER AORTIC VALVE REPLACEMENT N/A 9/25/2017    Procedure: TRANSCATHETER AORTIC VALVE REPLACEMENT;  Surgeon: Sherif Elder M.D.;  Location: [facility];  Service: Cardiac   • TATIANNA  9/25/2017    Procedure: TTE;  Surgeon: Sherif Elder M.D.;  Location: [facility];  Service: Cardiac   • ORBITAL FRACTURE ORIF Left 5/23/2016    Procedure: ORBITAL FRACTURE ORIF FOR: LATERAL CANTHOPEXY;  Surgeon: Fabrice Daniel Jr., M.D.;  Location: [facility];  Service:    • FEMUR ORIF Right 8/10/2015    Procedure: FEMUR ORIF;  Surgeon: Umer Guevara M.D.;  Location: [facility];  Service:    •  ORBITAL FRACTURE ORIF Left 7/6/2015    Procedure: ORBITAL FRACTURE ORIF;  Surgeon: Fabrice Daniel Jr., M.D.;  Location: [facility];  Service:    • PB REVISE TOTAL HIP REPLACEMENT  3/24/2015    R   • OTHER CARDIAC SURGERY  2012    cardiac cath with stent placement   • PB REVISE ACETABULAR PART OF TOTAL HIP  2010    L   • OTHER CARDIAC SURGERY  2002    CABG   • GYN SURGERY  1961    hysterectomy   • ANGIOGRAM     • BLADDER BIOPSY     • CHOLECYSTECTOMY     • HIP ARTHROPLASTY TOTAL Left    • LUMPECTOMY      L   • MULTIPLE CORONARY ARTERY BYPASS     • ZZZ CARDIAC CATH       Family History   Problem Relation Age of Onset   • Heart Attack Neg Hx      Social History     Socioeconomic History   • Marital status:      Spouse name: Not on file   • Number of children: Not on file   • Years of education: Not on file   • Highest education level: Not on file   Occupational History   • Not on file   Social Needs   • Financial resource strain: Not on file   • Food insecurity:     Worry: Not on file     Inability: Not on file   • Transportation needs:     Medical: Not on file     Non-medical: Not on file   Tobacco Use   • Smoking status: Never Smoker   • Smokeless tobacco: Never Used   Substance and Sexual Activity   • Alcohol use: Yes     Comment: maybe once a month   • Drug use: No   • Sexual activity: Not on file   Lifestyle   • Physical activity:     Days per week: Not on file     Minutes per session: Not on file   • Stress: Not on file   Relationships   • Social connections:     Talks on phone: Not on file     Gets together: Not on file     Attends Hindu service: Not on file     Active member of club or organization: Not on file     Attends meetings of clubs or organizations: Not on file     Relationship status: Not on file   • Intimate partner violence:     Fear of current or ex partner: Not on file     Emotionally abused: Not on file     Physically abused: Not on file     Forced sexual activity: Not  on file   Other Topics Concern   • Not on file   Social History Narrative   • Not on file     No Known Allergies     (Medications reviewed.)  Outpatient Encounter Medications as of 11/15/2019   Medication Sig Dispense Refill   • metoprolol (LOPRESSOR) 50 MG Tab metoprolol tartrate 50 mg tablet     • miconazole (MONISTAT) 2 % Cream Miconazole 7 2 % vaginal cream   APPLY 1 APPLICATION TOP TWICE A DAY     • omeprazole (PRILOSEC) 40 MG delayed-release capsule omeprazole 40 mg capsule,delayed release     • citalopram (CELEXA) 20 MG Tab citalopram 20 mg tablet     • lisinopril (PRINIVIL) 20 MG Tab TAKE 1 TABLET BY MOUTH TWICE A  Tab 0   • amLODIPine (NORVASC) 5 MG Tab Take 1 Tab by mouth every day. 90 Tab 3   • furosemide (LASIX) 20 MG Tab Take 1 Tab by mouth every day. 90 Tab 3   • dicyclomine (BENTYL) 20 MG Tab Take 20 mg by mouth every 6 hours.     • HYDROcodone/acetaminophen (NORCO)  MG Tab Take 1-2 Tabs by mouth every 6 hours as needed.     • nitroglycerin (NITROSTAT) 0.4 MG SL Tab Place 0.4 mg under tongue every 5 minutes as needed for Chest Pain.     • aspirin (ASA) 81 MG Chew Tab chewable tablet Take 81 mg by mouth every day.     • Cyanocobalamin (VITAMIN B-12) 5000 MCG SL Tab Place 1 Tab under tongue every morning.     • ascorbic acid (ASCORBIC ACID) 500 MG Tab Take 500 mg by mouth every morning.     • B Complex Vitamins (VITAMIN B COMPLEX PO) Take 1 Tab by mouth every morning.     • multivitamin (THERAGRAN) Tab Take 1 Tab by mouth every morning.     • atorvastatin (LIPITOR) 20 MG Tab Take 20 mg by mouth every evening.     • calcium citrate (CALCITRATE) 950 MG Tab Take 600 mg by mouth 2 times a day.     • gabapentin (NEURONTIN) 600 MG tablet Take 1,800 mg by mouth 3 times a day.  5   • [DISCONTINUED] gabapentin (NEURONTIN) 600 MG tablet gabapentin 600 mg tablet     • [DISCONTINUED] celecoxib (CELEBREX) 100 MG Cap celecoxib 100 mg capsule     • [DISCONTINUED] Sodium Hyaluronate, [place], (GELSYN-3)  16.8 MG/2ML Solution Prefilled Syringe 2 mL.     • [DISCONTINUED] amLODIPine (NORVASC) 5 MG Tab amlodipine 5 mg tablet     • [DISCONTINUED] atorvastatin (LIPITOR) 20 MG Tab atorvastatin 20 mg tablet     • [DISCONTINUED] atorvastatin (LIPITOR) 40 MG Tab atorvastatin 40 mg tablet     • [DISCONTINUED] citalopram (CELEXA) 40 MG Tab citalopram 40 mg tablet     • [DISCONTINUED] citalopram (CELEXA) 40 MG Tab      • [DISCONTINUED] furosemide (LASIX) 20 MG Tab furosemide 20 mg tablet     • [DISCONTINUED] HYDROcodone/acetaminophen (NORCO)  MG Tab hydrocodone 10 mg-acetaminophen 325 mg tablet     • [DISCONTINUED] lisinopril (PRINIVIL) 10 MG Tab lisinopril 10 mg tablet     • [DISCONTINUED] lisinopril (PRINIVIL) 20 MG Tab lisinopril 20 mg tablet     • [DISCONTINUED] meloxicam (MOBIC) 15 MG tablet meloxicam 15 mg tablet     • [DISCONTINUED] metoprolol SR (TOPROL XL) 50 MG TABLET SR 24 HR metoprolol succinate ER 50 mg tablet,extended release 24 hr     • [DISCONTINUED] metoprolol SR (TOPROL XL) 50 MG TABLET SR 24 HR Take 1 Tab by mouth every day. (Patient not taking: Reported on 11/15/2019) 90 Tab 3   • [DISCONTINUED] carisoprodol (SOMA) 350 MG Tab Take 350 mg by mouth 4 times a day.     • [DISCONTINUED] meloxicam (MOBIC) 15 MG tablet      • [DISCONTINUED] lansoprazole (PREVACID) 15 MG TABLET DISPERSIBLE Take 15 mg by mouth every day.     • [DISCONTINUED] Promethazine HCl (PHENERGAN PO) Take 25 mg by mouth.     • [DISCONTINUED] citalopram (CELEXA) 20 MG TABS Take 20 mg by mouth every morning.       No facility-administered encounter medications on file as of 11/15/2019.      Review of Systems   Constitutional: Positive for malaise/fatigue. Negative for chills, fever and weight loss.        Weight gain.   HENT: Negative.  Negative for hearing loss.    Eyes: Negative.  Negative for blurred vision and double vision.   Respiratory: Positive for shortness of breath. Negative for cough.    Cardiovascular: Negative.  Negative for  "chest pain, palpitations, claudication and leg swelling.   Gastrointestinal: Negative.  Negative for abdominal pain, nausea and vomiting.   Genitourinary: Negative.  Negative for dysuria and urgency.   Musculoskeletal: Positive for joint pain. Negative for myalgias.   Skin: Negative.  Negative for itching and rash.   Neurological: Negative.  Negative for dizziness, focal weakness, weakness and headaches.   Endo/Heme/Allergies: Negative.  Does not bruise/bleed easily.   Psychiatric/Behavioral: Positive for depression. The patient is not nervous/anxious.         Objective:   /66 (BP Location: Left arm, Patient Position: Sitting, BP Cuff Size: Adult)   Pulse 65   Ht 1.6 m (5' 3\")   Wt 95 kg (209 lb 7 oz)   SpO2 94%   BMI 37.10 kg/m²     Physical Exam   Constitutional: She is oriented to person, place, and time. She appears well-developed and well-nourished.   HENT:   Head: Normocephalic and atraumatic.   Eyes: EOM are normal.   Neck: No JVD present.   Cardiovascular: Normal rate, regular rhythm and normal heart sounds.   Pulmonary/Chest: Effort normal and breath sounds normal.   Abdominal: Soft. Bowel sounds are normal.   No hepatosplenomegaly.   Musculoskeletal: Normal range of motion.   Lymphadenopathy:     She has no cervical adenopathy.   Neurological: She is alert and oriented to person, place, and time.   Skin: Skin is warm and dry.   Psychiatric: She has a normal mood and affect.     CARDIAC STUDIES/PROCEDURES:     CARDIAC CATHETERIZATION CONCLUSIONS by Dr. Aguirre (08/30/17)  1. Patent LIMA to LAD  2. Patent KINDRA to PDA  3. Patent vein graft to OM, mild/moderate in-stent restenosis of the ostial stent  4. Mildly tortuous abdominal descending aorta     CAROTID ULTRASOUND (03/04/16)  <50% bilateral ICA stenoses   Nl subclavians and vertebrals     CT OF ABDOMEN (09/28/17)  1.  Mild left hydronephrosis and hydroureter with definite distal obstructive etiology not identified.  2.  Apparent rectal wall " thickening with perirectal stranding which could indicate proctitis.  3.  Trace bilateral pleural effusions with associated compressive atelectasis and/or consolidation. Underlying infection is possible.  4.  Status post cholecystectomy.  5.  Atrophic right kidney.  6.  Right renal cysts.  7.  Diverticulosis without evidence of diverticulitis.  8.  Atherosclerosis.     CT OF ABDOMEN (09/25/17)  1.  Small LEFT low anterior abdominal wall hematoma. Ongoing bleeding is not excluded.  2.  Gallbladder decompressed or absent  3.  Duodenal diverticulum  4.  Atrophic RIGHT kidney  5.  Atherosclerosis  6.  Prior hysterectomy  7.  Distal colonic diverticulosis  8.  Small hiatal hernia     CT OF CHEST AND ABDOMEN (09/06/17)  1.  Aortic annulus area of 412 sq mm.  2.  Coronary artery ostia are greater than 10 mm from the annulus.  3.  Moderate tortuosity and atherosclerotic calcification of the iliofemoral arterial system, worse on the RIGHT, with minimum diameters of 5.1 mm on the RIGHT and 6.9 mm on the LEFT.  4.  Markedly atrophic RIGHT kidney with apparent moderate to severe proximal RIGHT renal artery stenosis, likely chronic.     DOBUTAMINE STRESS ECHOCARDIOGRAM (08/07/17)  Resting Echo: LVEF 65% with AoV Vmax  of 2.52 m/s; peak gradient 25.5 mmHg and mean 16.11 mmHg  Peak Dobutamine: LVEF 80%, Vmax 4.02 m/s; Peak gradient 64.75 mmHg and mean 36.42 mmHg  No wall motion abnormality.    ECHOCARDIOGRAM CONCLUSIONS (10/17/19)  Prior 11/13/18, no significant changes are noted.  Known TAVR aortic valve that is functioning normally with normal transvalvular gradients.  Vmax is 2.8  m/s. Transvalvular gradients are - Peak: 31 mmHg,  Mean: 15 mmHg. Mild paravalvular leak is noted.  Mild paravalvular leak is noted.  Left ventricular ejection fraction is visually estimated to be 65%.  Grade II diastolic dysfunction.  Estimated right ventricular systolic pressure  is 40 mmHg.  (study result reviewed)     ECHOCARDIOGRAM CONCLUSIONS  (02/05/18)  Compared to the report of the study done 10/30/17 - there has been   normalization of TAVR gradient.  Normal left ventricular systolic function.  Left ventricular ejection fraction is visually estimated to be 65%.  Grade I diastolic dysfunction.  Mild mitral regurgitation.  Known TAVR aortic valve that is functioning normally with normal   transvalvular gradients.  Max is 2.64 m/s. Transvalvular gradients are - Peak: 28 mmHg,  Mean: 18 mmHg.   No evidence of paravalvular leak is noted.  Mild pulmonic insufficiency.     ECHOCARDIOGRAM CONCLUSIONS (10/30/17)  Mild concentric left ventricular hypertrophy.  Normal left ventricular systolic function. Left ventricular ejection   fraction is visually estimated to be 70%.  Grade I diastolic dysfunction.  Normal inferior vena cava size and inspiratory collapse.  Mild mitral stenosis with a mean gradient of 3mmHg at a HR of 63 bpm.  Known TAVR aortic valve. Transvalvular gradients are - Peak: 47 mmHg, Mean: 28  mmHg.   Estimated right ventricular systolic pressure is 20 mmHg.  Compared to the report of the study done 09/26/2017 there has been an   increase in the mean gradient across the prosthetic aortic valve.     ECHOCARDIOGRAM CONCLUSIONS (09/26/17)  Hyperdynamic left ventricular systolic function. Left ventricular   ejection fraction is visually estimated to be greater than 75%.  Evidence of increased intracavity gradient, peak velocity is 3.35 m/sec.   Normal regional wall motion. Normal diastolic function.  Moderate mitral stenosis. Mean transvalvular gradient is 5.5 mmHg at a   heart rate of 92 BPM.   Normal functioning bioprosthetic aortic valve with a peak gradient of   22 mmHg and mean gradient of 12 mmHg.  Echo images from 9/25/17 showed normal LV function and normal   functioning bioprosthetic aortic with a peak gradient of 22 mmHg and   mean gradient of 13 mmHg.     ECHOCARDIOGRAM CONCLUSIONS by Dr. Acosta (09/25/17)  Intraoperative TTE during TAVR.   Baseline images show normal   biventricular systolic function with EF - 65%.  Severe aortic stenosis.    Post-CPB images show preserved biventricular function.  A 23 mm Oliveira   Leeann 3 valve is seen in the aortic position with normal leaflet   motion, no paravalvular leak, mean gradient of 5 mm Hg, and calculated   effective orifice area of 3.5 cm2.  Findings communicated at the time   of exam.     ECHOCARDIOGRAM CONCLUSIONS (04/19/17)  Structurally normal tricuspid valve. Mild tricuspid regurgitation.   Estimated right ventricular systolic pressure  is 40 mmHg.  Normal left ventricular systolic function.   Estimated right ventricular systolic pressure  is 40 mmHg.  Compared to the images of the study done - there has been increase in   mean gradient across the aortic valve but still within moderate   severity of aortic stenosis.     EKG performed on (10/17/17) was reviewed: EKG shows sinus bradycardia.  EKG performed on (09/27/17) EKG shows sinus tachycardia.  EKG performed on (09/26/17) EKG shows sinus tachycardia.  EKG performed on (09/25/17) EKG shows sinus tachycardia.  EKG performed on (09/21/16) EKG shows sinus bradycardia.  EKG performed on (02/10/16) EKG shows sinus bradycardia.     Laboratory results of (10/05/18) were reviewed. Bun of 13 mg/dl, creatinine levels of 1.18 mg/dl noted. Cholesterol profile of 130/134/41/62 noted.     Laboratory results of (10/13/17) Bun of 15 mg/dl, creatinine levels of 1.23 mg/dl noted.     PULMONARY FUNCTION INTERPRETATION (09/13/17)  REASON FOR STUDY:  Shortness of breath, dyspnea on exertion and a history of   severe aortic stenosis.  SPIROMETRY:  FVC is 1.83, 79% predicted.  FEV1 is 1.44, 83% predicted.    FEV1/FVC is 79.  There is a significant response to bronchodilator.  LUNG VOLUMES:  Vital capacity is 97% predicted.  Total lung capacity is 106%   predicted.  Diffusion studies are normal.  Flow volume loops show some mild   coving in the expiratory  limb.  IMPRESSION:  Normal exam       Assessment:     1. S/P TAVR (transcatheter aortic valve replacement)     2. Chronic diastolic CHF (congestive heart failure) (HCC)     3. CAD     4. Hx of CABG     5. Essential hypertension     6. Dyslipidemia     7. Bilateral carotid artery stenosis     8. TIA (transient ischemic attack)         Medical Decision Making:  Today's Assessment / Status / Plan:     1. Status post transcatheter aortic valve replacement (TAVR) with # 23 Oliveira Leeann S3 valve, transfemoral approach, under conscious sedation (09/25/17): She is clinically doing well, NYHA class II. We will repeat an echocardiogram, in one year.  2. Chronic diastolic congestive heart failure: The overall volume status is adequate.  3. Coronary artery disease with prior coronary bypass graft (x3 with LIMA to LAD, KINDRA to PDA, vein graft to OM) in [facility], 2002, ostium of the vein graft  stent placement also in 2012): She is clinically doing well. I will continue with current medical care including aspirin, metoprolol, lisinopril and atorvastatin..  4. Hypertension: Blood pressure is well controlled. We will continue with beta blockade therapy and ace inhibitor therapy.  5. Hyperlipidemia: She is doing well on statin therapy without myalgia symptoms.  6. Carotid artery stenosis: Clinically stable on above medical therapy.  4. History of trans-ischemic attack (2012): She remains clinically stable without any new neurological symptoms.  5. Chronic kidney disease: We will continue to follow labs.  6. Small abdominal wall hematoma: Stable  7. Health maintenance: She admits to depression and lack of motivation following the loss oh her  in 2014 and moving from her home in [place] to with her daughter.     We will follow up with her in one year from TAVR standpoint and also have her follow up with her.      CC Pamela Deal and Joshua Acosta     Patient/Family/Parent

## 2023-12-01 ENCOUNTER — APPOINTMENT (OUTPATIENT)
Dept: CARDIOLOGY | Facility: MEDICAL CENTER | Age: 88
End: 2023-12-01
Attending: NURSE PRACTITIONER
Payer: MEDICARE

## 2024-01-03 ENCOUNTER — OFFICE VISIT (OUTPATIENT)
Dept: CARDIOLOGY | Facility: MEDICAL CENTER | Age: 89
End: 2024-01-03
Attending: NURSE PRACTITIONER
Payer: MEDICARE

## 2024-01-03 VITALS
OXYGEN SATURATION: 88 % | WEIGHT: 164 LBS | BODY MASS INDEX: 28 KG/M2 | HEIGHT: 64 IN | HEART RATE: 61 BPM | SYSTOLIC BLOOD PRESSURE: 168 MMHG | DIASTOLIC BLOOD PRESSURE: 48 MMHG

## 2024-01-03 DIAGNOSIS — I25.10 CORONARY ARTERY DISEASE DUE TO CALCIFIED CORONARY LESION: ICD-10-CM

## 2024-01-03 DIAGNOSIS — I10 ESSENTIAL HYPERTENSION: ICD-10-CM

## 2024-01-03 DIAGNOSIS — Z95.2 S/P TAVR (TRANSCATHETER AORTIC VALVE REPLACEMENT): ICD-10-CM

## 2024-01-03 DIAGNOSIS — Z95.1 HX OF CABG: ICD-10-CM

## 2024-01-03 DIAGNOSIS — I10 PRIMARY HYPERTENSION: ICD-10-CM

## 2024-01-03 DIAGNOSIS — G45.9 TIA (TRANSIENT ISCHEMIC ATTACK): ICD-10-CM

## 2024-01-03 DIAGNOSIS — N18.31 STAGE 3A CHRONIC KIDNEY DISEASE: ICD-10-CM

## 2024-01-03 DIAGNOSIS — I25.84 CORONARY ARTERY DISEASE DUE TO CALCIFIED CORONARY LESION: ICD-10-CM

## 2024-01-03 DIAGNOSIS — I65.23 BILATERAL CAROTID ARTERY STENOSIS: Chronic | ICD-10-CM

## 2024-01-03 DIAGNOSIS — E78.00 PURE HYPERCHOLESTEROLEMIA: ICD-10-CM

## 2024-01-03 DIAGNOSIS — I50.32 CHRONIC DIASTOLIC CHF (CONGESTIVE HEART FAILURE) (HCC): ICD-10-CM

## 2024-01-03 DIAGNOSIS — E78.5 DYSLIPIDEMIA: ICD-10-CM

## 2024-01-03 DIAGNOSIS — Z95.5 STENTED CORONARY ARTERY: ICD-10-CM

## 2024-01-03 DIAGNOSIS — R53.1 WEAKNESS: ICD-10-CM

## 2024-01-03 PROCEDURE — 99213 OFFICE O/P EST LOW 20 MIN: CPT | Performed by: NURSE PRACTITIONER

## 2024-01-03 PROCEDURE — 3078F DIAST BP <80 MM HG: CPT | Performed by: NURSE PRACTITIONER

## 2024-01-03 PROCEDURE — 99214 OFFICE O/P EST MOD 30 MIN: CPT | Performed by: NURSE PRACTITIONER

## 2024-01-03 PROCEDURE — 3077F SYST BP >= 140 MM HG: CPT | Performed by: NURSE PRACTITIONER

## 2024-01-03 RX ORDER — ATORVASTATIN CALCIUM 40 MG/1
40 TABLET, FILM COATED ORAL NIGHTLY
Qty: 100 TABLET | Refills: 3 | Status: SHIPPED | OUTPATIENT
Start: 2024-01-03 | End: 2024-03-25 | Stop reason: SDUPTHER

## 2024-01-03 RX ORDER — FUROSEMIDE 20 MG/1
20 TABLET ORAL
Qty: 100 TABLET | Refills: 3 | Status: SHIPPED | OUTPATIENT
Start: 2024-01-03

## 2024-01-03 RX ORDER — POTASSIUM CHLORIDE 750 MG/1
CAPSULE, EXTENDED RELEASE ORAL
Qty: 200 CAPSULE | Refills: 3 | Status: SHIPPED | OUTPATIENT
Start: 2024-01-03

## 2024-01-03 RX ORDER — LISINOPRIL 20 MG/1
20 TABLET ORAL 2 TIMES DAILY
Qty: 200 TABLET | Refills: 3 | Status: SHIPPED | OUTPATIENT
Start: 2024-01-03

## 2024-01-03 RX ORDER — METOPROLOL SUCCINATE 50 MG/1
50 TABLET, EXTENDED RELEASE ORAL
Qty: 100 TABLET | Refills: 3 | Status: SHIPPED | OUTPATIENT
Start: 2024-01-03

## 2024-01-03 ASSESSMENT — ENCOUNTER SYMPTOMS
WEAKNESS: 1
COUGH: 0
ABDOMINAL PAIN: 1
ORTHOPNEA: 0
CLAUDICATION: 0
DIARRHEA: 1
SHORTNESS OF BREATH: 1
NAUSEA: 1
DIZZINESS: 0
FALLS: 0
FEVER: 0
PALPITATIONS: 0
PND: 0

## 2024-01-03 ASSESSMENT — FIBROSIS 4 INDEX: FIB4 SCORE: 3.12

## 2024-01-03 NOTE — PATIENT INSTRUCTIONS
Waffle cushion daily in the chair for weight distribution.    Pure Wick for urinary incontinence.    Check BP at home, BP goal <140/90. I will reach out to obtain BP readings in 2 weeks.    Echocardiogram this year with follow up in 1 year.

## 2024-01-03 NOTE — PROGRESS NOTES
Subjective:   Silvia Orellana is a 89 y.o. female who presents today for 1 year follow up.    Hx of CAD with previous CABG, HTN, HLD, carotid artery disease, and falls with multiple orthopedic surgeries requiring wheelchair for mobility at times, and S/P TAVR for severe AS.    Her shortness of breath is present and she now is on oxygen daily at 2L NC continuous, although she forgot this today. She thinks her overall breathing status is improving though.    Her mobility is still limited and worsening. She doesn't leave her room or house much.    She has a great support system with her daughter and grandchildren, she lives with her daughter and has home health nursing as well.    She has had no episodes of chest pain, palpitations, or orthopnea.    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    Cataract     patria IOL    CHF (congestive heart failure) (HCC)     Chronic pain     Dental disorder     dentures    Diverticulosis     Fall     GERD (gastroesophageal reflux disease)     Heart burn     Hx of Clostridium difficile infection 2015    Hyperlipidemia     Hypertension     Indigestion     Myocardial infarct (HCC) 2002    cardiac stent    Pneumonia 2014    Stroke (HCC) 2001; 2012    no deficits, general weakness    Urinary bladder disorder     Urinary incontinence      Past Surgical History:   Procedure Laterality Date    TRANSCATHETER AORTIC VALVE REPLACEMENT N/A 9/25/2017    Procedure: TRANSCATHETER AORTIC VALVE REPLACEMENT;  Surgeon: Sherif Elder M.D.;  Location: SURGERY [facility];  Service: Cardiac    TATIANNA  9/25/2017    Procedure: TTE;  Surgeon: Sherif Elder M.D.;  Location: SURGERY [facility];  Service: Cardiac    ORBITAL FRACTURE ORIF Left  5/23/2016    Procedure: ORBITAL FRACTURE ORIF FOR: LATERAL CANTHOPEXY;  Surgeon: Fabrice Daniel Jr., M.D.;  Location: SURGERY [facility];  Service:     ORIF, FRACTURE, FEMUR Right 8/10/2015    Procedure: FEMUR ORIF;  Surgeon: Umer Guevara M.D.;  Location: SURGERY [facility];  Service:     ORBITAL FRACTURE ORIF Left 7/6/2015    Procedure: ORBITAL FRACTURE ORIF;  Surgeon: Fabrice Daniel Jr., M.D.;  Location: SURGERY [facility];  Service:     PB REVISE TOTAL HIP REPLACEMENT  3/24/2015    R    OTHER CARDIAC SURGERY  2012    cardiac cath with stent placement    PB REVISE ACETABULAR PART OF TOTAL HIP  2010    L    OTHER CARDIAC SURGERY  2002    CABG    GYN SURGERY  1961    hysterectomy    ANGIOGRAM      BLADDER BIOPSY      CHOLECYSTECTOMY      HIP ARTHROPLASTY TOTAL Left     LUMPECTOMY      L    MULTIPLE CORONARY ARTERY BYPASS      ZZZ CARDIAC CATH       Family History   Problem Relation Age of Onset    Heart Attack Neg Hx      Social History     Tobacco Use   Smoking Status Never   Smokeless Tobacco Never     No Known Allergies  Outpatient Encounter Medications as of 1/3/2024   Medication Sig Dispense Refill    potassium chloride (MICRO-K) 10 MEQ capsule TAKE 2 CAPSULES BY MOUTH EVERY DAY 30 Capsule 1    citalopram (CELEXA) 40 MG Tab Take 40 mg by mouth every day.      furosemide (LASIX) 20 MG Tab Take 1 Tablet by mouth every day. 90 Tablet 3    metoprolol SR (TOPROL XL) 50 MG TABLET SR 24 HR Take 1 Tablet by mouth every day. 90 Tablet 3    lisinopril (PRINIVIL) 20 MG Tab Take 1 Tablet by mouth 2 times a day. 180 Tablet 3    atorvastatin (LIPITOR) 40 MG Tab Take 1 Tablet by mouth every evening. 90 Tablet 3    omeprazole (PRILOSEC) 40 MG delayed-release capsule Take 40 mg by mouth every day.      HYDROcodone/acetaminophen (NORCO)  MG Tab Take 1-2 Tabs by mouth every 6 hours as needed.      aspirin (ASA) 81 MG Chew Tab chewable tablet Chew 325 mg every day.      Cyanocobalamin (VITAMIN  "B-12) 5000 MCG SL Tab Place 1 Tab under tongue every morning.      ascorbic acid (ASCORBIC ACID) 500 MG Tab Take 1,000 mg by mouth every morning.      multivitamin (THERAGRAN) Tab Take 1 Tab by mouth every morning.      calcium citrate (CALCITRATE) 950 MG Tab Take 600 mg by mouth 2 times a day.      gabapentin (NEURONTIN) 600 MG tablet Take 1,800 mg by mouth 3 times a day.  5    [DISCONTINUED] citalopram (CELEXA) 20 MG Tab Take 20 mg by mouth every morning. (Patient not taking: Reported on 1/3/2024)       No facility-administered encounter medications on file as of 1/3/2024.     Review of Systems   Constitutional:  Positive for malaise/fatigue. Negative for fever.   Respiratory:  Positive for shortness of breath. Negative for cough.         Exertional moderate   Cardiovascular:  Negative for chest pain, palpitations, orthopnea, claudication, leg swelling and PND.   Gastrointestinal:  Positive for abdominal pain, diarrhea and nausea.   Musculoskeletal:  Negative for falls and joint pain.   Neurological:  Positive for weakness. Negative for dizziness.        Wheelchair for mobility at times   All other systems reviewed and are negative.       Objective:   BP (!) 168/48 (BP Location: Left arm, Patient Position: Sitting, BP Cuff Size: Adult)   Pulse 61   Ht 1.626 m (5' 4\")   Wt 74.4 kg (164 lb)   SpO2 88%   BMI 28.15 kg/m²     Physical Exam  Vitals and nursing note reviewed.   Constitutional:       General: She is not in acute distress.     Appearance: Normal appearance. She is well-developed. She is obese.      Comments: Obese   HENT:      Head: Normocephalic and atraumatic.   Neck:      Vascular: No JVD.   Cardiovascular:      Rate and Rhythm: Regular rhythm. Bradycardia present.      Pulses: Normal pulses.           Carotid pulses are  on the right side with bruit and  on the left side with bruit.     Heart sounds: No murmur heard.  Pulmonary:      Effort: Pulmonary effort is normal. No respiratory distress.     "  Breath sounds: Normal breath sounds.      Comments: Oxygen at 2L continuous  Abdominal:      General: Bowel sounds are normal.      Palpations: Abdomen is soft.   Musculoskeletal:      Cervical back: Normal range of motion.      Comments: Wheelchair today for mobility   Skin:     General: Skin is warm and dry.      Capillary Refill: Capillary refill takes less than 2 seconds.   Neurological:      General: No focal deficit present.      Mental Status: She is alert and oriented to person, place, and time. Mental status is at baseline.   Psychiatric:         Mood and Affect: Mood normal.         Behavior: Behavior normal.         Thought Content: Thought content normal.         Judgment: Judgment normal.       Reviewed with patient  Lab Results   Component Value Date/Time    CHOLSTRLTOT 141 03/02/2022 06:37 AM    LDL 79 04/19/2017 10:00 AM    HDL 38 (L) 03/02/2022 06:37 AM    TRIGLYCERIDE 89 03/02/2022 06:37 AM       Lab Results   Component Value Date/Time    SODIUM 141 03/29/2023 11:10 AM    SODIUM 137 09/30/2017 03:14 AM    POTASSIUM 4.5 03/29/2023 11:10 AM    POTASSIUM 4.3 09/30/2017 03:14 AM    CHLORIDE 102 03/29/2023 11:10 AM    CHLORIDE 105 09/30/2017 03:14 AM    CO2 26 03/29/2023 11:10 AM    CO2 25 09/30/2017 03:14 AM    GLUCOSE 116 (H) 03/29/2023 11:10 AM    GLUCOSE 122 (H) 09/30/2017 03:14 AM    BUN 11 03/29/2023 11:10 AM    BUN 13 09/30/2017 03:14 AM    CREATININE 0.85 03/29/2023 11:10 AM    CREATININE 0.90 09/30/2017 03:14 AM    BUNCREATRAT 13 03/29/2023 11:10 AM     Lab Results   Component Value Date/Time    ALKPHOSPHAT 113 03/02/2022 06:37 AM    ALKPHOSPHAT 72 09/30/2017 03:14 AM    ASTSGOT 30 03/02/2022 06:37 AM    ASTSGOT 17 09/30/2017 03:14 AM    ALTSGPT 19 03/02/2022 06:37 AM    ALTSGPT 9 09/30/2017 03:14 AM    TBILIRUBIN 0.6 03/02/2022 06:37 AM    TBILIRUBIN 0.7 09/30/2017 03:14 AM      3/4/16 MPI IMPRESSIONS  PVCs on baseline ECG.  No evidence of significant jeopardized viable myocardium or prior  myocardial   infarction.  Normal left ventricular wall motion. LV ejection fraction = 71%.  123    3/4/16 CAROTID DUPLEX CONCLUSIONS  <50% bilateral ICA stenoses  Nl subclavians and vertebrals    2017 ECHO CONCLUSIONS  Compared to the report of the prior study done 7/4/2015 -  there has   been no significant change.   Normal left ventricular size and systolic function.  Left ventricular ejection fraction is visually estimated to be 65%.  Grade I diastolic dysfunction.  Moderate aortic stenosis.  Transvalvular gradients are - Peak: 34 mmHg, Mean: 21 mmHg.  Mild aortic insufficiency.    Assessment:     1. Weakness        2. TIA (transient ischemic attack)        3. Stented coronary artery        4. S/P TAVR (transcatheter aortic valve replacement)        5. Pure hypercholesterolemia        6. Hx of CABG        7. Primary hypertension        8. Chronic diastolic CHF (congestive heart failure) (HCC)        9. Bilateral carotid artery stenosis        10. CAD        11. Dyslipidemia        12. Essential hypertension        13. Stage 3a chronic kidney disease (HCC)          Medical Decision Making:  Today's Assessment / Status / Plan:     1. HLD with CAD  -no angina or NGUYEN  -continue ASA, statin    2. Weakness/frailty  -chronic with age and debility  -working with home health  -consider palliative care/hospice if symptoms progress?    3. HTN  -good control on furosemide, toprol, and lisinopril  -BP goal <130/80    4. Carotid artery stenosis  -cont asa, statin    5. CKD  -stable, previously followed by nephro  -labs stable    6. S/P TAVR  -echo shows normal EF but grade I diastolic dysfunction (normal for aging process)  -follow clinically  -cont lasix/potassium  -follow clinically    Patient is to follow up with Pamela MEADE in 12 months with annual labs.

## 2024-02-23 ENCOUNTER — PATIENT OUTREACH (OUTPATIENT)
Dept: CARDIOLOGY | Facility: MEDICAL CENTER | Age: 89
End: 2024-02-23
Payer: MEDICARE

## 2024-02-23 DIAGNOSIS — I25.84 CORONARY ARTERY DISEASE DUE TO CALCIFIED CORONARY LESION: ICD-10-CM

## 2024-02-23 DIAGNOSIS — I25.10 CORONARY ARTERY DISEASE DUE TO CALCIFIED CORONARY LESION: ICD-10-CM

## 2024-02-29 ENCOUNTER — OFFICE VISIT (OUTPATIENT)
Dept: CARDIOLOGY | Facility: MEDICAL CENTER | Age: 89
End: 2024-02-29
Payer: MEDICARE

## 2024-02-29 ENCOUNTER — TELEPHONE (OUTPATIENT)
Dept: CARDIOLOGY | Facility: MEDICAL CENTER | Age: 89
End: 2024-02-29
Payer: MEDICARE

## 2024-02-29 VITALS
BODY MASS INDEX: 27.66 KG/M2 | WEIGHT: 162 LBS | SYSTOLIC BLOOD PRESSURE: 110 MMHG | HEART RATE: 60 BPM | HEIGHT: 64 IN | OXYGEN SATURATION: 90 % | RESPIRATION RATE: 16 BRPM | DIASTOLIC BLOOD PRESSURE: 70 MMHG

## 2024-02-29 DIAGNOSIS — R30.0 DYSURIA: ICD-10-CM

## 2024-02-29 DIAGNOSIS — Z95.5 STENTED CORONARY ARTERY: ICD-10-CM

## 2024-02-29 DIAGNOSIS — I50.32 CHRONIC DIASTOLIC CHF (CONGESTIVE HEART FAILURE) (HCC): ICD-10-CM

## 2024-02-29 DIAGNOSIS — Z95.1 HX OF CABG: ICD-10-CM

## 2024-02-29 DIAGNOSIS — E78.5 HYPERLIPIDEMIA, UNSPECIFIED HYPERLIPIDEMIA TYPE: ICD-10-CM

## 2024-02-29 DIAGNOSIS — I25.10 CORONARY ARTERY DISEASE DUE TO CALCIFIED CORONARY LESION: ICD-10-CM

## 2024-02-29 DIAGNOSIS — T17.908A ASPIRATION INTO AIRWAY, INITIAL ENCOUNTER: ICD-10-CM

## 2024-02-29 DIAGNOSIS — I10 PRIMARY HYPERTENSION: ICD-10-CM

## 2024-02-29 DIAGNOSIS — Z95.2 S/P TAVR (TRANSCATHETER AORTIC VALVE REPLACEMENT): ICD-10-CM

## 2024-02-29 DIAGNOSIS — I25.84 CORONARY ARTERY DISEASE DUE TO CALCIFIED CORONARY LESION: ICD-10-CM

## 2024-02-29 PROCEDURE — 3074F SYST BP LT 130 MM HG: CPT

## 2024-02-29 PROCEDURE — 99214 OFFICE O/P EST MOD 30 MIN: CPT

## 2024-02-29 PROCEDURE — 3078F DIAST BP <80 MM HG: CPT

## 2024-02-29 PROCEDURE — 99213 OFFICE O/P EST LOW 20 MIN: CPT

## 2024-02-29 ASSESSMENT — FIBROSIS 4 INDEX: FIB4 SCORE: 3.12

## 2024-02-29 ASSESSMENT — ENCOUNTER SYMPTOMS
GASTROINTESTINAL NEGATIVE: 1
CONSTITUTIONAL NEGATIVE: 1
MUSCULOSKELETAL NEGATIVE: 1
DEPRESSION: 0
SHORTNESS OF BREATH: 0
EYES NEGATIVE: 1
PND: 0
NERVOUS/ANXIOUS: 0
PALPITATIONS: 0
NEUROLOGICAL NEGATIVE: 1
ORTHOPNEA: 0

## 2024-02-29 NOTE — PROGRESS NOTES
No chief complaint on file.      Subjective     Silvia Orellana is a 89 y.o. female who presents today for follow-up. They have a history of CAD with previous CABG, HTN, HLD, carotid artery disease, and falls with multiple orthopedic surgeries requiring wheelchair for mobility at times, and S/P TAVR for severe AS     They are accompanied today by her daughter, grandson, and great grandson    Last seen by DESHAUN Garcia on 1/3/2024, at that visit she was still having some shortness of breath on 2 L home O2, poor mobility.    Today 2/29/2024 she was recently admitted to [facility], she apparently had had some nausea and vomiting for several days, she was unable to take her medication, she ended up with volume overload, confusion, HFpEF exacerbation. In the hospital for 2.5 days.  While she was there she had a traumatic Borja catheter insertion and since then she has been having burning with urination.  She additionally reports that she has been coughing a lot when she eats or drinks.  Since returning home she is now close to her baseline level of function, no edema.    Records from [facility] not immediately available at time of the appointment but have been requested        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    Cataract     patria IOL    CHF (congestive heart failure) (HCC)     Chronic pain     Dental disorder     dentures    Diverticulosis     Fall     GERD (gastroesophageal reflux disease)     Heart burn     Hx of Clostridium difficile infection 2015    Hyperlipidemia     Hypertension     Indigestion     Myocardial infarct (HCC) 2002    cardiac stent    Pneumonia 2014    Stroke ([facility]) 2001; 2012    no deficits, general weakness    Urinary  bladder disorder     Urinary incontinence      Past Surgical History:   Procedure Laterality Date    TRANSCATHETER AORTIC VALVE REPLACEMENT N/A 9/25/2017    Procedure: TRANSCATHETER AORTIC VALVE REPLACEMENT;  Surgeon: Sherif Elder M.D.;  Location: SURGERY [facility];  Service: Cardiac    TATIANNA  9/25/2017    Procedure: TTE;  Surgeon: Sherif Elder M.D.;  Location: SURGERY [facility];  Service: Cardiac    ORBITAL FRACTURE ORIF Left 5/23/2016    Procedure: ORBITAL FRACTURE ORIF FOR: LATERAL CANTHOPEXY;  Surgeon: Fabrice Daniel Jr., M.D.;  Location: SURGERY [facility];  Service:     ORIF, FRACTURE, FEMUR Right 8/10/2015    Procedure: FEMUR ORIF;  Surgeon: Umer Guevara M.D.;  Location: SURGERY [facility];  Service:     ORBITAL FRACTURE ORIF Left 7/6/2015    Procedure: ORBITAL FRACTURE ORIF;  Surgeon: Fabrice Daniel Jr., M.D.;  Location: SURGERY [facility];  Service:     PB REVISE TOTAL HIP REPLACEMENT  3/24/2015    R    OTHER CARDIAC SURGERY  2012    cardiac cath with stent placement    PB REVISE ACETABULAR PART OF TOTAL HIP  2010    L    OTHER CARDIAC SURGERY  2002    CABG    GYN SURGERY  1961    hysterectomy    ANGIOGRAM      BLADDER BIOPSY      CHOLECYSTECTOMY      HIP ARTHROPLASTY TOTAL Left     LUMPECTOMY      L    MULTIPLE CORONARY ARTERY BYPASS      ZZZ CARDIAC CATH       Family History   Problem Relation Age of Onset    Heart Attack Neg Hx      Social History     Socioeconomic History    Marital status:      Spouse name: Not on file    Number of children: Not on file    Years of education: Not on file    Highest education level: Not on file   Occupational History    Not on file   Tobacco Use    Smoking status: Never    Smokeless tobacco: Never   Substance and Sexual Activity    Alcohol use: Yes     Comment: occ    Drug use: No    Sexual activity: Not on file   Other Topics Concern    Not on file   Social History Narrative    Not on file     Social Determinants of  Health     Financial Resource Strain: Not on file   Food Insecurity: Not on file   Transportation Needs: Not on file   Physical Activity: Not on file   Stress: Not on file   Social Connections: Not on file   Intimate Partner Violence: Not on file   Housing Stability: Not on file     No Known Allergies  Outpatient Encounter Medications as of 2/29/2024   Medication Sig Dispense Refill    atorvastatin (LIPITOR) 40 MG Tab Take 1 Tablet by mouth every evening. 100 Tablet 3    furosemide (LASIX) 20 MG Tab Take 1 Tablet by mouth every day. 100 Tablet 3    lisinopril (PRINIVIL) 20 MG Tab Take 1 Tablet by mouth 2 times a day. 200 Tablet 3    metoprolol SR (TOPROL XL) 50 MG TABLET SR 24 HR Take 1 Tablet by mouth every day. 100 Tablet 3    potassium chloride (MICRO-K) 10 MEQ capsule TAKE 2 CAPSULES BY MOUTH EVERY  Capsule 3    citalopram (CELEXA) 40 MG Tab Take 40 mg by mouth every day.      omeprazole (PRILOSEC) 40 MG delayed-release capsule Take 40 mg by mouth every day.      HYDROcodone/acetaminophen (NORCO)  MG Tab Take 1-2 Tabs by mouth every 6 hours as needed.      aspirin (ASA) 81 MG Chew Tab chewable tablet Chew 325 mg every day.      Cyanocobalamin (VITAMIN B-12) 5000 MCG SL Tab Place 1 Tab under tongue every morning.      ascorbic acid (ASCORBIC ACID) 500 MG Tab Take 1,000 mg by mouth every morning.      multivitamin (THERAGRAN) Tab Take 1 Tab by mouth every morning.      calcium citrate (CALCITRATE) 950 MG Tab Take 600 mg by mouth 2 times a day.      gabapentin (NEURONTIN) 600 MG tablet Take 1,800 mg by mouth 3 times a day.  5     No facility-administered encounter medications on file as of 2/29/2024.     Review of Systems   Constitutional: Negative.    HENT: Negative.     Eyes: Negative.    Respiratory:  Negative for shortness of breath.         Coughing after eating/drinking   Cardiovascular:  Negative for chest pain, palpitations, orthopnea, leg swelling and PND.   Gastrointestinal: Negative.   "  Genitourinary:  Positive for dysuria.   Musculoskeletal: Negative.    Skin: Negative.    Neurological: Negative.    Endo/Heme/Allergies: Negative.    Psychiatric/Behavioral:  Negative for depression. The patient is not nervous/anxious.               Objective     /70 (BP Location: Left arm, Patient Position: Sitting, BP Cuff Size: Adult)   Pulse 60   Resp 16   Ht 1.626 m (5' 4\")   Wt 73.5 kg (162 lb)   SpO2 90%   BMI 27.81 kg/m²     Physical Exam  Constitutional:       Appearance: Normal appearance.   HENT:      Head: Normocephalic and atraumatic.   Neck:      Vascular: No JVD.   Cardiovascular:      Rate and Rhythm: Normal rate and regular rhythm.      Pulses: Normal pulses.      Heart sounds: Normal heart sounds. No murmur heard.     No friction rub.   Pulmonary:      Effort: Pulmonary effort is normal. No respiratory distress.      Breath sounds: Normal breath sounds.   Abdominal:      General: There is no distension.      Palpations: Abdomen is soft.   Musculoskeletal:      Right lower leg: No edema.      Left lower leg: No edema.   Skin:     General: Skin is warm and dry.   Neurological:      General: No focal deficit present.      Mental Status: She is alert and oriented to person, place, and time.   Psychiatric:         Mood and Affect: Mood normal.         Behavior: Behavior normal.            Lab Results   Component Value Date/Time    CHOLSTRLTOT 141 03/02/2022 06:37 AM    LDL 79 04/19/2017 10:00 AM    HDL 38 (L) 03/02/2022 06:37 AM    TRIGLYCERIDE 89 03/02/2022 06:37 AM       Lab Results   Component Value Date/Time    SODIUM 141 03/29/2023 11:10 AM    SODIUM 137 09/30/2017 03:14 AM    POTASSIUM 4.5 03/29/2023 11:10 AM    POTASSIUM 4.3 09/30/2017 03:14 AM    CHLORIDE 102 03/29/2023 11:10 AM    CHLORIDE 105 09/30/2017 03:14 AM    CO2 26 03/29/2023 11:10 AM    CO2 25 09/30/2017 03:14 AM    GLUCOSE 116 (H) 03/29/2023 11:10 AM    GLUCOSE 122 (H) 09/30/2017 03:14 AM    BUN 11 03/29/2023 11:10 AM    " BUN 13 09/30/2017 03:14 AM    CREATININE 0.85 03/29/2023 11:10 AM    CREATININE 0.90 09/30/2017 03:14 AM    BUNCREATRAT 13 03/29/2023 11:10 AM     Lab Results   Component Value Date/Time    ALKPHOSPHAT 113 03/02/2022 06:37 AM    ALKPHOSPHAT 72 09/30/2017 03:14 AM    ASTSGOT 30 03/02/2022 06:37 AM    ASTSGOT 17 09/30/2017 03:14 AM    ALTSGPT 19 03/02/2022 06:37 AM    ALTSGPT 9 09/30/2017 03:14 AM    TBILIRUBIN 0.6 03/02/2022 06:37 AM    TBILIRUBIN 0.7 09/30/2017 03:14 AM      Echocardiogram 4/21/2022  CONCLUSIONS  Prior echo on 10/17/22, compared to the report of the prior study,   there has been no significant change.   Normal left ventricular systolic function.  The left ventricular ejection fraction is visually estimated to be 65%.  Known TAVR aortic valve that is functioning normally with normal   transvalvular gradients.  Vmax is 2.7  m/s. Transvalvular gradients are - Peak:  30mmHg,  Mean:   19 mmHg.   Mild paravalvular leak is noted.  Estimated right ventricular systolic pressure is 30 mmHg.    Assessment & Plan     1. S/P TAVR (transcatheter aortic valve replacement)        2. Chronic diastolic CHF (congestive heart failure) ([facility])        3. Primary hypertension        4. Hx of CABG        5. CAD        6. Stented coronary artery            Medical Decision Making: Today's Assessment/Status/Plan:        S/P TAVR  -cont asa lifelong  -SBE prophylaxis understands  -Last echocardiogram showed EF of 65%, TAVR valve that is functioning normally with normal transvalvular gradients, mild paravalvular leak    Heart Failure with preserved Ejection Fraction  -EF 65%  - Diuretic: Continue Lasix with potassium replacement  -Ordered updated labs, BMP, BNP, CBC, and lipid panel    Coronary Artery Disease with hyperlipidemia  Status post stent, status post CABG  - continue aspirin 81 mg, lisinopril 20 mg twice daily, metoprolol 50 mg daily, atorvastatin 40 mg daily  We addressed the management of atherosclerotic artery  disease.  She is on proper antiplatelet, cholesterol management and beta-blockers as appropriate.  We addressed the potential side effects and laboratory follow-up for these medications.  -Check lipid panel, LDL goal less than 70    Hypertension  - good control  - continue current regimen  - goal < 130/80    Dysuria  -I have recommended that she call her PCP and/or go to urgent care for possible UTI    Postprandial coughing  I have ordered a swallow evaluation for her, concerned that she may be having aspiration events     Follow-up with Pamela Appiah in 3 months with updated lab work    This note was dictated using Dragon speech recognition software.

## 2024-02-29 NOTE — TELEPHONE ENCOUNTER
Requested most recent visit notes from [facility], for pt visit. Confirmation report received and scanned into [employer].

## 2024-03-15 ENCOUNTER — TELEPHONE (OUTPATIENT)
Dept: CARDIOLOGY | Facility: MEDICAL CENTER | Age: 89
End: 2024-03-15
Payer: MEDICARE

## 2024-03-15 DIAGNOSIS — E87.5 HYPERKALEMIA: ICD-10-CM

## 2024-03-15 NOTE — TELEPHONE ENCOUNTER
BMP order placed.    Phone Number Called: [phone]     Call outcome:  Spoke to pt's daughter Delilah    Message: Called to inform patient to hold her potassium and repeat labs in one week. Delilah verbalized understanding and agrees to pass message along to her mom.

## 2024-03-15 NOTE — TELEPHONE ENCOUNTER
----- Message from CAESAR Parada sent at 3/15/2024  6:46 AM PDT -----  Please have him stop his potassium replacement, continue lasix and recheck BMP in 1 week

## 2024-03-22 ENCOUNTER — HOSPITAL ENCOUNTER (OUTPATIENT)
Dept: LAB | Facility: MEDICAL CENTER | Age: 89
End: 2024-03-22
Payer: MEDICARE

## 2024-03-22 DIAGNOSIS — E78.5 HYPERLIPIDEMIA, UNSPECIFIED HYPERLIPIDEMIA TYPE: ICD-10-CM

## 2024-03-22 DIAGNOSIS — E87.5 HYPERKALEMIA: ICD-10-CM

## 2024-03-22 DIAGNOSIS — Z95.1 HX OF CABG: ICD-10-CM

## 2024-03-22 DIAGNOSIS — I50.32 CHRONIC DIASTOLIC CHF (CONGESTIVE HEART FAILURE) (HCC): ICD-10-CM

## 2024-03-22 LAB
ANION GAP SERPL CALC-SCNC: 11 MMOL/L (ref 7–16)
BUN SERPL-MCNC: 9 MG/DL (ref 8–22)
CALCIUM SERPL-MCNC: 9.1 MG/DL (ref 8.5–10.5)
CHLORIDE SERPL-SCNC: 104 MMOL/L (ref 96–112)
CHOLEST SERPL-MCNC: 162 MG/DL (ref 100–199)
CO2 SERPL-SCNC: 25 MMOL/L (ref 20–33)
CREAT SERPL-MCNC: 0.79 MG/DL (ref 0.5–1.4)
ERYTHROCYTE [DISTWIDTH] IN BLOOD BY AUTOMATED COUNT: 46.7 FL (ref 35.9–50)
GFR SERPLBLD CREATININE-BSD FMLA CKD-EPI: 70 ML/MIN/1.73 M 2
GLUCOSE SERPL-MCNC: 92 MG/DL (ref 65–99)
HCT VFR BLD AUTO: 41.8 % (ref 37–47)
HDLC SERPL-MCNC: 44 MG/DL
HGB BLD-MCNC: 13.6 G/DL (ref 12–16)
LDLC SERPL CALC-MCNC: 100 MG/DL
MCH RBC QN AUTO: 31.9 PG (ref 27–33)
MCHC RBC AUTO-ENTMCNC: 32.5 G/DL (ref 32.2–35.5)
MCV RBC AUTO: 97.9 FL (ref 81.4–97.8)
NT-PROBNP SERPL IA-MCNC: 9491 PG/ML (ref 0–125)
PLATELET # BLD AUTO: 195 K/UL (ref 164–446)
PMV BLD AUTO: 10.2 FL (ref 9–12.9)
POTASSIUM SERPL-SCNC: 4.2 MMOL/L (ref 3.6–5.5)
RBC # BLD AUTO: 4.27 M/UL (ref 4.2–5.4)
SODIUM SERPL-SCNC: 140 MMOL/L (ref 135–145)
TRIGL SERPL-MCNC: 92 MG/DL (ref 0–149)
WBC # BLD AUTO: 6.7 K/UL (ref 4.8–10.8)

## 2024-03-22 PROCEDURE — 83880 ASSAY OF NATRIURETIC PEPTIDE: CPT

## 2024-03-22 PROCEDURE — 36415 COLL VENOUS BLD VENIPUNCTURE: CPT

## 2024-03-22 PROCEDURE — 80061 LIPID PANEL: CPT

## 2024-03-22 PROCEDURE — 80048 BASIC METABOLIC PNL TOTAL CA: CPT

## 2024-03-22 PROCEDURE — 85027 COMPLETE CBC AUTOMATED: CPT

## 2024-03-25 ENCOUNTER — HOSPITAL ENCOUNTER (OUTPATIENT)
Dept: CARDIOLOGY | Facility: MEDICAL CENTER | Age: 89
End: 2024-03-25
Attending: NURSE PRACTITIONER
Payer: MEDICARE

## 2024-03-25 ENCOUNTER — TELEPHONE (OUTPATIENT)
Dept: CARDIOLOGY | Facility: MEDICAL CENTER | Age: 89
End: 2024-03-25
Payer: MEDICARE

## 2024-03-25 DIAGNOSIS — Z95.2 S/P TAVR (TRANSCATHETER AORTIC VALVE REPLACEMENT): ICD-10-CM

## 2024-03-25 DIAGNOSIS — E78.5 DYSLIPIDEMIA: ICD-10-CM

## 2024-03-25 LAB
LV EJECT FRACT MOD 2C 99903: 30.51
LV EJECT FRACT MOD 4C 99902: 67.79
LV EJECT FRACT MOD BP 99901: 53.35

## 2024-03-25 PROCEDURE — 93306 TTE W/DOPPLER COMPLETE: CPT

## 2024-03-25 PROCEDURE — 93306 TTE W/DOPPLER COMPLETE: CPT | Mod: 26 | Performed by: STUDENT IN AN ORGANIZED HEALTH CARE EDUCATION/TRAINING PROGRAM

## 2024-03-25 RX ORDER — ATORVASTATIN CALCIUM 80 MG/1
80 TABLET, FILM COATED ORAL NIGHTLY
Qty: 90 TABLET | Refills: 3 | Status: SHIPPED | OUTPATIENT
Start: 2024-03-25

## 2024-03-25 NOTE — TELEPHONE ENCOUNTER
Phone Number Called: [phone]    Call outcome:  spoke to patient daughter    Message: Called to inform patient daughter of lab results. Agreeable to plan to increase dose of atorvastatin. Atorvastatin reordered at this time. All questions answered at this time. Advised to call back with any further questions or concerns.

## 2024-03-27 ENCOUNTER — TELEPHONE (OUTPATIENT)
Dept: CARDIOLOGY | Facility: MEDICAL CENTER | Age: 89
End: 2024-03-27
Payer: MEDICARE

## 2024-03-27 DIAGNOSIS — I27.21 PAH (PULMONARY ARTERY HYPERTENSION) (HCC): ICD-10-CM

## 2024-03-27 DIAGNOSIS — J90 PLEURAL EFFUSION: ICD-10-CM

## 2024-03-27 NOTE — TELEPHONE ENCOUNTER
Phone Number Called: [phone]    Call outcome:  spoke to patient daughter    Message: Called to inform patient daughter of recent echocardiogram results. Patient daughter agreeable to pulmonary referral as well as for sooner appointment. Pulmonary referral placed and patient scheduled to see  4/2/2024 at 3:15 pm. All questions answered at this time. Advised to call back with any further questions or concerns.     To : FYI only; patient scheduled for f/u 4/2/24; pulmonary referral placed as well, thank you!

## 2024-03-27 NOTE — TELEPHONE ENCOUNTER
----- Message from CAESAR Parada sent at 3/27/2024 11:28 AM PDT -----  There has been a big change on her RVSP, now with severe pulmonary hypertension, please place referral to her to pulmonary.  Also please see if she would like to come in to see me sooner to discuss her echocardiogram results

## 2024-04-01 NOTE — PROGRESS NOTES
"SUBJECTIVE:  HPI    Ted Mulligan is a 6 y.o. male here accompanied by mother for Cough.  Four day history of cough.  His cough worsened in the last 48 hours.  Since the cough has worsened in the last 24 hours, he has also had decreased activity level and decreased appetite.  Minimal nasal congestion.  His cough did improve with neb treatments.  No fever or chills.      Ted's allergies, medications, history, and problem list were updated as appropriate.    ROS:  Pertinent ROS as above, otherwise negative    OBJECTIVE:  Vital signs  Vitals:    04/01/24 1535   Pulse: 94   Temp: 98.2 °F (36.8 °C)   TempSrc: Oral   SpO2: 99%   Weight: 16.9 kg (37 lb 3.2 oz)   Height: 3' 6.13" (1.07 m)      PHYSICAL EXAM:  General:  Awake, alert, no acute distress   Eyes:  Pupils equal, round, reactive to light.  Conjunctiva normal bilaterally.  Ears:  Bilateral external auditory canals normal.  Bilateral tympanic membranes normal.  Nares:  Bilateral turbinate erythema and edema with clear rhinorrhea.  Oropharynx:  Postnasal drainage present.  No erythema or exudate.  Neck:  No lymphadenopathy  Cardiovascular: Regular rate and rhythm.  No murmurs.  Respiratory: Clear to auscultation bilaterally, normal effort.  No appreciable wheezes at this time  Skin: No rashes      ASSESSMENT/PLAN:  1. Acute bronchiolitis due to other specified organisms  Assessment & Plan:  Azithromycin and prednisolone for 5 days     Albuterol treatments every 4 hours until cough resolves    Orders:  -     azithromycin (ZITHROMAX) 100 mg/5 mL suspension; Take 9 mLs (180 mg total) by mouth once daily for 1 day, THEN 5 mLs (100 mg total) once daily for 4 days.  Dispense: 29 mL; Refill: 0  -     prednisoLONE (PRELONE) 15 mg/5 mL syrup; Take 6 mLs (18 mg total) by mouth once daily. for 5 days  Dispense: 30 mL; Refill: 0         " Monitor Summary. SR-ST, 90s-100s. .20/.08/.40

## 2024-04-02 ENCOUNTER — APPOINTMENT (OUTPATIENT)
Dept: CARDIOLOGY | Facility: MEDICAL CENTER | Age: 89
End: 2024-04-02
Payer: MEDICARE

## 2024-04-02 ENCOUNTER — HOSPITAL ENCOUNTER (OUTPATIENT)
Facility: MEDICAL CENTER | Age: 89
End: 2024-04-03
Attending: EMERGENCY MEDICINE | Admitting: STUDENT IN AN ORGANIZED HEALTH CARE EDUCATION/TRAINING PROGRAM
Payer: MEDICARE

## 2024-04-02 ENCOUNTER — TELEPHONE (OUTPATIENT)
Dept: CARDIOLOGY | Facility: MEDICAL CENTER | Age: 89
End: 2024-04-02
Payer: MEDICARE

## 2024-04-02 DIAGNOSIS — I51.7 LEFT VENTRICULAR HYPERTROPHY: ICD-10-CM

## 2024-04-02 DIAGNOSIS — R11.0 NAUSEA: ICD-10-CM

## 2024-04-02 DIAGNOSIS — R07.2 PRECORDIAL CHEST PAIN: ICD-10-CM

## 2024-04-02 DIAGNOSIS — J96.11 CHRONIC RESPIRATORY FAILURE WITH HYPOXIA (HCC): ICD-10-CM

## 2024-04-02 PROBLEM — R07.9 CHEST PAIN: Status: ACTIVE | Noted: 2024-04-02

## 2024-04-02 LAB
ALBUMIN SERPL BCP-MCNC: 3.6 G/DL (ref 3.2–4.9)
ALBUMIN/GLOB SERPL: 1.2 G/DL
ALP SERPL-CCNC: 120 U/L (ref 30–99)
ALT SERPL-CCNC: 6 U/L (ref 2–50)
ANION GAP SERPL CALC-SCNC: 12 MMOL/L (ref 7–16)
AST SERPL-CCNC: 22 U/L (ref 12–45)
BASOPHILS # BLD AUTO: 0.5 % (ref 0–1.8)
BASOPHILS # BLD: 0.04 K/UL (ref 0–0.12)
BILIRUB SERPL-MCNC: 0.7 MG/DL (ref 0.1–1.5)
BUN SERPL-MCNC: 13 MG/DL (ref 8–22)
CALCIUM ALBUM COR SERPL-MCNC: 9 MG/DL (ref 8.5–10.5)
CALCIUM SERPL-MCNC: 8.7 MG/DL (ref 8.5–10.5)
CHLORIDE SERPL-SCNC: 105 MMOL/L (ref 96–112)
CO2 SERPL-SCNC: 25 MMOL/L (ref 20–33)
CREAT SERPL-MCNC: 0.78 MG/DL (ref 0.5–1.4)
EKG IMPRESSION: NORMAL
EOSINOPHIL # BLD AUTO: 0.15 K/UL (ref 0–0.51)
EOSINOPHIL NFR BLD: 2 % (ref 0–6.9)
ERYTHROCYTE [DISTWIDTH] IN BLOOD BY AUTOMATED COUNT: 48.6 FL (ref 35.9–50)
GFR SERPLBLD CREATININE-BSD FMLA CKD-EPI: 71 ML/MIN/1.73 M 2
GLOBULIN SER CALC-MCNC: 2.9 G/DL (ref 1.9–3.5)
GLUCOSE SERPL-MCNC: 109 MG/DL (ref 65–99)
HCT VFR BLD AUTO: 37 % (ref 37–47)
HGB BLD-MCNC: 12.6 G/DL (ref 12–16)
IMM GRANULOCYTES # BLD AUTO: 0.02 K/UL (ref 0–0.11)
IMM GRANULOCYTES NFR BLD AUTO: 0.3 % (ref 0–0.9)
LIPASE SERPL-CCNC: 10 U/L (ref 11–82)
LYMPHOCYTES # BLD AUTO: 2.2 K/UL (ref 1–4.8)
LYMPHOCYTES NFR BLD: 28.8 % (ref 22–41)
MCH RBC QN AUTO: 33.1 PG (ref 27–33)
MCHC RBC AUTO-ENTMCNC: 34.1 G/DL (ref 32.2–35.5)
MCV RBC AUTO: 97.1 FL (ref 81.4–97.8)
MONOCYTES # BLD AUTO: 0.55 K/UL (ref 0–0.85)
MONOCYTES NFR BLD AUTO: 7.2 % (ref 0–13.4)
NEUTROPHILS # BLD AUTO: 4.68 K/UL (ref 1.82–7.42)
NEUTROPHILS NFR BLD: 61.2 % (ref 44–72)
NRBC # BLD AUTO: 0 K/UL
NRBC BLD-RTO: 0 /100 WBC (ref 0–0.2)
PLATELET # BLD AUTO: 233 K/UL (ref 164–446)
PMV BLD AUTO: 9.5 FL (ref 9–12.9)
POTASSIUM SERPL-SCNC: 4 MMOL/L (ref 3.6–5.5)
PROT SERPL-MCNC: 6.5 G/DL (ref 6–8.2)
RBC # BLD AUTO: 3.81 M/UL (ref 4.2–5.4)
SODIUM SERPL-SCNC: 142 MMOL/L (ref 135–145)
TROPONIN T SERPL-MCNC: 30 NG/L (ref 6–19)
TROPONIN T SERPL-MCNC: 32 NG/L (ref 6–19)
TSH SERPL DL<=0.005 MIU/L-ACNC: 2.21 UIU/ML (ref 0.38–5.33)
WBC # BLD AUTO: 7.6 K/UL (ref 4.8–10.8)

## 2024-04-02 PROCEDURE — 96372 THER/PROPH/DIAG INJ SC/IM: CPT | Mod: XU

## 2024-04-02 PROCEDURE — 99285 EMERGENCY DEPT VISIT HI MDM: CPT

## 2024-04-02 PROCEDURE — 93005 ELECTROCARDIOGRAM TRACING: CPT | Performed by: EMERGENCY MEDICINE

## 2024-04-02 PROCEDURE — 99214 OFFICE O/P EST MOD 30 MIN: CPT | Performed by: INTERNAL MEDICINE

## 2024-04-02 PROCEDURE — 96375 TX/PRO/DX INJ NEW DRUG ADDON: CPT

## 2024-04-02 PROCEDURE — 700111 HCHG RX REV CODE 636 W/ 250 OVERRIDE (IP)

## 2024-04-02 PROCEDURE — 83690 ASSAY OF LIPASE: CPT

## 2024-04-02 PROCEDURE — 84443 ASSAY THYROID STIM HORMONE: CPT

## 2024-04-02 PROCEDURE — 85025 COMPLETE CBC W/AUTO DIFF WBC: CPT

## 2024-04-02 PROCEDURE — 94760 N-INVAS EAR/PLS OXIMETRY 1: CPT

## 2024-04-02 PROCEDURE — G0378 HOSPITAL OBSERVATION PER HR: HCPCS

## 2024-04-02 PROCEDURE — 93005 ELECTROCARDIOGRAM TRACING: CPT

## 2024-04-02 PROCEDURE — 96374 THER/PROPH/DIAG INJ IV PUSH: CPT

## 2024-04-02 PROCEDURE — 80053 COMPREHEN METABOLIC PANEL: CPT

## 2024-04-02 PROCEDURE — 36415 COLL VENOUS BLD VENIPUNCTURE: CPT

## 2024-04-02 PROCEDURE — A9270 NON-COVERED ITEM OR SERVICE: HCPCS

## 2024-04-02 PROCEDURE — 84484 ASSAY OF TROPONIN QUANT: CPT

## 2024-04-02 PROCEDURE — 700102 HCHG RX REV CODE 250 W/ 637 OVERRIDE(OP)

## 2024-04-02 PROCEDURE — 99223 1ST HOSP IP/OBS HIGH 75: CPT

## 2024-04-02 RX ORDER — ATORVASTATIN CALCIUM 80 MG/1
80 TABLET, FILM COATED ORAL NIGHTLY
Status: DISCONTINUED | OUTPATIENT
Start: 2024-04-02 | End: 2024-04-03 | Stop reason: HOSPADM

## 2024-04-02 RX ORDER — ONDANSETRON 2 MG/ML
4 INJECTION INTRAMUSCULAR; INTRAVENOUS EVERY 4 HOURS PRN
Status: DISCONTINUED | OUTPATIENT
Start: 2024-04-02 | End: 2024-04-03 | Stop reason: HOSPADM

## 2024-04-02 RX ORDER — HYDRALAZINE HYDROCHLORIDE 20 MG/ML
20 INJECTION INTRAMUSCULAR; INTRAVENOUS EVERY 6 HOURS PRN
Status: DISCONTINUED | OUTPATIENT
Start: 2024-04-02 | End: 2024-04-03 | Stop reason: HOSPADM

## 2024-04-02 RX ORDER — ASPIRIN 325 MG
325 TABLET ORAL EVERY EVENING
Status: ON HOLD | COMMUNITY
End: 2024-04-25

## 2024-04-02 RX ORDER — MULTIVIT WITH MINERALS/LUTEIN
1000 TABLET ORAL DAILY
COMMUNITY

## 2024-04-02 RX ORDER — FUROSEMIDE 40 MG/1
20 TABLET ORAL DAILY
Status: DISCONTINUED | OUTPATIENT
Start: 2024-04-02 | End: 2024-04-03 | Stop reason: HOSPADM

## 2024-04-02 RX ORDER — GABAPENTIN 300 MG/1
600 CAPSULE ORAL 3 TIMES DAILY
Status: DISCONTINUED | OUTPATIENT
Start: 2024-04-02 | End: 2024-04-03 | Stop reason: HOSPADM

## 2024-04-02 RX ORDER — HYDROCODONE BITARTRATE AND ACETAMINOPHEN 10; 325 MG/1; MG/1
1-2 TABLET ORAL EVERY 4 HOURS PRN
COMMUNITY

## 2024-04-02 RX ORDER — ONDANSETRON 4 MG/1
4 TABLET, ORALLY DISINTEGRATING ORAL EVERY 8 HOURS PRN
Status: ON HOLD | COMMUNITY
End: 2024-04-03

## 2024-04-02 RX ORDER — METOPROLOL SUCCINATE 50 MG/1
50 TABLET, EXTENDED RELEASE ORAL EVERY EVENING
Status: DISCONTINUED | OUTPATIENT
Start: 2024-04-02 | End: 2024-04-03 | Stop reason: HOSPADM

## 2024-04-02 RX ORDER — LISINOPRIL 20 MG/1
20 TABLET ORAL 2 TIMES DAILY
Status: DISCONTINUED | OUTPATIENT
Start: 2024-04-02 | End: 2024-04-03 | Stop reason: HOSPADM

## 2024-04-02 RX ORDER — MORPHINE SULFATE 4 MG/ML
2-4 INJECTION INTRAVENOUS
Status: DISCONTINUED | OUTPATIENT
Start: 2024-04-02 | End: 2024-04-03 | Stop reason: HOSPADM

## 2024-04-02 RX ORDER — VITS A,C,E/LUTEIN/MINERALS 300MCG-200
1 TABLET ORAL DAILY
COMMUNITY

## 2024-04-02 RX ORDER — ENOXAPARIN SODIUM 100 MG/ML
40 INJECTION SUBCUTANEOUS DAILY
Status: DISCONTINUED | OUTPATIENT
Start: 2024-04-02 | End: 2024-04-03 | Stop reason: HOSPADM

## 2024-04-02 RX ORDER — OMEPRAZOLE 40 MG/1
40 CAPSULE, DELAYED RELEASE ORAL DAILY
COMMUNITY

## 2024-04-02 RX ORDER — NITROGLYCERIN 0.4 MG/1
0.4 TABLET SUBLINGUAL
Status: DISCONTINUED | OUTPATIENT
Start: 2024-04-02 | End: 2024-04-03 | Stop reason: HOSPADM

## 2024-04-02 RX ORDER — ASPIRIN 81 MG/1
81 TABLET, CHEWABLE ORAL DAILY
Status: DISCONTINUED | OUTPATIENT
Start: 2024-04-02 | End: 2024-04-03 | Stop reason: HOSPADM

## 2024-04-02 RX ORDER — OMEPRAZOLE 20 MG/1
40 CAPSULE, DELAYED RELEASE ORAL DAILY
Status: DISCONTINUED | OUTPATIENT
Start: 2024-04-03 | End: 2024-04-03 | Stop reason: HOSPADM

## 2024-04-02 RX ORDER — TRAZODONE HYDROCHLORIDE 50 MG/1
50 TABLET ORAL
Status: DISCONTINUED | OUTPATIENT
Start: 2024-04-02 | End: 2024-04-03 | Stop reason: HOSPADM

## 2024-04-02 RX ORDER — POTASSIUM CITRATE 10 MEQ/1
10 TABLET, EXTENDED RELEASE ORAL DAILY
COMMUNITY

## 2024-04-02 RX ORDER — CITALOPRAM 20 MG/1
20 TABLET ORAL DAILY
Status: DISCONTINUED | OUTPATIENT
Start: 2024-04-02 | End: 2024-04-03 | Stop reason: HOSPADM

## 2024-04-02 RX ORDER — CITALOPRAM 20 MG/1
20 TABLET ORAL DAILY
COMMUNITY

## 2024-04-02 RX ADMIN — HYDRALAZINE HYDROCHLORIDE 20 MG: 20 INJECTION, SOLUTION INTRAMUSCULAR; INTRAVENOUS at 18:51

## 2024-04-02 RX ADMIN — CITALOPRAM HYDROBROMIDE 20 MG: 20 TABLET ORAL at 17:33

## 2024-04-02 RX ADMIN — ATORVASTATIN CALCIUM 80 MG: 80 TABLET, FILM COATED ORAL at 21:14

## 2024-04-02 RX ADMIN — ONDANSETRON 4 MG: 2 INJECTION INTRAMUSCULAR; INTRAVENOUS at 20:06

## 2024-04-02 RX ADMIN — ENOXAPARIN SODIUM 40 MG: 100 INJECTION SUBCUTANEOUS at 17:36

## 2024-04-02 RX ADMIN — ASPIRIN 81 MG 81 MG: 81 TABLET ORAL at 17:33

## 2024-04-02 RX ADMIN — FUROSEMIDE 20 MG: 40 TABLET ORAL at 17:34

## 2024-04-02 RX ADMIN — TRAZODONE HYDROCHLORIDE 50 MG: 50 TABLET ORAL at 21:14

## 2024-04-02 RX ADMIN — LISINOPRIL 20 MG: 20 TABLET ORAL at 17:35

## 2024-04-02 RX ADMIN — GABAPENTIN 600 MG: 300 CAPSULE ORAL at 18:51

## 2024-04-02 ASSESSMENT — ENCOUNTER SYMPTOMS
COUGH: 0
NAUSEA: 0
HEARTBURN: 0
ABDOMINAL PAIN: 0
VOMITING: 0
HEADACHES: 0
DIARRHEA: 0
CHILLS: 0
FEVER: 0
SHORTNESS OF BREATH: 0
DIZZINESS: 0
PALPITATIONS: 0

## 2024-04-02 ASSESSMENT — HEART SCORE
HISTORY: MODERATELY SUSPICIOUS
RISK FACTORS: >2 RISK FACTORS OR HX OF ATHEROSCLEROTIC DISEASE
TROPONIN: 1-3 TIMES NORMAL LIMIT
ECG: SIGNIFICANT ST-DEPRESSION
HEART SCORE: 8
AGE: 65+

## 2024-04-02 ASSESSMENT — LIFESTYLE VARIABLES
DOES PATIENT WANT TO STOP DRINKING: NO
ALCOHOL_USE: YES
HAVE PEOPLE ANNOYED YOU BY CRITICIZING YOUR DRINKING: NO
ON A TYPICAL DAY WHEN YOU DRINK ALCOHOL HOW MANY DRINKS DO YOU HAVE: 0
TOTAL SCORE: 0
EVER FELT BAD OR GUILTY ABOUT YOUR DRINKING: NO
TOTAL SCORE: 0
EVER HAD A DRINK FIRST THING IN THE MORNING TO STEADY YOUR NERVES TO GET RID OF A HANGOVER: NO
TOTAL SCORE: 0
CONSUMPTION TOTAL: NEGATIVE
HOW MANY TIMES IN THE PAST YEAR HAVE YOU HAD 5 OR MORE DRINKS IN A DAY: 0
HAVE YOU EVER FELT YOU SHOULD CUT DOWN ON YOUR DRINKING: NO
AVERAGE NUMBER OF DAYS PER WEEK YOU HAVE A DRINK CONTAINING ALCOHOL: 0

## 2024-04-02 ASSESSMENT — FIBROSIS 4 INDEX: FIB4 SCORE: 3.55

## 2024-04-02 ASSESSMENT — PATIENT HEALTH QUESTIONNAIRE - PHQ9
2. FEELING DOWN, DEPRESSED, IRRITABLE, OR HOPELESS: NOT AT ALL
1. LITTLE INTEREST OR PLEASURE IN DOING THINGS: NOT AT ALL
SUM OF ALL RESPONSES TO PHQ9 QUESTIONS 1 AND 2: 0

## 2024-04-02 NOTE — H&P
Hospital Medicine History & Physical Note    Date of Service  4/2/2024    Primary Care Physician  Joshua Rendon M.D.    Consultants  cardiology    Specialist Names: Dr Elder    Code Status  DNAR/DNI    Chief Complaint  Chief Complaint   Patient presents with    Lightheadedness     Since 5AM    Nausea     Since 5am. Patient given 4mg Zofran by EMS    Shortness of Breath     X 4 hours. Patient on 4L NC at baseline. EMS found patient concentrator with an error notice     Chest Pain     An hour ago patient felt a sudden stabbing pain to the left and right side of her chest that resolved after 1 minute        History of Presenting Illness  Silvia Orellana is a 89 y.o. female who presented 4/2/2024 with stabbing chest pain to her right and left sides onset 1 hour ago. The patient was upgraded to a STEMI given her EKG findings. The patient reports the episode of chest pain lasted for 1 minute before dissipating and now she is not currently in pain. She reports associated shortness of breath, nausea, and diaphoresis. She is on 4 L of oxygen at baseline. EMS found the patient's concentrator with an error notice. She was given Zofran 4 mg en route to the ED. The patient denies any history of heart attack, but notes she almost had one, so a bypass surgery was done and stents were placed.     In the ED, a code STEMI was called and was then subsequently canceled by Cardiology.  She is afebrile, BP is elevated and is on room air.  Labs significant for slightly elevated alkaline phosphate.  Her troponin is slightly elevated at 30.  Cardiology saw patient and will follow along with her clinically, no further interventions at this time. She will be admitted for tele monitoring and observation over night.    I discussed the plan of care with patient and cardiology, ERP and Dr Joya.    Review of Systems  Review of Systems   Constitutional:  Negative for chills, fever and malaise/fatigue.   Respiratory:  Negative for cough  and shortness of breath.    Cardiovascular:  Positive for chest pain (now resolved). Negative for palpitations and leg swelling.   Gastrointestinal:  Negative for abdominal pain, diarrhea, heartburn, nausea and vomiting.   Genitourinary:  Negative for dysuria, frequency and urgency.   Neurological:  Negative for dizziness and headaches.   All other systems reviewed and are negative.      Past Medical History   has a past medical history of Arthritis, Bowel habit changes, Bradycardia, Breast cancer ([facility]), Breath shortness, CAD (coronary artery disease), Cancer ([facility]) (2003), Cancer (HCC) (2007), Cancer ([facility]) (2016), Cataract, CHF (congestive heart failure) ([facility]), Chronic pain, Dental disorder, Diverticulosis, Fall, GERD (gastroesophageal reflux disease), Heart burn, Clostridium difficile infection (2015), Hyperlipidemia, Hypertension, Indigestion, Myocardial infarct ([facility]) (2002), Pneumonia (2014), Stroke ([facility]) (2001; 2012), Urinary bladder disorder, and Urinary incontinence.    Surgical History   has a past surgical history that includes cholecystectomy; lumpectomy; bladder biopsy; pr revise total hip replacement (3/24/2015); pr revise acetabular part of total hip (2010); orbital fracture orif (Left, 7/6/2015); orif, fracture, femur (Right, 8/10/2015); hip arthroplasty total (Left); angiogram; other cardiac surgery (2002); other cardiac surgery (2012); gyn surgery (1961); orbital fracture orif (Left, 5/23/2016); multiple coronary artery bypass; zzz cardiac cath; transcatheter aortic valve replacement (N/A, 9/25/2017); and jessica (9/25/2017).     Family History  family history is not on file.   Family history reviewed with patient. There is no family history that is pertinent to the chief complaint.     Social History   reports that she has never smoked. She has never used smokeless tobacco. She reports current alcohol use. She reports that she does not use drugs.    Allergies  No Known Allergies    Medications  Prior to  Admission Medications   Prescriptions Last Dose Informant Patient Reported? Taking?   Cyanocobalamin (VITAMIN B-12) 5000 MCG SL Tab  Family Member Yes No   Sig: Place 1 Tab under tongue every morning.   HYDROcodone/acetaminophen (NORCO)  MG Tab   Yes No   Sig: Take 1-2 Tabs by mouth every 6 hours as needed.   ascorbic acid (ASCORBIC ACID) 500 MG Tab  Family Member Yes No   Sig: Take 1,000 mg by mouth every morning.   aspirin (ASA) 81 MG Chew Tab chewable tablet   Yes No   Sig: Chew 81 mg every day.   atorvastatin (LIPITOR) 80 MG tablet   No No   Sig: Take 1 Tablet by mouth every evening.   calcium citrate (CALCITRATE) 950 MG Tab  Family Member Yes No   Sig: Take 600 mg by mouth 2 times a day.   citalopram (CELEXA) 40 MG Tab   Yes No   Sig: Take 40 mg by mouth every day.   furosemide (LASIX) 20 MG Tab   No No   Sig: Take 1 Tablet by mouth every day.   gabapentin (NEURONTIN) 600 MG tablet  Family Member Yes No   Sig: Take 1,800 mg by mouth 3 times a day.   lisinopril (PRINIVIL) 20 MG Tab   No No   Sig: Take 1 Tablet by mouth 2 times a day.   metoprolol SR (TOPROL XL) 50 MG TABLET SR 24 HR   No No   Sig: Take 1 Tablet by mouth every day.   multivitamin (THERAGRAN) Tab  Family Member Yes No   Sig: Take 1 Tab by mouth every morning.   omeprazole (PRILOSEC) 40 MG delayed-release capsule   Yes No   Sig: Take 40 mg by mouth every day.   potassium chloride (MICRO-K) 10 MEQ capsule   No No   Sig: TAKE 2 CAPSULES BY MOUTH EVERY DAY      Facility-Administered Medications: None       Physical Exam  Temp:  [35.9 °C (96.7 °F)-36.6 °C (97.8 °F)] 36.6 °C (97.8 °F)  Pulse:  [56-62] 61  Resp:  [18-20] 20  BP: (171-211)/(72-84) 199/77  SpO2:  [90 %-93 %] 92 %  Blood Pressure : (!) 171/74   Temperature: 35.9 °C (96.7 °F)   Pulse: (!) 57   Respiration: 20   Pulse Oximetry: 93 %       Physical Exam  Vitals and nursing note reviewed.   Constitutional:       General: She is awake.      Appearance: Normal appearance. She is not  ill-appearing.   HENT:      Head: Normocephalic and atraumatic.      Jaw: There is normal jaw occlusion.      Right Ear: Hearing normal.      Left Ear: Hearing normal.      Nose: Nose normal.      Mouth/Throat:      Lips: Pink.      Mouth: Mucous membranes are moist.      Dentition: Abnormal dentition.   Eyes:      Extraocular Movements: Extraocular movements intact.      Conjunctiva/sclera: Conjunctivae normal.      Pupils: Pupils are equal, round, and reactive to light.   Neck:      Vascular: No carotid bruit.   Cardiovascular:      Rate and Rhythm: Normal rate and regular rhythm.      Pulses: Normal pulses.      Heart sounds: Normal heart sounds, S1 normal and S2 normal.   Pulmonary:      Effort: Pulmonary effort is normal.      Breath sounds: Normal air entry. No stridor. Examination of the right-lower field reveals decreased breath sounds. Examination of the left-lower field reveals decreased breath sounds. Decreased breath sounds present.   Abdominal:      General: Bowel sounds are normal.      Palpations: Abdomen is soft.      Tenderness: There is no abdominal tenderness.   Musculoskeletal:      Cervical back: Normal range of motion and neck supple.      Right lower leg: No edema.      Left lower leg: No edema.   Skin:     General: Skin is warm and dry.      Capillary Refill: Capillary refill takes less than 2 seconds.   Neurological:      General: No focal deficit present.      Mental Status: She is alert and oriented to person, place, and time. Mental status is at baseline.      Sensory: Sensation is intact.      Motor: Motor function is intact.   Psychiatric:         Attention and Perception: Attention and perception normal.         Mood and Affect: Mood and affect normal.         Speech: Speech normal.         Behavior: Behavior normal. Behavior is cooperative.         Laboratory:  Recent Labs     04/02/24  1408   WBC 7.6   RBC 3.81*   HEMOGLOBIN 12.6   HEMATOCRIT 37.0   MCV 97.1   MCH 33.1*   MCHC 34.1  "  RDW 48.6   PLATELETCT 233   MPV 9.5     Recent Labs     04/02/24  1408   SODIUM 142   POTASSIUM 4.0   CHLORIDE 105   CO2 25   GLUCOSE 109*   BUN 13   CREATININE 0.78   CALCIUM 8.7     Recent Labs     04/02/24  1408   ALTSGPT 6   ASTSGOT 22   ALKPHOSPHAT 120*   TBILIRUBIN 0.7   LIPASE 10*   GLUCOSE 109*         No results for input(s): \"NTPROBNP\" in the last 72 hours.      Recent Labs     04/02/24  1408 04/02/24  1533   TROPONINT 30* 32*       Imaging:  No orders to display       EKG:  I have personally reviewed the images and compared with prior images.    Assessment/Plan:  Justification for Admission Status  I anticipate this patient is appropriate for observation status at this time because rule out chest pain.    Patient will need a Telemetry bed on EMERGENCY service .  The need is secondary to rule out chest pain.    * Chest pain- (present on admission)  Assessment & Plan  The ASCVD Risk score (Maryann DK, et al., 2019) failed to calculate for the following reasons:    The 2019 ASCVD risk score is only valid for ages 40 to 79    The patient has a prior MI or stroke diagnosis  - Tele monitoring  - EKG reveals LVH and some ST elevation. Code STEMI was called and subsequently canceled by Cardiology, not concerned for STEMI.    - Troponin slightly elevated at 30 and 32, trend  - Asa  - TSH  - Beta Blocker 50mg daily (home dose)  - Lipid profile in AM  - Stress Test is NOT needed per cardiology  - ECHO (last ECHO March 2024 showing 53% EF and moderate concentric left ventricular hypertrophy)  -Patient does have an extensive cardiac history including MI, CABG, stent placements and TAVR.  Cardiology was consulted and will follow the patient.      CAD- (present on admission)  Assessment & Plan  - Patient has extensive cardiac history including MI, stent placement, CABG, TAVR  - Continue home lisinopril, metoprolol, Lasix as well as Lipitor    HTN (hypertension)- (present on admission)  Assessment & Plan  - Blood " pressure elevated on admission  - Continue home lisinopril, metoprolol, Lasix  - Monitor vitals every 4 hours    S/P TAVR (transcatheter aortic valve replacement)- (present on admission)  Assessment & Plan  - History of TAVR in 2017    Stented coronary artery- (present on admission)  Assessment & Plan  - History of stent placements    Hx of CABG- (present on admission)  Assessment & Plan  - History of CABG in 2002    Hyperlipidemia- (present on admission)  Assessment & Plan  - Continue atorvastatin 80 mg nightly  - Lipid panel in a.m.        VTE prophylaxis: enoxaparin ppx

## 2024-04-02 NOTE — ED PROVIDER NOTES
ER Provider Note    Scribed for Randy Linda D.O. by Trace Whitaker. 4/2/2024  2:10 PM    Primary Care Provider: Joshua Rendon M.D.    CHIEF COMPLAINT  Chief Complaint   Patient presents with    Lightheadedness     Since 5AM    Nausea     Since 5am. Patient given 4mg Zofran by EMS    Shortness of Breath     X 4 hours. Patient on 4L NC at baseline. EMS found patient concentrator with an error notice     Chest Pain     An hour ago patient felt a sudden stabbing pain to the left and right side of her chest that resolved after 1 minute        HPI/ROS    Silvia Orellana is a 89 y.o. female who presents to the Emergency Department via EMS for evaluation of stabbing chest pain to her right and left sides onset 1 hour ago. The patient was upgraded to a STEMI given her EKG findings. The patient reports the episode of chest pain lasted for 1 minute before dissipating and she is not currently in pain. She reports associated shortness of breath, nausea, and diaphoresis. She is on 4 L of oxygen at baseline. EMS found the patient's concentrator with an error notice. She was given Zofran 4 mg en route to the ED. The patient denies any history of heart attack, but notes she almost had one in the past, so a bypass surgery was done and stents were placed.     ROS as per HPI.    PAST MEDICAL HISTORY  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    Cataract     patria IOL    CHF (congestive heart failure) (HCC)     Chronic pain     Dental disorder     dentures    Diverticulosis     Fall     GERD (gastroesophageal reflux disease)     Heart burn     Hx of Clostridium difficile infection 2015    Hyperlipidemia     Hypertension     Indigestion     Myocardial infarct  ([facility]) 2002    cardiac stent    Pneumonia 2014    Stroke ([facility]) 2001; 2012    no deficits, general weakness    Urinary bladder disorder     Urinary incontinence        SURGICAL HISTORY  Past Surgical History:   Procedure Laterality Date    TRANSCATHETER AORTIC VALVE REPLACEMENT N/A 9/25/2017    Procedure: TRANSCATHETER AORTIC VALVE REPLACEMENT;  Surgeon: Sherif Elder M.D.;  Location: SURGERY [facility];  Service: Cardiac    TATIANNA  9/25/2017    Procedure: TTE;  Surgeon: Sherif Elder M.D.;  Location: SURGERY [facility];  Service: Cardiac    ORBITAL FRACTURE ORIF Left 5/23/2016    Procedure: ORBITAL FRACTURE ORIF FOR: LATERAL CANTHOPEXY;  Surgeon: Fabrice Daniel Jr., M.D.;  Location: SURGERY [facility];  Service:     ORIF, FRACTURE, FEMUR Right 8/10/2015    Procedure: FEMUR ORIF;  Surgeon: Umer Guevara M.D.;  Location: SURGERY [facility];  Service:     ORBITAL FRACTURE ORIF Left 7/6/2015    Procedure: ORBITAL FRACTURE ORIF;  Surgeon: Fabrice Daniel Jr., M.D.;  Location: SURGERY [facility];  Service:     PB REVISE TOTAL HIP REPLACEMENT  3/24/2015    R    OTHER CARDIAC SURGERY  2012    cardiac cath with stent placement    PB REVISE ACETABULAR PART OF TOTAL HIP  2010    L    OTHER CARDIAC SURGERY  2002    CABG    GYN SURGERY  1961    hysterectomy    ANGIOGRAM      BLADDER BIOPSY      CHOLECYSTECTOMY      HIP ARTHROPLASTY TOTAL Left     LUMPECTOMY      L    MULTIPLE CORONARY ARTERY BYPASS      ZZZ CARDIAC CATH         FAMILY HISTORY  Family History   Problem Relation Age of Onset    Heart Attack Neg Hx        SOCIAL HISTORY   reports that she has never smoked. She has never used smokeless tobacco. She reports current alcohol use. She reports that she does not use drugs.    CURRENT MEDICATIONS  Previous Medications    ASCORBIC ACID (ASCORBIC ACID) 500 MG TAB    Take 1,000 mg by mouth every morning.    ASPIRIN (ASA) 81 MG CHEW TAB CHEWABLE TABLET    Chew 81 mg every day.    ATORVASTATIN  (LIPITOR) 80 MG TABLET    Take 1 Tablet by mouth every evening.    CALCIUM CITRATE (CALCITRATE) 950 MG TAB    Take 600 mg by mouth 2 times a day.    CITALOPRAM (CELEXA) 40 MG TAB    Take 40 mg by mouth every day.    CYANOCOBALAMIN (VITAMIN B-12) 5000 MCG SL TAB    Place 1 Tab under tongue every morning.    FUROSEMIDE (LASIX) 20 MG TAB    Take 1 Tablet by mouth every day.    GABAPENTIN (NEURONTIN) 600 MG TABLET    Take 1,800 mg by mouth 3 times a day.    HYDROCODONE/ACETAMINOPHEN (NORCO)  MG TAB    Take 1-2 Tabs by mouth every 6 hours as needed.    LISINOPRIL (PRINIVIL) 20 MG TAB    Take 1 Tablet by mouth 2 times a day.    METOPROLOL SR (TOPROL XL) 50 MG TABLET SR 24 HR    Take 1 Tablet by mouth every day.    MULTIVITAMIN (THERAGRAN) TAB    Take 1 Tab by mouth every morning.    OMEPRAZOLE (PRILOSEC) 40 MG DELAYED-RELEASE CAPSULE    Take 40 mg by mouth every day.    POTASSIUM CHLORIDE (MICRO-K) 10 MEQ CAPSULE    TAKE 2 CAPSULES BY MOUTH EVERY DAY       ALLERGIES  Patient has no known allergies.    PHYSICAL EXAM  BP (!) 211/84   Pulse 62   Temp 35.9 °C (96.7 °F)   Resp 20   Wt 71.7 kg (158 lb)   SpO2 91%   BMI 27.12 kg/m²     General: No acute distress.  HENT: Normocephalic, Mucus membranes are moist.   Chest: Lungs have even and unlabored respirations, Clear to auscultation.   Cardiovascular: Regular rate and regular rhythm, No peripheral cyanosis.  Abdomen: Non distended.  Neuro: Awake, Conversive, Able to relay recent events.  Psychiatric: Calm and cooperative.     INITIAL ASSESSMENT  The patient had an episode of chest pain with associated symptoms for heart attack that lasted for 1 minute before the pain resolved. EKG on arrival shows ST elevation, which is new from prior. She does have a history of cardiac stents. Code STEMI was called by me. The patient was evaluated by the cardiac team including Dr. Elder (cardiology). EKG is read as not a STEMI, so STEMI code is canceled. Lab tests and chest x-ray  are still pending.    ED Observation Status? No; Patient does not meet criteria for ED Observation.     DIAGNOSTIC STUDIES    Labs:   Results for orders placed or performed during the hospital encounter of 04/02/24   CBC WITH DIFFERENTIAL   Result Value Ref Range    WBC 7.6 4.8 - 10.8 K/uL    RBC 3.81 (L) 4.20 - 5.40 M/uL    Hemoglobin 12.6 12.0 - 16.0 g/dL    Hematocrit 37.0 37.0 - 47.0 %    MCV 97.1 81.4 - 97.8 fL    MCH 33.1 (H) 27.0 - 33.0 pg    MCHC 34.1 32.2 - 35.5 g/dL    RDW 48.6 35.9 - 50.0 fL    Platelet Count 233 164 - 446 K/uL    MPV 9.5 9.0 - 12.9 fL    Neutrophils-Polys 61.20 44.00 - 72.00 %    Lymphocytes 28.80 22.00 - 41.00 %    Monocytes 7.20 0.00 - 13.40 %    Eosinophils 2.00 0.00 - 6.90 %    Basophils 0.50 0.00 - 1.80 %    Immature Granulocytes 0.30 0.00 - 0.90 %    Nucleated RBC 0.00 0.00 - 0.20 /100 WBC    Neutrophils (Absolute) 4.68 1.82 - 7.42 K/uL    Lymphs (Absolute) 2.20 1.00 - 4.80 K/uL    Monos (Absolute) 0.55 0.00 - 0.85 K/uL    Eos (Absolute) 0.15 0.00 - 0.51 K/uL    Baso (Absolute) 0.04 0.00 - 0.12 K/uL    Immature Granulocytes (abs) 0.02 0.00 - 0.11 K/uL    NRBC (Absolute) 0.00 K/uL   COMP METABOLIC PANEL   Result Value Ref Range    Sodium 142 135 - 145 mmol/L    Potassium 4.0 3.6 - 5.5 mmol/L    Chloride 105 96 - 112 mmol/L    Co2 25 20 - 33 mmol/L    Anion Gap 12.0 7.0 - 16.0    Glucose 109 (H) 65 - 99 mg/dL    Bun 13 8 - 22 mg/dL    Creatinine 0.78 0.50 - 1.40 mg/dL    Calcium 8.7 8.5 - 10.5 mg/dL    Correct Calcium 9.0 8.5 - 10.5 mg/dL    AST(SGOT) 22 12 - 45 U/L    ALT(SGPT) 6 2 - 50 U/L    Alkaline Phosphatase 120 (H) 30 - 99 U/L    Total Bilirubin 0.7 0.1 - 1.5 mg/dL    Albumin 3.6 3.2 - 4.9 g/dL    Total Protein 6.5 6.0 - 8.2 g/dL    Globulin 2.9 1.9 - 3.5 g/dL    A-G Ratio 1.2 g/dL   TROPONIN   Result Value Ref Range    Troponin T 30 (H) 6 - 19 ng/L   LIPASE   Result Value Ref Range    Lipase 10 (L) 11 - 82 U/L   ESTIMATED GFR   Result Value Ref Range    GFR (CKD-EPI) 71 >60  mL/min/1.73 m 2   Troponin in two (2) hours   Result Value Ref Range    Troponin T 32 (H) 6 - 19 ng/L   TSH   Result Value Ref Range    TSH 2.210 0.380 - 5.330 uIU/mL   EKG   Result Value Ref Range    Report       [facility] Emergency Dept.    Test Date:  2024  Pt Name:    CESAR VELASQUEZ               Department: ER  MRN:        [id number]                      Room:        13  Gender:     Female                       Technician: [id number]  :        [id number]                   Requested By:ER TRIAGE PROTOCOL  Order #:    [id number]                    Reading MD: LUIS OKEEFE D.O.    Measurements  Intervals                                Axis  Rate:       63                           P:          36  MT:         195                          QRS:        5  QRSD:       107                          T:          223  QT:         475  QTc:        487    Interpretive Statements  Sinus rhythm  LVH with secondary repolarization abnormality  Anterior ST elevation, probably due to LVH  Borderline prolonged QT interval  Compared to ECG 10/17/2017 10:50:06  Left ventricular hypertrophy now present  Early repolarization now present  Sinus bradycardia no longer present  ST (T wave) d eviation still present  Electronically Signed On 2024 14:59:37 PDT by LUIS OKEEFE D.O.          EKG:   I have independently interpreted the above EKG.    COURSE & MEDICAL DECISION MAKING     COURSE AND PLAN  2:10 PM - Patient seen and examined at bedside. Dr. Elder (cardiology) is present. The patient was informed she is not having a heart attack. Discussed plan of care, including obtaining labs to monitor her symptoms. Patient agrees to the plan of care. Ordered for EKG, CBC w/ diff, CMP, troponin, and lipase to evaluate her symptoms.     3:14 PM - I discussed the patient's case and the above findings with a PA from [facility] (hospitalist) who agrees to admit the patient. I reevaluated the patient at bedside. Her  daughter is now present. I reevaluated the patient at bedside. I discussed the patient's diagnostic study results. I informed the patient of my plan to admit today given the patient's current presentation and diagnostic study results. Patient verbalizes understanding and support with my plan for admission.      ED Summary: Patient presented with episodes of chest pain.  It was bilateral, less than a minute but she did have shortness of breath sweats and nausea with it.  He does have a history of cardiac disease and heart score is 8.    Her EKG has left ventricular hypertrophy with elevation the elevation is new from a previous as comparison so a code STEMI was called.  The patient was evaluated by cardiology department I consulted with Dr. Matute and the patient does not have a STEMI based on the EKG and presentation.    The patient's troponin is negative she will be admitted for further evaluation and treatment.    Decision tools and prescription drugs considered including, but not limited to: Heart score 8    CRITICAL CARE  The very real possibility of a deterioration of this patient's condition required the highest level of my preparedness for sudden, emergent intervention.  I provided critical care services, which included medication orders, frequent reevaluations of the patient's condition and response to treatment, ordering and reviewing test results, and discussing the case with various consultants.  The critical care time associated with the care of the patient was 35 minutes. Review chart for interventions. This time is exclusive of any other billable procedures.      DISPOSITION AND DISCUSSIONS  I have discussed management of the patient with the following physicians and VISHNU's: Dr. Elder (cardiology), PA from [facility]    Discussion of management with other [facility] or appropriate source(s): None    Barriers to care at this time, including but not limited to: None     DISPOSITION:  Patient will be hospitalized by the  [facility] in guarded condition.     FINAL DIAGNOSIS  1. Precordial chest pain    2. Left ventricular hypertrophy        The total critical care time for this patient was 35 minutes, as outlined above.     Trace PEREIRA (Scribe), am scribing for, and in the presence of, Randy Linda D.O..    Electronically signed by: Trace Whitaker (Scribe), 4/2/2024    Randy PEREIRA D.O. personally performed the services described in this documentation, as scribed by Trace Whitaker in my presence, and it is both accurate and complete.     The note accurately reflects work and decisions made by me.  Randy Linda D.O.  4/2/2024  6:49 PM

## 2024-04-02 NOTE — ED TRIAGE NOTES
Chief Complaint   Patient presents with    Lightheadedness     Since 5AM    Nausea     Since 5am. Patient given 4mg Zofran by EMS    Shortness of Breath     X 4 hours. Patient on 4L NC at baseline. EMS found patient concentrator with an error notice     Chest Pain     An hour ago patient felt a sudden stabbing pain to the left and right side of her chest that resolved after 1 minute      Patient brought in by EMS from home where she lives with her daughter

## 2024-04-02 NOTE — TELEPHONE ENCOUNTER
Caller: Delilah (Daughter)    Topic/issue: Patient is being transported to [facility] by Ambulance, she wanted to let provider know     Callback Number: [phone]    Thank You   Rosemary GOMEZ

## 2024-04-02 NOTE — DISCHARGE PLANNING
SW responded to a STEMI page.   Cardiology at bedside and called off the STEMI.   SW spoke with Pt who stated her daughter is aware she is here and should be coming. SW called Pt daughter Delilah [phone] and she is en route to the hospital.   Pt had a POLST at bedside, SW scanned Pt POLST and scanned into chart.     SW will remain available for support as needed.

## 2024-04-02 NOTE — ASSESSMENT & PLAN NOTE
- Patient has extensive cardiac history including MI, stent placement, CABG, TAVR  - Continue home lisinopril, metoprolol, Lasix as well as Lipitor

## 2024-04-02 NOTE — ED NOTES
Med rec updated and complete. Allergies reviewed.  Family at bedside provided a detailed list of medications. Pt /family confirmed last doses taken.  No outpatient antibiotic use in last 30 days.  No anticoagulant medications.    Last ASA dose 04/01/24.      Home pharmacy [facility] = [phone]

## 2024-04-02 NOTE — ASSESSMENT & PLAN NOTE
The ASCVD Risk score (Maryann MILLER, et al., 2019) failed to calculate for the following reasons:    The 2019 ASCVD risk score is only valid for ages 40 to 79    The patient has a prior MI or stroke diagnosis  - Tele monitoring  - EKG reveals LVH and some ST elevation. Code STEMI was called and subsequently canceled by Cardiology, not concerned for STEMI.    - Troponin slightly elevated at 30 and 32, trend  - Asa  - TSH  - Beta Blocker 50mg daily (home dose)  - Lipid profile in AM  - Stress Test is NOT needed per cardiology  - ECHO (last ECHO March 2024 showing 53% EF and moderate concentric left ventricular hypertrophy)  -Patient does have an extensive cardiac history including MI, CABG, stent placements and TAVR.  Cardiology was consulted and will follow the patient.

## 2024-04-02 NOTE — ASSESSMENT & PLAN NOTE
- Blood pressure elevated on admission  - Continue home lisinopril, metoprolol, Lasix  - Monitor vitals every 4 hours

## 2024-04-02 NOTE — PROGRESS NOTES
No chief complaint on file.      Subjective     Silvia Orellana is a 89 y.o. female who presents today     There has been a big change on her RVSP, now with severe pulmonary hypertension, please place referral to her to pulmonary. Also please see if she would like to come in to see me sooner to discuss her echocardiogram results     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    Cataract     patria IOL    CHF (congestive heart failure) (HCC)     Chronic pain     Dental disorder     dentures    Diverticulosis     Fall     GERD (gastroesophageal reflux disease)     Heart burn     Hx of Clostridium difficile infection 2015    Hyperlipidemia     Hypertension     Indigestion     Myocardial infarct ([facility]) 2002    cardiac stent    Pneumonia 2014    Stroke ([facility]) 2001; 2012    no deficits, general weakness    Urinary bladder disorder     Urinary incontinence      Past Surgical History:   Procedure Laterality Date    TRANSCATHETER AORTIC VALVE REPLACEMENT N/A 9/25/2017    Procedure: TRANSCATHETER AORTIC VALVE REPLACEMENT;  Surgeon: Sherif Elder M.D.;  Location: SURGERY [facility];  Service: Cardiac    TATIANNA  9/25/2017    Procedure: TTE;  Surgeon: Sherif Elder M.D.;  Location: [facility];  Service: Cardiac    ORBITAL FRACTURE ORIF Left 5/23/2016    Procedure: ORBITAL FRACTURE ORIF FOR: LATERAL CANTHOPEXY;  Surgeon: Fabrice Daniel Jr., M.D.;  Location: [facility];  Service:     ORIF, FRACTURE, FEMUR Right 8/10/2015    Procedure: FEMUR ORIF;  Surgeon: Umer Guevara M.D.;  Location: [facility];  Service:     ORBITAL FRACTURE ORIF Left 7/6/2015    Procedure: ORBITAL FRACTURE ORIF;  Surgeon: Fabrice Daniel Jr.,  M.D.;  Location: SURGERY [facility];  Service:     PB REVISE TOTAL HIP REPLACEMENT  3/24/2015    R    OTHER CARDIAC SURGERY  2012    cardiac cath with stent placement    PB REVISE ACETABULAR PART OF TOTAL HIP  2010    L    OTHER CARDIAC SURGERY  2002    CABG    GYN SURGERY  1961    hysterectomy    ANGIOGRAM      BLADDER BIOPSY      CHOLECYSTECTOMY      HIP ARTHROPLASTY TOTAL Left     LUMPECTOMY      L    MULTIPLE CORONARY ARTERY BYPASS      ZZZ CARDIAC CATH       Family History   Problem Relation Age of Onset    Heart Attack Neg Hx      Social History     Socioeconomic History    Marital status:      Spouse name: Not on file    Number of children: Not on file    Years of education: Not on file    Highest education level: Not on file   Occupational History    Not on file   Tobacco Use    Smoking status: Never    Smokeless tobacco: Never   Substance and Sexual Activity    Alcohol use: Yes     Comment: occ    Drug use: No    Sexual activity: Not on file   Other Topics Concern    Not on file   Social History Narrative    Not on file     Social Determinants of Health     Financial Resource Strain: Not on file   Food Insecurity: Not on file   Transportation Needs: Not on file   Physical Activity: Not on file   Stress: Not on file   Social Connections: Not on file   Intimate Partner Violence: Not on file   Housing Stability: Not on file     No Known Allergies  Outpatient Encounter Medications as of 4/2/2024   Medication Sig Dispense Refill    atorvastatin (LIPITOR) 80 MG tablet Take 1 Tablet by mouth every evening. 90 Tablet 3    furosemide (LASIX) 20 MG Tab Take 1 Tablet by mouth every day. 100 Tablet 3    lisinopril (PRINIVIL) 20 MG Tab Take 1 Tablet by mouth 2 times a day. 200 Tablet 3    metoprolol SR (TOPROL XL) 50 MG TABLET SR 24 HR Take 1 Tablet by mouth every day. 100 Tablet 3    potassium chloride (MICRO-K) 10 MEQ capsule TAKE 2 CAPSULES BY MOUTH EVERY  Capsule 3    citalopram (CELEXA) 40 MG Tab  Take 40 mg by mouth every day.      omeprazole (PRILOSEC) 40 MG delayed-release capsule Take 40 mg by mouth every day.      HYDROcodone/acetaminophen (NORCO)  MG Tab Take 1-2 Tabs by mouth every 6 hours as needed.      aspirin (ASA) 81 MG Chew Tab chewable tablet Chew 81 mg every day.      Cyanocobalamin (VITAMIN B-12) 5000 MCG SL Tab Place 1 Tab under tongue every morning.      ascorbic acid (ASCORBIC ACID) 500 MG Tab Take 1,000 mg by mouth every morning.      multivitamin (THERAGRAN) Tab Take 1 Tab by mouth every morning.      calcium citrate (CALCITRATE) 950 MG Tab Take 600 mg by mouth 2 times a day.      gabapentin (NEURONTIN) 600 MG tablet Take 1,800 mg by mouth 3 times a day.  5     No facility-administered encounter medications on file as of 4/2/2024.     ROS           Objective     There were no vitals taken for this visit.    Physical Exam           Assessment & Plan     No diagnosis found.    Medical Decision Making: Today's Assessment/Status/Plan:

## 2024-04-02 NOTE — CONSULTS
Cardiology Initial Consultation    Date of Service  4/2/2024    Referring Physician  Randy Linda D.O.    Reason for Consultation  Chest pain, cancelled STEMI.     History of Presenting Illness  Silvia Orellana is a 89 y.o. female with a past medical history of TAVR, coronary artery disease with prior coronary artery bypass graft surgery who presented 4/2/2024 with chest pain described to bilateral chest burning sensation for 1 minute which has since resolved. STEMI was activated and cancelled.     Review of Systems  Review of Systems   Unable to perform ROS: Other   Respiratory:  Negative for shortness of breath.    Cardiovascular:  Negative for chest pain.       Past Medical History   has a past medical history of Arthritis, Bowel habit changes, Bradycardia, Breast cancer ([facility]), Breath shortness, CAD (coronary artery disease), Cancer ([facility]) (2003), Cancer (HCC) (2007), Cancer (HCC) (2016), Cataract, CHF (congestive heart failure) ([facility]), Chronic pain, Dental disorder, Diverticulosis, Fall, GERD (gastroesophageal reflux disease), Heart burn, Clostridium difficile infection (2015), Hyperlipidemia, Hypertension, Indigestion, Myocardial infarct (HCC) (2002), Pneumonia (2014), Stroke ([facility]) (2001; 2012), Urinary bladder disorder, and Urinary incontinence.    Surgical History   has a past surgical history that includes cholecystectomy; lumpectomy; bladder biopsy; pr revise total hip replacement (3/24/2015); pr revise acetabular part of total hip (2010); orbital fracture orif (Left, 7/6/2015); orif, fracture, femur (Right, 8/10/2015); hip arthroplasty total (Left); angiogram; other cardiac surgery (2002); other cardiac surgery (2012); gyn surgery (1961); orbital fracture orif (Left, 5/23/2016); multiple coronary artery bypass; zzz cardiac cath; transcatheter aortic valve replacement (N/A, 9/25/2017); and jessica (9/25/2017).    Family History  family history is not on file.    Social History   reports that she has never  smoked. She has never used smokeless tobacco. She reports current alcohol use. She reports that she does not use drugs.    Medications  Prior to Admission Medications   Prescriptions Last Dose Informant Patient Reported? Taking?   Cyanocobalamin (VITAMIN B-12) 5000 MCG SL Tab  Family Member Yes No   Sig: Place 1 Tab under tongue every morning.   HYDROcodone/acetaminophen (NORCO)  MG Tab   Yes No   Sig: Take 1-2 Tabs by mouth every 6 hours as needed.   ascorbic acid (ASCORBIC ACID) 500 MG Tab  Family Member Yes No   Sig: Take 1,000 mg by mouth every morning.   aspirin (ASA) 81 MG Chew Tab chewable tablet   Yes No   Sig: Chew 81 mg every day.   atorvastatin (LIPITOR) 80 MG tablet   No No   Sig: Take 1 Tablet by mouth every evening.   calcium citrate (CALCITRATE) 950 MG Tab  Family Member Yes No   Sig: Take 600 mg by mouth 2 times a day.   citalopram (CELEXA) 40 MG Tab   Yes No   Sig: Take 40 mg by mouth every day.   furosemide (LASIX) 20 MG Tab   No No   Sig: Take 1 Tablet by mouth every day.   gabapentin (NEURONTIN) 600 MG tablet  Family Member Yes No   Sig: Take 1,800 mg by mouth 3 times a day.   lisinopril (PRINIVIL) 20 MG Tab   No No   Sig: Take 1 Tablet by mouth 2 times a day.   metoprolol SR (TOPROL XL) 50 MG TABLET SR 24 HR   No No   Sig: Take 1 Tablet by mouth every day.   multivitamin (THERAGRAN) Tab  Family Member Yes No   Sig: Take 1 Tab by mouth every morning.   omeprazole (PRILOSEC) 40 MG delayed-release capsule   Yes No   Sig: Take 40 mg by mouth every day.   potassium chloride (MICRO-K) 10 MEQ capsule   No No   Sig: TAKE 2 CAPSULES BY MOUTH EVERY DAY      Facility-Administered Medications: None       Allergies  No Known Allergies    Vital signs in last 24 hours  Temp:  [35.9 °C (96.7 °F)] 35.9 °C (96.7 °F)  Pulse:  [62] 62  Resp:  [20] 20  BP: (211)/(84) 211/84  SpO2:  [91 %] 91 %    Physical Exam  Physical Exam  Constitutional:       Appearance: Normal appearance. She is well-developed and normal  weight.   HENT:      Head: Normocephalic and atraumatic.      Mouth/Throat:      Mouth: Mucous membranes are moist.   Eyes:      Extraocular Movements: Extraocular movements intact.      Conjunctiva/sclera: Conjunctivae normal.   Cardiovascular:      Rate and Rhythm: Normal rate and regular rhythm.      Pulses: Normal pulses.      Heart sounds: Murmur heard.   Pulmonary:      Effort: Pulmonary effort is normal.      Breath sounds: Normal breath sounds.   Abdominal:      General: Bowel sounds are normal.      Palpations: Abdomen is soft.   Musculoskeletal:         General: Normal range of motion.      Cervical back: Normal range of motion and neck supple.   Skin:     General: Skin is warm and dry.   Neurological:      General: No focal deficit present.      Mental Status: She is alert and oriented to person, place, and time. Mental status is at baseline.   Psychiatric:         Mood and Affect: Mood normal.         Behavior: Behavior normal.         Thought Content: Thought content normal.         Judgment: Judgment normal.         Lab Review  Lab Results   Component Value Date/Time    WBC 7.6 04/02/2024 02:08 PM    RBC 3.81 (L) 04/02/2024 02:08 PM    HEMOGLOBIN 12.6 04/02/2024 02:08 PM    HEMATOCRIT 37.0 04/02/2024 02:08 PM    MCV 97.1 04/02/2024 02:08 PM    MCH 33.1 (H) 04/02/2024 02:08 PM    MCHC 34.1 04/02/2024 02:08 PM    MPV 9.5 04/02/2024 02:08 PM      Lab Results   Component Value Date/Time    SODIUM 140 03/22/2024 01:14 PM    POTASSIUM 4.2 03/22/2024 01:14 PM    CHLORIDE 104 03/22/2024 01:14 PM    CO2 25 03/22/2024 01:14 PM    GLUCOSE 92 03/22/2024 01:14 PM    BUN 9 03/22/2024 01:14 PM    CREATININE 0.79 03/22/2024 01:14 PM    BUNCREATRAT 10 (L) 03/12/2024 08:10 AM      Lab Results   Component Value Date/Time    ASTSGOT 30 03/02/2022 06:37 AM    ASTSGOT 17 09/30/2017 03:14 AM    ALTSGPT 19 03/02/2022 06:37 AM    ALTSGPT 9 09/30/2017 03:14 AM     Lab Results   Component Value Date/Time    CHOLSTRLTOT 162  "03/22/2024 01:14 PM     (H) 03/22/2024 01:14 PM    HDL 44 03/22/2024 01:14 PM    TRIGLYCERIDE 92 03/22/2024 01:14 PM       No results for input(s): \"NTPROBNP\" in the last 72 hours.    Cardiac Imaging and Procedures Review  CARDIAC STUDIES/PROCEDURES:     CARDIAC CATHETERIZATION CONCLUSIONS by Dr. Aguirre (08/30/17)  1. Patent LIMA to LAD  2. Patent KINDRA to PDA  3. Patent vein graft to OM, mild/moderate in-stent restenosis of the ostial stent  4. Mildly tortuous abdominal descending aorta     CAROTID ULTRASOUND (03/04/16)  <50% bilateral ICA stenoses   Nl subclavians and vertebrals     CT OF ABDOMEN (09/28/17)  1.  Mild left hydronephrosis and hydroureter with definite distal obstructive etiology not identified.  2.  Apparent rectal wall thickening with perirectal stranding which could indicate proctitis.  3.  Trace bilateral pleural effusions with associated compressive atelectasis and/or consolidation. Underlying infection is possible.  4.  Status post cholecystectomy.  5.  Atrophic right kidney.  6.  Right renal cysts.  7.  Diverticulosis without evidence of diverticulitis.  8.  Atherosclerosis.     CT OF ABDOMEN (09/25/17)  1.  Small LEFT low anterior abdominal wall hematoma. Ongoing bleeding is not excluded.  2.  Gallbladder decompressed or absent  3.  Duodenal diverticulum  4.  Atrophic RIGHT kidney  5.  Atherosclerosis  6.  Prior hysterectomy  7.  Distal colonic diverticulosis  8.  Small hiatal hernia     CT OF CHEST AND ABDOMEN (09/06/17)  1.  Aortic annulus area of 412 sq mm.  2.  Coronary artery ostia are greater than 10 mm from the annulus.  3.  Moderate tortuosity and atherosclerotic calcification of the iliofemoral arterial system, worse on the RIGHT, with minimum diameters of 5.1 mm on the RIGHT and 6.9 mm on the LEFT.  4.  Markedly atrophic RIGHT kidney with apparent moderate to severe proximal RIGHT renal artery stenosis, likely chronic.     DOBUTAMINE STRESS ECHOCARDIOGRAM " (08/07/17)  Resting Echo: LVEF 65% with AoV Vmax  of 2.52 m/s; peak gradient 25.5 mmHg and mean 16.11 mmHg  Peak Dobutamine: LVEF 80%, Vmax 4.02 m/s; Peak gradient 64.75 mmHg and mean 36.42 mmHg  No wall motion abnormality.    ECHOCARDIOGRAM CONCLUSIONS (03/25/24)  Normal left ventricular systolic function. The ejection fraction is   measured to be 53 % by Laurent's biplane.  Moderate concentric left ventricular hypertrophy.   Normal right ventricular size and systolic function.  Moderate mitral regurgitation.  Known TAVR aortic valve that is functioning normally with slightly   elevated transvalvular gradients.  Transvalvular gradients are - Peak:   39 mmHg, Mean: 21 mmHg.   Mild tricuspid regurgitation. Estimated right ventricular systolic   pressure is 70 mmHg (severe pulmonary hypertension).   Compared to the prior study on 4/21/2022, there is now moderate mitral   regurgitation and severe pulmonary hypertension. TAVR gradients are   slightly elevated (mean gradient 21mmHg now, previously 19mmHg).  (study result reviewed)      ECHOCARDIOGRAM CONCLUSIONS (10/17/19)  Prior 11/13/18, no significant changes are noted.  Known TAVR aortic valve that is functioning normally with normal transvalvular gradients.  Vmax is 2.8  m/s. Transvalvular gradients are - Peak: 31 mmHg,  Mean: 15 mmHg. Mild paravalvular leak is noted.  Mild paravalvular leak is noted.  Left ventricular ejection fraction is visually estimated to be 65%.  Grade II diastolic dysfunction.  Estimated right ventricular systolic pressure  is 40 mmHg.     ECHOCARDIOGRAM CONCLUSIONS (02/05/18)  Compared to the report of the study done 10/30/17 - there has been   normalization of TAVR gradient.  Normal left ventricular systolic function.  Left ventricular ejection fraction is visually estimated to be 65%.  Grade I diastolic dysfunction.  Mild mitral regurgitation.  Known TAVR aortic valve that is functioning normally with normal   transvalvular  gradients.  Max is 2.64 m/s. Transvalvular gradients are - Peak: 28 mmHg,  Mean: 18 mmHg.   No evidence of paravalvular leak is noted.  Mild pulmonic insufficiency.     ECHOCARDIOGRAM CONCLUSIONS (10/30/17)  Mild concentric left ventricular hypertrophy.  Normal left ventricular systolic function. Left ventricular ejection   fraction is visually estimated to be 70%.  Grade I diastolic dysfunction.  Normal inferior vena cava size and inspiratory collapse.  Mild mitral stenosis with a mean gradient of 3mmHg at a HR of 63 bpm.  Known TAVR aortic valve. Transvalvular gradients are - Peak: 47 mmHg, Mean: 28  mmHg.   Estimated right ventricular systolic pressure is 20 mmHg.  Compared to the report of the study done 09/26/2017 there has been an   increase in the mean gradient across the prosthetic aortic valve.     ECHOCARDIOGRAM CONCLUSIONS (09/26/17)  Hyperdynamic left ventricular systolic function. Left ventricular   ejection fraction is visually estimated to be greater than 75%.  Evidence of increased intracavity gradient, peak velocity is 3.35 m/sec.   Normal regional wall motion. Normal diastolic function.  Moderate mitral stenosis. Mean transvalvular gradient is 5.5 mmHg at a   heart rate of 92 BPM.   Normal functioning bioprosthetic aortic valve with a peak gradient of   22 mmHg and mean gradient of 12 mmHg.  Echo images from 9/25/17 showed normal LV function and normal   functioning bioprosthetic aortic with a peak gradient of 22 mmHg and   mean gradient of 13 mmHg.     ECHOCARDIOGRAM CONCLUSIONS by Dr. Acosta (09/25/17)  Intraoperative TTE during TAVR.  Baseline images show normal   biventricular systolic function with EF - 65%.  Severe aortic stenosis.    Post-CPB images show preserved biventricular function.  A 23 mm Oliveira   Leeann 3 valve is seen in the aortic position with normal leaflet   motion, no paravalvular leak, mean gradient of 5 mm Hg, and calculated   effective orifice area of 3.5 cm2.  Findings  communicated at the time   of exam.     ECHOCARDIOGRAM CONCLUSIONS (04/19/17)  Structurally normal tricuspid valve.   Mild tricuspid regurgitation.   Estimated right ventricular systolic pressure  is 40 mmHg.  Normal left ventricular systolic function.   Compared to the images of the study done - there has been increase in   mean gradient across the aortic valve but still within moderate   severity of aortic stenosis.     EKG performed on (04/02/24) was reviewed: EKG personally interpreted shows sinus rhythm with left ventricular hypertrophy.   EKG performed on (10/17/17) EKG shows sinus bradycardia.  EKG performed on (09/27/17) EKG shows sinus tachycardia.  EKG performed on (09/26/17) EKG shows sinus tachycardia.  EKG performed on (09/25/17) EKG shows sinus tachycardia.  EKG performed on (09/21/16) EKG shows sinus bradycardia.  EKG performed on (02/10/16) EKG shows sinus bradycardia.     PULMONARY FUNCTION INTERPRETATION (09/13/17)  REASON FOR STUDY:  Shortness of breath, dyspnea on exertion and a history of   severe aortic stenosis.  SPIROMETRY:  FVC is 1.83, 79% predicted.  FEV1 is 1.44, 83% predicted.    FEV1/FVC is 79.  There is a significant response to bronchodilator.  LUNG VOLUMES:  Vital capacity is 97% predicted.  Total lung capacity is 106%   predicted.  Diffusion studies are normal.  Flow volume loops show some mild   coving in the expiratory limb.  IMPRESSION:  Normal exam     Assessment/Plan  Chest pain, cancelled STEMI: She is a 89 y.o. female with a past medical history of TAVR, coronary artery disease with prior coronary artery bypass graft surgery who presented with atypical chest pain which has since resolved. STEMI was activated and cancelled. We will follow her clinically.  Status post transcatheter aortic valve replacement (TAVR) with # 23 Oliveira Leeann S3 valve, transfemoral approach, under conscious sedation (09/25/17)  Chronic diastolic congestive heart failure  Coronary artery disease with  prior coronary bypass graft (x 3 with LIMA to LAD, KINDRA to PDA, vein graft to OM) in [facility], 2002, ostium of the vein graft  stent placement also in 2012)  Hypertension and hyperlipidemia: Stable on medical therapy.   Carotid artery stenosis  History of trans-ischemic attack (2012)  Health maintenance: She admits to depression and lack of motivation following the loss of her  in 2014 and moving from her home in [place] to with her daughter.       Thank you for allowing me to participate in the care of this patient.    I will continue to follow this patient    Please contact me with any questions.    Sherif Elder M.D.   Cardiologist, [facility]  [phone]

## 2024-04-03 ENCOUNTER — PHARMACY VISIT (OUTPATIENT)
Dept: PHARMACY | Facility: MEDICAL CENTER | Age: 89
End: 2024-04-03
Payer: COMMERCIAL

## 2024-04-03 VITALS
BODY MASS INDEX: 31.45 KG/M2 | RESPIRATION RATE: 18 BRPM | OXYGEN SATURATION: 93 % | TEMPERATURE: 98.7 F | DIASTOLIC BLOOD PRESSURE: 61 MMHG | HEART RATE: 63 BPM | SYSTOLIC BLOOD PRESSURE: 134 MMHG | WEIGHT: 177.47 LBS | HEIGHT: 63 IN

## 2024-04-03 PROBLEM — J96.11 CHRONIC HYPOXIC RESPIRATORY FAILURE (HCC): Status: ACTIVE | Noted: 2024-04-03

## 2024-04-03 LAB
ALBUMIN SERPL BCP-MCNC: 3.4 G/DL (ref 3.2–4.9)
ALBUMIN/GLOB SERPL: 1.3 G/DL
ALP SERPL-CCNC: 108 U/L (ref 30–99)
ALT SERPL-CCNC: 5 U/L (ref 2–50)
ANION GAP SERPL CALC-SCNC: 10 MMOL/L (ref 7–16)
AST SERPL-CCNC: 20 U/L (ref 12–45)
BILIRUB SERPL-MCNC: 0.7 MG/DL (ref 0.1–1.5)
BUN SERPL-MCNC: 17 MG/DL (ref 8–22)
CALCIUM ALBUM COR SERPL-MCNC: 9.4 MG/DL (ref 8.5–10.5)
CALCIUM SERPL-MCNC: 8.9 MG/DL (ref 8.5–10.5)
CHLORIDE SERPL-SCNC: 102 MMOL/L (ref 96–112)
CHOLEST SERPL-MCNC: 136 MG/DL (ref 100–199)
CO2 SERPL-SCNC: 27 MMOL/L (ref 20–33)
CREAT SERPL-MCNC: 1.03 MG/DL (ref 0.5–1.4)
ERYTHROCYTE [DISTWIDTH] IN BLOOD BY AUTOMATED COUNT: 48.4 FL (ref 35.9–50)
GFR SERPLBLD CREATININE-BSD FMLA CKD-EPI: 51 ML/MIN/1.73 M 2
GLOBULIN SER CALC-MCNC: 2.7 G/DL (ref 1.9–3.5)
GLUCOSE SERPL-MCNC: 87 MG/DL (ref 65–99)
HCT VFR BLD AUTO: 35.8 % (ref 37–47)
HDLC SERPL-MCNC: 37 MG/DL
HGB BLD-MCNC: 11.9 G/DL (ref 12–16)
LDLC SERPL CALC-MCNC: 82 MG/DL
MCH RBC QN AUTO: 32.7 PG (ref 27–33)
MCHC RBC AUTO-ENTMCNC: 33.2 G/DL (ref 32.2–35.5)
MCV RBC AUTO: 98.4 FL (ref 81.4–97.8)
PLATELET # BLD AUTO: 210 K/UL (ref 164–446)
PMV BLD AUTO: 9.4 FL (ref 9–12.9)
POTASSIUM SERPL-SCNC: 4.1 MMOL/L (ref 3.6–5.5)
PROT SERPL-MCNC: 6.1 G/DL (ref 6–8.2)
RBC # BLD AUTO: 3.64 M/UL (ref 4.2–5.4)
SODIUM SERPL-SCNC: 139 MMOL/L (ref 135–145)
TRIGL SERPL-MCNC: 85 MG/DL (ref 0–149)
TROPONIN T SERPL-MCNC: 50 NG/L (ref 6–19)
WBC # BLD AUTO: 7.6 K/UL (ref 4.8–10.8)

## 2024-04-03 PROCEDURE — 99239 HOSP IP/OBS DSCHRG MGMT >30: CPT | Performed by: STUDENT IN AN ORGANIZED HEALTH CARE EDUCATION/TRAINING PROGRAM

## 2024-04-03 PROCEDURE — 99213 OFFICE O/P EST LOW 20 MIN: CPT | Performed by: INTERNAL MEDICINE

## 2024-04-03 PROCEDURE — 700102 HCHG RX REV CODE 250 W/ 637 OVERRIDE(OP)

## 2024-04-03 PROCEDURE — 84484 ASSAY OF TROPONIN QUANT: CPT

## 2024-04-03 PROCEDURE — 85027 COMPLETE CBC AUTOMATED: CPT

## 2024-04-03 PROCEDURE — A9270 NON-COVERED ITEM OR SERVICE: HCPCS

## 2024-04-03 PROCEDURE — 80061 LIPID PANEL: CPT

## 2024-04-03 PROCEDURE — 80053 COMPREHEN METABOLIC PANEL: CPT

## 2024-04-03 PROCEDURE — RXMED WILLOW AMBULATORY MEDICATION CHARGE: Performed by: STUDENT IN AN ORGANIZED HEALTH CARE EDUCATION/TRAINING PROGRAM

## 2024-04-03 PROCEDURE — G0378 HOSPITAL OBSERVATION PER HR: HCPCS

## 2024-04-03 RX ORDER — ONDANSETRON 4 MG/1
4 TABLET, ORALLY DISINTEGRATING ORAL EVERY 8 HOURS PRN
Qty: 10 TABLET | Refills: 0 | Status: SHIPPED | OUTPATIENT
Start: 2024-04-03

## 2024-04-03 RX ADMIN — CITALOPRAM HYDROBROMIDE 20 MG: 20 TABLET ORAL at 05:52

## 2024-04-03 RX ADMIN — GABAPENTIN 600 MG: 300 CAPSULE ORAL at 05:39

## 2024-04-03 RX ADMIN — FUROSEMIDE 20 MG: 40 TABLET ORAL at 05:39

## 2024-04-03 RX ADMIN — GABAPENTIN 600 MG: 300 CAPSULE ORAL at 14:00

## 2024-04-03 RX ADMIN — ASPIRIN 81 MG 81 MG: 81 TABLET ORAL at 05:39

## 2024-04-03 RX ADMIN — OMEPRAZOLE 40 MG: 20 CAPSULE, DELAYED RELEASE ORAL at 05:39

## 2024-04-03 RX ADMIN — LISINOPRIL 20 MG: 20 TABLET ORAL at 05:39

## 2024-04-03 ASSESSMENT — ENCOUNTER SYMPTOMS
NEUROLOGICAL NEGATIVE: 1
CARDIOVASCULAR NEGATIVE: 1
SHORTNESS OF BREATH: 0
COUGH: 0
MUSCULOSKELETAL NEGATIVE: 1
WEAKNESS: 0
RESPIRATORY NEGATIVE: 1
HEADACHES: 0
CHILLS: 0
EYES NEGATIVE: 1
ABDOMINAL PAIN: 0
FEVER: 0
CONSTITUTIONAL NEGATIVE: 1
PSYCHIATRIC NEGATIVE: 1
NAUSEA: 0
BRUISES/BLEEDS EASILY: 0
MYALGIAS: 0
PALPITATIONS: 0
GASTROINTESTINAL NEGATIVE: 1
VOMITING: 0
DIZZINESS: 0
NERVOUS/ANXIOUS: 0

## 2024-04-03 ASSESSMENT — PAIN DESCRIPTION - PAIN TYPE: TYPE: ACUTE PAIN

## 2024-04-03 NOTE — DISCHARGE PLANNING
Updated that patient has no O2 currently at home. Has a small concentrator from [employer] that daughter bought on line but it is not working proberly and patient has no back up. Discussed  home O2 with daughter and choice fom filled out with telephone consent. Choice form faxed to Saeed CASTELLON. Message sent to Sae[employer] CASTELLON to update.

## 2024-04-03 NOTE — PROGRESS NOTES
Assumed patient care. Patient resting in bed with call light and belongings in reach. No questions from patient at this time.

## 2024-04-03 NOTE — FACE TO FACE
"Face to Face Note  -  Durable Medical Equipment    Chepe Joya M.D. - NPI: [id number]  I certify that this patient is under my care and that they had a durable medical equipment(DME)face to face encounter by myself that meets the physician DME face-to-face encounter requirements with this patient on:    Date of encounter:   Patient:                    MRN:                       YOB: 2024  Silvia Orellana  [id number]  6/19/1931     The encounter with the patient was in whole, or in part, for the following medical condition, which is the primary reason for durable medical equipment:  Other - Advanced CAD s/p CABG with chronic hypoxia     I certify that, based on my findings, the following durable medical equipment is medically necessary:    Oxygen   HOME O2 Saturation Measurements:(Values must be present for Home Oxygen orders)  Room air sat at rest: 91  Room air sat with amb: 87 (EOB, pt gets dizzy when tries to ambulate. RN notified)  With liters of O2: 3, O2 sat at rest with O2: 93  With liters of O2: 3, O2 sat at ambulation with O2: 92   ,    Is the patient mobile?: Yes  If patient feels more short of breath, they can go up to 6 liters per minute and contact healthcare provider.    Supporting Symptoms: The patient requires supplemental oxygen, as the following interventions have been tried with limited or no improvement: \"Ambulation with oximetry and \"Incentive spirometry.    My Clinical findings support the need for the above equipment due to:  Hypoxia  "

## 2024-04-03 NOTE — CARE PLAN
The patient is Stable - Low risk of patient condition declining or worsening    Shift Goals  Clinical Goals: No Cp and DC home  Patient Goals: go home  Family Goals: Feel better    Progress made toward(s) clinical / shift goals:  Yes Pt remains free of CP and has DC orders, awaiting for O2 delivery at this time  Problem: Fall Risk  Goal: Patient will remain free from falls  Outcome: Met     Problem: Pain - Standard  Goal: Alleviation of pain or a reduction in pain to the patient’s comfort goal  Outcome: Met       Patient is not progressing towards the following goals:

## 2024-04-03 NOTE — DISCHARGE PLANNING
Received Choice Form @: 1106  Agency/Facility Name: [facility]  Referral Sent Per Choice Form @: 1217 manually faxed    [phone]  CANDE spoke with Ivette at [facility] regarding oxygen order. Ivette informed [facility] that [facility] will attempt to run pt's insurance since pt and family don't know about being on service with [employer]. [facility] had left a message for [employer] informing that insurance came back informing company that pt was already on service with [employer]. [facility] will attempt to run insurance and contact [employer] if there is an issue.     [phone]  CANDE spoke with Ivette from [facility] to cancel DME order per RN CM.

## 2024-04-03 NOTE — DISCHARGE SUMMARY
"Discharge Summary    CHIEF COMPLAINT ON ADMISSION  Chief Complaint   Patient presents with    Lightheadedness     Since 5AM    Nausea     Since 5am. Patient given 4mg Zofran by EMS    Shortness of Breath     X 4 hours. Patient on 4L NC at baseline. EMS found patient concentrator with an error notice     Chest Pain     An hour ago patient felt a sudden stabbing pain to the left and right side of her chest that resolved after 1 minute        Reason for Admission  Chest pain     Admission Date  4/2/2024    CODE STATUS  DNAR/DNI    HPI & HOSPITAL COURSE  Per Evan Yo APRN HPI dated 4/2/2024:  \"Silvia Orellana is a 89 y.o. female who presented 4/2/2024 with stabbing chest pain to her right and left sides onset 1 hour ago. The patient was upgraded to a STEMI given her EKG findings. The patient reports the episode of chest pain lasted for 1 minute before dissipating and now she is not currently in pain. She reports associated shortness of breath, nausea, and diaphoresis. She is on 4 L of oxygen at baseline. EMS found the patient's concentrator with an error notice. She was given Zofran 4 mg en route to the ED. The patient denies any history of heart attack, but notes she almost had one, so a bypass surgery was done and stents were placed.      In the ED, a code STEMI was called and was then subsequently canceled by Cardiology.  She is afebrile, BP is elevated and is on room air.  Labs significant for slightly elevated alkaline phosphate.  Her troponin is slightly elevated at 30.  Cardiology saw patient and will follow along with her clinically, no further interventions at this time. She was admitted for tele monitoring and observation over night.\"    Hospital course under my care 4/3: afebrile and vitals wnl. Exam at bedside was grossly unremarkable. Labs and imagings findings discussed with patient. Cardiology eval appreciated this AM - no additional advanced cardiac workups are recommended. Pt can continue with " medical therapy and a close outpatient follow up. Atypical symptom is also concerning for a possible GI related - she is already on PPI; we discussed a Rx about Zofran prn. Ambulatory O2 testing done to confirm her chronic O2 needs - Pt would need 3L continous O2 upon DC at rest and ambulation. Chronic resp failure thought to be secondary to advanced CAD with underlying complex heart issues.     Therefore, she is discharged in fair and stable condition to home with close outpatient follow-up.    The patient recovered much more quickly than anticipated on admission.    Discharge Date  04/03/24    FOLLOW UP ITEMS POST DISCHARGE  N/A    DISCHARGE DIAGNOSES  Principal Problem:    Chest pain (POA: Yes)  Active Problems:    Hyperlipidemia (POA: Yes)    HTN (hypertension) (POA: Yes)    CAD (POA: Yes)      Overview: (LIMA--AD, KINDRA--PD, VG--OM 2002) and PCI to VG ostium (3.5x23mm Xience       [facility] 9/2012)     Hx of CABG (POA: Yes)    Stented coronary artery (POA: Yes)    S/P TAVR (transcatheter aortic valve replacement) (POA: Yes)    Chronic hypoxic respiratory failure (HCC) (POA: Yes)  Resolved Problems:    * No resolved hospital problems. *      FOLLOW UP  Future Appointments   Date Time Provider Department Center   5/29/2024  2:45 PM CAESAR Parada None   7/10/2024  2:30 PM Gina Foster M.D. [facility] None     No follow-up provider specified.    MEDICATIONS ON DISCHARGE     Medication List        CONTINUE taking these medications        Instructions   aspirin 325 MG Tabs  Commonly known as: Asa   Take 325 mg by mouth every evening.  Dose: 325 mg     atorvastatin 80 MG tablet  Commonly known as: Lipitor   Take 1 Tablet by mouth every evening.  Dose: 80 mg     calcium citrate 950 (200 Ca) MG Tabs  Commonly known as: Calcitrate   Take 600 mg by mouth 2 times a day.  Dose: 600 mg     citalopram 20 MG Tabs  Commonly known as: CeleXA   Take 20 mg by mouth every day.  Dose: 20 mg     furosemide 20 MG  Tabs  Commonly known as: Lasix   Take 1 Tablet by mouth every day.  Dose: 20 mg     gabapentin 600 MG tablet  Commonly known as: Neurontin   Take 1,800 mg by mouth 3 times a day. 600 mg tablet x 3 tablets = 1800  Dose: 1,800 mg     HYDROcodone/acetaminophen  MG Tabs  Commonly known as: Norco   Take 1-2 Tablets by mouth every four hours as needed for Moderate Pain.  Dose: 1-2 Tablet     lisinopril 20 MG Tabs  Commonly known as: Prinivil   Take 1 Tablet by mouth 2 times a day.  Dose: 20 mg     LUTEIN-ZEAXANTHIN PO   Take 1 Tablet by mouth every day.  Dose: 1 Tablet     metoprolol SR 50 MG Tb24  Commonly known as: Toprol XL   Take 1 Tablet by mouth every day.  Dose: 50 mg     multivitamin Tabs   Take 1 Tab by mouth every morning.  Dose: 1 Tablet     Ocuvite-Lutein Tabs   Take 1 Tablet by mouth every day.  Dose: 1 Tablet     omeprazole 40 MG delayed-release capsule  Commonly known as: PriLOSEC   Take 40 mg by mouth every day.  Dose: 40 mg     ondansetron 4 MG Tbdp  Commonly known as: Zofran ODT   Take 1 Tablet by mouth every 8 hours as needed for Nausea/Vomiting.  Dose: 4 mg     potassium chloride 10 MEQ capsule  Commonly known as: Micro-K   TAKE 2 CAPSULES BY MOUTH EVERY DAY     potassium citrate SR 10 MEQ (1080 MG) Tbcr  Commonly known as: Urocit-K SR   Take 10 mEq by mouth every day.  Dose: 10 mEq     Vitamin B-12 5000 MCG Subl   Place 1 Tab under tongue every morning.  Dose: 1 Tablet     Vitamin C 1000 MG Tabs   Take 1,000 mg by mouth every day.  Dose: 1,000 mg              Allergies  No Known Allergies    DIET  Orders Placed This Encounter   Procedures    Diet Order Diet: Level 6 - Soft and Bite Sized; Liquid level: Level 0 - Thin; Second Modifier: (optional): Cardiac     Standing Status:   Standing     Number of Occurrences:   1     Order Specific Question:   Diet:     Answer:   Level 6 - Soft and Bite Sized [23]     Order Specific Question:   Liquid level     Answer:   Level 0 - Thin     Order Specific  Question:   Second Modifier: (optional)     Answer:   Cardiac [6]       ACTIVITY  As tolerated.  Weight bearing as tolerated    CONSULTATIONS  Cardiology    PROCEDURES  N/A    LABORATORY  Lab Results   Component Value Date    SODIUM 139 04/03/2024    POTASSIUM 4.1 04/03/2024    CHLORIDE 102 04/03/2024    CO2 27 04/03/2024    GLUCOSE 87 04/03/2024    BUN 17 04/03/2024    CREATININE 1.03 04/03/2024        Lab Results   Component Value Date    WBC 7.6 04/03/2024    HEMOGLOBIN 11.9 (L) 04/03/2024    HEMATOCRIT 35.8 (L) 04/03/2024    PLATELETCT 210 04/03/2024        Total time of the discharge process exceeds 36 minutes.

## 2024-04-03 NOTE — DISCHARGE PLANNING
Lynsey with [facility] # [phone] here and they have received a referral from patients PCP. They are working on home O2. Message sent to [facility] to update. Called and spoke with Delilah patients daughter and updated choice form for [facility] with telephone consent. Choice form faxed to [facility]. Message sent to [facility] to update.

## 2024-04-03 NOTE — CARE PLAN
Problem: Knowledge Deficit - Standard  Goal: Patient and family/care givers will demonstrate understanding of plan of care, disease process/condition, diagnostic tests and medications  Outcome: Progressing     Problem: Fall Risk  Goal: Patient will remain free from falls  Outcome: Progressing   The patient is Stable - Low risk of patient condition declining or worsening         Progress made toward(s) clinical / shift goals:  Pt is able to verbalize her plan of care.    Patient is not progressing towards the following goals: N/A

## 2024-04-03 NOTE — DISCHARGE PLANNING
Care Transition Team Assessment    Spoke with daughter Moris via phone. Patient lives with moris and Moris's spouse in [address] [place] [address]. PCP Joshua Rendon. Has walker, W/C, cane, shower mitzi with slider. Had O2 concentrator from [employer] but it is not working, Uses [facility] for meds. Daughter will be ride @ D/C. Anticipate home O2 @ D/C.     Information Source  Information Given By: Relative  Informant's Name: Moris Painting    Readmission Evaluation  Is this a readmission?: No    Interdisciplinary Discharge Planning  Primary Care Physician: Joshua Rendon  Lives with - Patient's Self Care Capacity: Adult Children  Patient or legal guardian wants to designate a caregiver: No  Support Systems: Children  Housing / Facility: [address] [place] [address]  Do You Take your Prescribed Medications Regularly: Yes  Able to Return to Previous ADL's: Yes  Mobility Issues: Yes  Prior Services: Continuous (24 Hour) Care Giving Family  Patient Prefers to be Discharged to:: Home  Assistance Needed: No  Durable Medical Equipment: Walker, Commode    Discharge Preparedness  What are your discharge supports?: Child  Prior Functional Level: Uses Walker, Uses Cane, Uses Wheelchair    Functional Assesment  Prior Functional Level: Uses Walker, Uses Cane, Uses Wheelchair    Finances  Prescription Coverage: Yes  Discharge Risks or Barriers  Patient risk factors: Vulnerable adult    Anticipated Discharge Information  Discharge Disposition: Discharged to home/self care (01)  Discharge Address: [address] [place] [address]  Discharge Contact Phone Number: [phone]

## 2024-04-03 NOTE — PROGRESS NOTES
4 Eyes Skin Assessment Completed by Aretha RN and Francisco RN.    Head WDL  Ears WDL  Nose WDL  Mouth WDL  Neck WDL  Breast/Chest WDL  Shoulder Blades WDL  Spine WDL  (R) Arm/Elbow/Hand Bruising  (L) Arm/Elbow/Hand Bruising  Abdomen WDL  Groin WDL  Scrotum/Coccyx/Buttocks WDL  (R) Leg Dr, bruising  (L) Leg Dry, bruising  (R) Heel/Foot/Toe WDL  (L) Heel/Foot/Toe WDL          Devices In Places Tele Box and Pulse Ox      Interventions In Place Pillows, purewick    Possible Skin Injury No    Pictures Uploaded Into Epic N/A  Wound Consult Placed N/A  RN Wound Prevention Protocol Ordered No

## 2024-04-03 NOTE — DISCHARGE PLANNING
Message sent to [facility]ae[facility] CASTELLON with request to contact [employer] for update. Received message back from [employer]aeed and [employer] running insurance and  verifing not on service with other company.

## 2024-04-03 NOTE — PROGRESS NOTES
Monitor summary: SB/SR 58-70, KY .18, QRS .10, QT .51, PVCs per strip from monitor room.

## 2024-04-03 NOTE — PROGRESS NOTES
Cardiology Follow Up Progress Note    Date of Service  4/3/2024    Attending Physician  Chepe Joya M.D.    Chief Complaint   Cancelled STEMI.     HPI  Silvia Orellana is a 89 y.o. female admitted 4/2/2024 with above.    Interim Events  No significant changes noted from cardiac standpoint within the past 24 hours.     Review of Systems  Review of Systems   Constitutional: Negative.  Negative for chills and fever.   HENT: Negative.  Negative for hearing loss.    Eyes: Negative.    Respiratory: Negative.  Negative for cough and shortness of breath.    Cardiovascular: Negative.  Negative for chest pain, palpitations and leg swelling.   Gastrointestinal: Negative.  Negative for abdominal pain, nausea and vomiting.   Genitourinary: Negative.  Negative for dysuria and urgency.   Musculoskeletal: Negative.  Negative for myalgias.   Skin: Negative.  Negative for rash.   Neurological: Negative.  Negative for dizziness, weakness and headaches.   Hematological:  Does not bruise/bleed easily.   Psychiatric/Behavioral: Negative.  The patient is not nervous/anxious.        Vital signs in last 24 hours  Temp:  [35.9 °C (96.7 °F)-37.2 °C (98.9 °F)] 37.2 °C (98.9 °F)  Pulse:  [56-67] 63  Resp:  [16-20] 18  BP: (110-211)/(53-84) 128/58  SpO2:  [90 %-98 %] 90 %    Physical Exam  Physical Exam  Constitutional:       Appearance: Normal appearance. She is well-developed and normal weight.   HENT:      Head: Normocephalic and atraumatic.      Mouth/Throat:      Mouth: Mucous membranes are moist.   Eyes:      Extraocular Movements: Extraocular movements intact.      Conjunctiva/sclera: Conjunctivae normal.   Cardiovascular:      Rate and Rhythm: Normal rate and regular rhythm.      Pulses: Normal pulses.      Heart sounds: Normal heart sounds.   Pulmonary:      Effort: Pulmonary effort is normal.      Breath sounds: Normal breath sounds.   Abdominal:      General: Bowel sounds are normal.      Palpations: Abdomen is soft.  "  Musculoskeletal:         General: Normal range of motion.      Cervical back: Normal range of motion and neck supple.   Skin:     General: Skin is warm and dry.   Neurological:      General: No focal deficit present.      Mental Status: She is alert and oriented to person, place, and time. Mental status is at baseline.   Psychiatric:         Mood and Affect: Mood normal.         Behavior: Behavior normal.         Thought Content: Thought content normal.         Judgment: Judgment normal.         Lab Review  Lab Results   Component Value Date/Time    WBC 7.6 04/03/2024 04:16 AM    RBC 3.64 (L) 04/03/2024 04:16 AM    HEMOGLOBIN 11.9 (L) 04/03/2024 04:16 AM    HEMATOCRIT 35.8 (L) 04/03/2024 04:16 AM    MCV 98.4 (H) 04/03/2024 04:16 AM    MCH 32.7 04/03/2024 04:16 AM    MCHC 33.2 04/03/2024 04:16 AM    MPV 9.4 04/03/2024 04:16 AM      Lab Results   Component Value Date/Time    SODIUM 139 04/03/2024 04:16 AM    POTASSIUM 4.1 04/03/2024 04:16 AM    CHLORIDE 102 04/03/2024 04:16 AM    CO2 27 04/03/2024 04:16 AM    GLUCOSE 87 04/03/2024 04:16 AM    BUN 17 04/03/2024 04:16 AM    CREATININE 1.03 04/03/2024 04:16 AM    BUNCREATRAT 10 (L) 03/12/2024 08:10 AM      Lab Results   Component Value Date/Time    ASTSGOT 20 04/03/2024 04:16 AM    ALTSGPT 5 04/03/2024 04:16 AM     Lab Results   Component Value Date/Time    CHOLSTRLTOT 136 04/03/2024 04:16 AM    LDL 82 04/03/2024 04:16 AM    HDL 37 (A) 04/03/2024 04:16 AM    TRIGLYCERIDE 85 04/03/2024 04:16 AM    TROPONINT 50 (H) 04/03/2024 04:16 AM       No results for input(s): \"NTPROBNP\" in the last 72 hours.  (Above labs reviewed.)       Current Facility-Administered Medications:     aspirin (Asa) chewable tab 81 mg, 81 mg, Oral, DAILY, Mallika Yo, A.P.R.N., 81 mg at 04/03/24 0539    atorvastatin (Lipitor) tablet 80 mg, 80 mg, Oral, Nightly, KIRT Gandhi.P.R.N., 80 mg at 04/02/24 2114    citalopram (CeleXA) tablet 20 mg, 20 mg, Oral, DAILY, Mallika Yo, " A.P.R.N., 20 mg at 04/03/24 0552    furosemide (Lasix) tablet 20 mg, 20 mg, Oral, DAILY, Mallika Yo, A.P.R.N., 20 mg at 04/03/24 0539    lisinopril (Prinivil) tablet 20 mg, 20 mg, Oral, BID, Mallika Yo, A.P.R.N., 20 mg at 04/03/24 0539    metoprolol SR (Toprol XL) tablet 50 mg, 50 mg, Oral, Q EVENING, Mallika Maciassen, A.P.R.N.    omeprazole (PriLOSEC) capsule 40 mg, 40 mg, Oral, DAILY, Mallika Yo, A.P.R.N., 40 mg at 04/03/24 0539    enoxaparin (Lovenox) inj 40 mg, 40 mg, Subcutaneous, DAILY AT 1800, Mallika Yo, A.P.R.N., 40 mg at 04/02/24 1736    nitroglycerin (Nitrostat) tablet 0.4 mg, 0.4 mg, Sublingual, Q5 MIN PRN, Mallika Maciassen, A.P.R.N.    morphine 4 MG/ML injection 2-4 mg, 2-4 mg, Intravenous, Q5 MIN PRN, Mallika Maciassen, A.P.R.N.    gabapentin (Neurontin) capsule 600 mg, 600 mg, Oral, TID, Mallika Yo, A.P.R.N., 600 mg at 04/03/24 0539    hydrALAZINE (Apresoline) injection 20 mg, 20 mg, Intravenous, Q6HRS PRN, Mallika Yo, A.P.R.N., 20 mg at 04/02/24 1851    ondansetron (Zofran) syringe/vial injection 4 mg, 4 mg, Intravenous, Q4HRS PRN, Mallika Maciassen, A.P.R.N., 4 mg at 04/02/24 2006    traZODone (Desyrel) tablet 50 mg, 50 mg, Oral, QHS PRN, Mallika Maciassen, A.P.R.N., 50 mg at 04/02/24 2114  (Medications reviewed.)    Cardiac Imaging and Procedures Review  CARDIAC STUDIES/PROCEDURES:     CARDIAC CATHETERIZATION CONCLUSIONS by Dr. Aguirre (08/30/17)  1. Patent LIMA to LAD  2. Patent KINDRA to PDA  3. Patent vein graft to OM, mild/moderate in-stent restenosis of the ostial stent  4. Mildly tortuous abdominal descending aorta     CAROTID ULTRASOUND (03/04/16)  <50% bilateral ICA stenoses   Nl subclavians and vertebrals     CT OF ABDOMEN (09/28/17)  1.  Mild left hydronephrosis and hydroureter with definite distal obstructive etiology not identified.  2.  Apparent rectal wall thickening with perirectal stranding which could indicate proctitis.  3.   Trace bilateral pleural effusions with associated compressive atelectasis and/or consolidation. Underlying infection is possible.  4.  Status post cholecystectomy.  5.  Atrophic right kidney.  6.  Right renal cysts.  7.  Diverticulosis without evidence of diverticulitis.  8.  Atherosclerosis.     CT OF ABDOMEN (09/25/17)  1.  Small LEFT low anterior abdominal wall hematoma. Ongoing bleeding is not excluded.  2.  Gallbladder decompressed or absent  3.  Duodenal diverticulum  4.  Atrophic RIGHT kidney  5.  Atherosclerosis  6.  Prior hysterectomy  7.  Distal colonic diverticulosis  8.  Small hiatal hernia     CT OF CHEST AND ABDOMEN (09/06/17)  1.  Aortic annulus area of 412 sq mm.  2.  Coronary artery ostia are greater than 10 mm from the annulus.  3.  Moderate tortuosity and atherosclerotic calcification of the iliofemoral arterial system, worse on the RIGHT, with minimum diameters of 5.1 mm on the RIGHT and 6.9 mm on the LEFT.  4.  Markedly atrophic RIGHT kidney with apparent moderate to severe proximal RIGHT renal artery stenosis, likely chronic.     DOBUTAMINE STRESS ECHOCARDIOGRAM (08/07/17)  Resting Echo: LVEF 65% with AoV Vmax  of 2.52 m/s; peak gradient 25.5 mmHg and mean 16.11 mmHg  Peak Dobutamine: LVEF 80%, Vmax 4.02 m/s; Peak gradient 64.75 mmHg and mean 36.42 mmHg  No wall motion abnormality.     ECHOCARDIOGRAM CONCLUSIONS (03/25/24)  Normal left ventricular systolic function. The ejection fraction is   measured to be 53 % by Laurent's biplane.  Moderate concentric left ventricular hypertrophy.   Normal right ventricular size and systolic function.  Moderate mitral regurgitation.  Known TAVR aortic valve that is functioning normally with slightly   elevated transvalvular gradients.  Transvalvular gradients are - Peak:   39 mmHg, Mean: 21 mmHg.   Mild tricuspid regurgitation. Estimated right ventricular systolic   pressure is 70 mmHg (severe pulmonary hypertension).   Compared to the prior study on  4/21/2022, there is now moderate mitral   regurgitation and severe pulmonary hypertension. TAVR gradients are   slightly elevated (mean gradient 21mmHg now, previously 19mmHg).    ECHOCARDIOGRAM CONCLUSIONS (10/17/19)  Prior 11/13/18, no significant changes are noted.  Known TAVR aortic valve that is functioning normally with normal transvalvular gradients.  Vmax is 2.8  m/s. Transvalvular gradients are - Peak: 31 mmHg,  Mean: 15 mmHg. Mild paravalvular leak is noted.  Mild paravalvular leak is noted.  Left ventricular ejection fraction is visually estimated to be 65%.  Grade II diastolic dysfunction.  Estimated right ventricular systolic pressure  is 40 mmHg.     ECHOCARDIOGRAM CONCLUSIONS (02/05/18)  Compared to the report of the study done 10/30/17 - there has been   normalization of TAVR gradient.  Normal left ventricular systolic function.  Left ventricular ejection fraction is visually estimated to be 65%.  Grade I diastolic dysfunction.  Mild mitral regurgitation.  Known TAVR aortic valve that is functioning normally with normal   transvalvular gradients.  Max is 2.64 m/s. Transvalvular gradients are - Peak: 28 mmHg,  Mean: 18 mmHg.   No evidence of paravalvular leak is noted.  Mild pulmonic insufficiency.     ECHOCARDIOGRAM CONCLUSIONS (10/30/17)  Mild concentric left ventricular hypertrophy.  Normal left ventricular systolic function. Left ventricular ejection   fraction is visually estimated to be 70%.  Grade I diastolic dysfunction.  Normal inferior vena cava size and inspiratory collapse.  Mild mitral stenosis with a mean gradient of 3mmHg at a HR of 63 bpm.  Known TAVR aortic valve. Transvalvular gradients are - Peak: 47 mmHg, Mean: 28  mmHg.   Estimated right ventricular systolic pressure is 20 mmHg.  Compared to the report of the study done 09/26/2017 there has been an   increase in the mean gradient across the prosthetic aortic valve.     ECHOCARDIOGRAM CONCLUSIONS (09/26/17)  Hyperdynamic left  ventricular systolic function. Left ventricular   ejection fraction is visually estimated to be greater than 75%.  Evidence of increased intracavity gradient, peak velocity is 3.35 m/sec.   Normal regional wall motion. Normal diastolic function.  Moderate mitral stenosis. Mean transvalvular gradient is 5.5 mmHg at a   heart rate of 92 BPM.   Normal functioning bioprosthetic aortic valve with a peak gradient of   22 mmHg and mean gradient of 12 mmHg.  Echo images from 9/25/17 showed normal LV function and normal   functioning bioprosthetic aortic with a peak gradient of 22 mmHg and   mean gradient of 13 mmHg.     ECHOCARDIOGRAM CONCLUSIONS by Dr. Acosta (09/25/17)  Intraoperative TTE during TAVR.  Baseline images show normal   biventricular systolic function with EF - 65%.  Severe aortic stenosis.    Post-CPB images show preserved biventricular function.  A 23 mm Oliveira   Leeann 3 valve is seen in the aortic position with normal leaflet   motion, no paravalvular leak, mean gradient of 5 mm Hg, and calculated   effective orifice area of 3.5 cm2.  Findings communicated at the time   of exam.     ECHOCARDIOGRAM CONCLUSIONS (04/19/17)  Structurally normal tricuspid valve.   Mild tricuspid regurgitation.   Estimated right ventricular systolic pressure  is 40 mmHg.  Normal left ventricular systolic function.   Compared to the images of the study done - there has been increase in   mean gradient across the aortic valve but still within moderate   severity of aortic stenosis.     EKG performed on (04/02/24) EKG shows sinus rhythm with left ventricular hypertrophy.   EKG performed on (10/17/17) EKG shows sinus bradycardia.  EKG performed on (09/27/17) EKG shows sinus tachycardia.  EKG performed on (09/26/17) EKG shows sinus tachycardia.  EKG performed on (09/25/17) EKG shows sinus tachycardia.  EKG performed on (09/21/16) EKG shows sinus bradycardia.  EKG performed on (02/10/16) EKG shows sinus bradycardia.     PULMONARY  FUNCTION INTERPRETATION (09/13/17)  REASON FOR STUDY:  Shortness of breath, dyspnea on exertion and a history of   severe aortic stenosis.  SPIROMETRY:  FVC is 1.83, 79% predicted.  FEV1 is 1.44, 83% predicted.    FEV1/FVC is 79.  There is a significant response to bronchodilator.  LUNG VOLUMES:  Vital capacity is 97% predicted.  Total lung capacity is 106%   predicted.  Diffusion studies are normal.  Flow volume loops show some mild   coving in the expiratory limb.  IMPRESSION:  Normal exam     Assessment/Plan  Chest pain: Her chest pain has resolved. No further studies recommended at this time.   Status post transcatheter aortic valve replacement (TAVR) with # 23 Oliveira Leeann S3 valve, transfemoral approach, under conscious sedation (09/25/17)  Chronic diastolic congestive heart failure  Coronary artery disease with prior coronary bypass graft (x 3 with LIMA to LAD, KINDRA to PDA, vein graft to OM) in [facility], 2002, ostium of the vein graft  stent placement also in 2012)  Hypertension and hyperlipidemia: Stable on medical therapy.   Carotid artery stenosis  History of trans-ischemic attack (2012)  Health maintenance: She admits to depression and lack of motivation following the loss of her  in 2014 and moving from her home in [place] to with her daughter.       Thank you for allowing me to participate in the care of this patient.  Cardiology will sign off on this patient    Please contact me with any questions.    Sherif Elder M.D.   Cardiologist, [facility]  [phone]

## 2024-04-03 NOTE — CARE PLAN
The patient is Stable - Low risk of patient condition declining or worsening    Shift Goals  Clinical Goals: Monitor vital signs, Tele monitor  Patient Goals: Rest comfortably  Family Goals: Feel better    Progress made toward(s) clinical / shift goals:      Problem: Knowledge Deficit - Standard  Goal: Patient and family/care givers will demonstrate understanding of plan of care, disease process/condition, diagnostic tests and medications  Description: Target End Date:  1-3 days or as soon as patient condition allows    Document in Patient Education    1.  Patient and family/caregiver oriented to unit, equipment, visitation policy and means for communicating concern  2.  Complete/review Learning Assessment  3.  Assess knowledge level of disease process/condition, treatment plan, diagnostic tests and medications  4.  Explain disease process/condition, treatment plan, diagnostic tests and medications  Outcome: Progressing     Problem: Fall Risk  Goal: Patient will remain free from falls  Description: Target End Date:  Prior to discharge or change in level of care    Document interventions on the [facility] Fall Risk Assessment    1.  Assess for fall risk factors  2.  Implement fall precautions  Outcome: Progressing     Problem: Pain - Standard  Goal: Alleviation of pain or a reduction in pain to the patient’s comfort goal  Description: Target End Date:  Prior to discharge or change in level of care    Document on Vitals flowsheet    1.  Document pain using the appropriate pain scale per order or unit policy  2.  Educate and implement non-pharmacologic comfort measures (i.e. relaxation, distraction, massage, cold/heat therapy, etc.)  3.  Pain management medications as ordered  4.  Reassess pain after pain med administration per policy  5.  If opiods administered assess patient's response to pain medication is appropriate per POSS sedation scale  6.  Follow pain management plan developed in collaboration with patient  and interdisciplinary team (including palliative care or pain specialists if applicable)  Outcome: Progressing         Patient is not progressing towards the following goals:

## 2024-04-03 NOTE — PROGRESS NOTES
Report received from Noc RN. Assume care. Pt resting comfortably  AAOx4.  Assessment completed. VSS. Denies chest pain, Pt was update for the care for the day. White board updated, All question answered. Pt has call light within reach,  bed is in the lowest position. Pt has no other needs at this time.   0845 Called Guadalupe, she states Pt does not have O2 supplies at home and no supplier as well. Small portable tank is broken. Jazzy ESPARZA is aware.

## 2024-04-04 NOTE — DISCHARGE INSTRUCTIONS
Discharge Instructions    Discharged to home by car with relative. Discharged via wheelchair, hospital escort: Yes.  Special equipment needed: Oxygen    Be sure to schedule a follow-up appointment with your primary care doctor or any specialists as instructed.     Discharge Plan:   Diet Plan: Discussed  Activity Level: Discussed  Confirmed Follow up Appointment: Patient to Call and Schedule Appointment  Confirmed Symptoms Management: Discussed  Medication Reconciliation Updated: Yes    I understand that a diet low in cholesterol, fat, and sodium is recommended for good health. Unless I have been given specific instructions below for another diet, I accept this instruction as my diet prescription.   Other diet: Heart Healthy    Special Instructions: None    -Is this patient being discharged with medication to prevent blood clots?  No    Is patient discharged on Warfarin / Coumadin?   No

## 2024-04-23 ENCOUNTER — HOSPITAL ENCOUNTER (INPATIENT)
Facility: MEDICAL CENTER | Age: 89
LOS: 2 days | End: 2024-04-25
Attending: STUDENT IN AN ORGANIZED HEALTH CARE EDUCATION/TRAINING PROGRAM | Admitting: HOSPITALIST
Payer: MEDICARE

## 2024-04-23 ENCOUNTER — APPOINTMENT (OUTPATIENT)
Dept: RADIOLOGY | Facility: MEDICAL CENTER | Age: 89
End: 2024-04-23
Attending: STUDENT IN AN ORGANIZED HEALTH CARE EDUCATION/TRAINING PROGRAM
Payer: MEDICARE

## 2024-04-23 DIAGNOSIS — I50.32 CHRONIC DIASTOLIC CHF (CONGESTIVE HEART FAILURE) (HCC): ICD-10-CM

## 2024-04-23 DIAGNOSIS — I50.1 PULMONARY EDEMA CARDIAC CAUSE (HCC): ICD-10-CM

## 2024-04-23 DIAGNOSIS — I10 PRIMARY HYPERTENSION: ICD-10-CM

## 2024-04-23 DIAGNOSIS — J96.21 ACUTE AND CHRONIC RESPIRATORY FAILURE WITH HYPOXIA (HCC): ICD-10-CM

## 2024-04-23 PROBLEM — I50.33 ACUTE ON CHRONIC DIASTOLIC HEART FAILURE (HCC): Status: ACTIVE | Noted: 2024-04-23

## 2024-04-23 PROBLEM — I50.9 DECOMPENSATED HEART FAILURE (HCC): Status: ACTIVE | Noted: 2024-04-23

## 2024-04-23 PROBLEM — J96.20 ACUTE ON CHRONIC RESPIRATORY FAILURE (HCC): Status: ACTIVE | Noted: 2024-04-23

## 2024-04-23 LAB
ALBUMIN SERPL BCP-MCNC: 3.4 G/DL (ref 3.2–4.9)
ALBUMIN/GLOB SERPL: 1.1 G/DL
ALP SERPL-CCNC: 117 U/L (ref 30–99)
ALT SERPL-CCNC: 6 U/L (ref 2–50)
ANION GAP SERPL CALC-SCNC: 10 MMOL/L (ref 7–16)
AST SERPL-CCNC: 18 U/L (ref 12–45)
BASOPHILS # BLD AUTO: 0.5 % (ref 0–1.8)
BASOPHILS # BLD: 0.03 K/UL (ref 0–0.12)
BILIRUB SERPL-MCNC: 0.6 MG/DL (ref 0.1–1.5)
BUN SERPL-MCNC: 9 MG/DL (ref 8–22)
CALCIUM ALBUM COR SERPL-MCNC: 9.4 MG/DL (ref 8.5–10.5)
CALCIUM SERPL-MCNC: 8.9 MG/DL (ref 8.5–10.5)
CHLORIDE SERPL-SCNC: 102 MMOL/L (ref 96–112)
CO2 SERPL-SCNC: 30 MMOL/L (ref 20–33)
CREAT SERPL-MCNC: 0.71 MG/DL (ref 0.5–1.4)
EOSINOPHIL # BLD AUTO: 0.08 K/UL (ref 0–0.51)
EOSINOPHIL NFR BLD: 1.2 % (ref 0–6.9)
ERYTHROCYTE [DISTWIDTH] IN BLOOD BY AUTOMATED COUNT: 47.5 FL (ref 35.9–50)
FLUAV RNA SPEC QL NAA+PROBE: NEGATIVE
FLUBV RNA SPEC QL NAA+PROBE: NEGATIVE
GFR SERPLBLD CREATININE-BSD FMLA CKD-EPI: 79 ML/MIN/1.73 M 2
GLOBULIN SER CALC-MCNC: 3.1 G/DL (ref 1.9–3.5)
GLUCOSE SERPL-MCNC: 103 MG/DL (ref 65–99)
HCT VFR BLD AUTO: 37.2 % (ref 37–47)
HGB BLD-MCNC: 12 G/DL (ref 12–16)
IMM GRANULOCYTES # BLD AUTO: 0.02 K/UL (ref 0–0.11)
IMM GRANULOCYTES NFR BLD AUTO: 0.3 % (ref 0–0.9)
LACTATE SERPL-SCNC: 1.1 MMOL/L (ref 0.5–2)
LYMPHOCYTES # BLD AUTO: 1.92 K/UL (ref 1–4.8)
LYMPHOCYTES NFR BLD: 29.9 % (ref 22–41)
MCH RBC QN AUTO: 32.4 PG (ref 27–33)
MCHC RBC AUTO-ENTMCNC: 32.3 G/DL (ref 32.2–35.5)
MCV RBC AUTO: 100.5 FL (ref 81.4–97.8)
MONOCYTES # BLD AUTO: 0.58 K/UL (ref 0–0.85)
MONOCYTES NFR BLD AUTO: 9 % (ref 0–13.4)
NEUTROPHILS # BLD AUTO: 3.8 K/UL (ref 1.82–7.42)
NEUTROPHILS NFR BLD: 59.1 % (ref 44–72)
NRBC # BLD AUTO: 0 K/UL
NRBC BLD-RTO: 0 /100 WBC (ref 0–0.2)
NT-PROBNP SERPL IA-MCNC: ABNORMAL PG/ML (ref 0–125)
PLATELET # BLD AUTO: 199 K/UL (ref 164–446)
PMV BLD AUTO: 9.7 FL (ref 9–12.9)
POTASSIUM SERPL-SCNC: 3.9 MMOL/L (ref 3.6–5.5)
PROCALCITONIN SERPL-MCNC: <0.05 NG/ML
PROT SERPL-MCNC: 6.5 G/DL (ref 6–8.2)
RBC # BLD AUTO: 3.7 M/UL (ref 4.2–5.4)
RSV RNA SPEC QL NAA+PROBE: NEGATIVE
SARS-COV-2 RNA RESP QL NAA+PROBE: NOTDETECTED
SODIUM SERPL-SCNC: 142 MMOL/L (ref 135–145)
TROPONIN T SERPL-MCNC: 29 NG/L (ref 6–19)
TROPONIN T SERPL-MCNC: 29 NG/L (ref 6–19)
WBC # BLD AUTO: 6.4 K/UL (ref 4.8–10.8)

## 2024-04-23 PROCEDURE — 80053 COMPREHEN METABOLIC PANEL: CPT

## 2024-04-23 PROCEDURE — 99285 EMERGENCY DEPT VISIT HI MDM: CPT

## 2024-04-23 PROCEDURE — 0241U HCHG SARS-COV-2 COVID-19 NFCT DS RESP RNA 4 TRGT ED POC: CPT

## 2024-04-23 PROCEDURE — 84145 PROCALCITONIN (PCT): CPT

## 2024-04-23 PROCEDURE — 83880 ASSAY OF NATRIURETIC PEPTIDE: CPT

## 2024-04-23 PROCEDURE — 85025 COMPLETE CBC W/AUTO DIFF WBC: CPT

## 2024-04-23 PROCEDURE — 700111 HCHG RX REV CODE 636 W/ 250 OVERRIDE (IP): Mod: JZ | Performed by: HOSPITALIST

## 2024-04-23 PROCEDURE — 96374 THER/PROPH/DIAG INJ IV PUSH: CPT

## 2024-04-23 PROCEDURE — 84484 ASSAY OF TROPONIN QUANT: CPT

## 2024-04-23 PROCEDURE — A9270 NON-COVERED ITEM OR SERVICE: HCPCS | Performed by: HOSPITALIST

## 2024-04-23 PROCEDURE — 99223 1ST HOSP IP/OBS HIGH 75: CPT | Mod: AI | Performed by: HOSPITALIST

## 2024-04-23 PROCEDURE — 700102 HCHG RX REV CODE 250 W/ 637 OVERRIDE(OP): Performed by: HOSPITALIST

## 2024-04-23 PROCEDURE — 71045 X-RAY EXAM CHEST 1 VIEW: CPT

## 2024-04-23 PROCEDURE — 36415 COLL VENOUS BLD VENIPUNCTURE: CPT

## 2024-04-23 PROCEDURE — 83605 ASSAY OF LACTIC ACID: CPT

## 2024-04-23 PROCEDURE — 700111 HCHG RX REV CODE 636 W/ 250 OVERRIDE (IP): Performed by: STUDENT IN AN ORGANIZED HEALTH CARE EDUCATION/TRAINING PROGRAM

## 2024-04-23 PROCEDURE — 770020 HCHG ROOM/CARE - TELE (206)

## 2024-04-23 PROCEDURE — 96376 TX/PRO/DX INJ SAME DRUG ADON: CPT

## 2024-04-23 RX ORDER — ONDANSETRON 2 MG/ML
4 INJECTION INTRAMUSCULAR; INTRAVENOUS EVERY 4 HOURS PRN
Status: DISCONTINUED | OUTPATIENT
Start: 2024-04-23 | End: 2024-04-25 | Stop reason: HOSPADM

## 2024-04-23 RX ORDER — FUROSEMIDE 10 MG/ML
20 INJECTION INTRAMUSCULAR; INTRAVENOUS ONCE
Status: COMPLETED | OUTPATIENT
Start: 2024-04-23 | End: 2024-04-23

## 2024-04-23 RX ORDER — HYDROCODONE BITARTRATE AND ACETAMINOPHEN 10; 325 MG/1; MG/1
1-2 TABLET ORAL EVERY 4 HOURS PRN
Status: DISCONTINUED | OUTPATIENT
Start: 2024-04-23 | End: 2024-04-25 | Stop reason: HOSPADM

## 2024-04-23 RX ORDER — CITALOPRAM 20 MG/1
20 TABLET ORAL DAILY
Status: DISCONTINUED | OUTPATIENT
Start: 2024-04-24 | End: 2024-04-25 | Stop reason: HOSPADM

## 2024-04-23 RX ORDER — ONDANSETRON 4 MG/1
4 TABLET, ORALLY DISINTEGRATING ORAL EVERY 4 HOURS PRN
Status: DISCONTINUED | OUTPATIENT
Start: 2024-04-23 | End: 2024-04-25 | Stop reason: HOSPADM

## 2024-04-23 RX ORDER — LISINOPRIL 20 MG/1
20 TABLET ORAL 2 TIMES DAILY
Status: DISCONTINUED | OUTPATIENT
Start: 2024-04-23 | End: 2024-04-25 | Stop reason: HOSPADM

## 2024-04-23 RX ORDER — FUROSEMIDE 10 MG/ML
40 INJECTION INTRAMUSCULAR; INTRAVENOUS
Status: DISCONTINUED | OUTPATIENT
Start: 2024-04-23 | End: 2024-04-25

## 2024-04-23 RX ORDER — OMEPRAZOLE 20 MG/1
40 CAPSULE, DELAYED RELEASE ORAL DAILY
Status: DISCONTINUED | OUTPATIENT
Start: 2024-04-24 | End: 2024-04-25 | Stop reason: HOSPADM

## 2024-04-23 RX ORDER — HYDRALAZINE HYDROCHLORIDE 20 MG/ML
10 INJECTION INTRAMUSCULAR; INTRAVENOUS EVERY 4 HOURS PRN
Status: DISCONTINUED | OUTPATIENT
Start: 2024-04-23 | End: 2024-04-25 | Stop reason: HOSPADM

## 2024-04-23 RX ORDER — GABAPENTIN 300 MG/1
1800 CAPSULE ORAL 3 TIMES DAILY
Status: DISCONTINUED | OUTPATIENT
Start: 2024-04-24 | End: 2024-04-24

## 2024-04-23 RX ORDER — METOPROLOL SUCCINATE 50 MG/1
50 TABLET, EXTENDED RELEASE ORAL EVERY EVENING
Status: DISCONTINUED | OUTPATIENT
Start: 2024-04-23 | End: 2024-04-25 | Stop reason: HOSPADM

## 2024-04-23 RX ORDER — ASPIRIN 325 MG
325 TABLET ORAL EVERY EVENING
Status: DISCONTINUED | OUTPATIENT
Start: 2024-04-23 | End: 2024-04-24

## 2024-04-23 RX ORDER — ACETAMINOPHEN 325 MG/1
650 TABLET ORAL EVERY 6 HOURS PRN
Status: DISCONTINUED | OUTPATIENT
Start: 2024-04-23 | End: 2024-04-24

## 2024-04-23 RX ORDER — ATORVASTATIN CALCIUM 80 MG/1
80 TABLET, FILM COATED ORAL NIGHTLY
Status: DISCONTINUED | OUTPATIENT
Start: 2024-04-23 | End: 2024-04-25 | Stop reason: HOSPADM

## 2024-04-23 RX ADMIN — FUROSEMIDE 20 MG: 10 INJECTION INTRAMUSCULAR; INTRAVENOUS at 19:55

## 2024-04-23 RX ADMIN — METOPROLOL SUCCINATE 50 MG: 50 TABLET, FILM COATED, EXTENDED RELEASE ORAL at 23:51

## 2024-04-23 RX ADMIN — LISINOPRIL 20 MG: 20 TABLET ORAL at 23:51

## 2024-04-23 RX ADMIN — FUROSEMIDE 40 MG: 10 INJECTION INTRAMUSCULAR; INTRAVENOUS at 23:47

## 2024-04-23 RX ADMIN — ASPIRIN 325 MG: 325 TABLET ORAL at 23:51

## 2024-04-23 RX ADMIN — ATORVASTATIN CALCIUM 80 MG: 80 TABLET, FILM COATED ORAL at 23:54

## 2024-04-23 ASSESSMENT — ENCOUNTER SYMPTOMS
HEMOPTYSIS: 0
CONSTIPATION: 0
EYE PAIN: 0
BLOOD IN STOOL: 0
WEAKNESS: 0
HEARTBURN: 0
DOUBLE VISION: 0
PND: 0
SINUS PAIN: 0
DEPRESSION: 0
HALLUCINATIONS: 0
VOMITING: 0
BLURRED VISION: 0
TINGLING: 0
MYALGIAS: 0
SPUTUM PRODUCTION: 0
ABDOMINAL PAIN: 0
SHORTNESS OF BREATH: 1
CHILLS: 0
WHEEZING: 0
PALPITATIONS: 0
FLANK PAIN: 0
DIZZINESS: 0
NAUSEA: 0
BACK PAIN: 0
DIARRHEA: 0
ORTHOPNEA: 0
DIAPHORESIS: 0
COUGH: 0
PHOTOPHOBIA: 0
TREMORS: 0
BRUISES/BLEEDS EASILY: 0
POLYDIPSIA: 0
FEVER: 0
HEADACHES: 0
FALLS: 0
STRIDOR: 0
CLAUDICATION: 0
NECK PAIN: 0
SORE THROAT: 0

## 2024-04-23 ASSESSMENT — LIFESTYLE VARIABLES: SUBSTANCE_ABUSE: 0

## 2024-04-23 ASSESSMENT — COPD QUESTIONNAIRES: HAVE YOU SMOKED AT LEAST 100 CIGARETTES IN YOUR ENTIRE LIFE: NO/DON'T KNOW

## 2024-04-23 ASSESSMENT — FIBROSIS 4 INDEX: FIB4 SCORE: 3.92

## 2024-04-24 LAB
ALBUMIN SERPL BCP-MCNC: 3.7 G/DL (ref 3.2–4.9)
ALBUMIN/GLOB SERPL: 1.2 G/DL
ALP SERPL-CCNC: 129 U/L (ref 30–99)
ALT SERPL-CCNC: 5 U/L (ref 2–50)
ANION GAP SERPL CALC-SCNC: 12 MMOL/L (ref 7–16)
AST SERPL-CCNC: 18 U/L (ref 12–45)
BASOPHILS # BLD AUTO: 0.6 % (ref 0–1.8)
BASOPHILS # BLD: 0.04 K/UL (ref 0–0.12)
BILIRUB SERPL-MCNC: 0.9 MG/DL (ref 0.1–1.5)
BUN SERPL-MCNC: 8 MG/DL (ref 8–22)
CALCIUM ALBUM COR SERPL-MCNC: 9.6 MG/DL (ref 8.5–10.5)
CALCIUM SERPL-MCNC: 9.4 MG/DL (ref 8.5–10.5)
CHLORIDE SERPL-SCNC: 97 MMOL/L (ref 96–112)
CO2 SERPL-SCNC: 33 MMOL/L (ref 20–33)
CREAT SERPL-MCNC: 0.77 MG/DL (ref 0.5–1.4)
EOSINOPHIL # BLD AUTO: 0.13 K/UL (ref 0–0.51)
EOSINOPHIL NFR BLD: 1.8 % (ref 0–6.9)
ERYTHROCYTE [DISTWIDTH] IN BLOOD BY AUTOMATED COUNT: 47.4 FL (ref 35.9–50)
GFR SERPLBLD CREATININE-BSD FMLA CKD-EPI: 72 ML/MIN/1.73 M 2
GLOBULIN SER CALC-MCNC: 3.2 G/DL (ref 1.9–3.5)
GLUCOSE SERPL-MCNC: 102 MG/DL (ref 65–99)
HCT VFR BLD AUTO: 39.1 % (ref 37–47)
HGB BLD-MCNC: 12.5 G/DL (ref 12–16)
IMM GRANULOCYTES # BLD AUTO: 0.02 K/UL (ref 0–0.11)
IMM GRANULOCYTES NFR BLD AUTO: 0.3 % (ref 0–0.9)
LYMPHOCYTES # BLD AUTO: 2.45 K/UL (ref 1–4.8)
LYMPHOCYTES NFR BLD: 34.6 % (ref 22–41)
MAGNESIUM SERPL-MCNC: 1.8 MG/DL (ref 1.5–2.5)
MCH RBC QN AUTO: 32.3 PG (ref 27–33)
MCHC RBC AUTO-ENTMCNC: 32 G/DL (ref 32.2–35.5)
MCV RBC AUTO: 101 FL (ref 81.4–97.8)
MONOCYTES # BLD AUTO: 0.63 K/UL (ref 0–0.85)
MONOCYTES NFR BLD AUTO: 8.9 % (ref 0–13.4)
NEUTROPHILS # BLD AUTO: 3.81 K/UL (ref 1.82–7.42)
NEUTROPHILS NFR BLD: 53.8 % (ref 44–72)
NRBC # BLD AUTO: 0 K/UL
NRBC BLD-RTO: 0 /100 WBC (ref 0–0.2)
PHOSPHATE SERPL-MCNC: 3.3 MG/DL (ref 2.5–4.5)
PLATELET # BLD AUTO: 208 K/UL (ref 164–446)
PMV BLD AUTO: 9.9 FL (ref 9–12.9)
POTASSIUM SERPL-SCNC: 3.9 MMOL/L (ref 3.6–5.5)
PROT SERPL-MCNC: 6.9 G/DL (ref 6–8.2)
RBC # BLD AUTO: 3.87 M/UL (ref 4.2–5.4)
SODIUM SERPL-SCNC: 142 MMOL/L (ref 135–145)
TROPONIN T SERPL-MCNC: 31 NG/L (ref 6–19)
WBC # BLD AUTO: 7.1 K/UL (ref 4.8–10.8)

## 2024-04-24 PROCEDURE — 97166 OT EVAL MOD COMPLEX 45 MIN: CPT

## 2024-04-24 PROCEDURE — 700102 HCHG RX REV CODE 250 W/ 637 OVERRIDE(OP): Performed by: HOSPITALIST

## 2024-04-24 PROCEDURE — 84484 ASSAY OF TROPONIN QUANT: CPT

## 2024-04-24 PROCEDURE — A9270 NON-COVERED ITEM OR SERVICE: HCPCS | Performed by: HOSPITALIST

## 2024-04-24 PROCEDURE — 700111 HCHG RX REV CODE 636 W/ 250 OVERRIDE (IP): Mod: JZ | Performed by: STUDENT IN AN ORGANIZED HEALTH CARE EDUCATION/TRAINING PROGRAM

## 2024-04-24 PROCEDURE — 84100 ASSAY OF PHOSPHORUS: CPT

## 2024-04-24 PROCEDURE — 97535 SELF CARE MNGMENT TRAINING: CPT

## 2024-04-24 PROCEDURE — 770020 HCHG ROOM/CARE - TELE (206)

## 2024-04-24 PROCEDURE — 80053 COMPREHEN METABOLIC PANEL: CPT

## 2024-04-24 PROCEDURE — 85025 COMPLETE CBC W/AUTO DIFF WBC: CPT

## 2024-04-24 PROCEDURE — 700102 HCHG RX REV CODE 250 W/ 637 OVERRIDE(OP): Performed by: STUDENT IN AN ORGANIZED HEALTH CARE EDUCATION/TRAINING PROGRAM

## 2024-04-24 PROCEDURE — A9270 NON-COVERED ITEM OR SERVICE: HCPCS | Performed by: STUDENT IN AN ORGANIZED HEALTH CARE EDUCATION/TRAINING PROGRAM

## 2024-04-24 PROCEDURE — 700111 HCHG RX REV CODE 636 W/ 250 OVERRIDE (IP): Performed by: HOSPITALIST

## 2024-04-24 PROCEDURE — 83735 ASSAY OF MAGNESIUM: CPT

## 2024-04-24 PROCEDURE — 99232 SBSQ HOSP IP/OBS MODERATE 35: CPT | Performed by: STUDENT IN AN ORGANIZED HEALTH CARE EDUCATION/TRAINING PROGRAM

## 2024-04-24 RX ORDER — ASPIRIN 81 MG/1
81 TABLET, CHEWABLE ORAL DAILY
Status: DISCONTINUED | OUTPATIENT
Start: 2024-04-24 | End: 2024-04-25 | Stop reason: HOSPADM

## 2024-04-24 RX ORDER — GABAPENTIN 300 MG/1
600 CAPSULE ORAL 3 TIMES DAILY
Status: DISCONTINUED | OUTPATIENT
Start: 2024-04-25 | End: 2024-04-25 | Stop reason: HOSPADM

## 2024-04-24 RX ORDER — ENOXAPARIN SODIUM 100 MG/ML
40 INJECTION SUBCUTANEOUS DAILY
Status: DISCONTINUED | OUTPATIENT
Start: 2024-04-24 | End: 2024-04-25 | Stop reason: HOSPADM

## 2024-04-24 RX ORDER — ACETAMINOPHEN 500 MG
1000 TABLET ORAL 3 TIMES DAILY PRN
Status: DISCONTINUED | OUTPATIENT
Start: 2024-04-24 | End: 2024-04-25 | Stop reason: HOSPADM

## 2024-04-24 RX ADMIN — HYDROCODONE BITARTRATE AND ACETAMINOPHEN 1 TABLET: 10; 325 TABLET ORAL at 05:41

## 2024-04-24 RX ADMIN — ATORVASTATIN CALCIUM 80 MG: 80 TABLET, FILM COATED ORAL at 21:08

## 2024-04-24 RX ADMIN — GABAPENTIN 1800 MG: 300 CAPSULE ORAL at 12:10

## 2024-04-24 RX ADMIN — GABAPENTIN 1800 MG: 300 CAPSULE ORAL at 05:43

## 2024-04-24 RX ADMIN — METOPROLOL SUCCINATE 50 MG: 50 TABLET, FILM COATED, EXTENDED RELEASE ORAL at 17:14

## 2024-04-24 RX ADMIN — FUROSEMIDE 40 MG: 10 INJECTION INTRAMUSCULAR; INTRAVENOUS at 05:43

## 2024-04-24 RX ADMIN — FUROSEMIDE 40 MG: 10 INJECTION INTRAMUSCULAR; INTRAVENOUS at 15:51

## 2024-04-24 RX ADMIN — CITALOPRAM HYDROBROMIDE 20 MG: 20 TABLET ORAL at 05:43

## 2024-04-24 RX ADMIN — LISINOPRIL 20 MG: 20 TABLET ORAL at 17:14

## 2024-04-24 RX ADMIN — OMEPRAZOLE 40 MG: 20 CAPSULE, DELAYED RELEASE ORAL at 05:42

## 2024-04-24 RX ADMIN — ASPIRIN 81 MG 81 MG: 81 TABLET ORAL at 15:53

## 2024-04-24 RX ADMIN — ONDANSETRON 4 MG: 4 TABLET, ORALLY DISINTEGRATING ORAL at 05:41

## 2024-04-24 RX ADMIN — LISINOPRIL 20 MG: 20 TABLET ORAL at 06:00

## 2024-04-24 RX ADMIN — ENOXAPARIN SODIUM 40 MG: 100 INJECTION SUBCUTANEOUS at 17:15

## 2024-04-24 ASSESSMENT — ENCOUNTER SYMPTOMS
WEAKNESS: 1
CARDIOVASCULAR NEGATIVE: 1
COUGH: 1
SHORTNESS OF BREATH: 1
PSYCHIATRIC NEGATIVE: 1
EYES NEGATIVE: 1
GASTROINTESTINAL NEGATIVE: 1
MUSCULOSKELETAL NEGATIVE: 1

## 2024-04-24 ASSESSMENT — COGNITIVE AND FUNCTIONAL STATUS - GENERAL
HELP NEEDED FOR BATHING: A LITTLE
MOVING FROM LYING ON BACK TO SITTING ON SIDE OF FLAT BED: A LITTLE
MOBILITY SCORE: 17
CLIMB 3 TO 5 STEPS WITH RAILING: A LOT
STANDING UP FROM CHAIR USING ARMS: A LITTLE
PERSONAL GROOMING: A LITTLE
SUGGESTED CMS G CODE MODIFIER DAILY ACTIVITY: CJ
DAILY ACTIVITIY SCORE: 20
WALKING IN HOSPITAL ROOM: A LITTLE
MOVING TO AND FROM BED TO CHAIR: A LITTLE
DRESSING REGULAR UPPER BODY CLOTHING: A LITTLE
SUGGESTED CMS G CODE MODIFIER DAILY ACTIVITY: CK
TURNING FROM BACK TO SIDE WHILE IN FLAT BAD: A LITTLE
TOILETING: A LITTLE
DRESSING REGULAR LOWER BODY CLOTHING: A LITTLE
HELP NEEDED FOR BATHING: A LITTLE
SUGGESTED CMS G CODE MODIFIER MOBILITY: CK
DAILY ACTIVITIY SCORE: 19
DRESSING REGULAR LOWER BODY CLOTHING: A LITTLE
TOILETING: A LITTLE
DRESSING REGULAR UPPER BODY CLOTHING: A LITTLE

## 2024-04-24 ASSESSMENT — FIBROSIS 4 INDEX
FIB4 SCORE: 3.72
FIB4 SCORE: 3.4
FIB4 SCORE: 3.4

## 2024-04-24 ASSESSMENT — PATIENT HEALTH QUESTIONNAIRE - PHQ9
SUM OF ALL RESPONSES TO PHQ9 QUESTIONS 1 AND 2: 0
2. FEELING DOWN, DEPRESSED, IRRITABLE, OR HOPELESS: NOT AT ALL
1. LITTLE INTEREST OR PLEASURE IN DOING THINGS: NOT AT ALL

## 2024-04-24 ASSESSMENT — ACTIVITIES OF DAILY LIVING (ADL): TOILETING: INDEPENDENT

## 2024-04-24 ASSESSMENT — PAIN DESCRIPTION - PAIN TYPE
TYPE: ACUTE PAIN
TYPE: ACUTE PAIN;CHRONIC PAIN
TYPE: CHRONIC PAIN

## 2024-04-24 ASSESSMENT — LIFESTYLE VARIABLES
EVER HAD A DRINK FIRST THING IN THE MORNING TO STEADY YOUR NERVES TO GET RID OF A HANGOVER: NO
HAVE PEOPLE ANNOYED YOU BY CRITICIZING YOUR DRINKING: NO
TOTAL SCORE: 0
AVERAGE NUMBER OF DAYS PER WEEK YOU HAVE A DRINK CONTAINING ALCOHOL: 0
ON A TYPICAL DAY WHEN YOU DRINK ALCOHOL HOW MANY DRINKS DO YOU HAVE: 0
HAVE YOU EVER FELT YOU SHOULD CUT DOWN ON YOUR DRINKING: NO
HOW MANY TIMES IN THE PAST YEAR HAVE YOU HAD 5 OR MORE DRINKS IN A DAY: 0
EVER FELT BAD OR GUILTY ABOUT YOUR DRINKING: NO
CONSUMPTION TOTAL: NEGATIVE
TOTAL SCORE: 0
ALCOHOL_USE: NO
TOTAL SCORE: 0

## 2024-04-24 NOTE — PROGRESS NOTES
Hospital Medicine Daily Progress Note    Date of Service  4/24/2024    Chief Complaint  Silvia Orellana is a 89 y.o. female admitted 4/23/2024 with dyspnea     Hospital Course  89 y.o. female who presented 4/23/2024 with past medical history of coronary disease with history of CABG, chronic hypoxic respiratory failure on 3 to 4 L of oxygen, hypertension, history of TAVR on 10/2017, breast and bladder cancer who presents to the hospital for shortness of breath for the past few days.  It is associated with orthopnea and a nonproductive cough.  There is her symptom was worse and started having left-sided chest pain.  The chest pain was worse when she takes a deep breath.  She describes the pain as a burning sensation.  She was getting clammy and diaphoretic during this episode.  Patient has also been experiencing nausea and decreased appetite.  She denies any fevers, vomiting, diarrhea.  Patient denies a high salt intake and is compliant with her home medications.     Interval Problem Update  Reporting improvement with dyspnea  Stable vitals  On 5 L nasal cannula (baseline 3 to 4 L nasal cannula)  CBC without anemia or leukocytosis  Chemistry stable  Fluid restriction  Continue diuresis with Lasix  Titrate oxygen as tolerable  PT/OT to evaluate  Labs on AM      I have discussed this patient's plan of care and discharge plan at IDT rounds today with Case Management, Nursing, Nursing leadership, and other members of the IDT team.    Consultants/Specialty  None    Code Status  DNAR/DNI    Disposition  The patient is not medically cleared for discharge to home or a post-acute facility.  Anticipate discharge to: skilled nursing facility    I have placed the appropriate orders for post-discharge needs.    Review of Systems  Review of Systems   Constitutional:  Positive for malaise/fatigue.   HENT: Negative.     Eyes: Negative.    Respiratory:  Positive for cough and shortness of breath.    Cardiovascular: Negative.     Gastrointestinal: Negative.    Genitourinary: Negative.    Musculoskeletal: Negative.    Skin: Negative.    Neurological:  Positive for weakness.   Endo/Heme/Allergies: Negative.    Psychiatric/Behavioral: Negative.          Physical Exam  Temp:  [36.4 °C (97.5 °F)-36.7 °C (98.1 °F)] 36.4 °C (97.6 °F)  Pulse:  [64-74] 69  Resp:  [14-18] 15  BP: (138-217)/(72-88) 164/72  SpO2:  [93 %-97 %] 93 %    Physical Exam  Constitutional:       Appearance: Normal appearance.   HENT:      Head: Normocephalic and atraumatic.      Mouth/Throat:      Mouth: Mucous membranes are moist.   Eyes:      Extraocular Movements: Extraocular movements intact.      Pupils: Pupils are equal, round, and reactive to light.   Cardiovascular:      Rate and Rhythm: Normal rate and regular rhythm.      Pulses: Normal pulses.      Heart sounds: Normal heart sounds.   Pulmonary:      Effort: Pulmonary effort is normal.      Breath sounds: Rales present.   Abdominal:      General: Bowel sounds are normal.      Palpations: Abdomen is soft.   Musculoskeletal:         General: No swelling. Normal range of motion.      Cervical back: Normal range of motion and neck supple.      Right lower leg: Edema present.      Left lower leg: Edema present.   Skin:     General: Skin is warm.      Coloration: Skin is not jaundiced.   Neurological:      General: No focal deficit present.      Mental Status: She is alert and oriented to person, place, and time. Mental status is at baseline.      Cranial Nerves: No cranial nerve deficit.   Psychiatric:         Mood and Affect: Mood normal.         Behavior: Behavior normal.         Thought Content: Thought content normal.         Judgment: Judgment normal.         Fluids    Intake/Output Summary (Last 24 hours) at 4/24/2024 0905  Last data filed at 4/24/2024 0800  Gross per 24 hour   Intake 850 ml   Output 1150 ml   Net -300 ml       Laboratory  Recent Labs     04/23/24  1947 04/24/24  0401   WBC 6.4 7.1   RBC 3.70*  3.87*   HEMOGLOBIN 12.0 12.5   HEMATOCRIT 37.2 39.1   .5* 101.0*   MCH 32.4 32.3   MCHC 32.3 32.0*   RDW 47.5 47.4   PLATELETCT 199 208   MPV 9.7 9.9     Recent Labs     04/23/24 1947 04/24/24  0401   SODIUM 142 142   POTASSIUM 3.9 3.9   CHLORIDE 102 97   CO2 30 33   GLUCOSE 103* 102*   BUN 9 8   CREATININE 0.71 0.77   CALCIUM 8.9 9.4                   Imaging  DX-CHEST-PORTABLE (1 VIEW)   Final Result      Findings are suggestive of pulmonary edema with bilateral pleural effusions.           Assessment/Plan  * Acute on chronic diastolic heart failure (HCC)- (present on admission)  Assessment & Plan  Cardiac echo was completed on 3/25 that found an EF of 53%  Decompensated  Strict ins and outs and daily weights  IV Lasix  Monitor on telemetry    Acute on chronic respiratory failure (HCC)  Assessment & Plan  Patient is currently on 5 L of oxygen and her baseline is 3 to 4 L  Secondary to heart failure exacerbation    Chest pain- (present on admission)  Assessment & Plan  Sounds like the patient is having more pleuritic chest pain  Monitor on telemetry      S/P TAVR (transcatheter aortic valve replacement)- (present on admission)  Assessment & Plan  Patient does have a loud murmur on examination  Last echo found slightly elevated transvalvular gradient    Hx of CABG- (present on admission)  Assessment & Plan  Continue aspirin and statins         VTE prophylaxis: Prophylactic Lovenox    I have performed a physical exam and reviewed and updated ROS and Plan today (4/24/2024). In review of yesterday's note (4/23/2024), there are no changes except as documented above.

## 2024-04-24 NOTE — CARE PLAN
The patient is Stable - Low risk of patient condition declining or worsening    Shift Goals  Clinical Goals: Diuresis, up for meals q2h turns  Patient Goals: Rest  Family Goals: Updates on POC    Progress made toward(s) clinical / shift goals:    Problem: Knowledge Deficit - Standard  Goal: Patient and family/care givers will demonstrate understanding of plan of care, disease process/condition, diagnostic tests and medications  Outcome: Progressing  POC discussed with patient and her daughter, Guadalupe. Plan for diuresis, therapies, q2h turns, and up to chair for meals.     Problem: Respiratory  Goal: Patient will achieve/maintain optimum respiratory ventilation and gas exchange  Outcome: Progressing  Patient educated on use of incentive spirometer. Current volume 750 mL.  Patient encouraged to use IS at least hourly.     Problem: Urinary Elimination  Goal: Establish and maintain regular urinary output  Outcome: Progressing  Patient is incontinent of urine, purewick in use.

## 2024-04-24 NOTE — PROGRESS NOTES
Patient arrived to [facility] via gurney on Zoll monitor by WESLEY Lemus. Transferred to hospital bed, connected to telemonitor, vitals obtained, bed scale weight obtained. Oriented to room, call device. Daughter at bedside. Patient is AxOx4 on 5L NC. Patient denies pain at this time. Hourly rounding in place.

## 2024-04-24 NOTE — CARE PLAN
The patient is Stable - Low risk of patient condition declining or worsening         Progress made toward(s) clinical / shift goals:      Problem: Communication  Goal: The ability to communicate needs accurately and effectively will improve  Outcome: Progressing     Problem: Urinary Elimination  Goal: Establish and maintain regular urinary output  Outcome: Progressing     Problem: Infection - Standard  Goal: Patient will remain free from infection  Outcome: Progressing       Patient is not progressing towards the following goals:

## 2024-04-24 NOTE — ASSESSMENT & PLAN NOTE
Cardiac echo was completed on 3/25 that found an EF of 53%  Decompensated  Strict ins and outs and daily weights  IV Lasix  Monitor on telemetry

## 2024-04-24 NOTE — ED PROVIDER NOTES
"  ER Provider Note    Scribed for Mirlande Beltrán M.D. by Edy Harrington. 4/23/2024   7:26 PM    Primary Care Provider: Joshua Rendon M.D.    CHIEF COMPLAINT  Chief Complaint   Patient presents with    Shortness of Breath    Constipation            EXTERNAL RECORDS REVIEWED  Inpatient Notes Admitted 4/2 for chest pain, concern for STEMI, ultimately canceled. On 3 L NC at baseline    HPI/ROS    LIMITATION TO HISTORY   Select: : None    Silvia Orellana is a 89 y.o. female who presents to the ED for evaluation of shortness of breath onset 3 days ago. She notes that at that time she went into a \"rhythm\" with her breathing. She felt like she was suffocating and could not take another breath. She did not eat anything last night or the night before due to nausea. She does have some pain beneath her rib cage, described as \"jagged pains\". She also has centralized abdominal pain which has been intermittent for weeks. She was seen here and admitted 3 weeks ago for similar symptoms. She usually wears 3 L supplemental oxygen but is currently requiring 5 L. Sometimes she feels like she is having urinary retention.     PAST MEDICAL HISTORY  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    Cataract     patria IOL    CHF (congestive heart failure) (HCC)     Chronic pain     Dental disorder     dentures    Diverticulosis     Fall     GERD (gastroesophageal reflux disease)     Heart burn     Hx of Clostridium difficile infection 2015    Hyperlipidemia     Hypertension     Indigestion     Myocardial infarct (HCC) 2002    cardiac stent    Pneumonia 2014    Stroke (HCC) 2001; 2012    no deficits, general weakness    Urinary bladder disorder     Urinary incontinence  "       SURGICAL HISTORY  Past Surgical History:   Procedure Laterality Date    TRANSCATHETER AORTIC VALVE REPLACEMENT N/A 9/25/2017    Procedure: TRANSCATHETER AORTIC VALVE REPLACEMENT;  Surgeon: Sherif Elder M.D.;  Location: SURGERY [facility];  Service: Cardiac    TATIANNA  9/25/2017    Procedure: TTE;  Surgeon: Sherif Elder M.D.;  Location: SURGERY [facility];  Service: Cardiac    ORBITAL FRACTURE ORIF Left 5/23/2016    Procedure: ORBITAL FRACTURE ORIF FOR: LATERAL CANTHOPEXY;  Surgeon: Fabrice Daniel Jr., M.D.;  Location: SURGERY [facility];  Service:     ORIF, FRACTURE, FEMUR Right 8/10/2015    Procedure: FEMUR ORIF;  Surgeon: Umer Guevara M.D.;  Location: SURGERY [facility];  Service:     ORBITAL FRACTURE ORIF Left 7/6/2015    Procedure: ORBITAL FRACTURE ORIF;  Surgeon: Fabrice Daniel Jr., M.D.;  Location: SURGERY [facility];  Service:     PB REVISE TOTAL HIP REPLACEMENT  3/24/2015    R    OTHER CARDIAC SURGERY  2012    cardiac cath with stent placement    PB REVISE ACETABULAR PART OF TOTAL HIP  2010    L    OTHER CARDIAC SURGERY  2002    CABG    GYN SURGERY  1961    hysterectomy    ANGIOGRAM      BLADDER BIOPSY      CHOLECYSTECTOMY      HIP ARTHROPLASTY TOTAL Left     LUMPECTOMY      L    MULTIPLE CORONARY ARTERY BYPASS      ZZZ CARDIAC CATH         FAMILY HISTORY  Family History   Problem Relation Age of Onset    Heart Attack Neg Hx        SOCIAL HISTORY   reports that she has never smoked. She has never used smokeless tobacco. She reports current alcohol use. She reports that she does not use drugs.    CURRENT MEDICATIONS  Current Discharge Medication List        CONTINUE these medications which have NOT CHANGED    Details   aspirin (ASA) 325 MG Tab Take 325 mg by mouth every evening.      citalopram (CELEXA) 20 MG Tab Take 20 mg by mouth every day.      omeprazole (PRILOSEC) 40 MG delayed-release capsule Take 40 mg by mouth every day.      potassium citrate SR (UROCIT-K  SR) 10 MEQ (1080 MG) Tab CR Take 10 mEq by mouth every day.      Multiple Vitamins-Minerals (OCUVITE-LUTEIN) Tab Take 1 Tablet by mouth every day.      Ascorbic Acid (VITAMIN C) 1000 MG Tab Take 1,000 mg by mouth every day.      atorvastatin (LIPITOR) 80 MG tablet Take 1 Tablet by mouth every evening.  Qty: 90 Tablet, Refills: 3    Associated Diagnoses: Dyslipidemia      furosemide (LASIX) 20 MG Tab Take 1 Tablet by mouth every day.  Qty: 100 Tablet, Refills: 3    Associated Diagnoses: Stented coronary artery; S/P TAVR (transcatheter aortic valve replacement); Hx of CABG; Primary hypertension; Chronic diastolic CHF (congestive heart failure) (HCC); Coronary artery disease due to calcified coronary lesion; Dyslipidemia; Essential hypertension; Stage 3a chronic kidney disease      lisinopril (PRINIVIL) 20 MG Tab Take 1 Tablet by mouth 2 times a day.  Qty: 200 Tablet, Refills: 3    Associated Diagnoses: Stented coronary artery; S/P TAVR (transcatheter aortic valve replacement); Hx of CABG; Primary hypertension; Chronic diastolic CHF (congestive heart failure) (HCC); Coronary artery disease due to calcified coronary lesion; Dyslipidemia; Essential hypertension; Stage 3a chronic kidney disease      metoprolol SR (TOPROL XL) 50 MG TABLET SR 24 HR Take 1 Tablet by mouth every day.  Qty: 100 Tablet, Refills: 3    Associated Diagnoses: Stented coronary artery; S/P TAVR (transcatheter aortic valve replacement); Hx of CABG; Primary hypertension; Chronic diastolic CHF (congestive heart failure) (HCC); Coronary artery disease due to calcified coronary lesion; Dyslipidemia; Essential hypertension; Stage 3a chronic kidney disease      Cyanocobalamin (VITAMIN B-12) 5000 MCG SL Tab Place 1 Tab under tongue every morning.      multivitamin (THERAGRAN) Tab Take 1 Tab by mouth every morning.      calcium citrate (CALCITRATE) 950 MG Tab Take 600 mg by mouth 2 times a day.      gabapentin (NEURONTIN) 600 MG tablet Take 1,800 mg by mouth  3 times a day. 600 mg tablet x 3 tablets = 1800 mg  Refills: 5      ondansetron (ZOFRAN ODT) 4 MG TABLET DISPERSIBLE Take 1 Tablet by mouth every 8 hours as needed for Nausea/Vomiting.  Qty: 10 Tablet, Refills: 0    Associated Diagnoses: Nausea      HYDROcodone/acetaminophen (NORCO)  MG Tab Take 1-2 Tablets by mouth every four hours as needed for Moderate Pain.             ALLERGIES  No Known Allergies     PHYSICAL EXAM  BP (!) 197/81   Pulse 64   Temp 36.6 °C (97.8 °F) (Temporal)   Resp 14   Wt 80.7 kg (178 lb)   SpO2 96%   BMI 31.53 kg/m²    General: Alert, elderly female lying on gurney  Head: Normocephalic atraumatic  Eyes: Extraocular motion intact  Neck: Supple, no rigidity  Cardiovascular: Regular rate and rhythm via peripheral pulses   Respiratory: equal chest rise and fall, no increased work of breathing, crackles in bases  Abdomen: Soft nontender no guarding  Musculoskeletal: Warm and well perfused, trace peripheral edema  Neuro: Alert, no focal deficits, demonstrating decision making capacity  Integumentary: No wounds or rashes     DIAGNOSTIC STUDIES    Labs:   Labs Reviewed   CBC WITH DIFFERENTIAL - Abnormal; Notable for the following components:       Result Value    RBC 3.70 (*)     .5 (*)     All other components within normal limits   COMP METABOLIC PANEL - Abnormal; Notable for the following components:    Glucose 103 (*)     Alkaline Phosphatase 117 (*)     All other components within normal limits   PROBRAIN NATRIURETIC PEPTIDE, NT - Abnormal; Notable for the following components:    NT-proBNP 95336 (*)     All other components within normal limits   TROPONIN - Abnormal; Notable for the following components:    Troponin T 29 (*)     All other components within normal limits   TROPONIN - Abnormal; Notable for the following components:    Troponin T 29 (*)     All other components within normal limits   LACTIC ACID   ESTIMATED GFR   PROCALCITONIN   COV-2, FLU A/B, AND RSV BY PCR  (CEPHEID)   TROPONIN   CBC WITH DIFFERENTIAL   COMP METABOLIC PANEL   POC COV-2, FLU A/B, RSV BY PCR     All labs reviewed by me.     Radiology:     Radiologist interpretation:   DX-CHEST-PORTABLE (1 VIEW)   Final Result      Findings are suggestive of pulmonary edema with bilateral pleural effusions.         INITIAL ASSESSMENT COURSE AND PLAN  Care Narrative     7:26 PM - Patient was evaluated at bedside. She presents for worsening shortness of breath that began 3 days ago. Discussed POLST paperwork, patient is DNR but would like maximum medical therapy performed if needed.  Patient verbalizes understanding and support with my plan of care.  Differential diagnoses include but not limited to: Heart failure exacerbation, arrhythmia, ACS, pneumonia        ED Observation Status? No; Patient does not meet criteria for ED Observation.     9:26 PM I discussed the patient's case and the above findings with Dr. Vitale (Hospitalist) who will assess the patient for hospitalization.    Labs notable for elevated proBNP,  Chronically elevated troponin at patient's baseline, delta pending, no electrolyte abnormality no other significant abnormalities appreciated. EKG nonischemic,  chest x-ray shows bilateral consolidations consistent with pulmonary edema.    Patient given IV Lasix, she has made greater than 1 L of urinary output in the ED.  Patient care will be overall comfort oriented, discussed IV medications such as Lasix she is agreeable she would not anything invasive performed          DISPOSITION AND DISCUSSIONS    I have discussed management of the patient with the following physicians and VISHNU's:  Dr. Vitale (Hospitalist)     Discussion of management with other [facility] or appropriate source(s): None     Barriers to care at this time, including but not limited to: None      DISPOSITION:  Patient will be hospitalized by Dr. Vitale in guarded condition.    FINAL DIAGNOSIS  1. Pulmonary edema cardiac cause (HCC)    2. Acute and  chronic respiratory failure with hypoxia (HCC)         Edy PEREIRA (Scribe), am scribing for, and in the presence of, Donald Beltrán M.D..    Electronically signed by: Edy Harrington (Lindaibcharity), 4/23/2024    IDonald M.D. personally performed the services described in this documentation, as scribed by Edy Harrington in my presence, and it is both accurate and complete.      The note accurately reflects work and decisions made by me.  Donald Beltrán M.D.  4/24/2024  3:19 AM

## 2024-04-24 NOTE — PROGRESS NOTES
Received report from night shift RN. Assumed patient care at 0715. Assessment completed. A&OX4. Pain 1/10.  Patient ambulates w/ x1 assist and a FWW. Patient updated on plan of care. Patient declines to get up to chair for breakfast, but is agreeable to get up for lunch.    Bed in lowest position, bed locked, bed alarm on for safety, treaded socks in place, call light within reach. Hourly rounding in place.

## 2024-04-24 NOTE — ASSESSMENT & PLAN NOTE
Patient does have a loud murmur on examination  Last echo found slightly elevated transvalvular gradient

## 2024-04-24 NOTE — ED NOTES
Bedside report received from off going RN/tech: Yasmin OLSON, assumed care of patient.  POC discussed with patient. Call light within reach, all needs addressed at this time.       Fall risk interventions in place: Move the patient closer to the nurse's station, Patient's personal possessions are with in their safe reach, Place socks on patient, Place fall risk sign on patient's door, and Keep floor surfaces clean and dry (all applicable per [facility] Fall risk assessment)   Continuous monitoring: Cardiac Leads, Pulse Ox, or Blood Pressure  IVF/IV medications: Not Applicable   Oxygen: How many liters 5L  Bedside sitter: Not Applicable   Isolation: Not Applicable

## 2024-04-24 NOTE — DIETARY
"Nutrition services: Day 1 of admit.  Silvia Orellana is a 89 y.o. female with admitting DX of  Decompensated heart failure   Consult received for malnutrition screening tool score of 5 for 34 or more lb wt loss x 6 months and poor PO.    Visited pt at bedside. Pt reported said that starting on Friday, her appetite has been poor. Pt said that she typically eats breakfast and dinner and sometimes lunch. However, on Saturday and Sunday, she didn't eat dinner and Monday and Tuesday, she didn't eat anything. Pt said she had poor PO d/t feeling nauseated when smelling food. Pt said that once she received medication for nausea, her appetite has been better. Pt reported eating well for breakfast this am.Pt stated that 1.5 years ago she put on weight and was 213lbs. Pt reported that she watched what desserts she would eat and lost weight. Pt expressed her food preferences. Pt also stated that food can be hard to chew d/t dentition. RD offered pt to receive soft and bite sized foods. Pt readily agreed.    Per nutrition focused physical exam, pt w/ mild protrusion in acromion process and mild temporal scooping.    Assessment:  Height: 160 cm (5' 3\")   Weight: 76 kg (167 lb 8.8 oz) via bed scale on 4/24  Body mass index is 29.68 kg/m²., BMI classification: Overweight  Diet/Intake: 2g sodium; no PO documented yet.    Evaluation:   Per H&P: Pt reported chest pain and experiencing nausea and decreased appetite.  Labs 4/24: Reviewed, no nutrition intervention indicated  Meds: Lipitor, lasix, omeprazole, zofran PRN,   Skin: No documented wounds, RLE dependent 2+ edema and LLE 1+ dependent edema  +BM: 4/22  Per chart review, pt has lost 11.6% of wt x 1 year (not significant), and 6% x 3 weeks (severe)  Wt Readings from Last Encounters:   04/24/24 76 kg (167 lb 8.8 oz)   04/02/24 80.5 kg (177 lb 7.5 oz)   02/29/24 73.5 kg (162 lb)   01/03/24 74.4 kg (164 lb)   04/06/23 86 kg (189 lb 11.2 oz)     Malnutrition Risk: Though pt would " meet moderate malnutrition in the context of chronic illness aeb mild muscle wasting and severe wt loss, mild muscle wasting could be attributed to age related sarcopenia. Per RD judgement, pt is not malnourished at this time.    Recommendations/Plan:  Change diet texture to soft and bite sized (lvl 6) per pt preferences.  Add pt food preferences to meals   Encourage intake of ~50% of meals  Document intake of all meals as % taken in ADL's to provide interdisciplinary communication across all shifts.   Monitor weight.  Nutrition rep will continue to see patient for ongoing meal and snack preferences.     RD following.

## 2024-04-24 NOTE — PROGRESS NOTES
4 Eyes Skin Assessment Completed by WESLEY Stearns and WESLEY Lemus.    Head WDL  Ears WDL  Nose WDL  Mouth WDL  Neck WDL  Breast/Chest WDL  Shoulder Blades WDL  Spine WDL scars  (R) Arm/Elbow/Hand Bruising and Scab  (L) Arm/Elbow/Hand Bruising and Scab  Abdomen Redness, Blanching, and Scar damp under pannus  Groin Redness and Blanching  Scrotum/Coccyx/Buttocks Redness and Blanching  (R) Leg Scar, Scab, Edema, and Incision +2 edema leg and foot  (L) Leg Scab, Swelling, Edema, and Incision m+1 leg, +2 foot edema  (R) Heel/Foot/Toe Redness and Blanching  (L) Heel/Foot/Toe Redness and Blanching          Devices In Places Tele Box, Blood Pressure Cuff, Pulse Ox, and Nasal Cannula      Interventions In Place TAP System, Pillows, Low Air Loss Mattress, and ZFlo Pillow    Possible Skin Injury No    Pictures Uploaded Into Epic N/A  Wound Consult Placed N/A  RN Wound Prevention Protocol Ordered No

## 2024-04-24 NOTE — H&P
Hospital Medicine History & Physical Note    Date of Service  4/23/2024    Primary Care Physician  Joshua Rendon M.D.    Consultants      Specialist Names:     Code Status  DNAR/DNI    Chief Complaint  Chief Complaint   Patient presents with    Shortness of Breath    Constipation              History of Presenting Illness  Silvia Orellana is a 89 y.o. female who presented 4/23/2024 with past medical history of coronary disease with history of CABG, chronic hypoxic respiratory failure on 3 to 4 L of oxygen, hypertension, history of TAVR on 10/2017, breast and bladder cancer who presents to the hospital for shortness of breath for the past few days.  It is associated with orthopnea and a nonproductive cough.  There is her symptom was worse and started having left-sided chest pain.  The chest pain was worse when she takes a deep breath.  She describes the pain as a burning sensation.  She was getting clammy and diaphoretic during this episode.  Patient has also been experiencing nausea and decreased appetite.  She denies any fevers, vomiting, diarrhea.  Patient denies a high salt intake and is compliant with her home medications.    Chest x-ray interpreted by me found bilateral pulmonary edema bilateral pleural effusions      The patient has a POLST stating that she is DNR/DNI with comfort focused treatment.  I did discuss with the patient that if she was having an MI episode she would not like an coronary angiogram.  The patient is okay with  IV Lasix.    I discussed the plan of care with patient.    Review of Systems  Review of Systems   Constitutional:  Negative for chills, diaphoresis, fever and malaise/fatigue.   HENT:  Negative for congestion, ear discharge, ear pain, hearing loss, nosebleeds, sinus pain, sore throat and tinnitus.    Eyes:  Negative for blurred vision, double vision, photophobia and pain.   Respiratory:  Positive for shortness of breath. Negative for cough, hemoptysis, sputum  production, wheezing and stridor.    Cardiovascular:  Negative for chest pain, palpitations, orthopnea, claudication, leg swelling and PND.   Gastrointestinal:  Negative for abdominal pain, blood in stool, constipation, diarrhea, heartburn, melena, nausea and vomiting.   Genitourinary:  Negative for dysuria, flank pain, frequency, hematuria and urgency.   Musculoskeletal:  Negative for back pain, falls, joint pain, myalgias and neck pain.   Skin:  Negative for itching and rash.   Neurological:  Negative for dizziness, tingling, tremors, weakness and headaches.   Endo/Heme/Allergies:  Negative for environmental allergies and polydipsia. Does not bruise/bleed easily.   Psychiatric/Behavioral:  Negative for depression, hallucinations, substance abuse and suicidal ideas.        Past Medical History   has a past medical history of Arthritis, Bowel habit changes, Bradycardia, Breast cancer ([facility]), Breath shortness, CAD (coronary artery disease), Cancer ([facility]) (2003), Cancer ([facility]) (2007), Cancer ([facility]) (2016), Cataract, CHF (congestive heart failure) ([facility]), Chronic pain, Dental disorder, Diverticulosis, Fall, GERD (gastroesophageal reflux disease), Heart burn, Clostridium difficile infection (2015), Hyperlipidemia, Hypertension, Indigestion, Myocardial infarct ([facility]) (2002), Pneumonia (2014), Stroke ([facility]) (2001; 2012), Urinary bladder disorder, and Urinary incontinence.    Surgical History   has a past surgical history that includes cholecystectomy; lumpectomy; bladder biopsy; pr revise total hip replacement (3/24/2015); pr revise acetabular part of total hip (2010); orbital fracture orif (Left, 7/6/2015); orif, fracture, femur (Right, 8/10/2015); hip arthroplasty total (Left); angiogram; other cardiac surgery (2002); other cardiac surgery (2012); gyn surgery (1961); orbital fracture orif (Left, 5/23/2016); multiple coronary artery bypass; zzz cardiac cath; transcatheter aortic valve replacement (N/A, 9/25/2017); and jessica  (9/25/2017).     Family History  Family history reviewed with patient. There is no family history that is pertinent to the chief complaint.     Social History   reports that she has never smoked. She has never used smokeless tobacco. She reports current alcohol use. She reports that she does not use drugs.    Allergies  No Known Allergies    Medications  Prior to Admission Medications   Prescriptions Last Dose Informant Patient Reported? Taking?   Ascorbic Acid (VITAMIN C) 1000 MG Tab  Patient, Family Member Yes No   Sig: Take 1,000 mg by mouth every day.   Cyanocobalamin (VITAMIN B-12) 5000 MCG SL Tab  Family Member, Patient Yes No   Sig: Place 1 Tab under tongue every morning.   HYDROcodone/acetaminophen (NORCO)  MG Tab  Patient, Family Member Yes No   Sig: Take 1-2 Tablets by mouth every four hours as needed for Moderate Pain.   Multiple Vitamins-Minerals (LUTEIN-ZEAXANTHIN PO)  Patient, Family Member Yes No   Sig: Take 1 Tablet by mouth every day.   Multiple Vitamins-Minerals (OCUVITE-LUTEIN) Tab  Patient, Family Member Yes No   Sig: Take 1 Tablet by mouth every day.   aspirin (ASA) 325 MG Tab  Patient, Family Member Yes No   Sig: Take 325 mg by mouth every evening.   atorvastatin (LIPITOR) 80 MG tablet  Patient, Family Member No No   Sig: Take 1 Tablet by mouth every evening.   calcium citrate (CALCITRATE) 950 MG Tab  Family Member, Patient Yes No   Sig: Take 600 mg by mouth 2 times a day.   citalopram (CELEXA) 20 MG Tab  Patient, Family Member Yes No   Sig: Take 20 mg by mouth every day.   furosemide (LASIX) 20 MG Tab  Patient, Family Member No No   Sig: Take 1 Tablet by mouth every day.   gabapentin (NEURONTIN) 600 MG tablet  Family Member, Patient Yes No   Sig: Take 1,800 mg by mouth 3 times a day. 600 mg tablet x 3 tablets = 1800   lisinopril (PRINIVIL) 20 MG Tab  Patient, Family Member No No   Sig: Take 1 Tablet by mouth 2 times a day.   metoprolol SR (TOPROL XL) 50 MG TABLET SR 24 HR  Patient,  Family Member No No   Sig: Take 1 Tablet by mouth every day.   multivitamin (THERAGRAN) Tab  Family Member, Patient Yes No   Sig: Take 1 Tab by mouth every morning.   omeprazole (PRILOSEC) 40 MG delayed-release capsule  Patient, Family Member Yes No   Sig: Take 40 mg by mouth every day.   ondansetron (ZOFRAN ODT) 4 MG TABLET DISPERSIBLE   No No   Sig: Take 1 Tablet by mouth every 8 hours as needed for Nausea/Vomiting.   potassium chloride (MICRO-K) 10 MEQ capsule  Patient, Family Member No No   Sig: TAKE 2 CAPSULES BY MOUTH EVERY DAY   potassium citrate SR (UROCIT-K SR) 10 MEQ (1080 MG) Tab CR  Patient, Family Member Yes No   Sig: Take 10 mEq by mouth every day.      Facility-Administered Medications: None       Physical Exam  Temp:  [36.6 °C (97.8 °F)] 36.6 °C (97.8 °F)  Pulse:  [64] 64  Resp:  [14] 14  BP: (197)/(81) 197/81  SpO2:  [96 %] 96 %  Blood Pressure : (!) 197/81   Temperature: 36.6 °C (97.8 °F)   Pulse: 64   Respiration: 14   Pulse Oximetry: 96 %       Physical Exam  Vitals and nursing note reviewed.   Constitutional:       General: She is not in acute distress.     Appearance: Normal appearance. She is not ill-appearing, toxic-appearing or diaphoretic.   HENT:      Head: Normocephalic and atraumatic.      Nose: No congestion or rhinorrhea.      Mouth/Throat:      Pharynx: No oropharyngeal exudate or posterior oropharyngeal erythema.   Eyes:      General: No scleral icterus.  Neck:      Vascular: JVD present. No carotid bruit.   Cardiovascular:      Rate and Rhythm: Normal rate and regular rhythm.      Pulses: Normal pulses.      Heart sounds: Murmur heard.      No friction rub. No gallop.   Pulmonary:      Effort: Pulmonary effort is normal. No respiratory distress.      Breath sounds: No stridor. Rales present. No wheezing or rhonchi.   Abdominal:      General: Abdomen is flat. There is no distension.      Palpations: There is no mass.      Tenderness: There is no abdominal tenderness. There is no left  CVA tenderness, guarding or rebound.      Hernia: No hernia is present.   Musculoskeletal:         General: No swelling. Normal range of motion.      Cervical back: No rigidity. No muscular tenderness.      Right lower leg: Edema present.      Left lower leg: Edema present.   Lymphadenopathy:      Cervical: No cervical adenopathy.   Skin:     General: Skin is warm and dry.      Capillary Refill: Capillary refill takes more than 3 seconds.      Coloration: Skin is not jaundiced or pale.      Findings: No bruising or erythema.   Neurological:      Mental Status: She is alert.         Laboratory:  Recent Labs     04/23/24 1947   WBC 6.4   RBC 3.70*   HEMOGLOBIN 12.0   HEMATOCRIT 37.2   .5*   MCH 32.4   MCHC 32.3   RDW 47.5   PLATELETCT 199   MPV 9.7     Recent Labs     04/23/24 1947   SODIUM 142   POTASSIUM 3.9   CHLORIDE 102   CO2 30   GLUCOSE 103*   BUN 9   CREATININE 0.71   CALCIUM 8.9     Recent Labs     04/23/24 1947   ALTSGPT 6   ASTSGOT 18   ALKPHOSPHAT 117*   TBILIRUBIN 0.6   GLUCOSE 103*         Recent Labs     04/23/24 1947   NTPROBNP [id number]*         Recent Labs     04/23/24 1947   TROPONINT 29*       Imaging:  DX-CHEST-PORTABLE (1 VIEW)   Final Result      Findings are suggestive of pulmonary edema with bilateral pleural effusions.          X-Ray:  I have personally reviewed the images and compared with prior images.  EKG:  I have personally reviewed the images and compared with prior images.    Assessment/Plan:  Justification for Admission Status  I anticipate this patient will require at least two midnights for appropriate medical management, necessitating inpatient admission because decompensated heart failure    Patient will need a Telemetry bed on MEDICAL service .  The need is secondary to decompensated heart failure.    * Acute on chronic diastolic heart failure (HCC)- (present on admission)  Assessment & Plan  Cardiac echo was completed on 3/25 that found an EF of  53%  Decompensated  Strict ins and outs and daily weights  IV Lasix  Monitor on telemetry    Acute on chronic respiratory failure (HCC)  Assessment & Plan  Patient is currently on 5 L of oxygen and her baseline is 3 to 4 L  Secondary to heart failure exacerbation    Chest pain- (present on admission)  Assessment & Plan  Sounds like the patient is having more pleuritic chest pain  Monitor on telemetry      S/P TAVR (transcatheter aortic valve replacement)- (present on admission)  Assessment & Plan  Patient does have a loud murmur on examination  Last echo found slightly elevated transvalvular gradient    Hx of CABG- (present on admission)  Assessment & Plan  Continue aspirin and statins        VTE prophylaxis: SCDs/TEDs        I discussed advance care planning for at least 20 minutes with the patient, including diagnosis, prognosis, plan of care, risks and benefits of any therapies that could be offered, as well as alternatives including palliation and hospice, as appropriate. The patient has opted DNR/comfort focus treatment. Time spent is exclusive of evaluation and management or other separately billable procedures.

## 2024-04-24 NOTE — ASSESSMENT & PLAN NOTE
Patient is currently on 5 L of oxygen and her baseline is 3 to 4 L  Secondary to heart failure exacerbation

## 2024-04-24 NOTE — THERAPY
"Occupational Therapy   Initial Evaluation     Patient Name: Silvia Orellana  Age:  89 y.o., Sex:  female  Medical Record #: [id number]  Today's Date: 4/24/2024     Precautions  Precautions: Fall Risk  Comments:  (orthostatic)    Assessment    Patient is 89 y.o. female admitted for SOB, found to have acute on chronic respiratory failure and heart failure. Other pertinent medical history includes CABG, TAVR, HLD, breast cancer w/ L radiation therapy, HTN,  CAD, and TIA. Pt seen for OT evaluation and treatment. Pt required SBA-min A to perform functional mobility, bed mobility, toilet transfer, and standing handwashing. While standing at the sink, pt reported feeling \"weird\" and was assisted to chair where vitals were checked: HR WDL, O2 WDL, BP seated 138/47, and BP standing 129/41. RN present for end of eval and aware. Pt lives with her daughter and son in law who assist as needed at baseline. Pt would benefit from post acute placement, however family really prefers home with home health. Pt and daughter education regarding the role of OT, pathology of bedrest, energy conservation principles, and DME recommendations for the bathroom. Pt current functional performance limited by impaired activity tolerance, impaired balance, generalized weakness, and UE hand deformities. Pt will benefit from skilled OT while admitted to acute care.     Plan    Occupational Therapy Initial Treatment Plan   Treatment Interventions: Self Care / Activities of Daily Living, Adaptive Equipment, Neuro Re-Education / Balance, Therapeutic Exercises, Therapeutic Activity  Treatment Frequency: 3 Times per Week  Duration: Until Therapy Goals Met    DC Equipment Recommendations: Unable to determine at this time  Discharge Recommendations:  (Pt would benefit from placement, however family strongly prefers home with .)      Objective     04/24/24 0439   Initial Contact Note    Initial Contact Note Order Received and Verified, Occupational Therapy " Evaluation in Progress with Full Report to Follow.   Prior Living Situation   Prior Services   (assist for IADLs from family)   Housing / Facility 2 Story House  (bedrooms/bathrooms located on top floor where pt spends most of her time; daughter reported if needed they could look into getting a hospital bed for downstairs but there is no shower)   Steps Into Home   (no steps, but uneven surfaces per daughter report; driveway under construction)   Steps In Home   (FOS)   Bathroom Set up Bathtub / Shower Combination;Shower Chair;Grab Bars  (had tub transfer bench, pt did not like it)   Equipment Owned Front-Wheel Walker;Wheelchair;Single Point Cane;Tub / Shower Seat;Grab Bar(s) By Toilet;Grab Bar(s) In Tub / Shower;Raised Toilet Seat With Arms  (raised toilet in storage)   Lives with - Patient's Self Care Capacity Adult Children   Comments Pt lives with her daughter and son in law. Pt's daughter present for last half of eval. Pt's daughter uses a cane and pt's son has stage IV cancer.   Prior Level of ADL Function   Self Feeding Independent   Grooming / Hygiene Independent   Bathing Independent  (daughter stays nearby)   Dressing Independent   Toileting Independent   Prior Level of IADL Function   Medication Management Requires Assist   Laundry Requires Assist   Kitchen Mobility Requires Assist   Finances Requires Assist   Home Management Dependent   Shopping Dependent   Prior Level Of Mobility Independent Without Device in Home  (uses wheelchair for community mobility)   Driving / Transportation Relatives / Others Provide Transportation   Occupation (Pre-Hospital Vocational) Retired Due To Age   History of Falls   History of Falls   (reported no recent falls)   Precautions   Precautions Fall Risk   Comments   (orthostatic)   Vitals   Pulse 70   Pulse Oximetry 92 %   O2 (LPM) 5  (6 used for mobility due to O2 tank not having the option for 5)   O2 Delivery Device Nasal Cannula   Vitals Comments Pt reported feeling  "\"weird\" like \"someone had punched her in the stomach\" while standing at sink in bathroom. BP checked seated 138/47 and standing 102/40. RN aware. Recovered to 129/41 in supine. HR and O2 stable.   Pain   Pain Scales 0 to 10 Scale    Pain 0 - 10 Group   Therapist Pain Assessment During Activity;Nurse Notified  (Pain reported feeling like \"someone punched her in the stomach\" while standing at the sink; RN present for end of eval and aware)   Cognition    Cognition / Consciousness WDL   Comments   (very pleasant, cooperative, receptive to education)   Passive ROM Upper Body   Comments noted arthritic appearing hands with ulnar deviation and beginning contractures, other ROM appeared WFL but not formally assessed   Active ROM Upper Body   Comments noted arthritic appearing hands with ulnar deviation and beginning contractures, other ROM appeared WFL but not formally assessed   Strength Upper Body   Comments grossly 3+/5   Sensation Upper Body   Upper Extremity Sensation  Not Tested   Upper Body Muscle Tone   Upper Body Muscle Tone  WDL   Coordination Upper Body   Coordination Not Tested   Balance Assessment   Sitting Balance (Static) Fair   Sitting Balance (Dynamic) Fair -   Standing Balance (Static) Fair -   Standing Balance (Dynamic) Poor +   Weight Shift Sitting Fair   Weight Shift Standing Fair   Comments w/ FWW   Bed Mobility    Supine to Sit   (up to chair prior)   Sit to Supine Minimal Assist   Scooting Standby Assist   Rolling Standby Assist   Comments HOB flat, no use of rails   ADL Assessment   Grooming Minimal Assist;Standing  (washed hands at sink, felt like she'd been \"punched in the stomach\" partway through the process; assisted to chair)   Toileting   (toilet transfer only)   Functional Mobility   Sit to Stand Standby Assist   Bed, Chair, Wheelchair Transfer Contact Guard Assist   Toilet Transfers Minimal Assist  (stated that the hospital toilet is lower than the one at home)   Transfer Method Stand Step " "  Mobility chair>bathroom>chair>stand>chair>bed   Comments w/ FWW   Visual Perception   Visual Perception  Not Tested   Activity Tolerance   Sitting in Chair up to chair prior   Sitting Edge of Bed <5 min   Standing >5 min   Comments limited by \"weird feeling\", orthostatic BP, and weakness   Patient / Family Goals   Patient / Family Goal #1 to go home   Short Term Goals   Short Term Goal # 1 Pt will perform LB dressing w/ supv and AE PRN   Short Term Goal # 2 Pt will perform ADL transfer w/ supv   Short Term Goal # 3 Pt will perform standing g/h w/ supv   Education Group   Education Provided Role of Occupational Therapist;Activities of Daily Living;Energy Conservation;Adaptive Equipment;Pathology of bedrest  (discussed HH vs post acute placement, discussed raised toilet and tub transfer bench)   Role of Occupational Therapist Patient Response Patient;Family;Acceptance;Explanation;Verbal Demonstration   Energy Conservation Patient Response Patient;Family;Acceptance;Explanation;Verbal Demonstration   ADL Patient Response Patient;Family;Acceptance;Explanation;Demonstration;Action Demonstration;Verbal Demonstration   Adaptive Equipment Patient Response Family;Patient;Acceptance;Demonstration;Verbal Demonstration;Action Demonstration   Pathology of Bedrest Patient Response Patient;Family;Acceptance;Explanation;Verbal Demonstration   Occupational Therapy Initial Treatment Plan    Treatment Interventions Self Care / Activities of Daily Living;Adaptive Equipment;Neuro Re-Education / Balance;Therapeutic Exercises;Therapeutic Activity   Treatment Frequency 3 Times per Week   Duration Until Therapy Goals Met   Problem List   Problem List Decreased Active Daily Living Skills;Decreased Homemaking Skills;Decreased Upper Extremity Strength Right;Decreased Upper Extremity Strength Left;Decreased Upper Extremity AROM Right;Decreased Upper Extremity AROM Left;Decreased Upper Extremity PROM Right;Decreased Upper Extremity PROM " Left;Decreased Functional Mobility;Decreased Activity Tolerance;Impaired Postural Control / Balance

## 2024-04-24 NOTE — ED TRIAGE NOTES
Pt BIB [facility] with c/c of SOB for approx 3 days. Pt also reporting constipation. Pt stating she took miralax last night and had a BM this morning but still feels like there is more. Pt stating home o2 at 3L but bumped it up to 5L for the past couple of days. Pt stating she still feels SOB. Pt arrives with DNR paperwork.

## 2024-04-24 NOTE — ED NOTES
Med rec updated and complete.  Allergies reviewed.   Confirmed name and date of birth.   Per interview with daughter at bedside.  Daughter provided a detailed list of medications.  No anticoagulant medications. Last ASA dose 04/22/24 .  No outpatient  antibiotic  use in last 30 days.      Home pharmacy  [facility] [phone]

## 2024-04-25 VITALS
RESPIRATION RATE: 15 BRPM | SYSTOLIC BLOOD PRESSURE: 118 MMHG | HEART RATE: 59 BPM | TEMPERATURE: 98.3 F | BODY MASS INDEX: 31.33 KG/M2 | DIASTOLIC BLOOD PRESSURE: 49 MMHG | OXYGEN SATURATION: 93 % | HEIGHT: 63 IN | WEIGHT: 176.81 LBS

## 2024-04-25 LAB
ALBUMIN SERPL BCP-MCNC: 3.1 G/DL (ref 3.2–4.9)
BASOPHILS # BLD AUTO: 0.4 % (ref 0–1.8)
BASOPHILS # BLD: 0.03 K/UL (ref 0–0.12)
BUN SERPL-MCNC: 13 MG/DL (ref 8–22)
CALCIUM ALBUM COR SERPL-MCNC: 9.3 MG/DL (ref 8.5–10.5)
CALCIUM SERPL-MCNC: 8.6 MG/DL (ref 8.5–10.5)
CHLORIDE SERPL-SCNC: 96 MMOL/L (ref 96–112)
CO2 SERPL-SCNC: 31 MMOL/L (ref 20–33)
CREAT SERPL-MCNC: 1.13 MG/DL (ref 0.5–1.4)
EOSINOPHIL # BLD AUTO: 0.14 K/UL (ref 0–0.51)
EOSINOPHIL NFR BLD: 1.9 % (ref 0–6.9)
ERYTHROCYTE [DISTWIDTH] IN BLOOD BY AUTOMATED COUNT: 47.4 FL (ref 35.9–50)
GFR SERPLBLD CREATININE-BSD FMLA CKD-EPI: 45 ML/MIN/1.73 M 2
GLUCOSE SERPL-MCNC: 109 MG/DL (ref 65–99)
HCT VFR BLD AUTO: 35.5 % (ref 37–47)
HGB BLD-MCNC: 11.4 G/DL (ref 12–16)
IMM GRANULOCYTES # BLD AUTO: 0.02 K/UL (ref 0–0.11)
IMM GRANULOCYTES NFR BLD AUTO: 0.3 % (ref 0–0.9)
LYMPHOCYTES # BLD AUTO: 2.99 K/UL (ref 1–4.8)
LYMPHOCYTES NFR BLD: 41.5 % (ref 22–41)
MCH RBC QN AUTO: 32.3 PG (ref 27–33)
MCHC RBC AUTO-ENTMCNC: 32.1 G/DL (ref 32.2–35.5)
MCV RBC AUTO: 100.6 FL (ref 81.4–97.8)
MONOCYTES # BLD AUTO: 0.63 K/UL (ref 0–0.85)
MONOCYTES NFR BLD AUTO: 8.7 % (ref 0–13.4)
NEUTROPHILS # BLD AUTO: 3.4 K/UL (ref 1.82–7.42)
NEUTROPHILS NFR BLD: 47.2 % (ref 44–72)
NRBC # BLD AUTO: 0 K/UL
NRBC BLD-RTO: 0 /100 WBC (ref 0–0.2)
PHOSPHATE SERPL-MCNC: 3.8 MG/DL (ref 2.5–4.5)
PLATELET # BLD AUTO: 197 K/UL (ref 164–446)
PMV BLD AUTO: 10 FL (ref 9–12.9)
POTASSIUM SERPL-SCNC: 3.7 MMOL/L (ref 3.6–5.5)
RBC # BLD AUTO: 3.53 M/UL (ref 4.2–5.4)
SODIUM SERPL-SCNC: 139 MMOL/L (ref 135–145)
WBC # BLD AUTO: 7.2 K/UL (ref 4.8–10.8)

## 2024-04-25 PROCEDURE — A9270 NON-COVERED ITEM OR SERVICE: HCPCS | Performed by: STUDENT IN AN ORGANIZED HEALTH CARE EDUCATION/TRAINING PROGRAM

## 2024-04-25 PROCEDURE — 80069 RENAL FUNCTION PANEL: CPT

## 2024-04-25 PROCEDURE — 700111 HCHG RX REV CODE 636 W/ 250 OVERRIDE (IP): Mod: JZ | Performed by: HOSPITALIST

## 2024-04-25 PROCEDURE — 99239 HOSP IP/OBS DSCHRG MGMT >30: CPT | Performed by: STUDENT IN AN ORGANIZED HEALTH CARE EDUCATION/TRAINING PROGRAM

## 2024-04-25 PROCEDURE — 85025 COMPLETE CBC W/AUTO DIFF WBC: CPT

## 2024-04-25 PROCEDURE — 700102 HCHG RX REV CODE 250 W/ 637 OVERRIDE(OP): Performed by: STUDENT IN AN ORGANIZED HEALTH CARE EDUCATION/TRAINING PROGRAM

## 2024-04-25 PROCEDURE — A9270 NON-COVERED ITEM OR SERVICE: HCPCS | Performed by: HOSPITALIST

## 2024-04-25 PROCEDURE — 700102 HCHG RX REV CODE 250 W/ 637 OVERRIDE(OP): Performed by: HOSPITALIST

## 2024-04-25 RX ORDER — ASPIRIN 81 MG/1
81 TABLET, CHEWABLE ORAL DAILY
Qty: 100 TABLET | Refills: 0 | Status: SHIPPED | OUTPATIENT
Start: 2024-04-26

## 2024-04-25 RX ADMIN — FUROSEMIDE 40 MG: 10 INJECTION INTRAMUSCULAR; INTRAVENOUS at 04:13

## 2024-04-25 RX ADMIN — OMEPRAZOLE 40 MG: 20 CAPSULE, DELAYED RELEASE ORAL at 04:13

## 2024-04-25 RX ADMIN — ASPIRIN 81 MG 81 MG: 81 TABLET ORAL at 04:13

## 2024-04-25 RX ADMIN — CITALOPRAM HYDROBROMIDE 20 MG: 20 TABLET ORAL at 04:13

## 2024-04-25 ASSESSMENT — PAIN DESCRIPTION - PAIN TYPE: TYPE: CHRONIC PAIN

## 2024-04-25 ASSESSMENT — FIBROSIS 4 INDEX: FIB4 SCORE: 3.76

## 2024-04-25 NOTE — CARE PLAN
The patient is Stable - Low risk of patient condition declining or worsening    Shift Goals  Clinical Goals: diuresis  Patient Goals: sleep  Family Goals: [place]    Progress made toward(s) clinical / shift goals:    Problem: Knowledge Deficit - Standard  Goal: Patient and family/care givers will demonstrate understanding of plan of care, disease process/condition, diagnostic tests and medications  Outcome: Progressing     Problem: Hemodynamics  Goal: Patient's hemodynamics, fluid balance and neurologic status will be stable or improve  Outcome: Progressing     Problem: Respiratory  Goal: Patient will achieve/maintain optimum respiratory ventilation and gas exchange  Outcome: Progressing     Problem: Fluid Volume  Goal: Fluid volume balance will be maintained  Outcome: Progressing       Patient is not progressing towards the following goals:

## 2024-04-25 NOTE — CARE PLAN
The patient is Stable - Low risk of patient condition declining or worsening      Progress made toward(s) clinical / shift goals:        Problem: Care Map:  Admission Optimal Outcome for the Heart Failure Patient  Goal: Admission:  Optimal Care of the heart failure patient  Outcome: Progressing     Problem: Care Map:  Day of Discharge Optimal Outcome for the Heart Failure Patient  Goal: Day of Discharge:  Optimal Care of the heart failure patient  Outcome: Progressing       Patient is not progressing towards the following goals:

## 2024-04-25 NOTE — DISCHARGE PLANNING
Received Choice form at 0917  Agency/Facility Name: [facility]  Referral sent per Choice form @ [id number]  Scanned choice form in media. Notified ISAIAS Rangel

## 2024-04-25 NOTE — DISCHARGE SUMMARY
Discharge Summary    CHIEF COMPLAINT ON ADMISSION  Chief Complaint   Patient presents with    Shortness of Breath    Constipation              Reason for Admission  EMS     Admission Date  4/23/2024    CODE STATUS  DNAR/DNI    HPI & HOSPITAL COURSE  89 y.o. female who presented 4/23/2024 with past medical history of coronary disease with history of CABG, chronic hypoxic respiratory failure on 3 to 4 L of oxygen, hypertension, history of TAVR on 10/2017, breast and bladder cancer was admitted on 4/23/2024 for acute on chronic hypoxic respiratory failure secondary to heart failure exacerbation.  Patient was started on diuresis with Lasix and was titrated down to baseline 3 L nasal cannula shortly after admission.  Patient tolerating meals.  She was evaluated by Occupational Therapy who recommended postacute but patient politely declined and would prefer to continue therapy with home health which was set up prior to discharge.  Stable patient with in chronic condition was discharged home and instructed to follow-up with her primary care provider and cardiologist in the outpatient setting.      All results and plan of action discussed with the patient for she voiced understanding and agreement with the primary care team.  Patient was instructed to return to emergency department if symptoms were to worsen.    Therefore, she is discharged in good and stable condition to home with organized home healthcare and close outpatient follow-up.    The patient met 2-midnight criteria for an inpatient stay at the time of discharge.    Discharge Date  4/25/2024    FOLLOW UP ITEMS POST DISCHARGE  Primary care provider follow posthospital discharge care    DISCHARGE DIAGNOSES  Principal Problem:    Acute on chronic diastolic heart failure (HCC) (POA: Yes)  Active Problems:    Hx of CABG (POA: Yes)    S/P TAVR (transcatheter aortic valve replacement) (POA: Yes)    Chest pain (POA: Yes)    Acute on chronic respiratory failure (HCC)  (POA: Unknown)  Resolved Problems:    * No resolved hospital problems. *      FOLLOW UP  Future Appointments   Date Time Provider Department Center   5/15/2024 10:50 AM Nadya Horton M.D. [facility] None   5/29/2024  2:45 PM CAESAR Parada None     No follow-up provider specified.    MEDICATIONS ON DISCHARGE     Medication List        START taking these medications        Instructions   aspirin 81 MG Chew chewable tablet  Start taking on: April 26, 2024  Commonly known as: Asa  Replaces: aspirin 325 MG Tabs   Chew 1 Tablet every day.  Dose: 81 mg            CONTINUE taking these medications        Instructions   atorvastatin 80 MG tablet  Commonly known as: Lipitor   Take 1 Tablet by mouth every evening.  Dose: 80 mg     calcium citrate 950 (200 Ca) MG Tabs  Commonly known as: Calcitrate   Take 600 mg by mouth 2 times a day.  Dose: 600 mg     citalopram 20 MG Tabs  Commonly known as: CeleXA   Take 20 mg by mouth every day.  Dose: 20 mg     furosemide 20 MG Tabs  Commonly known as: Lasix   Take 1 Tablet by mouth every day.  Dose: 20 mg     gabapentin 600 MG tablet  Commonly known as: Neurontin   Take 600 mg by mouth 3 times a day. 600 mg tablet x 3 tablets = 1800 mg  Dose: 600 mg     HYDROcodone/acetaminophen  MG Tabs  Commonly known as: Norco   Take 1-2 Tablets by mouth every four hours as needed for Moderate Pain.  Dose: 1-2 Tablet     lisinopril 20 MG Tabs  Commonly known as: Prinivil   Take 1 Tablet by mouth 2 times a day.  Dose: 20 mg     metoprolol SR 50 MG Tb24  Commonly known as: Toprol XL   Take 1 Tablet by mouth every day.  Dose: 50 mg     multivitamin Tabs   Take 1 Tab by mouth every morning.  Dose: 1 Tablet     Ocuvite-Lutein Tabs   Take 1 Tablet by mouth every day.  Dose: 1 Tablet     omeprazole 40 MG delayed-release capsule  Commonly known as: PriLOSEC   Take 40 mg by mouth every day.  Dose: 40 mg     ondansetron 4 MG Tbdp  Commonly known as: Zofran ODT   Take 1 Tablet by  mouth every 8 hours as needed for Nausea/Vomiting.  Dose: 4 mg     potassium citrate SR 10 MEQ (1080 MG) Tbcr  Commonly known as: Urocit-K SR   Take 10 mEq by mouth every day.  Dose: 10 mEq     Vitamin B-12 5000 MCG Subl   Place 1 Tab under tongue every morning.  Dose: 1 Tablet     Vitamin C 1000 MG Tabs   Take 1,000 mg by mouth every day.  Dose: 1,000 mg            STOP taking these medications      aspirin 325 MG Tabs  Commonly known as: Asa  Replaced by: aspirin 81 MG Chew chewable tablet              Allergies  No Known Allergies    DIET  Orders Placed This Encounter   Procedures    Diet Order Diet: Level 6 - Soft and Bite Sized; Liquid level: Level 0 - Thin; Second Modifier: (optional): 2 Gram Sodium     Standing Status:   Standing     Number of Occurrences:   1     Order Specific Question:   Diet:     Answer:   Level 6 - Soft and Bite Sized [23]     Order Specific Question:   Liquid level     Answer:   Level 0 - Thin     Order Specific Question:   Second Modifier: (optional)     Answer:   2 Gram Sodium [7]       ACTIVITY  As tolerated.  Weight bearing as tolerated    CONSULTATIONS  None    PROCEDURES  None    LABORATORY  Lab Results   Component Value Date    SODIUM 139 04/25/2024    POTASSIUM 3.7 04/25/2024    CHLORIDE 96 04/25/2024    CO2 31 04/25/2024    GLUCOSE 109 (H) 04/25/2024    BUN 13 04/25/2024    CREATININE 1.13 04/25/2024        Lab Results   Component Value Date    WBC 7.2 04/25/2024    HEMOGLOBIN 11.4 (L) 04/25/2024    HEMATOCRIT 35.5 (L) 04/25/2024    PLATELETCT 197 04/25/2024        Total time of the discharge process exceeds 32 minutes.

## 2024-04-25 NOTE — PROGRESS NOTES
Silvia Orellana has been provided discharge instructions, to include follow up care, home medications, and activity/diet reviewed. Understanding verbalized. IV removed. Catheter tip intact, bleeding controlled. Arm band removed. Pt waiting for ride. Pt out of DCL at this time with personal belongings.    1037--pt ride present.

## 2024-04-25 NOTE — THERAPY
Physical Therapy Contact Note    Patient Name: Silvia Orellana  Age:  89 y.o., Sex:  female  Medical Record #: [id number]  Today's Date: 4/25/2024    Pt in d/c [facility] upon attempt for PT evaluation    Inderjit Mott PT, DPT

## 2024-04-25 NOTE — DISCHARGE PLANNING
Case Management Discharge Planning    Admission Date: 4/23/2024  GMLOS: 3.9  ALOS: 2    6-Clicks ADL Score: 19  6-Clicks Mobility Score: 17  PT and/or OT Eval ordered: Yes  Post-acute Referrals Ordered: Yes  Post-acute Choice Obtained: Yes  Has referral(s) been sent to post-acute provider:  Yes      Anticipated Discharge Dispo: Discharge Disposition: D/T to home under HHA care in anticipation of covered skilled care (06)    DME Needed: No    Action(s) Taken: Updated Provider/Nurse on Discharge Plan and DC Assessment Complete (See below)    0915, RN ISAIAS obtained choice for Home health and was fax to [facility].    No further CM needs identified.     Escalations Completed: None    Medically Clear: Yes    Next Steps: CM will continue to assist Pt with discharge needs.       Barriers to Discharge: None        Care Transition Team Assessment  RN ISAIAS met with Pt at bedside to obtain assessment informations. Demographics verified. RN CM introduced self and purpose of visit.   Pt lives with daughter and son in law in a 2 story house with 2 steps to main entrance.  Pt has adult children for support.  Pt has BRIT LY for PCP  Pt was independent  with ADLs prior to hospitalization.  Pt has O2 concentrator, portable O2 tanks. Pt uses continuous 3 LPM oxygen at home.  Pt's daughter  Delilah will provide transport and bring portable O2 tanks.        Information Source  Orientation Level: Oriented X4  Information Given By: Patient  Who is responsible for making decisions for patient? : Patient      Elopement Risk  Legal Hold: No  Ambulatory or Self Mobile in Wheelchair: Yes  Disoriented: No  Psychiatric Symptoms: None  History of Wandering: No  Elopement this Admit: No  Vocalizing Wanting to Leave: No  Displays Behaviors, Body Language Wanting to Leave: No-Not at Risk for Elopement  Elopement Risk: Not at Risk for Elopement    Interdisciplinary Discharge Planning  Primary Care Physician: BRIT LY  Lives with -  Patient's Self Care Capacity: Adult Children  Patient or legal guardian wants to designate a caregiver: No  Support Systems: Children  Housing / Facility: 2 Story House (2 steps to main entrance)  Prior Services:  (assist for IADLs from family)  Durable Medical Equipment: Home Oxygen (O2 concentrator, portable O2 tanks)    Discharge Preparedness  What is your plan after discharge?: Home health care  What are your discharge supports?: Child  Prior Functional Level: Independent with Activities of Daily Living, Independent with Medication Management    Functional Assesment  Prior Functional Level: Independent with Activities of Daily Living, Independent with Medication Management    Finances  Financial Barriers to Discharge: No  Prescription Coverage: Yes    Vision / Hearing Impairment  Vision Impairment : Yes  Right Eye Vision: Impaired, Wears Glasses  Left Eye Vision: Impaired, Wears Glasses  Hearing Impairment : No      Domestic Abuse  Have you ever been the victim of abuse or violence?: No  Physical Abuse or Sexual Abuse: No  Verbal Abuse or Emotional Abuse: No  Possible Abuse/Neglect Reported to:: Not Applicable    Psychological Assessment  History of Substance Abuse: None  History of Psychiatric Problems: No         Anticipated Discharge Information  Discharge Disposition: D/T to home under HHA care in anticipation of covered skilled care (06)

## 2024-04-25 NOTE — PROGRESS NOTES
Monitor Summary:     Rhythm: SR  Rate: 60-69  Ectopy: (R) PVC  Measurements: 0.17/0.10/0.46           12 Hour Chart Check

## 2024-05-13 NOTE — PROGRESS NOTES
Pulmonary Hypertension Clinic- Initial Consult    Date of Service: 5/15/24    Referring Physician: Sarah Sanchez, *    Reason for Consult: Pulmonary Hypertension    Chief Complaint:   Chief Complaint   Patient presents with    Establish Care      Ref by Sarah Sanchez, DEEPA.       HPI:   Silvia Orellana is a 89 y.o. female who is followed by Joshua Rendon and is referred to the pulmonary clinic for pulmonary hypertension by Sarah Sanchez.  Silvia has a significant cardiac disease including TAVR, CAD s/p CABG and known Grade II diastolic dysfunction, breast and bladder CA, HTN, and chronic hypoxic respiratory failure.  She was recently admitted with chest pain and a heart failure exacerbation.  She is on Furosemide 20mg daily as well as GMDT for heart failure.  She reports cough with phlegm in the morning.  Phlegm is clear and foamy.  She has a history of asthma which is untreated.  PFTS from 2017 demonstrate a significant bronchodilator response as well as air trapping.  She is a never smoker.  She denies significant seasonal allergies or GERD.  CXR from recent hospitalization is consistent with pulmonary edema 2/2 fluid overload.  She is on oxygen and has been for about 2 years now at 4-5L depending on her fluid status.         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    Cataract     patria IOL    CHF (congestive heart failure) (HCC)     Chronic pain     Dental disorder     dentures    Diverticulosis     Fall     GERD (gastroesophageal reflux disease)     Heart burn     Hx of Clostridium difficile infection 2015    Hyperlipidemia     Hypertension     Indigestion     Myocardial infarct (HCC) 2002    cardiac stent    Pneumonia  2014    Stroke (HCC) 2001; 2012    no deficits, general weakness    Urinary bladder disorder     Urinary incontinence        Past Surgical History:   Procedure Laterality Date    TRANSCATHETER AORTIC VALVE REPLACEMENT N/A 9/25/2017    Procedure: TRANSCATHETER AORTIC VALVE REPLACEMENT;  Surgeon: Sherif Elder M.D.;  Location: SURGERY [facility];  Service: Cardiac    TATIANNA  9/25/2017    Procedure: TTE;  Surgeon: Sherif Elder M.D.;  Location: SURGERY [facility];  Service: Cardiac    ORBITAL FRACTURE ORIF Left 5/23/2016    Procedure: ORBITAL FRACTURE ORIF FOR: LATERAL CANTHOPEXY;  Surgeon: Fabrice Daniel Jr., M.D.;  Location: SURGERY [facility];  Service:     ORIF, FRACTURE, FEMUR Right 8/10/2015    Procedure: FEMUR ORIF;  Surgeon: Umer Guevara M.D.;  Location: SURGERY [facility];  Service:     ORBITAL FRACTURE ORIF Left 7/6/2015    Procedure: ORBITAL FRACTURE ORIF;  Surgeon: Fabrice Daniel Jr., M.D.;  Location: SURGERY [facility];  Service:     PB REVISE TOTAL HIP REPLACEMENT  3/24/2015    R    OTHER CARDIAC SURGERY  2012    cardiac cath with stent placement    PB REVISE ACETABULAR PART OF TOTAL HIP  2010    L    OTHER CARDIAC SURGERY  2002    CABG    GYN SURGERY  1961    hysterectomy    ANGIOGRAM      BLADDER BIOPSY      CHOLECYSTECTOMY      HIP ARTHROPLASTY TOTAL Left     LUMPECTOMY      L    MULTIPLE CORONARY ARTERY BYPASS      ZZZ CARDIAC CATH         Social History     Socioeconomic History    Marital status:      Spouse name: Not on file    Number of children: Not on file    Years of education: Not on file    Highest education level: Not on file   Occupational History    Not on file   Tobacco Use    Smoking status: Never    Smokeless tobacco: Never   Substance and Sexual Activity    Alcohol use: Yes     Comment: occ    Drug use: No    Sexual activity: Not on file   Other Topics Concern    Not on file   Social History Narrative    Not on file     Social Determinants  of Health     Financial Resource Strain: Not on file   Food Insecurity: Not on file   Transportation Needs: Not on file   Physical Activity: Not on file   Stress: Not on file   Social Connections: Not on file   Intimate Partner Violence: Not on file   Housing Stability: Not on file          Family History   Problem Relation Age of Onset    Heart Attack Neg Hx        Current Outpatient Medications on File Prior to Visit   Medication Sig Dispense Refill    aspirin (ASA) 81 MG Chew Tab chewable tablet Chew 1 Tablet by mouth every day. 100 Tablet 0    ondansetron (ZOFRAN ODT) 4 MG TABLET DISPERSIBLE Take 1 Tablet by mouth every 8 hours as needed for Nausea/Vomiting. 10 Tablet 0    citalopram (CELEXA) 20 MG Tab Take 20 mg by mouth every day.      omeprazole (PRILOSEC) 40 MG delayed-release capsule Take 40 mg by mouth every day.      potassium citrate SR (UROCIT-K SR) 10 MEQ (1080 MG) Tab CR Take 10 mEq by mouth every day.      HYDROcodone/acetaminophen (NORCO)  MG Tab Take 1-2 Tablets by mouth every four hours as needed for Moderate Pain.      Multiple Vitamins-Minerals (OCUVITE-LUTEIN) Tab Take 1 Tablet by mouth every day.      Ascorbic Acid (VITAMIN C) 1000 MG Tab Take 1,000 mg by mouth every day.      atorvastatin (LIPITOR) 80 MG tablet Take 1 Tablet by mouth every evening. 90 Tablet 3    furosemide (LASIX) 20 MG Tab Take 1 Tablet by mouth every day. 100 Tablet 3    lisinopril (PRINIVIL) 20 MG Tab Take 1 Tablet by mouth 2 times a day. 200 Tablet 3    metoprolol SR (TOPROL XL) 50 MG TABLET SR 24 HR Take 1 Tablet by mouth every day. 100 Tablet 3    Cyanocobalamin (VITAMIN B-12) 5000 MCG SL Tab Place 1 Tab under tongue every morning.      multivitamin (THERAGRAN) Tab Take 1 Tab by mouth every morning.      calcium citrate (CALCITRATE) 950 MG Tab Take 600 mg by mouth 2 times a day.      gabapentin (NEURONTIN) 600 MG tablet Take 600 mg by mouth 3 times a day. 600 mg tablet x 3 tablets = 1800 mg  5     No current  "facility-administered medications on file prior to visit.       Allergies: Patient has no known allergies.      ROS:   Review of Systems   Constitutional:  Positive for malaise/fatigue. Negative for chills and fever.   HENT:  Positive for congestion.    Respiratory:  Positive for cough, sputum production and shortness of breath.    Cardiovascular:  Negative for chest pain, palpitations and leg swelling.   Gastrointestinal:  Negative for heartburn.   Neurological:  Negative for dizziness and headaches.   Endo/Heme/Allergies:  Negative for environmental allergies.       Vitals:  /78 (BP Location: Right arm, Patient Position: Sitting, BP Cuff Size: Adult)   Pulse 62   Resp 18   Ht 1.626 m (5' 4\")   Wt 72.6 kg (160 lb)   SpO2 93%     Physical Exam:  Physical Exam  HENT:      Head: Atraumatic.   Eyes:      Extraocular Movements: Extraocular movements intact.   Cardiovascular:      Rate and Rhythm: Normal rate.   Pulmonary:      Effort: Pulmonary effort is normal. No respiratory distress.      Breath sounds: Normal breath sounds. No wheezing or rales.   Musculoskeletal:         General: No swelling.   Skin:     General: Skin is warm and dry.   Neurological:      General: No focal deficit present.      Mental Status: She is alert and oriented to person, place, and time.           Vaccinations:    Pneumovax: Complete  Covid: Vaccinated  Annual Flu: Due in Fall    Pertinent Studies:  Laboratory Data:    6MWT:    PFTs as reviewed by me personally show:    Imaging as reviewed by me personally show:      Pertinent Cardiac Studies:  Echo:  Echocardiography Laboratory     CONCLUSIONS  Normal left ventricular systolic function. The ejection fraction is   measured to be 53 % by Laurent's biplane.  Moderate concentric left ventricular hypertrophy.   Normal right ventricular size and systolic function.  Moderate mitral regurgitation.  Known TAVR aortic valve that is functioning normally with slightly   elevated " transvalvular gradients.  Transvalvular gradients are - Peak:   39 mmHg, Mean: 21 mmHg.   Mild tricuspid regurgitation. Estimated right ventricular systolic   pressure is 70 mmHg (severe pulmonary hypertension).   Compared to the prior study on 4/21/2022, there is now moderate mitral   regurgitation and severe pulmonary hypertension. TAVR gradients are   slightly elevated (mean gradient 21mmHg now, previously 19mmHg).    RHC:    Assessment/Plan:    Problem List Items Addressed This Visit       Chronic respiratory failure with hypoxia (HCC)     Silvia does have a history of untreated asthma.  She is likely too weak to be able to adequately use inhalers.  Plan:  - DME nebulizer  - Duonebs  - Follow up with Dr. Horton in 6 months in general pulmonary clinic          Pulmonary hypertension (HCC)     Group II Pulmonary Hypertension 2/2 CAD s/p CABG, TAVR and diastolic heart failure.  Plan:  - No specific PH therapies  - Continue to follow with cardiology for heart failure and valvular heart disease  - No PH clinic follow up is necessary.           Other Visit Diagnoses       Mild intermittent asthma with acute exacerbation        Relevant Medications    ipratropium-albuterol (DUONEB) 0.5-2.5 (3) MG/3ML nebulizer solution    Other Relevant Orders    DME Nebulizer             Return in about 6 months (around 11/15/2024) for DR. Horton .     This note was generated using voice recognition software which has a chance of producing errors of grammar and possibly content.  I have made every reasonable attempt to find and correct any obvious errors, but it should be expected that some may not be found prior to finalization of this note.    Time spent in record review prior to patient arrival, reviewing results, and in face-to-face encounter totaled 45 min, excluding any procedures if performed.      Nadya Horton MD RD  Pulmonary and Critical Care Medicine  [facility]

## 2024-05-15 ENCOUNTER — OFFICE VISIT (OUTPATIENT)
Dept: SLEEP MEDICINE | Facility: MEDICAL CENTER | Age: 89
End: 2024-05-15
Payer: MEDICARE

## 2024-05-15 VITALS
HEART RATE: 62 BPM | OXYGEN SATURATION: 93 % | HEIGHT: 64 IN | SYSTOLIC BLOOD PRESSURE: 132 MMHG | BODY MASS INDEX: 27.31 KG/M2 | DIASTOLIC BLOOD PRESSURE: 78 MMHG | WEIGHT: 160 LBS | RESPIRATION RATE: 18 BRPM

## 2024-05-15 DIAGNOSIS — J45.21 MILD INTERMITTENT ASTHMA WITH ACUTE EXACERBATION: ICD-10-CM

## 2024-05-15 DIAGNOSIS — I27.20 PULMONARY HYPERTENSION (HCC): ICD-10-CM

## 2024-05-15 DIAGNOSIS — J96.11 CHRONIC RESPIRATORY FAILURE WITH HYPOXIA (HCC): ICD-10-CM

## 2024-05-15 PROBLEM — J96.20 ACUTE ON CHRONIC RESPIRATORY FAILURE (HCC): Status: RESOLVED | Noted: 2024-04-23 | Resolved: 2024-05-15

## 2024-05-15 PROCEDURE — 99204 OFFICE O/P NEW MOD 45 MIN: CPT | Performed by: INTERNAL MEDICINE

## 2024-05-15 PROCEDURE — 3075F SYST BP GE 130 - 139MM HG: CPT | Performed by: INTERNAL MEDICINE

## 2024-05-15 PROCEDURE — 3078F DIAST BP <80 MM HG: CPT | Performed by: INTERNAL MEDICINE

## 2024-05-15 RX ORDER — IPRATROPIUM BROMIDE AND ALBUTEROL SULFATE 2.5; .5 MG/3ML; MG/3ML
3 SOLUTION RESPIRATORY (INHALATION) 4 TIMES DAILY
Qty: 180 ML | Refills: 3 | Status: SHIPPED | OUTPATIENT
Start: 2024-05-15

## 2024-05-15 ASSESSMENT — ENCOUNTER SYMPTOMS
CHILLS: 0
FEVER: 0
SHORTNESS OF BREATH: 1
HEADACHES: 0
HEARTBURN: 0
PALPITATIONS: 0
SPUTUM PRODUCTION: 1
DIZZINESS: 0
COUGH: 1

## 2024-05-15 ASSESSMENT — FIBROSIS 4 INDEX: FIB4 SCORE: 3.76

## 2024-05-15 NOTE — ASSESSMENT & PLAN NOTE
Group II Pulmonary Hypertension 2/2 CAD s/p CABG, TAVR and diastolic heart failure.  Plan:  - No specific PH therapies  - Continue to follow with cardiology for heart failure and valvular heart disease  - No PH clinic follow up is necessary.

## 2024-05-15 NOTE — ASSESSMENT & PLAN NOTE
Silvia does have a history of untreated asthma.  She is likely too weak to be able to adequately use inhalers.  Plan:  - DME nebulizer  - Alessandro  - Follow up with Dr. Horton in 6 months in general pulmonary clinic

## 2024-05-16 ENCOUNTER — TELEPHONE (OUTPATIENT)
Dept: SLEEP MEDICINE | Facility: MEDICAL CENTER | Age: 89
End: 2024-05-16
Payer: MEDICARE

## 2024-05-16 NOTE — TELEPHONE ENCOUNTER
Nadya from [facility] stopped by stating they received an order for a Nebulizer/supplies, unfortunately they don't provide those services, can we re-direct referral somewhere else. If you have any questions you can contact her at [phone].

## 2024-05-29 ENCOUNTER — APPOINTMENT (OUTPATIENT)
Dept: CARDIOLOGY | Facility: MEDICAL CENTER | Age: 89
End: 2024-05-29
Payer: MEDICARE

## 2024-05-31 ENCOUNTER — TELEPHONE (OUTPATIENT)
Dept: SLEEP MEDICINE | Facility: MEDICAL CENTER | Age: 89
End: 2024-05-31
Payer: MEDICARE

## 2024-06-04 NOTE — TELEPHONE ENCOUNTER
Denied. (Denial scanned into pt's chart.)    Called and spoke with pt's pharmacy to make sure they are running under Medicare Part B. They did. Pt  medication on 05/28.      Closing encounter.

## 2024-06-05 NOTE — TELEPHONE ENCOUNTER
Nadya from [facility] stopped by, patient contacted her stating they haven't received nebulizer machine, can you please send order to [facility] fax [phone]. Thank you

## 2024-07-01 ENCOUNTER — TELEPHONE (OUTPATIENT)
Dept: SLEEP MEDICINE | Facility: MEDICAL CENTER | Age: 89
End: 2024-07-01
Payer: MEDICARE

## 2024-07-19 ENCOUNTER — APPOINTMENT (OUTPATIENT)
Dept: RADIOLOGY | Facility: MEDICAL CENTER | Age: 89
DRG: 314 | End: 2024-07-19
Attending: HOSPITALIST
Payer: MEDICARE

## 2024-07-19 ENCOUNTER — APPOINTMENT (OUTPATIENT)
Dept: RADIOLOGY | Facility: MEDICAL CENTER | Age: 89
DRG: 314 | End: 2024-07-19
Attending: EMERGENCY MEDICINE
Payer: MEDICARE

## 2024-07-19 ENCOUNTER — HOSPITAL ENCOUNTER (INPATIENT)
Facility: MEDICAL CENTER | Age: 89
LOS: 6 days | DRG: 314 | End: 2024-07-25
Attending: EMERGENCY MEDICINE | Admitting: HOSPITALIST
Payer: MEDICARE

## 2024-07-19 DIAGNOSIS — J90 PLEURAL EFFUSION: ICD-10-CM
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
## 2024-07-19 DIAGNOSIS — J96.01 ACUTE RESPIRATORY FAILURE WITH HYPOXIA (HCC): ICD-10-CM

## 2024-07-19 DIAGNOSIS — S09.90XA CLOSED HEAD INJURY, INITIAL ENCOUNTER: ICD-10-CM

## 2024-07-19 DIAGNOSIS — I50.33 ACUTE ON CHRONIC DIASTOLIC HEART FAILURE (HCC): ICD-10-CM

## 2024-07-19 DIAGNOSIS — I50.9 CONGESTIVE HEART FAILURE, UNSPECIFIED HF CHRONICITY, UNSPECIFIED HEART FAILURE TYPE (HCC): ICD-10-CM

## 2024-07-19 PROBLEM — J81.1 PULMONARY EDEMA: Status: ACTIVE | Noted: 2024-07-19

## 2024-07-19 PROBLEM — E87.6 HYPOKALEMIA: Status: ACTIVE | Noted: 2024-07-19

## 2024-07-19 PROBLEM — Z66 DNR (DO NOT RESUSCITATE): Status: ACTIVE | Noted: 2024-07-19

## 2024-07-19 PROBLEM — S32.010A CLOSED WEDGE COMPRESSION FRACTURE OF L1 VERTEBRA (HCC): Status: ACTIVE | Noted: 2024-07-19

## 2024-07-19 PROBLEM — D53.9 MACROCYTIC ANEMIA: Status: ACTIVE | Noted: 2024-07-19

## 2024-07-19 PROBLEM — R73.9 HYPERGLYCEMIA: Status: ACTIVE | Noted: 2024-07-19

## 2024-07-19 PROBLEM — R74.8 ELEVATED ALKALINE PHOSPHATASE LEVEL: Status: ACTIVE | Noted: 2024-07-19

## 2024-07-19 LAB
ALBUMIN SERPL BCP-MCNC: 3.2 G/DL (ref 3.2–4.9)
ALBUMIN/GLOB SERPL: 1.1 G/DL
ALP SERPL-CCNC: 134 U/L (ref 30–99)
ALT SERPL-CCNC: 10 U/L (ref 2–50)
ANION GAP SERPL CALC-SCNC: 12 MMOL/L (ref 7–16)
APPEARANCE UR: CLEAR
AST SERPL-CCNC: 30 U/L (ref 12–45)
BACTERIA #/AREA URNS HPF: ABNORMAL /HPF
BASOPHILS # BLD AUTO: 0.4 % (ref 0–1.8)
BASOPHILS # BLD: 0.03 K/UL (ref 0–0.12)
BILIRUB SERPL-MCNC: 0.7 MG/DL (ref 0.1–1.5)
BILIRUB UR QL STRIP.AUTO: NEGATIVE
BUN SERPL-MCNC: 9 MG/DL (ref 8–22)
CALCIUM ALBUM COR SERPL-MCNC: 9.9 MG/DL (ref 8.5–10.5)
CALCIUM SERPL-MCNC: 9.3 MG/DL (ref 8.5–10.5)
CHLORIDE SERPL-SCNC: 100 MMOL/L (ref 96–112)
CO2 SERPL-SCNC: 32 MMOL/L (ref 20–33)
COLOR UR: YELLOW
CREAT SERPL-MCNC: 0.8 MG/DL (ref 0.5–1.4)
EOSINOPHIL # BLD AUTO: 0.1 K/UL (ref 0–0.51)
EOSINOPHIL NFR BLD: 1.4 % (ref 0–6.9)
EPI CELLS #/AREA URNS HPF: NEGATIVE /HPF
ERYTHROCYTE [DISTWIDTH] IN BLOOD BY AUTOMATED COUNT: 51.4 FL (ref 35.9–50)
EST. AVERAGE GLUCOSE BLD GHB EST-MCNC: 111 MG/DL
FLUAV RNA SPEC QL NAA+PROBE: NEGATIVE
FLUBV RNA SPEC QL NAA+PROBE: NEGATIVE
GFR SERPLBLD CREATININE-BSD FMLA CKD-EPI: 69 ML/MIN/1.73 M 2
GGT SERPL-CCNC: 55 U/L (ref 7–34)
GLOBULIN SER CALC-MCNC: 3 G/DL (ref 1.9–3.5)
GLUCOSE SERPL-MCNC: 150 MG/DL (ref 65–99)
GLUCOSE UR STRIP.AUTO-MCNC: NEGATIVE MG/DL
HBA1C MFR BLD: 5.5 % (ref 4–5.6)
HCT VFR BLD AUTO: 35.5 % (ref 37–47)
HGB BLD-MCNC: 11.6 G/DL (ref 12–16)
HYALINE CASTS #/AREA URNS LPF: ABNORMAL /LPF
IMM GRANULOCYTES # BLD AUTO: 0.05 K/UL (ref 0–0.11)
IMM GRANULOCYTES NFR BLD AUTO: 0.7 % (ref 0–0.9)
KETONES UR STRIP.AUTO-MCNC: ABNORMAL MG/DL
LACTATE SERPL-SCNC: 1.8 MMOL/L (ref 0.5–2)
LACTATE SERPL-SCNC: 2.1 MMOL/L (ref 0.5–2)
LEUKOCYTE ESTERASE UR QL STRIP.AUTO: NEGATIVE
LYMPHOCYTES # BLD AUTO: 2.03 K/UL (ref 1–4.8)
LYMPHOCYTES NFR BLD: 28.3 % (ref 22–41)
MCH RBC QN AUTO: 33 PG (ref 27–33)
MCHC RBC AUTO-ENTMCNC: 32.7 G/DL (ref 32.2–35.5)
MCV RBC AUTO: 100.9 FL (ref 81.4–97.8)
MICRO URNS: ABNORMAL
MONOCYTES # BLD AUTO: 0.34 K/UL (ref 0–0.85)
MONOCYTES NFR BLD AUTO: 4.7 % (ref 0–13.4)
NEUTROPHILS # BLD AUTO: 4.63 K/UL (ref 1.82–7.42)
NEUTROPHILS NFR BLD: 64.5 % (ref 44–72)
NITRITE UR QL STRIP.AUTO: POSITIVE
NRBC # BLD AUTO: 0 K/UL
NRBC BLD-RTO: 0 /100 WBC (ref 0–0.2)
NT-PROBNP SERPL IA-MCNC: 9336 PG/ML (ref 0–125)
PH UR STRIP.AUTO: 6 [PH] (ref 5–8)
PLATELET # BLD AUTO: 219 K/UL (ref 164–446)
PMV BLD AUTO: 9.5 FL (ref 9–12.9)
POTASSIUM SERPL-SCNC: 3.4 MMOL/L (ref 3.6–5.5)
PROT SERPL-MCNC: 6.2 G/DL (ref 6–8.2)
PROT UR QL STRIP: NEGATIVE MG/DL
RBC # BLD AUTO: 3.52 M/UL (ref 4.2–5.4)
RBC # URNS HPF: ABNORMAL /HPF
RBC UR QL AUTO: ABNORMAL
RSV RNA SPEC QL NAA+PROBE: NEGATIVE
SARS-COV-2 RNA RESP QL NAA+PROBE: NOTDETECTED
SODIUM SERPL-SCNC: 144 MMOL/L (ref 135–145)
SP GR UR STRIP.AUTO: 1.02
TROPONIN T SERPL-MCNC: 39 NG/L (ref 6–19)
UROBILINOGEN UR STRIP.AUTO-MCNC: 1 MG/DL
WBC # BLD AUTO: 7.2 K/UL (ref 4.8–10.8)
WBC #/AREA URNS HPF: ABNORMAL /HPF

## 2024-07-19 PROCEDURE — 93005 ELECTROCARDIOGRAM TRACING: CPT | Performed by: EMERGENCY MEDICINE

## 2024-07-19 PROCEDURE — 81001 URINALYSIS AUTO W/SCOPE: CPT

## 2024-07-19 PROCEDURE — 0W9B3ZZ DRAINAGE OF LEFT PLEURAL CAVITY, PERCUTANEOUS APPROACH: ICD-10-PCS | Performed by: NURSE PRACTITIONER

## 2024-07-19 PROCEDURE — 0241U HCHG SARS-COV-2 COVID-19 NFCT DS RESP RNA 4 TRGT ED POC: CPT

## 2024-07-19 PROCEDURE — C1729 CATH, DRAINAGE: HCPCS

## 2024-07-19 PROCEDURE — 700102 HCHG RX REV CODE 250 W/ 637 OVERRIDE(OP): Performed by: HOSPITALIST

## 2024-07-19 PROCEDURE — 83036 HEMOGLOBIN GLYCOSYLATED A1C: CPT

## 2024-07-19 PROCEDURE — 87077 CULTURE AEROBIC IDENTIFY: CPT | Mod: 91

## 2024-07-19 PROCEDURE — 83605 ASSAY OF LACTIC ACID: CPT

## 2024-07-19 PROCEDURE — 87150 DNA/RNA AMPLIFIED PROBE: CPT

## 2024-07-19 PROCEDURE — 700111 HCHG RX REV CODE 636 W/ 250 OVERRIDE (IP): Mod: JZ | Performed by: HOSPITALIST

## 2024-07-19 PROCEDURE — 72131 CT LUMBAR SPINE W/O DYE: CPT

## 2024-07-19 PROCEDURE — 36415 COLL VENOUS BLD VENIPUNCTURE: CPT

## 2024-07-19 PROCEDURE — 87040 BLOOD CULTURE FOR BACTERIA: CPT | Mod: 91

## 2024-07-19 PROCEDURE — 99223 1ST HOSP IP/OBS HIGH 75: CPT | Mod: AI | Performed by: HOSPITALIST

## 2024-07-19 PROCEDURE — 87086 URINE CULTURE/COLONY COUNT: CPT

## 2024-07-19 PROCEDURE — 83880 ASSAY OF NATRIURETIC PEPTIDE: CPT

## 2024-07-19 PROCEDURE — 84484 ASSAY OF TROPONIN QUANT: CPT

## 2024-07-19 PROCEDURE — 85025 COMPLETE CBC W/AUTO DIFF WBC: CPT

## 2024-07-19 PROCEDURE — 770020 HCHG ROOM/CARE - TELE (206)

## 2024-07-19 PROCEDURE — 87186 SC STD MICRODIL/AGAR DIL: CPT

## 2024-07-19 PROCEDURE — 80053 COMPREHEN METABOLIC PANEL: CPT

## 2024-07-19 PROCEDURE — 700111 HCHG RX REV CODE 636 W/ 250 OVERRIDE (IP): Mod: JZ | Performed by: EMERGENCY MEDICINE

## 2024-07-19 PROCEDURE — 70450 CT HEAD/BRAIN W/O DYE: CPT

## 2024-07-19 PROCEDURE — 99285 EMERGENCY DEPT VISIT HI MDM: CPT

## 2024-07-19 PROCEDURE — 72128 CT CHEST SPINE W/O DYE: CPT

## 2024-07-19 PROCEDURE — 82977 ASSAY OF GGT: CPT

## 2024-07-19 PROCEDURE — 72125 CT NECK SPINE W/O DYE: CPT

## 2024-07-19 PROCEDURE — 96374 THER/PROPH/DIAG INJ IV PUSH: CPT

## 2024-07-19 PROCEDURE — 71045 X-RAY EXAM CHEST 1 VIEW: CPT

## 2024-07-19 PROCEDURE — A9270 NON-COVERED ITEM OR SERVICE: HCPCS | Performed by: HOSPITALIST

## 2024-07-19 RX ORDER — MORPHINE SULFATE 4 MG/ML
2 INJECTION INTRAVENOUS
Status: DISCONTINUED | OUTPATIENT
Start: 2024-07-19 | End: 2024-07-25 | Stop reason: HOSPADM

## 2024-07-19 RX ORDER — AMOXICILLIN 250 MG
2 CAPSULE ORAL EVERY EVENING
Status: DISCONTINUED | OUTPATIENT
Start: 2024-07-19 | End: 2024-07-23

## 2024-07-19 RX ORDER — IPRATROPIUM BROMIDE AND ALBUTEROL SULFATE 2.5; .5 MG/3ML; MG/3ML
3 SOLUTION RESPIRATORY (INHALATION)
Status: DISCONTINUED | OUTPATIENT
Start: 2024-07-19 | End: 2024-07-25 | Stop reason: HOSPADM

## 2024-07-19 RX ORDER — PERPHENAZINE/AMITRIPTYLINE HCL 4 MG-25 MG
40 TABLET ORAL DAILY
Status: ON HOLD | COMMUNITY
End: 2024-07-25

## 2024-07-19 RX ORDER — POLYETHYLENE GLYCOL 3350 17 G/17G
1 POWDER, FOR SOLUTION ORAL
Status: DISCONTINUED | OUTPATIENT
Start: 2024-07-19 | End: 2024-07-23

## 2024-07-19 RX ORDER — ONDANSETRON 2 MG/ML
4 INJECTION INTRAMUSCULAR; INTRAVENOUS EVERY 4 HOURS PRN
Status: DISCONTINUED | OUTPATIENT
Start: 2024-07-19 | End: 2024-07-25 | Stop reason: HOSPADM

## 2024-07-19 RX ORDER — GABAPENTIN 300 MG/1
600 CAPSULE ORAL 3 TIMES DAILY
Status: DISCONTINUED | OUTPATIENT
Start: 2024-07-19 | End: 2024-07-25 | Stop reason: HOSPADM

## 2024-07-19 RX ORDER — ACETAMINOPHEN 325 MG/1
650 TABLET ORAL EVERY 6 HOURS PRN
Status: DISCONTINUED | OUTPATIENT
Start: 2024-07-19 | End: 2024-07-25 | Stop reason: HOSPADM

## 2024-07-19 RX ORDER — HYDROCODONE BITARTRATE AND ACETAMINOPHEN 10; 325 MG/1; MG/1
1 TABLET ORAL EVERY 4 HOURS PRN
Status: DISCONTINUED | OUTPATIENT
Start: 2024-07-19 | End: 2024-07-19

## 2024-07-19 RX ORDER — METOPROLOL SUCCINATE 25 MG/1
50 TABLET, EXTENDED RELEASE ORAL EVERY EVENING
Status: DISCONTINUED | OUTPATIENT
Start: 2024-07-19 | End: 2024-07-25 | Stop reason: HOSPADM

## 2024-07-19 RX ORDER — IBUPROFEN 200 MG
600 CAPSULE ORAL 2 TIMES DAILY
COMMUNITY

## 2024-07-19 RX ORDER — POTASSIUM CHLORIDE 1500 MG/1
20 TABLET, EXTENDED RELEASE ORAL 2 TIMES DAILY
Status: DISCONTINUED | OUTPATIENT
Start: 2024-07-19 | End: 2024-07-25

## 2024-07-19 RX ORDER — LABETALOL HYDROCHLORIDE 5 MG/ML
10 INJECTION, SOLUTION INTRAVENOUS EVERY 4 HOURS PRN
Status: DISCONTINUED | OUTPATIENT
Start: 2024-07-19 | End: 2024-07-25 | Stop reason: HOSPADM

## 2024-07-19 RX ORDER — OXYCODONE HYDROCHLORIDE 5 MG/1
2.5 TABLET ORAL
Status: DISCONTINUED | OUTPATIENT
Start: 2024-07-19 | End: 2024-07-25 | Stop reason: HOSPADM

## 2024-07-19 RX ORDER — ONDANSETRON 4 MG/1
4 TABLET, ORALLY DISINTEGRATING ORAL EVERY 4 HOURS PRN
Status: DISCONTINUED | OUTPATIENT
Start: 2024-07-19 | End: 2024-07-25 | Stop reason: HOSPADM

## 2024-07-19 RX ORDER — ATORVASTATIN CALCIUM 80 MG/1
80 TABLET, FILM COATED ORAL NIGHTLY
Status: DISCONTINUED | OUTPATIENT
Start: 2024-07-19 | End: 2024-07-25 | Stop reason: HOSPADM

## 2024-07-19 RX ORDER — FUROSEMIDE 10 MG/ML
40 INJECTION INTRAMUSCULAR; INTRAVENOUS ONCE
Status: COMPLETED | OUTPATIENT
Start: 2024-07-19 | End: 2024-07-19

## 2024-07-19 RX ORDER — OXYCODONE HYDROCHLORIDE 5 MG/1
5 TABLET ORAL
Status: DISCONTINUED | OUTPATIENT
Start: 2024-07-19 | End: 2024-07-25 | Stop reason: HOSPADM

## 2024-07-19 RX ORDER — CITALOPRAM HYDROBROMIDE 20 MG/1
20 TABLET ORAL DAILY
Status: DISCONTINUED | OUTPATIENT
Start: 2024-07-20 | End: 2024-07-25 | Stop reason: HOSPADM

## 2024-07-19 RX ORDER — CARVEDILOL 3.12 MG/1
3.12 TABLET ORAL 2 TIMES DAILY WITH MEALS
Status: DISCONTINUED | OUTPATIENT
Start: 2024-07-19 | End: 2024-07-19

## 2024-07-19 RX ORDER — ASPIRIN 325 MG
325 TABLET ORAL EVERY EVENING
Status: DISCONTINUED | OUTPATIENT
Start: 2024-07-19 | End: 2024-07-24

## 2024-07-19 RX ORDER — LISINOPRIL 20 MG/1
20 TABLET ORAL 2 TIMES DAILY
Status: DISCONTINUED | OUTPATIENT
Start: 2024-07-19 | End: 2024-07-25 | Stop reason: HOSPADM

## 2024-07-19 RX ORDER — FUROSEMIDE 10 MG/ML
40 INJECTION INTRAMUSCULAR; INTRAVENOUS EVERY 8 HOURS
Status: DISCONTINUED | OUTPATIENT
Start: 2024-07-19 | End: 2024-07-25

## 2024-07-19 RX ORDER — ASPIRIN 325 MG
325 TABLET ORAL EVERY EVENING
COMMUNITY

## 2024-07-19 RX ADMIN — GABAPENTIN 600 MG: 300 CAPSULE ORAL at 17:44

## 2024-07-19 RX ADMIN — FUROSEMIDE 40 MG: 10 INJECTION, SOLUTION INTRAVENOUS at 21:04

## 2024-07-19 RX ADMIN — METOPROLOL SUCCINATE 50 MG: 25 TABLET, EXTENDED RELEASE ORAL at 17:44

## 2024-07-19 RX ADMIN — POTASSIUM CHLORIDE 20 MEQ: 1500 TABLET, EXTENDED RELEASE ORAL at 17:43

## 2024-07-19 RX ADMIN — ATORVASTATIN CALCIUM 80 MG: 80 TABLET, FILM COATED ORAL at 21:04

## 2024-07-19 RX ADMIN — SENNOSIDES AND DOCUSATE SODIUM 2 TABLET: 50; 8.6 TABLET ORAL at 17:45

## 2024-07-19 RX ADMIN — LISINOPRIL 20 MG: 20 TABLET ORAL at 17:44

## 2024-07-19 RX ADMIN — OMEPRAZOLE 40 MG: 20 CAPSULE, DELAYED RELEASE ORAL at 17:43

## 2024-07-19 RX ADMIN — FUROSEMIDE 40 MG: 10 INJECTION, SOLUTION INTRAVENOUS at 13:48

## 2024-07-19 RX ADMIN — OXYCODONE 5 MG: 5 TABLET ORAL at 18:25

## 2024-07-19 RX ADMIN — ASPIRIN 325 MG: 325 TABLET ORAL at 17:43

## 2024-07-19 SDOH — ECONOMIC STABILITY: TRANSPORTATION INSECURITY
IN THE PAST 12 MONTHS, HAS LACK OF RELIABLE TRANSPORTATION KEPT YOU FROM MEDICAL APPOINTMENTS, MEETINGS, WORK OR FROM GETTING THINGS NEEDED FOR DAILY LIVING?: NO

## 2024-07-19 SDOH — ECONOMIC STABILITY: TRANSPORTATION INSECURITY
IN THE PAST 12 MONTHS, HAS THE LACK OF TRANSPORTATION KEPT YOU FROM MEDICAL APPOINTMENTS OR FROM GETTING MEDICATIONS?: NO

## 2024-07-19 ASSESSMENT — SOCIAL DETERMINANTS OF HEALTH (SDOH)
WITHIN THE LAST YEAR, HAVE YOU BEEN KICKED, HIT, SLAPPED, OR OTHERWISE PHYSICALLY HURT BY YOUR PARTNER OR EX-PARTNER?: NO
IN THE PAST 12 MONTHS, HAS THE ELECTRIC, GAS, OIL, OR WATER COMPANY THREATENED TO SHUT OFF SERVICE IN YOUR HOME?: NO
WITHIN THE PAST 12 MONTHS, THE FOOD YOU BOUGHT JUST DIDN'T LAST AND YOU DIDN'T HAVE MONEY TO GET MORE: NEVER TRUE
WITHIN THE LAST YEAR, HAVE TO BEEN RAPED OR FORCED TO HAVE ANY KIND OF SEXUAL ACTIVITY BY YOUR PARTNER OR EX-PARTNER?: NO
WITHIN THE LAST YEAR, HAVE YOU BEEN HUMILIATED OR EMOTIONALLY ABUSED IN OTHER WAYS BY YOUR PARTNER OR EX-PARTNER?: NO
WITHIN THE LAST YEAR, HAVE YOU BEEN HUMILIATED OR EMOTIONALLY ABUSED IN OTHER WAYS BY YOUR PARTNER OR EX-PARTNER?: NO
WITHIN THE LAST YEAR, HAVE YOU BEEN AFRAID OF YOUR PARTNER OR EX-PARTNER?: NO
WITHIN THE LAST YEAR, HAVE YOU BEEN AFRAID OF YOUR PARTNER OR EX-PARTNER?: NO
WITHIN THE LAST YEAR, HAVE YOU BEEN KICKED, HIT, SLAPPED, OR OTHERWISE PHYSICALLY HURT BY YOUR PARTNER OR EX-PARTNER?: NO
WITHIN THE LAST YEAR, HAVE TO BEEN RAPED OR FORCED TO HAVE ANY KIND OF SEXUAL ACTIVITY BY YOUR PARTNER OR EX-PARTNER?: NO
WITHIN THE PAST 12 MONTHS, YOU WORRIED THAT YOUR FOOD WOULD RUN OUT BEFORE YOU GOT THE MONEY TO BUY MORE: NEVER TRUE

## 2024-07-19 ASSESSMENT — LIFESTYLE VARIABLES
TOTAL SCORE: 0
EVER HAD A DRINK FIRST THING IN THE MORNING TO STEADY YOUR NERVES TO GET RID OF A HANGOVER: NO
AVERAGE NUMBER OF DAYS PER WEEK YOU HAVE A DRINK CONTAINING ALCOHOL: 0
CONSUMPTION TOTAL: NEGATIVE
TOTAL SCORE: 0
HAVE YOU EVER FELT YOU SHOULD CUT DOWN ON YOUR DRINKING: NO
TOTAL SCORE: 0
DOES PATIENT WANT TO STOP DRINKING: NO
HAVE PEOPLE ANNOYED YOU BY CRITICIZING YOUR DRINKING: NO
ON A TYPICAL DAY WHEN YOU DRINK ALCOHOL HOW MANY DRINKS DO YOU HAVE: 0
HOW MANY TIMES IN THE PAST YEAR HAVE YOU HAD 5 OR MORE DRINKS IN A DAY: 0
ALCOHOL_USE: NO
EVER FELT BAD OR GUILTY ABOUT YOUR DRINKING: NO

## 2024-07-19 ASSESSMENT — ENCOUNTER SYMPTOMS
GASTROINTESTINAL NEGATIVE: 1
FOCAL WEAKNESS: 0
DIAPHORESIS: 0
PSYCHIATRIC NEGATIVE: 1
BACK PAIN: 1
FEVER: 0
BRUISES/BLEEDS EASILY: 0
ABDOMINAL PAIN: 0
BLOOD IN STOOL: 0
HEARTBURN: 0
WHEEZING: 0
SEIZURES: 0
PALPITATIONS: 0
CHILLS: 0
SHORTNESS OF BREATH: 1
LOSS OF CONSCIOUSNESS: 0
CONSTITUTIONAL NEGATIVE: 1
CONSTIPATION: 0
FALLS: 1
NAUSEA: 0
DOUBLE VISION: 0
HEMOPTYSIS: 0
DIARRHEA: 0
SORE THROAT: 0
HEADACHES: 1
EYES NEGATIVE: 1
NERVOUS/ANXIOUS: 0
CARDIOVASCULAR NEGATIVE: 1
VOMITING: 0
COUGH: 0
DIZZINESS: 1

## 2024-07-19 ASSESSMENT — COGNITIVE AND FUNCTIONAL STATUS - GENERAL
DAILY ACTIVITIY SCORE: 22
MOBILITY SCORE: 23
SUGGESTED CMS G CODE MODIFIER MOBILITY: CI
SUGGESTED CMS G CODE MODIFIER DAILY ACTIVITY: CJ
TOILETING: A LITTLE
HELP NEEDED FOR BATHING: A LITTLE
CLIMB 3 TO 5 STEPS WITH RAILING: A LITTLE

## 2024-07-19 ASSESSMENT — VISUAL ACUITY: OU: 1

## 2024-07-19 ASSESSMENT — PATIENT HEALTH QUESTIONNAIRE - PHQ9
5. POOR APPETITE OR OVEREATING: NEARLY EVERY DAY
6. FEELING BAD ABOUT YOURSELF - OR THAT YOU ARE A FAILURE OR HAVE LET YOURSELF OR YOUR FAMILY DOWN: NOT AL ALL
4. FEELING TIRED OR HAVING LITTLE ENERGY: SEVERAL DAYS
SUM OF ALL RESPONSES TO PHQ9 QUESTIONS 1 AND 2: 1
8. MOVING OR SPEAKING SO SLOWLY THAT OTHER PEOPLE COULD HAVE NOTICED. OR THE OPPOSITE, BEING SO FIGETY OR RESTLESS THAT YOU HAVE BEEN MOVING AROUND A LOT MORE THAN USUAL: NOT AT ALL
3. TROUBLE FALLING OR STAYING ASLEEP OR SLEEPING TOO MUCH: SEVERAL DAYS
1. LITTLE INTEREST OR PLEASURE IN DOING THINGS: SEVERAL DAYS
2. FEELING DOWN, DEPRESSED, IRRITABLE, OR HOPELESS: NOT AT ALL
9. THOUGHTS THAT YOU WOULD BE BETTER OFF DEAD, OR OF HURTING YOURSELF: NOT AT ALL
SUM OF ALL RESPONSES TO PHQ QUESTIONS 1-9: 7
7. TROUBLE CONCENTRATING ON THINGS, SUCH AS READING THE NEWSPAPER OR WATCHING TELEVISION: SEVERAL DAYS

## 2024-07-19 ASSESSMENT — FIBROSIS 4 INDEX
FIB4 SCORE: 4.03
FIB4 SCORE: 4.03
FIB4 SCORE: 3.8

## 2024-07-19 ASSESSMENT — PAIN DESCRIPTION - PAIN TYPE
TYPE: ACUTE PAIN
TYPE: ACUTE PAIN

## 2024-07-19 NOTE — ED NOTES
Pt transferred with WESLEY Torres. Pt on tele monitoring and 8L NC. All personal belongings with pt off unit.

## 2024-07-19 NOTE — ASSESSMENT & PLAN NOTE
Patient fell on the stairs today hitting the back of her head.  CAT scan of the head done shows no acute intracranial abnormality  Patient externally does not have any laceration or hematoma at this point.  Continue with pain management and monitor neurological status

## 2024-07-19 NOTE — ED PROVIDER NOTES
ED Provider Note    CHIEF COMPLAINT  No chief complaint on file.  Frequent falls, respiratory failure, multiple injuries    EXTERNAL RECORDS REVIEWED  Reviewed based on lab and previous workup for comparison.    HPI/ROS  LIMITATION TO HISTORY   Patient is in moderate distress having difficult time providing history.  OUTSIDE HISTORIAN(S):  History obtained from EMS who brought the patient to the department.    Silvia Orellana is a 89 y.o. female who presents to the emergency department as a TBI.  The patient apparently has been falling multiple times she hit her head, injured her neck and injured her back.  She is also has had shortness of breath and increasing oxygen requirements.  She was noted be hypoxemic off of her baseline profoundly hypertensive for EMS.  She is on anticoagulations brought in as a code TBI    PAST MEDICAL HISTORY   has a past medical history of Arthritis, Bowel habit changes, Bradycardia, Breast cancer (HCC), Breath shortness, CAD (coronary artery disease), Cancer (HCC) (2003), Cancer (HCC) (2007), Cancer (HCC) (2016), Cataract, CHF (congestive heart failure) (HCC), Chronic pain, Dental disorder, Diverticulosis, Fall, GERD (gastroesophageal reflux disease), Heart burn, Clostridium difficile infection (2015), Hyperlipidemia, Hypertension, Indigestion, Myocardial infarct (HCC) (2002), Pneumonia (2014), Stroke ([facility]) (2001; 2012), Urinary bladder disorder, and Urinary incontinence.    SURGICAL HISTORY   has a past surgical history that includes cholecystectomy; lumpectomy; bladder biopsy; revise total hip replacement (3/24/2015); revise acetabular part of total hip (2010); orbital fracture orif (Left, 7/6/2015); orif, fracture, femur (Right, 8/10/2015); hip arthroplasty total (Left); angiogram; other cardiac surgery (2002); other cardiac surgery (2012); gyn surgery (1961); orbital fracture orif (Left, 5/23/2016); multiple coronary artery bypass; zzz cardiac cath; transcatheter aortic valve  "replacement (N/A, 9/25/2017); and jessica (9/25/2017).    FAMILY HISTORY  Family History   Problem Relation Age of Onset    Heart Attack Neg Hx        SOCIAL HISTORY  Social History     Tobacco Use    Smoking status: Never    Smokeless tobacco: Never   Substance and Sexual Activity    Alcohol use: Yes     Comment: occ    Drug use: No    Sexual activity: Not on file       CURRENT MEDICATIONS  Home Medications    **Home medications have not yet been reviewed for this encounter**       Audit from Redirected Encounters    **Home medications have not yet been reviewed for this encounter**         ALLERGIES  No Known Allergies    PHYSICAL EXAM  VITAL SIGNS: BP (!) 163/96   Pulse 63   Temp 36.1 °C (97 °F) (Temporal)   Resp (!) 21   Ht 1.626 m (5' 4\")   Wt 72.6 kg (160 lb)   SpO2 98%   BMI 27.46 kg/m²    Constitutional: Awake alert ill-appearing moderate distress  HENT: Normocephalic, Atraumatic, Bilateral external ears normal, Oropharynx dry mucous membranes small amount of blood in the lower lip.  Eyes: PERRL, EOMI, Conjunctiva normal, No discharge.   Neck: Significant kyphosis midline tenderness.  Cardiovascular: Normal heart rate, Normal rhythm, No murmurs, No rubs, No gallops.   Thorax & Lungs: Crackles bilaterally, posterior chest wall tenderness  Abdomen: Bowel sounds normal, Soft, No tenderness  Skin: Warm, Dry, No erythema, No rash.   Back: Significant kyphoscoliosis midthoracic tenderness.  No lumbar  Musculoskeletal: Good range of motion in all major joints.  Moderate edema good range of motion both lower extremities.  Neurologic: Alert, No focal deficits noted.   Psychiatric: Affect normal      EKG/LABS  Results for orders placed or performed during the hospital encounter of 07/19/24   Lactic Acid   Result Value Ref Range    Lactic Acid 1.8 0.5 - 2.0 mmol/L   CBC with Differential   Result Value Ref Range    WBC 7.2 4.8 - 10.8 K/uL    RBC 3.52 (L) 4.20 - 5.40 M/uL    Hemoglobin 11.6 (L) 12.0 - 16.0 g/dL    " Hematocrit 35.5 (L) 37.0 - 47.0 %    .9 (H) 81.4 - 97.8 fL    MCH 33.0 27.0 - 33.0 pg    MCHC 32.7 32.2 - 35.5 g/dL    RDW 51.4 (H) 35.9 - 50.0 fL    Platelet Count 219 164 - 446 K/uL    MPV 9.5 9.0 - 12.9 fL    Neutrophils-Polys 64.50 44.00 - 72.00 %    Lymphocytes 28.30 22.00 - 41.00 %    Monocytes 4.70 0.00 - 13.40 %    Eosinophils 1.40 0.00 - 6.90 %    Basophils 0.40 0.00 - 1.80 %    Immature Granulocytes 0.70 0.00 - 0.90 %    Nucleated RBC 0.00 0.00 - 0.20 /100 WBC    Neutrophils (Absolute) 4.63 1.82 - 7.42 K/uL    Lymphs (Absolute) 2.03 1.00 - 4.80 K/uL    Monos (Absolute) 0.34 0.00 - 0.85 K/uL    Eos (Absolute) 0.10 0.00 - 0.51 K/uL    Baso (Absolute) 0.03 0.00 - 0.12 K/uL    Immature Granulocytes (abs) 0.05 0.00 - 0.11 K/uL    NRBC (Absolute) 0.00 K/uL   Complete Metabolic Panel   Result Value Ref Range    Sodium 144 135 - 145 mmol/L    Potassium 3.4 (L) 3.6 - 5.5 mmol/L    Chloride 100 96 - 112 mmol/L    Co2 32 20 - 33 mmol/L    Anion Gap 12.0 7.0 - 16.0    Glucose 150 (H) 65 - 99 mg/dL    Bun 9 8 - 22 mg/dL    Creatinine 0.80 0.50 - 1.40 mg/dL    Calcium 9.3 8.5 - 10.5 mg/dL    Correct Calcium 9.9 8.5 - 10.5 mg/dL    AST(SGOT) 30 12 - 45 U/L    ALT(SGPT) 10 2 - 50 U/L    Alkaline Phosphatase 134 (H) 30 - 99 U/L    Total Bilirubin 0.7 0.1 - 1.5 mg/dL    Albumin 3.2 3.2 - 4.9 g/dL    Total Protein 6.2 6.0 - 8.2 g/dL    Globulin 3.0 1.9 - 3.5 g/dL    A-G Ratio 1.1 g/dL   Urinalysis    Specimen: Urine   Result Value Ref Range    Micro Urine Req Microscopic    TROPONIN   Result Value Ref Range    Troponin T 39 (H) 6 - 19 ng/L   proBrain Natriuretic Peptide, NT   Result Value Ref Range    NT-proBNP 9336 (H) 0 - 125 pg/mL   ESTIMATED GFR   Result Value Ref Range    GFR (CKD-EPI) 69 >60 mL/min/1.73 m 2   POC CoV-2, FLU A/B, RSV by PCR   Result Value Ref Range    POC Influenza A RNA, PCR Negative Negative    POC Influenza B RNA, PCR Negative Negative    POC RSV, by PCR Negative Negative    POC SARS-CoV-2,  PCR NotDetected        I have independently interpreted this EKG    RADIOLOGY/PROCEDURES   I have independently interpreted the diagnostic imaging associated with this visit and am waiting the final reading from the radiologist.   My preliminary interpretation is as follows: I reviewed the CT results and agree with radiologist interpretation    Radiologist interpretation:  DX-CHEST-PORTABLE (1 VIEW)   Final Result      Cardiomegaly with probable pulmonary edema.      Possible moderate left pleural effusion with associated compressive atelectasis. Correlate clinically for infection.      CT-TSPINE W/O PLUS RECONS   Final Result      1.  L1 vertebral body fracture without appreciable height loss. The L1 level is incompletely imaged. Recommend CT or MRI of the lumbar spine for completion of assessment.   2.  Osteopenia   3.  Multilevel multifactorial degenerative changes   4.  LEFT larger than RIGHT pleural effusions with overlying atelectasis   5.  Enlarged precarinal lymph node      CT-CSPINE WITHOUT PLUS RECONS   Final Result      1.  No cervical spine fracture detected.   2.  Large bilateral pleural effusions.      CT-HEAD W/O   Final Result      1.  Chronic microvascular ischemic type changes.   2.  No acute intracranial abnormality.                   COURSE & MEDICAL DECISION MAKING    ASSESSMENT, COURSE AND PLAN  Care Narrative:   89-year-old female presents after multiple falls.  She was noted to be short of breath and hypertensive for EMS and felt to have bilateral crackles.  There was a concern for possible heart failure she was treated with nitroglycerin by EMS.    The patient meets TBI criteria because she has been following and she is on blood thinners.  I saw and evaluated patient upon arrival.  She requires CTs of her head and cervical and thoracic spine for trauma.    She will be worked up for respiratory failure with labs, and imaging.  differential diagnoses include not limited to sepsis, heart  failure, pneumonia, COVID, pericardial effusion pleural effusion.    L1 has a lumbar vertebral body fracture.  No significant height loss.  She has significant pleural effusions and heart failure she is given IV Lasix.    Been able to titrate her oxygen down.  She will be hospitalized for diuresis and possible thoracentesis.  She may need further evaluation of her lumbar fracture.    Discussed case with Dr. Foster by the hospitalist and care transferred at 1410.        ADDITIONAL PROBLEMS MANAGED  Congestive heart failure  Pleural effusion    DISPOSITION AND DISCUSSIONS  I have discussed management of the patient with the following physicians and VISHNU's: Dr. Cohn.        FINAL DIAGNOSIS  1. Fall, initial encounter    2. Closed head injury, initial encounter    3. Acute respiratory failure with hypoxia (HCC)    4. Congestive heart failure, unspecified HF chronicity, unspecified heart failure type (HCC)    5. Pleural effusion           Electronically signed by: Eze Herrera M.D., 7/19/2024 12:38 PM

## 2024-07-19 NOTE — ASSESSMENT & PLAN NOTE
Resolved  Patient had significant bilateral pleural effusion worse on the left than the right  -Resolved with Urgent thoracentesis ordered through radiology with ultrasound guidance.  -1550 ml straw colored fluid drained, left lower lobe's disease likely pneumonia.    7/22/2024:  blood cultures grew gram+cocci clusters likely staph. Negative for MRSA and staph aureus.  - continue Augmentin 875 mg Q12H for appropriate coverage. Day 3/14 antibiotics following a day of cefepime. Reassess   - CTA chest impression: R/O Pulmonary embolism. Small B/L pleural effusion (right>left)

## 2024-07-19 NOTE — ASSESSMENT & PLAN NOTE
History of coronary artery disease with three-vessel bypass surgery in 2002  Patient also had to have PTCA in 2012 one-vessel.  Medication management optimization including beta-blocker ACE inhibitor statin and aspirin

## 2024-07-19 NOTE — ED NOTES
Non rebreather removed & pt placed on 8L O2 by nasal cannula. On 6L at baseline, Tolerating well, sats 95%.

## 2024-07-19 NOTE — ASSESSMENT & PLAN NOTE
Aggressive diuretic management will be utilized.  Will give her 40 mg IV Lasix every 8 hours and monitor intake output  ECHO ordered.

## 2024-07-19 NOTE — H&P
Hospital Medicine History & Physical Note    Date of Service  7/19/2024    Primary Care Physician  Joshua Rendon M.D.    Consultants  None    Specialist Names: None    Code Status  DNAR/DNI    Chief Complaint  Chief Complaint   Patient presents with    T-5000 GLF     PT resides at home where she experienced a GF, - LOC, + head strike, + blood thinners.     Shortness of Breath     PT is on 6L of oxygen at baseline, requiring 15L of oxygen to remain adequate oxygenation upon EMS arrival. PT expression some congestion and SOB.        History of Presenting Illness  Silvia Orellana is a 89 y.o. female who presented 7/19/2024 with shortness of breath after falling at home.  The patient apparently was going down the steps and fell backwards hitting her head.  She did not lose consciousness but she has severe pain in the back of her head on the right side.  Patient does not have a contusion there there is nose laceration.  Head CT shows no acute intracranial abnormality.  Patient was found to be hypoxic and had to be placed initially on nonrebreather by ambulance since then has been able to be titrated down to 6 L by nasal cannula.  Patient has pulmonary edema and bilateral pleural effusions more on the left than the right.  Patient will need at this point diuresis and then also thoracentesis.  Patient will need optimization of her medical management for heart failure.  In the past patient's left ventricular ejection fraction was low at 53% and she also had right-sided heart failure.  The patient will need at this point repeat echocardiogram and aggressive diuresis and monitoring of her potassium level and supplementation of it.  Patient will need PT OT evaluation and she will need outpatient follow-up send home safety to be established before she can be discharged.    I discussed the plan of care with patient, bedside RN, and emergency room physician Dr. Eze Tamayo .    Review of Systems  Review of Systems    Constitutional: Negative.  Negative for chills, diaphoresis and fever.   HENT: Negative.  Negative for hearing loss, nosebleeds and sore throat.    Eyes: Negative.  Negative for double vision.   Respiratory:  Positive for shortness of breath. Negative for cough, hemoptysis and wheezing.    Cardiovascular: Negative.  Negative for chest pain, palpitations and leg swelling.   Gastrointestinal: Negative.  Negative for abdominal pain, blood in stool, constipation, diarrhea, heartburn, nausea and vomiting.   Genitourinary: Negative.  Negative for frequency, hematuria and urgency.   Musculoskeletal:  Positive for back pain and falls. Negative for joint pain.   Skin: Negative.  Negative for itching and rash.   Neurological:  Positive for dizziness and headaches. Negative for focal weakness, seizures and loss of consciousness.   Endo/Heme/Allergies: Negative.  Does not bruise/bleed easily.   Psychiatric/Behavioral: Negative.  Negative for suicidal ideas. The patient is not nervous/anxious.    All other systems reviewed and are negative.      Past Medical History   has a past medical history of Arthritis, Bowel habit changes, Bradycardia, Breast cancer ([facility]), Breath shortness, CAD (coronary artery disease), Cancer ([facility]) (2003), Cancer ([facility]) (2007), Cancer ([facility]) (2016), Cataract, CHF (congestive heart failure) ([facility]), Chronic pain, Dental disorder, Diverticulosis, Fall, GERD (gastroesophageal reflux disease), Heart burn, Clostridium difficile infection (2015), Hyperlipidemia, Hypertension, Indigestion, Myocardial infarct ([facility]) (2002), Pneumonia (2014), Stroke ([facility]) (2001; 2012), Urinary bladder disorder, and Urinary incontinence.    Surgical History   has a past surgical history that includes cholecystectomy; lumpectomy; bladder biopsy; pr revise total hip replacement (3/24/2015); pr revise acetabular part of total hip (2010); orbital fracture orif (Left, 7/6/2015); orif, fracture, femur (Right, 8/10/2015); hip arthroplasty  total (Left); angiogram; other cardiac surgery (2002); other cardiac surgery (2012); gyn surgery (1961); orbital fracture orif (Left, 5/23/2016); multiple coronary artery bypass; zzz cardiac cath; transcatheter aortic valve replacement (N/A, 9/25/2017); and jessica (9/25/2017).     Family History  Patient reports no family history of heart disease  Family history reviewed with patient. There is no family history that is pertinent to the chief complaint.     Social History   reports that she has never smoked. She has never used smokeless tobacco. She reports current alcohol use. She reports that she does not use drugs.    Allergies  No Known Allergies    Medications  Prior to Admission Medications   Prescriptions Last Dose Informant Patient Reported? Taking?   Ascorbic Acid (VITAMIN C) 1000 MG Tab unk at unk Historical Yes Yes   Sig: Take 1,000 mg by mouth every day.   Calcium 600 MG Tab unk at unk Historical Yes Yes   Sig: Take 600 mg by mouth 2 times a day.   Cyanocobalamin (VITAMIN B-12) 5000 MCG SL Tab unk at unk Historical Yes Yes   Sig: Place 1 Tab under tongue every morning.   HYDROcodone/acetaminophen (NORCO)  MG Tab unk at unk Historical Yes Yes   Sig: Take 1 Tablet by mouth every four hours as needed for Moderate Pain.   Lutein 40 MG Cap unk at unk Historical Yes Yes   Sig: Take 40 mg by mouth every day.   aspirin (ASA) 325 MG Tab unk at unk Historical Yes Yes   Sig: Take 325 mg by mouth every evening.   atorvastatin (LIPITOR) 80 MG tablet unk at unk Historical No No   Sig: Take 1 Tablet by mouth every evening.   citalopram (CELEXA) 20 MG Tab unk at unk Historical Yes Yes   Sig: Take 20 mg by mouth every day.   furosemide (LASIX) 20 MG Tab unk at unk Historical No No   Sig: Take 1 Tablet by mouth every day.   gabapentin (NEURONTIN) 600 MG tablet 7/19/2024 at am Patient, Historical Yes Yes   Sig: Take 600 mg by mouth 3 times a day. 600 mg tablet x 3 tablets = 1800 mg   ipratropium-albuterol (DUONEB)  0.5-2.5 (3) MG/3ML nebulizer solution PRN Historical No No   Sig: Take 3 mL by nebulization 4 times a day.   lisinopril (PRINIVIL) 20 MG Tab unk at unk Historical No Yes   Sig: Take 1 Tablet by mouth 2 times a day.   metoprolol SR (TOPROL XL) 50 MG TABLET SR 24 HR unk at k Historical No Yes   Sig: Take 1 Tablet by mouth every day.   multivitamin (THERAGRAN) Tab unk at unk Historical Yes Yes   Sig: Take 1 Tab by mouth every morning.   omeprazole (PRILOSEC) 40 MG delayed-release capsule unk at k Historical Yes No   Sig: Take 40 mg by mouth every day.   ondansetron (ZOFRAN ODT) 4 MG TABLET DISPERSIBLE unk at k Historical No Yes   Sig: Take 1 Tablet by mouth every 8 hours as needed for Nausea/Vomiting.      Facility-Administered Medications: None       Physical Exam  Temp:  [36.1 °C (97 °F)] 36.1 °C (97 °F)  Pulse:  [61-63] 61  Resp:  [19-21] 20  BP: (163-170)/(74-96) 170/74  SpO2:  [95 %-98 %] 95 %  Blood Pressure : (!) 170/74   Temperature: 36.1 °C (97 °F)   Pulse: 61   Respiration: 20   Pulse Oximetry: 95 %       Physical Exam  Vitals and nursing note reviewed. Exam conducted with a chaperone present.   Constitutional:       General: She is awake.      Appearance: Normal appearance. She is well-developed, well-groomed and normal weight. She is ill-appearing.   HENT:      Head: Normocephalic and atraumatic.      Jaw: There is normal jaw occlusion. No trismus.      Salivary Glands: Right salivary gland is not tender. Left salivary gland is not tender.      Right Ear: External ear normal.      Left Ear: External ear normal.      Mouth/Throat:      Mouth: Mucous membranes are dry.      Dentition: Abnormal dentition (Patient has multiple broken teeth and abnormal dentition with gums bleeding as well.  She is apparently seeing oral surgery as an outpatient).      Pharynx: Oropharynx is clear.   Eyes:      General: Lids are normal. Vision grossly intact.      Extraocular Movements: Extraocular movements intact.       Conjunctiva/sclera: Conjunctivae normal.      Right eye: Right conjunctiva is not injected. No exudate.     Left eye: Left conjunctiva is not injected. No exudate.     Pupils: Pupils are equal, round, and reactive to light.   Neck:      Thyroid: No thyroid mass.      Vascular: No hepatojugular reflux or JVD.      Trachea: No abnormal tracheal secretions or tracheal deviation.   Cardiovascular:      Rate and Rhythm: Regular rhythm. Tachycardia present. Occasional Extrasystoles are present.     Pulses: Normal pulses.      Heart sounds: Normal heart sounds. No murmur heard.     No friction rub.   Pulmonary:      Effort: Tachypnea and accessory muscle usage present.      Breath sounds: Decreased air movement present. Examination of the left-upper field reveals rhonchi. Examination of the right-middle field reveals rhonchi. Examination of the left-middle field reveals decreased breath sounds. Examination of the right-lower field reveals decreased breath sounds. Examination of the left-lower field reveals decreased breath sounds. Decreased breath sounds and rhonchi present.   Abdominal:      General: Abdomen is flat. Bowel sounds are normal.      Palpations: Abdomen is soft.      Tenderness: There is no abdominal tenderness. There is no right CVA tenderness or left CVA tenderness.      Hernia: No hernia is present.   Musculoskeletal:         General: Normal range of motion.      Cervical back: Full passive range of motion without pain, normal range of motion and neck supple. No rigidity. No muscular tenderness.      Right lower leg: No edema.      Left lower leg: No edema.   Lymphadenopathy:      Head:      Right side of head: No submental adenopathy.      Left side of head: No submental adenopathy.      Cervical:      Right cervical: No superficial cervical adenopathy.     Left cervical: No superficial cervical adenopathy.      Upper Body:      Right upper body: No supraclavicular adenopathy.      Left upper body: No  supraclavicular adenopathy.   Skin:     General: Skin is warm and dry.      Capillary Refill: Capillary refill takes 2 to 3 seconds.      Coloration: Skin is not cyanotic or pale.      Findings: No abrasion or bruising.   Neurological:      General: No focal deficit present.      Mental Status: She is alert and oriented to person, place, and time. Mental status is at baseline.      GCS: GCS eye subscore is 4. GCS verbal subscore is 5. GCS motor subscore is 6.      Cranial Nerves: No cranial nerve deficit.      Sensory: No sensory deficit.      Motor: Motor function is intact.      Deep Tendon Reflexes:      Reflex Scores:       Tricep reflexes are 2+ on the right side and 2+ on the left side.       Bicep reflexes are 2+ on the right side and 2+ on the left side.       Brachioradialis reflexes are 2+ on the right side and 2+ on the left side.       Patellar reflexes are 2+ on the right side and 2+ on the left side.       Achilles reflexes are 2+ on the right side and 2+ on the left side.  Psychiatric:         Attention and Perception: Attention and perception normal.         Mood and Affect: Mood normal.         Speech: Speech normal.         Behavior: Behavior normal. Behavior is cooperative.         Thought Content: Thought content normal.         Cognition and Memory: Cognition and memory normal.         Judgment: Judgment normal.         Laboratory:  Recent Labs     07/19/24  1300   WBC 7.2   RBC 3.52*   HEMOGLOBIN 11.6*   HEMATOCRIT 35.5*   .9*   MCH 33.0   MCHC 32.7   RDW 51.4*   PLATELETCT 219   MPV 9.5     Recent Labs     07/19/24  1300   SODIUM 144   POTASSIUM 3.4*   CHLORIDE 100   CO2 32   GLUCOSE 150*   BUN 9   CREATININE 0.80   CALCIUM 9.3     Recent Labs     07/19/24  1300   ALTSGPT 10   ASTSGOT 30   ALKPHOSPHAT 134*   TBILIRUBIN 0.7   GLUCOSE 150*         Recent Labs     07/19/24  1300   NTPROBNP 9336*         Recent Labs     07/19/24  1300   TROPONINT 39*       Imaging:  DX-CHEST-PORTABLE (1  VIEW)   Final Result      Cardiomegaly with probable pulmonary edema.      Possible moderate left pleural effusion with associated compressive atelectasis. Correlate clinically for infection.      CT-TSPINE W/O PLUS RECONS   Final Result      1.  L1 vertebral body fracture without appreciable height loss. The L1 level is incompletely imaged. Recommend CT or MRI of the lumbar spine for completion of assessment.   2.  Osteopenia   3.  Multilevel multifactorial degenerative changes   4.  LEFT larger than RIGHT pleural effusions with overlying atelectasis   5.  Enlarged precarinal lymph node      CT-CSPINE WITHOUT PLUS RECONS   Final Result      1.  No cervical spine fracture detected.   2.  Large bilateral pleural effusions.      CT-HEAD W/O   Final Result      1.  Chronic microvascular ischemic type changes.   2.  No acute intracranial abnormality.               US-THORACENTESIS PUNCTURE LEFT    (Results Pending)   CT-LSPINE W/O PLUS RECONS    (Results Pending)   EC-ECHOCARDIOGRAM COMPLETE W/O CONT    (Results Pending)       X-Ray:  I have personally reviewed the images and compared with prior images.  EKG:  I have personally reviewed the images and compared with prior images.    Assessment/Plan:  Justification for Admission Status  I anticipate this patient will require at least two midnights for appropriate medical management, necessitating inpatient admission because patient is acute respiratory failure with pulmonary edema and bilateral pleural effusions will require at least 48 hours of inpatient management    Patient will need a Telemetry bed on MEDICAL service .  The need is secondary to acute hypoxic respiratory failure.    * Acute respiratory failure with hypoxemia (HCC)- (present on admission)  Assessment & Plan  Patient comes in complaining of respiratory failure.  Patient had to be initially placed on a nonrebreather.  Currently she is down to 6 L by nasal cannula.  Patient was hypoxic on room air  Patient  will need to continue with oxygen support to keep oxygen saturations above 90%.    Pulmonary edema- (present on admission)  Assessment & Plan  Aggressive diuretic management will be utilized.  Will give her 40 mg IV Lasix every 8 hours and monitor intake output    Pleural effusion, bilateral- (present on admission)  Assessment & Plan  Patient has significant bilateral pleural effusion worse on the left than the right  Urgent thoracentesis ordered through radiology with ultrasound guidance.    Closed wedge compression fracture of L1 vertebra (HCC)- (present on admission)  Assessment & Plan  On the T-spine CT there appears to be an L1 compression fracture they recommend doing a CT of the lumbar spine directly to evaluate  Continue with pain management patient may need a TLSO brace.  Will need PT OT evaluation     Closed head injury- (present on admission)  Assessment & Plan  Patient fell on the stairs today hitting the back of her head.  CAT scan of the head done shows no acute intracranial abnormality  Patient externally does not have any laceration or hematoma at this point.  Continue with pain management and monitor neurological status    Hyperglycemia- (present on admission)  Assessment & Plan  Check a hemoglobin A1c and continue to monitor hypoglycemia    Elevated alkaline phosphatase level- (present on admission)  Assessment & Plan  Check a GGT level    Hypokalemia- (present on admission)  Assessment & Plan  Potassium slightly low at 3.4 but with diuretics patient will continue to decline in her potassium levels  Given 20 mg of potassium twice daily    Carotid artery stenosis- (present on admission)  Assessment & Plan  Continue aspirin and statin    CAD- (present on admission)  Assessment & Plan  History of coronary artery disease with three-vessel bypass surgery in 2002  Patient also had to have PTCA in 2012 one-vessel.  Medication management optimization including beta-blocker ACE inhibitor statin and  aspirin    HTN (hypertension)- (present on admission)  Assessment & Plan  Blood pressure significantly elevated  Apparently she forgot to take her medications this morning  Optimize medication management and monitor blood pressure to keep systolic blood pressure less than 140 diastolic under 90    DNR (do not resuscitate)- (present on admission)  Assessment & Plan  Discussed advanced directive with the patient she wishes to be DO NOT RESUSCITATE CODE STATUS    Macrocytic anemia- (present on admission)  Assessment & Plan  Monitor H&H if drops below 7 or 21 transfuse    S/P TAVR (transcatheter aortic valve replacement)- (present on admission)  Assessment & Plan  Outpatient follow-ups with cardiology    CKD (chronic kidney disease) stage 3, GFR 30-59 ml/min- (present on admission)  Assessment & Plan  Monitor renal functions avoid nephrotoxic medications    Hyperlipidemia- (present on admission)  Assessment & Plan  Low-fat low-cholesterol diet  Statin  Fasting lipid panel        VTE prophylaxis: SCDs/TEDs

## 2024-07-19 NOTE — ASSESSMENT & PLAN NOTE
Potassium slightly low at 3.4 but with diuretics patient will continue to decline in her potassium levels  Given 20 mg of potassium twice daily

## 2024-07-19 NOTE — ED NOTES
Urine cath done with mini cath using aseptic technique.  Procedure explained to pt prior to start and verbalized understanding.  Urine collected and sent to lab.

## 2024-07-19 NOTE — ED NOTES
Med rec is complete per list, PPt , and Daughter.  Daughter unsure what medications Pt had today.   Pt only remembers taking Gabapentin today.    Has patient had any outside antibiotics in the last 30 days? N    Any Anticoagulants (rivaroxaban, apixaban, edoxaban, dabigatran, warfarin, enoxaparin) taken in the last 14 days? N     Pharmacy/Pharmacies Pt utilizes :   [phone]

## 2024-07-19 NOTE — ASSESSMENT & PLAN NOTE
On the T-spine CT there appears to be an L1 compression fracture they recommend doing a CT of the lumbar spine directly to evaluate  -Consulted Neurosurgery for lumbar compression fracture, appreciate recommendations. No intervention at this time given her advanced age  -PT OT evaluation -TLSO custom brace ready.

## 2024-07-19 NOTE — ASSESSMENT & PLAN NOTE
Blood pressure significantly elevated  Apparently she forgot to take her medications this morning  Optimize medication management and monitor blood pressure to keep systolic blood pressure less than 140 diastolic under 90

## 2024-07-19 NOTE — ASSESSMENT & PLAN NOTE
Patient on 4L NC, lower than baseline of 6L NC    Patient comes in complaining of respiratory failure.  Patient had to be initially placed on a nonrebreather.  Currently she is down to 6 L by nasal cannula.  Patient was hypoxic on room air  Patient will need to continue with oxygen support to keep oxygen saturations above 90%.

## 2024-07-19 NOTE — ED NOTES
U/S at bedside for thoracentesis with APRN.  Bed ready for pt - [id number]. Report called by WESLEY Kyle. Plan is to call SMT back when procedure is complete.

## 2024-07-20 ENCOUNTER — APPOINTMENT (OUTPATIENT)
Dept: RADIOLOGY | Facility: MEDICAL CENTER | Age: 89
DRG: 314 | End: 2024-07-20
Payer: MEDICARE

## 2024-07-20 PROBLEM — N39.0 UTI (URINARY TRACT INFECTION): Status: ACTIVE | Noted: 2024-07-20

## 2024-07-20 LAB
ANION GAP SERPL CALC-SCNC: 10 MMOL/L (ref 7–16)
ANION GAP SERPL CALC-SCNC: 14 MMOL/L (ref 7–16)
B PARAP IS1001 DNA NPH QL NAA+NON-PROBE: NOT DETECTED
B PERT.PT PRMT NPH QL NAA+NON-PROBE: NOT DETECTED
BUN SERPL-MCNC: 11 MG/DL (ref 8–22)
BUN SERPL-MCNC: 13 MG/DL (ref 8–22)
C PNEUM DNA NPH QL NAA+NON-PROBE: NOT DETECTED
CALCIUM SERPL-MCNC: 8.4 MG/DL (ref 8.5–10.5)
CALCIUM SERPL-MCNC: 8.6 MG/DL (ref 8.5–10.5)
CHLORIDE SERPL-SCNC: 92 MMOL/L (ref 96–112)
CHLORIDE SERPL-SCNC: 92 MMOL/L (ref 96–112)
CO2 SERPL-SCNC: 32 MMOL/L (ref 20–33)
CO2 SERPL-SCNC: 34 MMOL/L (ref 20–33)
CREAT SERPL-MCNC: 0.77 MG/DL (ref 0.5–1.4)
CREAT SERPL-MCNC: 0.85 MG/DL (ref 0.5–1.4)
D DIMER PPP IA.FEU-MCNC: 8.86 UG/ML (FEU) (ref 0–0.5)
EKG IMPRESSION: NORMAL
ERYTHROCYTE [DISTWIDTH] IN BLOOD BY AUTOMATED COUNT: 48.8 FL (ref 35.9–50)
FLUAV RNA NPH QL NAA+NON-PROBE: NOT DETECTED
FLUBV RNA NPH QL NAA+NON-PROBE: NOT DETECTED
GFR SERPLBLD CREATININE-BSD FMLA CKD-EPI: 64 ML/MIN/1.73 M 2
GFR SERPLBLD CREATININE-BSD FMLA CKD-EPI: 72 ML/MIN/1.73 M 2
GLUCOSE SERPL-MCNC: 103 MG/DL (ref 65–99)
GLUCOSE SERPL-MCNC: 105 MG/DL (ref 65–99)
HADV DNA NPH QL NAA+NON-PROBE: NOT DETECTED
HCOV 229E RNA NPH QL NAA+NON-PROBE: NOT DETECTED
HCOV HKU1 RNA NPH QL NAA+NON-PROBE: NOT DETECTED
HCOV NL63 RNA NPH QL NAA+NON-PROBE: NOT DETECTED
HCOV OC43 RNA NPH QL NAA+NON-PROBE: NOT DETECTED
HCT VFR BLD AUTO: 31.5 % (ref 37–47)
HGB BLD-MCNC: 10.3 G/DL (ref 12–16)
HMPV RNA NPH QL NAA+NON-PROBE: NOT DETECTED
HPIV1 RNA NPH QL NAA+NON-PROBE: NOT DETECTED
HPIV2 RNA NPH QL NAA+NON-PROBE: NOT DETECTED
HPIV3 RNA NPH QL NAA+NON-PROBE: NOT DETECTED
HPIV4 RNA NPH QL NAA+NON-PROBE: NOT DETECTED
M PNEUMO DNA NPH QL NAA+NON-PROBE: NOT DETECTED
MAGNESIUM SERPL-MCNC: 1.5 MG/DL (ref 1.5–2.5)
MCH RBC QN AUTO: 31.8 PG (ref 27–33)
MCHC RBC AUTO-ENTMCNC: 32.7 G/DL (ref 32.2–35.5)
MCV RBC AUTO: 97.2 FL (ref 81.4–97.8)
NT-PROBNP SERPL IA-MCNC: ABNORMAL PG/ML (ref 0–125)
PLATELET # BLD AUTO: 187 K/UL (ref 164–446)
PMV BLD AUTO: 9.7 FL (ref 9–12.9)
POTASSIUM SERPL-SCNC: 3.4 MMOL/L (ref 3.6–5.5)
POTASSIUM SERPL-SCNC: 3.5 MMOL/L (ref 3.6–5.5)
PROCALCITONIN SERPL-MCNC: 0.09 NG/ML
RBC # BLD AUTO: 3.24 M/UL (ref 4.2–5.4)
RSV RNA NPH QL NAA+NON-PROBE: NOT DETECTED
RV+EV RNA NPH QL NAA+NON-PROBE: NOT DETECTED
SARS-COV-2 RNA NPH QL NAA+NON-PROBE: NOTDETECTED
SODIUM SERPL-SCNC: 136 MMOL/L (ref 135–145)
SODIUM SERPL-SCNC: 138 MMOL/L (ref 135–145)
TROPONIN T SERPL-MCNC: 55 NG/L (ref 6–19)
WBC # BLD AUTO: 9 K/UL (ref 4.8–10.8)

## 2024-07-20 PROCEDURE — 85379 FIBRIN DEGRADATION QUANT: CPT

## 2024-07-20 PROCEDURE — A9270 NON-COVERED ITEM OR SERVICE: HCPCS

## 2024-07-20 PROCEDURE — 93010 ELECTROCARDIOGRAM REPORT: CPT | Performed by: INTERNAL MEDICINE

## 2024-07-20 PROCEDURE — A9270 NON-COVERED ITEM OR SERVICE: HCPCS | Mod: JZ | Performed by: STUDENT IN AN ORGANIZED HEALTH CARE EDUCATION/TRAINING PROGRAM

## 2024-07-20 PROCEDURE — 700102 HCHG RX REV CODE 250 W/ 637 OVERRIDE(OP): Performed by: HOSPITALIST

## 2024-07-20 PROCEDURE — 700111 HCHG RX REV CODE 636 W/ 250 OVERRIDE (IP): Performed by: STUDENT IN AN ORGANIZED HEALTH CARE EDUCATION/TRAINING PROGRAM

## 2024-07-20 PROCEDURE — 99233 SBSQ HOSP IP/OBS HIGH 50: CPT | Mod: GC | Performed by: STUDENT IN AN ORGANIZED HEALTH CARE EDUCATION/TRAINING PROGRAM

## 2024-07-20 PROCEDURE — 97602 WOUND(S) CARE NON-SELECTIVE: CPT

## 2024-07-20 PROCEDURE — 700105 HCHG RX REV CODE 258: Performed by: STUDENT IN AN ORGANIZED HEALTH CARE EDUCATION/TRAINING PROGRAM

## 2024-07-20 PROCEDURE — 87040 BLOOD CULTURE FOR BACTERIA: CPT | Mod: 91

## 2024-07-20 PROCEDURE — 84145 PROCALCITONIN (PCT): CPT

## 2024-07-20 PROCEDURE — 36415 COLL VENOUS BLD VENIPUNCTURE: CPT

## 2024-07-20 PROCEDURE — 84484 ASSAY OF TROPONIN QUANT: CPT

## 2024-07-20 PROCEDURE — 71275 CT ANGIOGRAPHY CHEST: CPT

## 2024-07-20 PROCEDURE — 700111 HCHG RX REV CODE 636 W/ 250 OVERRIDE (IP): Mod: JZ | Performed by: HOSPITALIST

## 2024-07-20 PROCEDURE — 93005 ELECTROCARDIOGRAM TRACING: CPT | Performed by: HOSPITALIST

## 2024-07-20 PROCEDURE — 700102 HCHG RX REV CODE 250 W/ 637 OVERRIDE(OP)

## 2024-07-20 PROCEDURE — 83735 ASSAY OF MAGNESIUM: CPT

## 2024-07-20 PROCEDURE — 700111 HCHG RX REV CODE 636 W/ 250 OVERRIDE (IP)

## 2024-07-20 PROCEDURE — 93005 ELECTROCARDIOGRAM TRACING: CPT

## 2024-07-20 PROCEDURE — 80048 BASIC METABOLIC PNL TOTAL CA: CPT | Mod: 91

## 2024-07-20 PROCEDURE — 700102 HCHG RX REV CODE 250 W/ 637 OVERRIDE(OP): Mod: JZ | Performed by: STUDENT IN AN ORGANIZED HEALTH CARE EDUCATION/TRAINING PROGRAM

## 2024-07-20 PROCEDURE — 770020 HCHG ROOM/CARE - TELE (206)

## 2024-07-20 PROCEDURE — 0202U NFCT DS 22 TRGT SARS-COV-2: CPT

## 2024-07-20 PROCEDURE — 700117 HCHG RX CONTRAST REV CODE 255

## 2024-07-20 PROCEDURE — 85027 COMPLETE CBC AUTOMATED: CPT

## 2024-07-20 PROCEDURE — 83880 ASSAY OF NATRIURETIC PEPTIDE: CPT

## 2024-07-20 PROCEDURE — A9270 NON-COVERED ITEM OR SERVICE: HCPCS | Performed by: HOSPITALIST

## 2024-07-20 PROCEDURE — 71045 X-RAY EXAM CHEST 1 VIEW: CPT

## 2024-07-20 RX ORDER — NYSTATIN 100000 [USP'U]/G
POWDER TOPICAL 3 TIMES DAILY
Status: DISCONTINUED | OUTPATIENT
Start: 2024-07-20 | End: 2024-07-25 | Stop reason: HOSPADM

## 2024-07-20 RX ORDER — POTASSIUM CHLORIDE 1500 MG/1
20 TABLET, EXTENDED RELEASE ORAL ONCE
Status: COMPLETED | OUTPATIENT
Start: 2024-07-20 | End: 2024-07-20

## 2024-07-20 RX ORDER — MAGNESIUM SULFATE HEPTAHYDRATE 40 MG/ML
2 INJECTION, SOLUTION INTRAVENOUS ONCE
Status: COMPLETED | OUTPATIENT
Start: 2024-07-20 | End: 2024-07-20

## 2024-07-20 RX ORDER — POTASSIUM CHLORIDE 7.45 MG/ML
10 INJECTION INTRAVENOUS
Status: DISCONTINUED | OUTPATIENT
Start: 2024-07-20 | End: 2024-07-20

## 2024-07-20 RX ADMIN — METOPROLOL SUCCINATE 50 MG: 25 TABLET, EXTENDED RELEASE ORAL at 21:21

## 2024-07-20 RX ADMIN — GABAPENTIN 600 MG: 300 CAPSULE ORAL at 13:38

## 2024-07-20 RX ADMIN — FUROSEMIDE 40 MG: 10 INJECTION, SOLUTION INTRAVENOUS at 05:26

## 2024-07-20 RX ADMIN — ASPIRIN 325 MG: 325 TABLET ORAL at 21:22

## 2024-07-20 RX ADMIN — CITALOPRAM HYDROBROMIDE 20 MG: 20 TABLET ORAL at 05:26

## 2024-07-20 RX ADMIN — POTASSIUM CHLORIDE 20 MEQ: 1500 TABLET, EXTENDED RELEASE ORAL at 21:20

## 2024-07-20 RX ADMIN — GABAPENTIN 600 MG: 300 CAPSULE ORAL at 05:25

## 2024-07-20 RX ADMIN — GABAPENTIN 600 MG: 300 CAPSULE ORAL at 21:21

## 2024-07-20 RX ADMIN — POTASSIUM CHLORIDE 20 MEQ: 1500 TABLET, EXTENDED RELEASE ORAL at 21:22

## 2024-07-20 RX ADMIN — LISINOPRIL 20 MG: 20 TABLET ORAL at 21:21

## 2024-07-20 RX ADMIN — ATORVASTATIN CALCIUM 80 MG: 80 TABLET, FILM COATED ORAL at 21:20

## 2024-07-20 RX ADMIN — CEFEPIME 2 G: 2 INJECTION, POWDER, FOR SOLUTION INTRAVENOUS at 11:00

## 2024-07-20 RX ADMIN — NYSTATIN: 100000 POWDER TOPICAL at 21:23

## 2024-07-20 RX ADMIN — FUROSEMIDE 40 MG: 10 INJECTION, SOLUTION INTRAVENOUS at 21:22

## 2024-07-20 RX ADMIN — FUROSEMIDE 40 MG: 10 INJECTION, SOLUTION INTRAVENOUS at 13:38

## 2024-07-20 RX ADMIN — POTASSIUM CHLORIDE 20 MEQ: 1500 TABLET, EXTENDED RELEASE ORAL at 05:26

## 2024-07-20 RX ADMIN — IOHEXOL 72 ML: 350 INJECTION, SOLUTION INTRAVENOUS at 10:10

## 2024-07-20 RX ADMIN — POTASSIUM CHLORIDE 10 MEQ: 7.46 INJECTION, SOLUTION INTRAVENOUS at 12:52

## 2024-07-20 RX ADMIN — NYSTATIN: 100000 POWDER TOPICAL at 12:00

## 2024-07-20 RX ADMIN — CEFEPIME 2 G: 2 INJECTION, POWDER, FOR SOLUTION INTRAVENOUS at 17:03

## 2024-07-20 RX ADMIN — SENNOSIDES AND DOCUSATE SODIUM 2 TABLET: 50; 8.6 TABLET ORAL at 21:20

## 2024-07-20 RX ADMIN — OMEPRAZOLE 40 MG: 20 CAPSULE, DELAYED RELEASE ORAL at 05:25

## 2024-07-20 RX ADMIN — OXYCODONE 2.5 MG: 5 TABLET ORAL at 05:50

## 2024-07-20 RX ADMIN — LISINOPRIL 20 MG: 20 TABLET ORAL at 05:25

## 2024-07-20 RX ADMIN — MAGNESIUM SULFATE HEPTAHYDRATE 2 G: 2 INJECTION, SOLUTION INTRAVENOUS at 12:55

## 2024-07-20 RX ADMIN — POTASSIUM CHLORIDE 10 MEQ: 7.46 INJECTION, SOLUTION INTRAVENOUS at 12:00

## 2024-07-20 ASSESSMENT — ENCOUNTER SYMPTOMS
TINGLING: 0
MEMORY LOSS: 0
BACK PAIN: 1
ABDOMINAL PAIN: 0
VOMITING: 0
HEADACHES: 0
EYE DISCHARGE: 0
BLURRED VISION: 0
CHILLS: 0
DOUBLE VISION: 0
DIZZINESS: 0
FEVER: 0
HEMOPTYSIS: 0
DEPRESSION: 0
FOCAL WEAKNESS: 0
EYE REDNESS: 0
WEIGHT LOSS: 0
MYALGIAS: 0
EYE PAIN: 0
NAUSEA: 0
COUGH: 0
PALPITATIONS: 0
SHORTNESS OF BREATH: 0

## 2024-07-20 ASSESSMENT — PAIN DESCRIPTION - PAIN TYPE
TYPE: ACUTE PAIN
TYPE: ACUTE PAIN;CHRONIC PAIN

## 2024-07-20 ASSESSMENT — PATIENT HEALTH QUESTIONNAIRE - PHQ9
SUM OF ALL RESPONSES TO PHQ9 QUESTIONS 1 AND 2: 0
1. LITTLE INTEREST OR PLEASURE IN DOING THINGS: NOT AT ALL
2. FEELING DOWN, DEPRESSED, IRRITABLE, OR HOPELESS: NOT AT ALL

## 2024-07-20 ASSESSMENT — FIBROSIS 4 INDEX: FIB4 SCORE: 4.72

## 2024-07-20 NOTE — PROGRESS NOTES
RN arrived to pt room at 0700. The pt was standing naked by the bed, had removed her purewick and pulled out her IV, and exhibited confusion. Patient was alert and oriented yesterday. As staff cleaned the blood from the bed and floor, pt gradually aroused and woke up and was speaking more coherently and appropriately.     Patient received one 5 mg Oxy last evening and one 2.5mg oxy overnight. Daughter stated the patient takes 10mg at home for her chronic back pain. Holding pain meds at this time due to her waking confusion and patient stating she woke up convinced she was at home and in bed with her .     Diastolic has been running low all AM, pt denies any symptoms and pulses are strong and palpable and similar to yesterday. Cap refill less than 3 seconds. Closely monitoring as patient was hypertensive yesterday with a diastolic of 100 in the ER. Diastolic now between 35 and 45.

## 2024-07-20 NOTE — PROGRESS NOTES
"Pt is not able to tolerate IV potassium at a rate more than 25 mls/hr. I cannot dilute the IV K due to risk of fluid overload so I am maintaining a low rate on the IV K because the pt stated the K is \"burning my arm.\"  "

## 2024-07-20 NOTE — CARE PLAN
The patient is Watcher - Medium risk of patient condition declining or worsening    Shift Goals  Clinical Goals: Safety, Q4 neuros, monitor VS and labs  Patient Goals: rome  Family Goals: rome    Progress made toward(s) clinical / shift goals:        Problem: Knowledge Deficit - Standard  Goal: Patient and family/care givers will demonstrate understanding of plan of care, disease process/condition, diagnostic tests and medications  Note: Assess knowledge level of disease process and answer all questions, explain tests, go over plan of care     Problem: Fall Risk  Goal: Patient will remain free from falls  Note: Assess for fall risk factors and implement fall precautions such as bed alarms and sign, safety protocols in place, hourly rounding     Problem: Pain - Standard  Goal: Alleviation of pain or a reduction in pain to the patient’s comfort goal  Note: Give pain management medications as ordered, assess pain often, hourly rounding       Patient is not progressing towards the following goals:

## 2024-07-20 NOTE — WOUND TEAM
[facility]  Inpatient Services  Initial Wound and Skin Care Evaluation    Admission Date: 7/19/2024     Last order of IP CONSULT TO WOUND CARE was found on 7/20/2024 from Hospital Encounter on 7/19/2024     HPI, PMH, SH: Reviewed    Past Surgical History:   Procedure Laterality Date    TRANSCATHETER AORTIC VALVE REPLACEMENT N/A 9/25/2017    Procedure: TRANSCATHETER AORTIC VALVE REPLACEMENT;  Surgeon: Sherif Elder M.D.;  Location: SURGERY [facility];  Service: Cardiac    TATIANNA  9/25/2017    Procedure: TTE;  Surgeon: Sherif Elder M.D.;  Location: SURGERY [facility];  Service: Cardiac    ORBITAL FRACTURE ORIF Left 5/23/2016    Procedure: ORBITAL FRACTURE ORIF FOR: LATERAL CANTHOPEXY;  Surgeon: Fabrice Daniel Jr., M.D.;  Location: SURGERY [facility];  Service:     ORIF, FRACTURE, FEMUR Right 8/10/2015    Procedure: FEMUR ORIF;  Surgeon: Umer Guevara M.D.;  Location: SURGERY [facility];  Service:     ORBITAL FRACTURE ORIF Left 7/6/2015    Procedure: ORBITAL FRACTURE ORIF;  Surgeon: Fabrice Daniel Jr., M.D.;  Location: SURGERY [facility];  Service:     PB REVISE TOTAL HIP REPLACEMENT  3/24/2015    R    OTHER CARDIAC SURGERY  2012    cardiac cath with stent placement    PB REVISE ACETABULAR PART OF TOTAL HIP  2010    L    OTHER CARDIAC SURGERY  2002    CABG    GYN SURGERY  1961    hysterectomy    ANGIOGRAM      BLADDER BIOPSY      CHOLECYSTECTOMY      HIP ARTHROPLASTY TOTAL Left     LUMPECTOMY      L    MULTIPLE CORONARY ARTERY BYPASS      ZZZ CARDIAC CATH       Social History     Tobacco Use    Smoking status: Never    Smokeless tobacco: Never   Substance Use Topics    Alcohol use: Yes     Comment: occ     Chief Complaint   Patient presents with    T-5000 GLF     PT resides at home where she experienced a GF, - LOC, + head strike, + blood thinners.     Shortness of Breath     PT is on 6L of oxygen at baseline, requiring 15L of oxygen to remain adequate oxygenation  upon EMS arrival. PT expression some congestion and SOB.      Diagnosis: Acute respiratory failure with hypoxemia (HCC) [J96.01]    Unit where seen by Wound Team: S168/02     WOUND CONSULT RELATED TO:  Pannus; Sacrum    WOUND TEAM PLAN OF CARE - Frequency of Follow-up:   Nursing to follow dressing orders written for wound care. Contact wound team if area fails to progress, deteriorates or with any questions/concerns if something comes up before next scheduled follow up (See below as to whether wound is following and frequency of wound follow up)     Not following, consult as needed  - Pannus; Sacrum; Bilateral Heels; BLE; BUE    WOUND HISTORY:     Silvia Orellana is a 89 y.o. female who presented 7/19/2024 with shortness of breath after falling at home.  The patient apparently was going down the steps and fell backwards hitting her head.  She did not lose consciousness but she has severe pain in the back of her head on the right side.  Patient does not have a contusion there there is nose laceration.  Head CT shows no acute intracranial abnormality.  Patient was found to be hypoxic and had to be placed initially on nonrebreather by ambulance since then has been able to be titrated down to 6 L by nasal cannula.  Patient has pulmonary edema and bilateral pleural effusions more on the left than the right.  Patient will need at this point diuresis and then also thoracentesis.  Patient will need optimization of her medical management for heart failure.  In the past patient's left ventricular ejection fraction was low at 53% and she also had right-sided heart failure.  The patient will need at this point repeat echocardiogram and aggressive diuresis and monitoring of her potassium level and supplementation of it.  Patient will need PT OT evaluation and she will need outpatient follow-up send home safety to be established before she can be discharged.     WOUND ASSESSMENT/LDA  Moisture Associated Skin Damage 07/20/24  Panus;Sacrum (Active)   First Observed Date: 07/20/24   Present on Original Admission: Yes  Laterality: Bilateral  Wound Location : Panus;Sacrum      Assessments 7/20/2024  3:00 PM   Wound Image     NEXT Weekly Photo (Inpatient Only) 07/24/24   Drainage Amount None   Periwound Assessment Red;Excoriated;Fragile;Painful;Rash   IAD Cleansing Foam Cleanser/Washcloth   Periwound Protectant Interdry;Antifungal Therapy   IAD Containment Device Purewick   Length (cm) 5   Width (cm) 30   WOUND NURSE ONLY - Time Spent with Patient (mins) 60       Wound Heel Plantar Bilateral POA DTI (Active)   No Date First Assessed or Time First Assessed found.   Location: Heel  Wound Orientation: Plantar  Laterality: Bilateral  Wound Description (Comments): POA DTI      Assessments 7/20/2024  3:00 PM   Wound Image       Site Assessment Clean;Dry;Intact;Purple;Red   Periwound Assessment Ecchymosis   Margins Attached edges   Closure Adhesive bandage   Drainage Amount None   Treatments Cleansed;Nonselective debridement;Site care;Offloading   Wound Cleansing Foam Cleanser/Washcloth   Periwound Protectant No-sting Skin Prep   Dressing Status Clean;Dry;Intact   Dressing Changed New   Dressing Cleansing/Solutions Not Applicable   Dressing Options Offloading Dressing - Heel   Dressing Change/Treatment Frequency Every 72 hrs, and As Needed   NEXT Dressing Change/Treatment Date 07/23/24   NEXT Weekly Photo (Inpatient Only) 07/23/24   Wound Team Following Not following   Wound Length (cm) 5 cm   Wound Width (cm) 5 cm   Wound Depth (cm) 0 cm   Wound Surface Area (cm^2) 25 cm^2   Wound Volume (cm^3) 0 cm^3   Shape round   Wound Odor None       Wound 07/20/24 Pressure Injury Buttocks;Coccyx Bilateral POA DTI (Active)   Date First Assessed: 07/20/24   Present on Original Admission: Yes  Hand Hygiene Completed: Yes  Primary Wound Type: Pressure Injury  Location: Buttocks;Coccyx  Laterality: Bilateral  Wound Description (Comments): POA DTI      Assessments  7/20/2024  3:00 PM   Wound Image     Site Assessment Dry;Purple;Red;Excoriated;Fragile   Periwound Assessment Non-blanchable erythema;Rash;Fragile   Margins Attached edges   Closure Open to air   Drainage Amount None   Treatments Cleansed;Nonselective debridement;Site care;Offloading   Wound Cleansing Foam Cleanser/Washcloth   Periwound Protectant Barrier Paste   Dressing Status Open to Air   NEXT Weekly Photo (Inpatient Only) 07/24/24   Wound Team Following Not following   Pressure Injury Stage Deep Tissue   Wound Length (cm) 6 cm   Wound Width (cm) 15 cm   Wound Surface Area (cm^2) 90 cm^2   Wound Bed Epithelium (%) 100 %   Shape oval; linear   Wound Odor None       Wound 07/20/24 Abrasion Pretibial Right;Bilateral (Active)   Date First Assessed: 07/20/24   Present on Original Admission: Yes  Hand Hygiene Completed: Yes  Primary Wound Type: Abrasion  Location: Pretibial  Laterality: Right;Bilateral      Assessments 7/20/2024  3:00 PM   Wound Image       Site Assessment Clean;Dry;Intact;Brown;Scabbed   Periwound Assessment Clean;Dry;Edema;Fragile;Scabbed   Margins Attached edges;Defined edges   Closure Open to air   Drainage Amount None   Treatments Cleansed;Nonselective debridement;Site care   Wound Cleansing Foam Cleanser/Washcloth   Periwound Protectant Not Applicable   Dressing Status Open to Air   NEXT Weekly Photo (Inpatient Only) 07/24/24   Wound Team Following Not following        Vascular:    DARLENE:   No results found.    Lab Values:    Lab Results   Component Value Date/Time    WBC 9.0 07/20/2024 01:46 AM    RBC 3.24 (L) 07/20/2024 01:46 AM    HEMOGLOBIN 10.3 (L) 07/20/2024 01:46 AM    HEMATOCRIT 31.5 (L) 07/20/2024 01:46 AM    HBA1C 5.5 07/19/2024 01:00 PM         Culture Results show:  No results found for this or any previous visit (from the past 720 hour(s)).    Pain Level/Medicated:  None, Tolerated without pain medication       INTERVENTIONS BY WOUND TEAM:  Chart and images reviewed. Discussed with  bedside RN. All areas of concern (based on picture review, LDA review and discussion with bedside RN) have been thoroughly assessed. Documentation of areas based on significant findings. This RN in to assess patient. Performed standard wound care which includes appropriate positioning, dressing removal and non-selective debridement. Pictures and measurements obtained weekly if/when required.    Wound:  Sacrum - POA DTI with moisture dermatitis  Preparation for Dressing removal: Open to air  Cleansed/Non-selectively Debrided with:  No rinse foam soap and Moist warm washcloth  Amada wound: Cleansed with No rinse foam soap and Moist warm washcloth, Prepped with Barrier paste  Primary Dressing:  open to air     Wound:  Bilateral heels - POA DTI  Preparation for Dressing removal: Open to air  Cleansed/Non-selectively Debrided with:  No rinse foam soap and Moist warm washcloth  Amada wound: Cleansed with No rinse foam soap and Moist warm washcloth, Prepped with No Sting  Primary Dressing:  offloading foam heel dressings     Wound:  Pannus - suspected moisture dermatitis  Preparation for Dressing removal: Open to air  Cleansed/Non-selectively Debrided with:  Perineal Wipes (Barrier wipes)  Amada wound: Cleansed with Perineal Wipes (Barrier wipes), Prepped with Nystatin Powder  Primary Dressing:  Interdry cloth    Advanced Wound Care Discharge Planning  Number of Clinicians necessary to complete wound care: 1  Is patient requiring IV pain medications for dressing changes:  No   Length of time for dressing change 60 min. (This does not include chart review, pre-medication time, set up, clean up or time spent charting.)    Interdisciplinary consultation: Patient, Bedside RN Sharon)    EVALUATION / RATIONALE FOR TREATMENT:     Date:  07/20/24  Wound Status:  Initial evaluation    Patient with red excoriated painful rash to pannus - suspected moisture dermatitis. Nystatin powder ordered per protocol. Interdry cloth in place.      Sacrum has non-blanching purple and red discoloration with excoriated rash - POA DTI with suspected moisture dermatitis.     Bilateral heels are clean, dry, and intact with purple non-blanching discoloration - POA DTI.     Scattered brown scabbed abrasions to bilateral lower legs and arms - cleansed and open to air.       Goals: Steady decrease in wound area and depth weekly.    NURSING PLAN OF CARE ORDERS:  Dressing changes: See Dressing Care orders  Skin care: See Skin Care orders    NUTRITION RECOMMENDATIONS   Wound Team Recommendations:  Protein supplements    DIET ORDERS (From admission to next 24h)       Start     Ordered    07/19/24 1424  Diet Order Diet: 2 Gram Sodium  ALL MEALS        Question:  Diet:  Answer:  2 Gram Sodium    07/19/24 5412                    PREVENTATIVE INTERVENTIONS:    Q shift Aris - performed per nursing policy  Q shift pressure point assessments - performed per nursing policy    Surface/Positioning  Low Airloss - Ordered  Reposition q 2 hours - Currently in Place  TAPs Turning system - Ordered    Offloading/Redistribution  Heel offloading dressing (Silicone dressing) - Currently in Place  Heel float boots (Prevalon boot) - Ordered  Float Heels off Bed with Pillows - Currently in Place           Containment/Moisture Prevention    Purwick/Condom Cath - Currently in Place  Barrier paste - Currently in Place  Antifungal treatment - Ordered  Interdry - Currently in Place    Anticipated discharge plans:  TBD        Vac Discharge Needs:  Vac Discharge plan is purely a recommendation from wound team and not a requirement for discharge unless otherwise stated by physician.  Not Applicable Pt not on a wound vac

## 2024-07-20 NOTE — PROGRESS NOTES
"NOC HOSPITALIST CROSS COVER    Notified by RN regarding patient complaining of 3/10 left-sided chest pain that is reported to be \"dull\" and has not reproducible with palpation.  The patient underwent an IR guided L-thoracentesis during dayshift yesterday with approximately 1.5 L withdrawn.  Stat chest x-ray obtained and showed a decrease left pleural effusion, though there is some left lower lobe airspace disease concerning for potential pneumonia.  The patient does have acute hypoxemic respiratory failure that was presumed secondary to fluid overload.     Twelve-lead EKG obtained and demonstrated sinus rhythm with probable LVH, similar to prior.  Stat troponin ordered.      Vitals:    07/20/24 0300   BP: (!) 147/45   Pulse:    Resp:    Temp:    SpO2:       Plan:  # Chest wall pain  -Likely multifactorial in the setting of recent thoracentesis, ground-level fall, fluid overload, and possible pneumonia  -EKG similar to prior with nonspecific ST/T changes, LVH  - Stat troponin and D-dimer pending  - Pro-Dom ordered given concern for pneumonia on CXR  - Sputum culture ordered and pending  - Full respiratory viral panel ordered and pending  - Encourage incentive spirometry  - Titrate oxygen to SpO2 greater than 92%  - Multimodal pain medications as needed  - Consider CTA if D-dimer profoundly positive or patient has persistent pain    Update:  Dimer 8.86. CTA ordered. Troponin uptrending slightly from 39-->55, likely demand ischemia in the setting of fluid overload, though cannot rule out ACS.     -----------------------------------------------------------------------------------------------------------    Electronically signed by:  Ebony Bueno, CARMELINA, APRN, MICHAELP-BC  Hospitalist Services         "

## 2024-07-20 NOTE — THERAPY
Physical Therapy Contact Note    Patient Name: Silvia Orellana  Age:  89 y.o., Sex:  female  Medical Record #: [id number]  Today's Date: 7/20/2024 07/20/24 0900   Initial Contact Note    Initial Contact Note Order Received and Verified, Physical Therapy Evaluation in Progress with Full Report to Follow.   Interdisciplinary Plan of Care Collaboration   Collaboration Comments PT consult received, chart reviewed. Pt with positive D-Dimer, pending CTA Chest to r/o PE. Will hold therapy, monitor EMR and eval when approrpriate.

## 2024-07-20 NOTE — PROGRESS NOTES
PT c/o of L sided rib pain. Located upper L chest, no increased pain with palpation, patient states the pain is dull 3/10, offered Tylenol PRN, patient denied. BP: 145/47. HR: 73 SpO2: 95% on 6L. DESHAUN Bueno notified, Stat EKG ordered.

## 2024-07-20 NOTE — PROGRESS NOTES
Pt left with transport to CT. On tele box, monitors notified, on 6L o2.   2010 patient ba ck from CT

## 2024-07-20 NOTE — PROGRESS NOTES
4 Eyes Skin Assessment Completed by WESLEY Guillaume and WESLEY Taylor.    Head WDL  Ears WDL  Nose WDL  Mouth WDL  Neck WDL  Breast/Chest Redness right breast, bruise center of upper chest    Shoulder Blades WDL  Spine Incision, L back thoracentesis site. No bleeding, redressed with gauze and tegaderm   (R) Arm/Elbow/Hand Bruising scattered   (L) Arm/Elbow/Hand Redness, Bruising, and Abrasion, scattered bruising, abrasion to left elbow  Abdomen Redness to pannus left and mid   Groin WDL  Scrotum/Coccyx/Buttocks Redness and Blanching  (R) Leg Redness and Bruising, scattered bruising, scab  (L) Leg Redness and Bruising, scattered bruising, scab  (R) Heel/Foot/Toe Bruising, callus pinky toe, dryness   (L) Heel/Foot/Toe Bruising, dryness           Devices In Places Tele Box, Pulse Ox, and Nasal Cannula      Interventions In Place Gray Ear Foams and Q2 Turns    Possible Skin Injury No    Pictures Uploaded Into Epic Yes  Wound Consult Placed Yes  RN Wound Prevention Protocol Ordered Yes

## 2024-07-20 NOTE — ASSESSMENT & PLAN NOTE
Hx of CHF  -ECHO: significant prosthetic aortic valve stenosis, Mild concentric LVH, normal LVEF = 60%, moderate AR, mild progressive MS, moderate MR, mild-mod TR, severely dilated Left atrium.  - continue lisinopril 20 mg  -continue metoprolol 50 mg  -Consider Eval with cardiology for significant AS, TAVR, HFpEF 60%

## 2024-07-20 NOTE — PROGRESS NOTES
Telemetry Report:      Rhythm:  SR     Heart Rate: 61-70  Ectopy:            TX:  0.19           QRS:       0.10  QT:   0.55        Per Telestrip from Monitor Room

## 2024-07-20 NOTE — PROGRESS NOTES
[facility] Internal Medicine Daily Progress Note    Date of Service  7/20/2024    [facility] Team: [facility] Team   Attending: Keyona Fatima M.d.  Senior Resident: Dr. Quincy Jung  Intern:  Dr. Mariella Armendariz  Contact Number: [phone]    Chief Complaint  Silvia Orellana is a 89 y.o. female admitted 7/19/2024 with acute respiratory failure with hypoxemia.    Hospital Course  Eric Orellana is a 89-year-old woman who presented on 7/19/2024 with shortness of breath after a ground-level fall at home.  She was going downstairs and fell backwards, hitting her head.  She did not lose consciousness but had severe pain in the back of her head on her right side.  She was found to be hypoxic and placed on nonbreathing mask which was later titrated to 6 L nasal cannula.  Patient has a past medical history of CHF EF 53%. She lives with daughter and son-in law.  Patient presented to the ED and underwent imaging studies.     CT head- no acute intracranial abnormalities  CT spine -no cervical fracture, large bilateral pleural effusions (Left> Right), L1 vertebral body compression fracture, precarinal enlarged lymph node  Chest x-ray-cardiomegaly with pulmonary edema, moderate left pleural effusion with associated compressive atelectasis  Patient underwent ultrasound-guided Thoracentesis which drained 155 0 mL of straw-colored fluid.   Repeat chest x-ray showed decreased left pleural effusion, left lower lobe airspace disease and pulmonary edema.  CT lower spine-acute L1 burst fracture, right renal atrophy with multiple right renal cysts nonobstructed left renal stone.    Past medical history-CAD breast cancer, CHF, arthritis, hypertension, GERD, MI, stroke.    Surgical history -bilateral hip replacement, lumpectomy, cholecystectomy, multiple CABG, transcatheter aortic valve replacement, TATIANNA.    Family history-no relevant history on file    Social history-smoking: never, alcohol: Occasionally, drugs: Never    Allergies:  None    Interval Problem Update  -Vitals stable. Baseline Oxygen - 6L NC  - Post-thoracentesis CXR visualized left lower lobe airspace disease likely CAP.   - Urinalysis positive for UTI, nitrite +, many bacteria, RBC +   - Viral panel : negative, blood cultures grew gram+cocci clusters likely staph. Negative for MRSA and staph aureus.  -IV Cefepime, started on 7/20/24.  - Potassium 3.4 , Goal K = 4. Patient continued on Lasix 40mg for heart failure. Patient given KCl and Kdur PO (total 80mEq). F/u on labs  - CTA chest ordered to R/O Pulmonary embolism. Pending   -Hx of CHF, EF 35%.ECHO ordered. Pending. Monitor NT-proBNP  -Sputum culture. Pending  -Consulted Neurosurgery for lumbar compression fracture, appreciate recommendations      I have discussed this patient's plan of care and discharge plan at IDT rounds today with Case Management, Nursing, Nursing leadership, and other members of the IDT team.    Consultants/Specialty  Neurosurgery    Code Status  DNAR/DNI    Review of Systems  Review of Systems   Constitutional:  Positive for malaise/fatigue. Negative for chills, fever and weight loss.   HENT:  Negative for ear discharge, ear pain, hearing loss and tinnitus.    Eyes:  Negative for blurred vision, double vision, pain, discharge and redness.   Respiratory:  Negative for cough, hemoptysis and shortness of breath.    Cardiovascular:  Negative for chest pain, palpitations and leg swelling.   Gastrointestinal:  Negative for abdominal pain, nausea and vomiting.   Genitourinary:  Negative for dysuria.   Musculoskeletal:  Positive for back pain. Negative for myalgias.   Skin:  Negative for rash.   Neurological:  Negative for dizziness, tingling, focal weakness and headaches.   Psychiatric/Behavioral:  Negative for depression, memory loss and suicidal ideas.         Physical Exam  Temp:  [36.3 °C (97.3 °F)-36.7 °C (98.1 °F)] 36.3 °C (97.3 °F)  Pulse:  [63-78] 67  Resp:  [15-18] 16  BP: (120-190)/(35-49)  131/38  SpO2:  [95 %-98 %] 98 %    Physical Exam  Constitutional:       Appearance: Normal appearance. She is normal weight.   HENT:      Head: Normocephalic and atraumatic.      Nose: Nose normal.      Mouth/Throat:      Mouth: Mucous membranes are moist.   Eyes:      Extraocular Movements: Extraocular movements intact.      Pupils: Pupils are equal, round, and reactive to light.   Cardiovascular:      Rate and Rhythm: Normal rate and regular rhythm.      Pulses: Normal pulses.      Heart sounds: Normal heart sounds. No murmur heard.     No friction rub. No gallop.   Pulmonary:      Effort: Pulmonary effort is normal. No respiratory distress.      Breath sounds: No wheezing.      Comments: Breath sounds diminished B/L  Abdominal:      General: Abdomen is flat. Bowel sounds are normal.      Palpations: Abdomen is soft.   Musculoskeletal:         General: No swelling. Normal range of motion.      Cervical back: Normal range of motion.      Right lower leg: No edema.      Left lower leg: No edema.   Skin:     Capillary Refill: Capillary refill takes less than 2 seconds.      Coloration: Skin is not jaundiced or pale.   Neurological:      General: No focal deficit present.      Mental Status: She is alert.      Cranial Nerves: No cranial nerve deficit.      Sensory: No sensory deficit.      Motor: No weakness.   Psychiatric:         Mood and Affect: Mood normal.         Behavior: Behavior normal.         Fluids    Intake/Output Summary (Last 24 hours) at 7/20/2024 1710  Last data filed at 7/20/2024 1220  Gross per 24 hour   Intake 1000 ml   Output 700 ml   Net 300 ml       Laboratory  Recent Labs     07/19/24  1300 07/20/24  0146   WBC 7.2 9.0   RBC 3.52* 3.24*   HEMOGLOBIN 11.6* 10.3*   HEMATOCRIT 35.5* 31.5*   .9* 97.2   MCH 33.0 31.8   MCHC 32.7 32.7   RDW 51.4* 48.8   PLATELETCT 219 187   MPV 9.5 9.7     Recent Labs     07/19/24  1300 07/20/24  0146 07/20/24  1159   SODIUM 144 138 136   POTASSIUM 3.4* 3.4*  3.5*   CHLORIDE 100 92* 92*   CO2 32 32 34*   GLUCOSE 150* 105* 103*   BUN 9 11 13   CREATININE 0.80 0.77 0.85   CALCIUM 9.3 8.6 8.4*                   Imaging  CT-CTA CHEST PULMONARY ARTERY W/ RECONS   Final Result      1.  There is no CT evidence of acute pulmonary embolism.   2.  Mildly enlarged heart with dependent groundglass opacities left greater than right consistent with changes of pulmonary edema.   3.  Bibasilar airspace disease likely due to atelectasis with pneumonitis considered less likely.   4.  Small bilateral dependent pleural effusions, right greater than left            DX-CHEST-PORTABLE (1 VIEW)   Final Result         1. Decreased left pleural effusion.   2. Left lower lobe airspace disease disease, correlate for potential pneumonia.   2. Stable pulmonary edema.      CT-LSPINE W/O PLUS RECONS   Final Result      1.  Acute L1 burst fracture with approximately 30% loss of height and minimal retropulsion.   2.  Multilevel disc and facet joint degenerative changes.   3.  Mild degenerative anterolisthesis of L3 on L4.   4.  At least small right and trace left pleural effusions.   5.  Likely pulmonary edema.   6.  Right renal cortical atrophy with multiple right renal cysts.   7.  Nonobstructing small left renal stone.   8.  Colonic diverticulosis.      US-THORACENTESIS PUNCTURE LEFT   Final Result      1. Ultrasound guided left sided therapeutic thoracentesis.      2. 1550 mL of fluid withdrawn.      DX-CHEST-PORTABLE (1 VIEW)   Final Result      Cardiomegaly with probable pulmonary edema.      Possible moderate left pleural effusion with associated compressive atelectasis. Correlate clinically for infection.      CT-TSPINE W/O PLUS RECONS   Final Result      1.  L1 vertebral body fracture without appreciable height loss. The L1 level is incompletely imaged. Recommend CT or MRI of the lumbar spine for completion of assessment.   2.  Osteopenia   3.  Multilevel multifactorial degenerative changes   4.   LEFT larger than RIGHT pleural effusions with overlying atelectasis   5.  Enlarged precarinal lymph node      CT-CSPINE WITHOUT PLUS RECONS   Final Result      1.  No cervical spine fracture detected.   2.  Large bilateral pleural effusions.      CT-HEAD W/O   Final Result      1.  Chronic microvascular ischemic type changes.   2.  No acute intracranial abnormality.               EC-ECHOCARDIOGRAM COMPLETE W/O CONT    (Results Pending)        Assessment/Plan  Problem Representation:    * Acute respiratory failure with hypoxemia (HCC)- (present on admission)  Assessment & Plan  Patient comes in complaining of respiratory failure.  Patient had to be initially placed on a nonrebreather.  Currently she is down to 6 L by nasal cannula.  Patient was hypoxic on room air  Patient will need to continue with oxygen support to keep oxygen saturations above 90%.    UTI (urinary tract infection)  Assessment & Plan  - Urinalysis positive for UTI, nitrite +, many bacteria, RBC + on 7/20/2024  -IV Cefepime, started on 7/20/24.    Pleural effusion, bilateral- (present on admission)  Assessment & Plan  Patient had significant bilateral pleural effusion worse on the left than the right  -Resolved with Urgent thoracentesis ordered through radiology with ultrasound guidance.  -1550 ml straw colored fluid drained, left lower lobe's disease likely pneumonia.    7/20/2024:  - Viral panel : negative, blood cultures grew gram+cocci clusters likely staph. Negative for MRSA and staph aureus.  -ordered IV Cefepime, started on 7/20/24.  - CTA chest impression: R/O Pulmonary embolism. Small B/L pleural effusion (right>left)  -Sputum culture. Pending    Closed wedge compression fracture of L1 vertebra (HCC)- (present on admission)  Assessment & Plan  On the T-spine CT there appears to be an L1 compression fracture they recommend doing a CT of the lumbar spine directly to evaluate  Continue with pain management patient may need a TLSO  brace.  -Will need PT OT evaluation   -Consulted Neurosurgery for lumbar compression fracture, appreciate recommendations    Acute on chronic diastolic heart failure (HCC)- (present on admission)  Assessment & Plan  Hx of CHF, EF 35%.  -ECHO ordered. Pending.   -Monitor NT-proBNP  - continue lisinopril 20 mg  -continue metoprolol 50 mg  -continue Lasix 40 mg  -potassium 3.4 , Goal K = 4. Patient continued on Lasix 40mg for heart failure. Patient given KCl and Kdur PO (total 80mEq). F/u on labs    DNR (do not resuscitate)- (present on admission)  Assessment & Plan  Discussed advanced directive with the patient she wishes to be DO NOT RESUSCITATE CODE STATUS    Hyperglycemia- (present on admission)  Assessment & Plan  Check a hemoglobin A1c and continue to monitor hypoglycemia    Elevated alkaline phosphatase level- (present on admission)  Assessment & Plan  Check a GGT level    Pulmonary edema- (present on admission)  Assessment & Plan  Aggressive diuretic management will be utilized.  Will give her 40 mg IV Lasix every 8 hours and monitor intake output  ECHO ordered.     Hypokalemia- (present on admission)  Assessment & Plan  Potassium slightly low at 3.4 but with diuretics patient will continue to decline in her potassium levels  Given 20 mg of potassium twice daily    Closed head injury- (present on admission)  Assessment & Plan  Patient fell on the stairs today hitting the back of her head.  CAT scan of the head done shows no acute intracranial abnormality  Patient externally does not have any laceration or hematoma at this point.  Continue with pain management and monitor neurological status    Macrocytic anemia- (present on admission)  Assessment & Plan  Monitor H&H if drops below 7 or 21 transfuse    S/P TAVR (transcatheter aortic valve replacement)- (present on admission)  Assessment & Plan  Outpatient follow-ups with cardiology    Carotid artery stenosis- (present on admission)  Assessment & Plan  Continue  aspirin and statin    CAD- (present on admission)  Assessment & Plan  History of coronary artery disease with three-vessel bypass surgery in 2002  Patient also had to have PTCA in 2012 one-vessel.  Medication management optimization including beta-blocker ACE inhibitor statin and aspirin    HTN (hypertension)- (present on admission)  Assessment & Plan  Blood pressure significantly elevated  Apparently she forgot to take her medications this morning  Optimize medication management and monitor blood pressure to keep systolic blood pressure less than 140 diastolic under 90    CKD (chronic kidney disease) stage 3, GFR 30-59 ml/min- (present on admission)  Assessment & Plan  Monitor renal functions avoid nephrotoxic medications    Hyperlipidemia- (present on admission)  Assessment & Plan  Low-fat low-cholesterol diet  Statin  Fasting lipid panel         VTE prophylaxis: SCDs/TEDs    I have performed a physical exam and reviewed and updated ROS and Plan today (7/20/2024). In review of yesterday's note (7/19/2024), there are no changes except as documented above.    Mariella Armendariz MD MPH

## 2024-07-20 NOTE — CARE PLAN
The patient is Stable - Low risk of patient condition declining or worsening    Shift Goals  Clinical Goals: monitor vitals, monitor labs, CT spine, echo tomorrow, q4 neuro checks  Patient Goals: rest, feel better  Family Goals: rome    Progress made toward(s) clinical / shift goals:      Patient is not progressing towards the following goals:  Problem: Care Map:  Day 1 Optimal Outcome for the Heart Failure Patient  Goal: Day 1:  Optimal Care of the heart failure patient  Description: Target End Date:  end of day 1  7/20/2024 0100 by Aundrea Oh R.N.  Outcome: Progressing  Note:     Start Heart Failure education booklet, provide writing utensils and notepad for questions    For patient's with heart failure exacerbation, identify precipitant (diet, med compliance, etc.) and direct education towards lifestyle changes to prevent exacerbations.    Instruct patient to write down weights on symptom tracker daily    Ensure daily BMP is ordered by provider    Daily weight documented. Use stand up scale if patient able. Compare to previous weight    Assess and document 2 hour post diuretic output    Document intake and output per shift. Communicate with care team if volume status is improving, stalled or worseing.    Document ambulation tolerance (functional assessment) in ADL flowsheet daily    Assess edema every shift (peripheral, sacral, periorbital, perineal/scrotal, and abdominal)    If patient is HFrEF, review for guideline medications (evidence based beta blocker (Toprol XL, carvedilol, bisprolol), ACEI/ARB/ARNI, Aldosterone receptor antagonist). If not ordered request provider contraindication documentation.    Educate patient on HF topics (medication, weight, worseining signs and symptoms, low salt diet, activity) and document in Patient Education      7/20/2024 0100 by Aundrea Oh R.N.  Outcome: Progressing     Problem: Skin Integrity  Goal: Skin integrity is maintained or improved  Description:  Target End Date:  Prior to discharge or change in level of care    Document interventions on Skin Risk/Aris flowsheet groups and corresponding LDA    1.  Assess and monitor skin integrity, appearance and/or temperature  2.  Assess risk factors for impaired skin integrity and/or pressures ulcers  3.  Implement precautions to protect skin integrity in collaboration with interdisciplinary team  4.  Implement pressure ulcer prevention protocol if at risk for skin breakdown  5.  Confirm wound care consult if at risk for skin breakdown  6.  Ensure patient use of pressure relieving devices  (Low air loss bed, waffle overlay, heel protectors, ROHO cushion, etc)  Outcome: Progressing  Note: Uploaded pictures into epic, q2 hour turns. Pt educated about the importance of frequent repositioning to prevent skin breakdown. Pt verbalized understanding. Appropriate dressings in place. Extra pillows in place for comfort, between knees, and to float heels. Barrier cream applied as appropriate. Pt cleaned and linens changed frequently as appropriate.

## 2024-07-21 ENCOUNTER — APPOINTMENT (OUTPATIENT)
Dept: CARDIOLOGY | Facility: MEDICAL CENTER | Age: 89
DRG: 314 | End: 2024-07-21
Attending: HOSPITALIST
Payer: MEDICARE

## 2024-07-21 LAB
ALBUMIN SERPL BCP-MCNC: 2.7 G/DL (ref 3.2–4.9)
ALBUMIN/GLOB SERPL: 1.1 G/DL
ALP SERPL-CCNC: 112 U/L (ref 30–99)
ALT SERPL-CCNC: 9 U/L (ref 2–50)
ANION GAP SERPL CALC-SCNC: 8 MMOL/L (ref 7–16)
AST SERPL-CCNC: 22 U/L (ref 12–45)
BACTERIA BLD CULT: ABNORMAL
BACTERIA BLD CULT: ABNORMAL
BACTERIA UR CULT: ABNORMAL
BACTERIA UR CULT: ABNORMAL
BASOPHILS # BLD AUTO: 0.5 % (ref 0–1.8)
BASOPHILS # BLD: 0.04 K/UL (ref 0–0.12)
BILIRUB SERPL-MCNC: 0.7 MG/DL (ref 0.1–1.5)
BUN SERPL-MCNC: 13 MG/DL (ref 8–22)
CALCIUM ALBUM COR SERPL-MCNC: 9.5 MG/DL (ref 8.5–10.5)
CALCIUM SERPL-MCNC: 8.5 MG/DL (ref 8.5–10.5)
CHLORIDE SERPL-SCNC: 96 MMOL/L (ref 96–112)
CO2 SERPL-SCNC: 34 MMOL/L (ref 20–33)
CREAT SERPL-MCNC: 0.91 MG/DL (ref 0.5–1.4)
EOSINOPHIL # BLD AUTO: 0.18 K/UL (ref 0–0.51)
EOSINOPHIL NFR BLD: 2.4 % (ref 0–6.9)
ERYTHROCYTE [DISTWIDTH] IN BLOOD BY AUTOMATED COUNT: 52.2 FL (ref 35.9–50)
GFR SERPLBLD CREATININE-BSD FMLA CKD-EPI: 59 ML/MIN/1.73 M 2
GLOBULIN SER CALC-MCNC: 2.5 G/DL (ref 1.9–3.5)
GLUCOSE SERPL-MCNC: 102 MG/DL (ref 65–99)
HCT VFR BLD AUTO: 29.3 % (ref 37–47)
HGB BLD-MCNC: 9.6 G/DL (ref 12–16)
IMM GRANULOCYTES # BLD AUTO: 0.02 K/UL (ref 0–0.11)
IMM GRANULOCYTES NFR BLD AUTO: 0.3 % (ref 0–0.9)
LV EJECT FRACT  99904: 65
LYMPHOCYTES # BLD AUTO: 2.02 K/UL (ref 1–4.8)
LYMPHOCYTES NFR BLD: 26.9 % (ref 22–41)
MAGNESIUM SERPL-MCNC: 1.9 MG/DL (ref 1.5–2.5)
MCH RBC QN AUTO: 32.8 PG (ref 27–33)
MCHC RBC AUTO-ENTMCNC: 32.8 G/DL (ref 32.2–35.5)
MCV RBC AUTO: 100 FL (ref 81.4–97.8)
MONOCYTES # BLD AUTO: 0.65 K/UL (ref 0–0.85)
MONOCYTES NFR BLD AUTO: 8.7 % (ref 0–13.4)
NEUTROPHILS # BLD AUTO: 4.6 K/UL (ref 1.82–7.42)
NEUTROPHILS NFR BLD: 61.2 % (ref 44–72)
NRBC # BLD AUTO: 0 K/UL
NRBC BLD-RTO: 0 /100 WBC (ref 0–0.2)
PLATELET # BLD AUTO: 164 K/UL (ref 164–446)
PMV BLD AUTO: 9.9 FL (ref 9–12.9)
POTASSIUM SERPL-SCNC: 4.1 MMOL/L (ref 3.6–5.5)
PROT SERPL-MCNC: 5.2 G/DL (ref 6–8.2)
RBC # BLD AUTO: 2.93 M/UL (ref 4.2–5.4)
SIGNIFICANT IND 70042: ABNORMAL
SIGNIFICANT IND 70042: ABNORMAL
SITE SITE: ABNORMAL
SITE SITE: ABNORMAL
SODIUM SERPL-SCNC: 138 MMOL/L (ref 135–145)
SOURCE SOURCE: ABNORMAL
SOURCE SOURCE: ABNORMAL
WBC # BLD AUTO: 7.5 K/UL (ref 4.8–10.8)

## 2024-07-21 PROCEDURE — 85025 COMPLETE CBC W/AUTO DIFF WBC: CPT

## 2024-07-21 PROCEDURE — 700117 HCHG RX CONTRAST REV CODE 255: Performed by: HOSPITALIST

## 2024-07-21 PROCEDURE — 93306 TTE W/DOPPLER COMPLETE: CPT

## 2024-07-21 PROCEDURE — 99232 SBSQ HOSP IP/OBS MODERATE 35: CPT | Mod: GC | Performed by: STUDENT IN AN ORGANIZED HEALTH CARE EDUCATION/TRAINING PROGRAM

## 2024-07-21 PROCEDURE — 36415 COLL VENOUS BLD VENIPUNCTURE: CPT

## 2024-07-21 PROCEDURE — 93306 TTE W/DOPPLER COMPLETE: CPT | Mod: 26 | Performed by: INTERNAL MEDICINE

## 2024-07-21 PROCEDURE — A9270 NON-COVERED ITEM OR SERVICE: HCPCS | Performed by: HOSPITALIST

## 2024-07-21 PROCEDURE — 700102 HCHG RX REV CODE 250 W/ 637 OVERRIDE(OP): Performed by: HOSPITALIST

## 2024-07-21 PROCEDURE — 83735 ASSAY OF MAGNESIUM: CPT

## 2024-07-21 PROCEDURE — 700111 HCHG RX REV CODE 636 W/ 250 OVERRIDE (IP): Mod: JZ | Performed by: HOSPITALIST

## 2024-07-21 PROCEDURE — 80053 COMPREHEN METABOLIC PANEL: CPT

## 2024-07-21 PROCEDURE — 770020 HCHG ROOM/CARE - TELE (206)

## 2024-07-21 PROCEDURE — 700111 HCHG RX REV CODE 636 W/ 250 OVERRIDE (IP): Mod: JZ | Performed by: STUDENT IN AN ORGANIZED HEALTH CARE EDUCATION/TRAINING PROGRAM

## 2024-07-21 PROCEDURE — 700105 HCHG RX REV CODE 258: Performed by: STUDENT IN AN ORGANIZED HEALTH CARE EDUCATION/TRAINING PROGRAM

## 2024-07-21 RX ADMIN — CEFEPIME 2 G: 2 INJECTION, POWDER, FOR SOLUTION INTRAVENOUS at 04:30

## 2024-07-21 RX ADMIN — METOPROLOL SUCCINATE 50 MG: 25 TABLET, EXTENDED RELEASE ORAL at 21:34

## 2024-07-21 RX ADMIN — LISINOPRIL 20 MG: 20 TABLET ORAL at 04:18

## 2024-07-21 RX ADMIN — HUMAN ALBUMIN MICROSPHERES AND PERFLUTREN 3 ML: 10; .22 INJECTION, SOLUTION INTRAVENOUS at 15:45

## 2024-07-21 RX ADMIN — LISINOPRIL 20 MG: 20 TABLET ORAL at 21:33

## 2024-07-21 RX ADMIN — NYSTATIN: 100000 POWDER TOPICAL at 21:31

## 2024-07-21 RX ADMIN — GABAPENTIN 600 MG: 300 CAPSULE ORAL at 04:19

## 2024-07-21 RX ADMIN — FUROSEMIDE 40 MG: 10 INJECTION, SOLUTION INTRAVENOUS at 04:18

## 2024-07-21 RX ADMIN — POTASSIUM CHLORIDE 20 MEQ: 1500 TABLET, EXTENDED RELEASE ORAL at 21:34

## 2024-07-21 RX ADMIN — GABAPENTIN 600 MG: 300 CAPSULE ORAL at 12:16

## 2024-07-21 RX ADMIN — POTASSIUM CHLORIDE 20 MEQ: 1500 TABLET, EXTENDED RELEASE ORAL at 04:18

## 2024-07-21 RX ADMIN — FUROSEMIDE 40 MG: 10 INJECTION, SOLUTION INTRAVENOUS at 14:55

## 2024-07-21 RX ADMIN — NYSTATIN: 100000 POWDER TOPICAL at 04:18

## 2024-07-21 RX ADMIN — SENNOSIDES AND DOCUSATE SODIUM 2 TABLET: 50; 8.6 TABLET ORAL at 21:33

## 2024-07-21 RX ADMIN — ASPIRIN 325 MG: 325 TABLET ORAL at 21:32

## 2024-07-21 RX ADMIN — ATORVASTATIN CALCIUM 80 MG: 80 TABLET, FILM COATED ORAL at 21:33

## 2024-07-21 RX ADMIN — NYSTATIN: 100000 POWDER TOPICAL at 14:55

## 2024-07-21 RX ADMIN — FUROSEMIDE 40 MG: 10 INJECTION, SOLUTION INTRAVENOUS at 21:34

## 2024-07-21 RX ADMIN — CITALOPRAM HYDROBROMIDE 20 MG: 20 TABLET ORAL at 04:19

## 2024-07-21 RX ADMIN — GABAPENTIN 600 MG: 300 CAPSULE ORAL at 21:32

## 2024-07-21 RX ADMIN — OMEPRAZOLE 40 MG: 20 CAPSULE, DELAYED RELEASE ORAL at 04:18

## 2024-07-21 ASSESSMENT — ENCOUNTER SYMPTOMS
WEIGHT LOSS: 0
BLURRED VISION: 0
SHORTNESS OF BREATH: 0
EYE REDNESS: 0
SEIZURES: 0
NAUSEA: 0
ABDOMINAL PAIN: 0
PALPITATIONS: 0
VOMITING: 0
BACK PAIN: 1
HEMOPTYSIS: 0
EYE PAIN: 0
TINGLING: 0
CHILLS: 0
HEADACHES: 0
DOUBLE VISION: 0
EYE DISCHARGE: 0
DIZZINESS: 0
TREMORS: 1
WEAKNESS: 1
FOCAL WEAKNESS: 0
MYALGIAS: 0
DEPRESSION: 0
COUGH: 0
FEVER: 0
MEMORY LOSS: 0

## 2024-07-21 ASSESSMENT — PAIN DESCRIPTION - PAIN TYPE
TYPE: ACUTE PAIN
TYPE: ACUTE PAIN;CHRONIC PAIN

## 2024-07-21 ASSESSMENT — FIBROSIS 4 INDEX
FIB4 SCORE: 4.16
FIB4 SCORE: 4.16

## 2024-07-21 NOTE — CARE PLAN
The patient is Stable - Low risk of patient condition declining or worsening    Shift Goals  Clinical Goals: neuro checks, maintain skin integrity  Patient Goals: rest  Family Goals: rome    Progress made toward(s) clinical / shift goals:      Problem: Care Map:  Day 2 Optimal Outcome for the Heart Failure Patient  Goal: Day 2:  Optimal Care of the heart failure patient  Description: Target End Date:  end of day 2  Outcome: Progressing  Note: Tele monitored. Monitored labs/VS.     Problem: Skin Integrity  Goal: Skin integrity is maintained or improved  Description: Target End Date:  Prior to discharge or change in level of care    Document interventions on Skin Risk/Aris flowsheet groups and corresponding LDA    1.  Assess and monitor skin integrity, appearance and/or temperature  2.  Assess risk factors for impaired skin integrity and/or pressures ulcers  3.  Implement precautions to protect skin integrity in collaboration with interdisciplinary team  4.  Implement pressure ulcer prevention protocol if at risk for skin breakdown  5.  Confirm wound care consult if at risk for skin breakdown  6.  Ensure patient use of pressure relieving devices  (Low air loss bed, waffle overlay, heel protectors, ROHO cushion, etc)  Outcome: Progressing  Note: Assessed skin integrity. Meplix on heels. Float boats on. Q2H turns. Barrier cream/paste and nystatin applied, interdry placed in folds.        Patient is not progressing towards the following goals:

## 2024-07-21 NOTE — ASSESSMENT & PLAN NOTE
Resolved.  - Culture positive for E.coli UTI  -Patient has not had pain or urinary symptoms, WBCs are normal. Augmentin discontinued as patient likely has asymptomatic bacteruria. ID recommendations.

## 2024-07-21 NOTE — PROGRESS NOTES
Rhythm:SR  Heart Rate:61-97  Ectopy:PVC    TN:.19  QRS:.08  QT:.42                Per telemetry room monitor

## 2024-07-21 NOTE — THERAPY
Physical Therapy Contact Note    Patient Name: Silvia Orellana  Age:  89 y.o., Sex:  female  Medical Record #: [id number]  Today's Date: 7/21/2024    PT order received. Pt with lumbar compression fx, per chart pending neurosurgery recommendations. Will hold and follow up as appropriate     Inderjit Mott PT, DPT

## 2024-07-21 NOTE — CARE PLAN
The patient is Watcher - Medium risk of patient condition declining or worsening    Shift Goals  Clinical Goals: Neuro checks, safety, ABX  Patient Goals: get better  Family Goals: rome    Progress made toward(s) clinical / shift goals:        Problem: Knowledge Deficit - Standard  Goal: Patient and family/care givers will demonstrate understanding of plan of care, disease process/condition, diagnostic tests and medications  Note: Assess knowledge level of disease process and answer all questions, ex[plain tests, go over plan of care     Problem: Fall Risk  Goal: Patient will remain free from falls  Note: Assess for fall risk factors and implement fall precautions such as bed alarms and signs, hourly rounding     Problem: Pain - Standard  Goal: Alleviation of pain or a reduction in pain to the patient’s comfort goal  Note: Give pain management medications as ordered, assess pain often, hourly rounding       Patient is not progressing towards the following goals:

## 2024-07-21 NOTE — PROGRESS NOTES
[facility] Internal Medicine Daily Progress Note    Date of Service  7/21/2024    [facility] Team: [facility] Team   Attending: Keyona Fatima M.d.  Senior Resident: Dr. Quincy Jung  Intern:  Dr. Mariella Armendariz  Contact Number: [phone]    Chief Complaint  Silvia Orellana is a 89 y.o. female admitted 7/19/2024 with acute respiratory failure with hypoxemia.    Hospital Course  Eric Orellana is a 89-year-old woman who presented on 7/19/2024 with shortness of breath after a ground-level fall at home.  She was going downstairs and fell backwards, hitting her head.  She did not lose consciousness but had severe pain in the back of her head on her right side.  She was found to be hypoxic and placed on nonbreathing mask which was later titrated to 6 L nasal cannula.  Patient has a past medical history of CHF EF 53%. She lives with daughter and son-in law.  Patient presented to the ED and underwent imaging studies.     CT head- no acute intracranial abnormalities  CT spine -no cervical fracture, large bilateral pleural effusions (Left> Right), L1 vertebral body compression fracture, precarinal enlarged lymph node  Chest x-ray-cardiomegaly with pulmonary edema, moderate left pleural effusion with associated compressive atelectasis  Patient underwent ultrasound-guided Thoracentesis which drained 155 0 mL of straw-colored fluid.   Repeat chest x-ray showed decreased left pleural effusion, left lower lobe airspace disease and pulmonary edema.  CT lower spine-acute L1 burst fracture, right renal atrophy with multiple right renal cysts nonobstructed left renal stone.    Past medical history-CAD breast cancer, CHF, arthritis, hypertension, GERD, MI, stroke.    Surgical history -bilateral hip replacement, lumpectomy, cholecystectomy, multiple CABG, transcatheter aortic valve replacement, TATIANNA.    Family history-no relevant history on file    Social history-smoking: never, alcohol: Occasionally, drugs: Never    Allergies:  None    Interval Problem Update  -Vitals stable. Baseline Oxygen - 6L NC  - Post-thoracentesis CXR visualized left lower lobe airspace disease likely CAP.   - Viral panel : negative, Blood cultures positive for coag-negative staph(prelim report). Negative for MRSA and staph aureus.  - Culture positive for E.coli UTI  -IV Cefepime discontinued 7/21/24. Started Augmentin 875 mg Q12H for appropriate coverage. Reassess   - K = 4.1. Patient continued on Lasix 40mg for heart failure. Monitor labs  - CTA chest negative for Pulmonary embolism  -Hx of CHF, EF 35%.ECHO ordered. Pending. Monitor NT-proBNP  -Sputum culture. Pending  -Consulted Neurosurgery for L1 burst fracture - no intervention at this time given her advanced age    I have discussed this patient's plan of care and discharge plan at IDT rounds today with Case Management, Nursing, Nursing leadership, and other members of the IDT team.    Consultants/Specialty  Neurosurgery    Code Status  DNAR/DNI    Review of Systems  Review of Systems   Constitutional:  Positive for malaise/fatigue. Negative for chills, fever and weight loss.   HENT:  Negative for ear discharge, ear pain, hearing loss and tinnitus.    Eyes:  Negative for blurred vision, double vision, pain, discharge and redness.   Respiratory:  Negative for cough, hemoptysis and shortness of breath.    Cardiovascular:  Negative for chest pain, palpitations and leg swelling.   Gastrointestinal:  Negative for abdominal pain, nausea and vomiting.   Genitourinary:  Negative for dysuria.   Musculoskeletal:  Positive for back pain. Negative for myalgias.   Skin:  Negative for itching and rash.   Neurological:  Positive for tremors and weakness. Negative for dizziness, tingling, focal weakness, seizures and headaches.   Psychiatric/Behavioral:  Negative for depression, memory loss and suicidal ideas.         Physical Exam  Temp:  [35.9 °C (96.6 °F)-36.5 °C (97.7 °F)] 36.4 °C (97.5 °F)  Pulse:  [59-70] 70  Resp:   [16] 16  BP: (116-168)/(37-52) 168/52  SpO2:  [90 %-98 %] 95 %    Physical Exam  Constitutional:       Appearance: Normal appearance. She is normal weight.   HENT:      Head: Normocephalic and atraumatic.      Nose: Nose normal.      Mouth/Throat:      Mouth: Mucous membranes are moist.   Eyes:      Extraocular Movements: Extraocular movements intact.      Pupils: Pupils are equal, round, and reactive to light.   Cardiovascular:      Rate and Rhythm: Normal rate and regular rhythm.      Pulses: Normal pulses.      Heart sounds: Normal heart sounds. No murmur heard.     No friction rub. No gallop.   Pulmonary:      Effort: Pulmonary effort is normal. No respiratory distress.      Breath sounds: No wheezing or rales.      Comments: Breath sounds diminished B/L  Abdominal:      General: Abdomen is flat. Bowel sounds are normal.      Palpations: Abdomen is soft.   Musculoskeletal:         General: No swelling. Normal range of motion.      Cervical back: Normal range of motion.      Right lower leg: No edema.      Left lower leg: No edema.   Skin:     General: Skin is warm.      Capillary Refill: Capillary refill takes less than 2 seconds.      Coloration: Skin is not jaundiced or pale.      Findings: Bruising (iatrogenic) present.   Neurological:      General: No focal deficit present.      Mental Status: She is alert.      Cranial Nerves: No cranial nerve deficit.      Sensory: No sensory deficit.      Motor: No weakness.   Psychiatric:         Mood and Affect: Mood normal.         Behavior: Behavior normal.         Fluids    Intake/Output Summary (Last 24 hours) at 7/21/2024 1422  Last data filed at 7/21/2024 1300  Gross per 24 hour   Intake 600 ml   Output 1750 ml   Net -1150 ml       Laboratory  Recent Labs     07/19/24  1300 07/20/24  0146 07/21/24  0255   WBC 7.2 9.0 7.5   RBC 3.52* 3.24* 2.93*   HEMOGLOBIN 11.6* 10.3* 9.6*   HEMATOCRIT 35.5* 31.5* 29.3*   .9* 97.2 100.0*   MCH 33.0 31.8 32.8   MCHC 32.7  32.7 32.8   RDW 51.4* 48.8 52.2*   PLATELETCT 219 187 164   MPV 9.5 9.7 9.9     Recent Labs     07/20/24  0146 07/20/24  1159 07/21/24  0255   SODIUM 138 136 138   POTASSIUM 3.4* 3.5* 4.1   CHLORIDE 92* 92* 96   CO2 32 34* 34*   GLUCOSE 105* 103* 102*   BUN 11 13 13   CREATININE 0.77 0.85 0.91   CALCIUM 8.6 8.4* 8.5                   Imaging  CT-CTA CHEST PULMONARY ARTERY W/ RECONS   Final Result      1.  There is no CT evidence of acute pulmonary embolism.   2.  Mildly enlarged heart with dependent groundglass opacities left greater than right consistent with changes of pulmonary edema.   3.  Bibasilar airspace disease likely due to atelectasis with pneumonitis considered less likely.   4.  Small bilateral dependent pleural effusions, right greater than left            DX-CHEST-PORTABLE (1 VIEW)   Final Result         1. Decreased left pleural effusion.   2. Left lower lobe airspace disease disease, correlate for potential pneumonia.   2. Stable pulmonary edema.      CT-LSPINE W/O PLUS RECONS   Final Result      1.  Acute L1 burst fracture with approximately 30% loss of height and minimal retropulsion.   2.  Multilevel disc and facet joint degenerative changes.   3.  Mild degenerative anterolisthesis of L3 on L4.   4.  At least small right and trace left pleural effusions.   5.  Likely pulmonary edema.   6.  Right renal cortical atrophy with multiple right renal cysts.   7.  Nonobstructing small left renal stone.   8.  Colonic diverticulosis.      US-THORACENTESIS PUNCTURE LEFT   Final Result      1. Ultrasound guided left sided therapeutic thoracentesis.      2. 1550 mL of fluid withdrawn.      DX-CHEST-PORTABLE (1 VIEW)   Final Result      Cardiomegaly with probable pulmonary edema.      Possible moderate left pleural effusion with associated compressive atelectasis. Correlate clinically for infection.      CT-TSPINE W/O PLUS RECONS   Final Result      1.  L1 vertebral body fracture without appreciable height  loss. The L1 level is incompletely imaged. Recommend CT or MRI of the lumbar spine for completion of assessment.   2.  Osteopenia   3.  Multilevel multifactorial degenerative changes   4.  LEFT larger than RIGHT pleural effusions with overlying atelectasis   5.  Enlarged precarinal lymph node      CT-CSPINE WITHOUT PLUS RECONS   Final Result      1.  No cervical spine fracture detected.   2.  Large bilateral pleural effusions.      CT-HEAD W/O   Final Result      1.  Chronic microvascular ischemic type changes.   2.  No acute intracranial abnormality.               EC-ECHOCARDIOGRAM COMPLETE W/O CONT    (Results Pending)        Assessment/Plan  Problem Representation:    * Acute respiratory failure with hypoxemia (HCC)- (present on admission)  Assessment & Plan  Patient comes in complaining of respiratory failure.  Patient had to be initially placed on a nonrebreather.  Currently she is down to 6 L by nasal cannula.  Patient was hypoxic on room air  Patient will need to continue with oxygen support to keep oxygen saturations above 90%.    UTI (urinary tract infection)  Assessment & Plan  - Culture positive for E.coli UTI  -IV Cefepime discontinued 7/21/24. Started Augmentin 875 mg Q12H for appropriate coverage. Reassess     Pleural effusion, bilateral- (present on admission)  Assessment & Plan  Patient had significant bilateral pleural effusion worse on the left than the right  -Resolved with Urgent thoracentesis ordered through radiology with ultrasound guidance.  -1550 ml straw colored fluid drained, left lower lobe's disease likely pneumonia.    7/20/2024:  - Viral panel : negative, blood cultures grew gram+cocci clusters likely staph. Negative for MRSA and staph aureus.  -ordered IV Cefepime, started on 7/20/24.  - CTA chest impression: R/O Pulmonary embolism. Small B/L pleural effusion (right>left)  -Sputum culture. Pending    Closed wedge compression fracture of L1 vertebra (HCC)- (present on  admission)  Assessment & Plan  On the T-spine CT there appears to be an L1 compression fracture they recommend doing a CT of the lumbar spine directly to evaluate  Continue with pain management patient may need a TLSO brace.  -Will need PT OT evaluation   -Consulted Neurosurgery for lumbar compression fracture, appreciate recommendations. No intervention at this time given her advanced age    Acute on chronic diastolic heart failure (HCC)- (present on admission)  Assessment & Plan  Hx of CHF, EF 35%.  -ECHO ordered. Pending.   -Monitor NT-proBNP  - continue lisinopril 20 mg  -continue metoprolol 50 mg  - K = 4.1. Patient continued on Lasix 40mg for heart failure. Monitor labs    DNR (do not resuscitate)- (present on admission)  Assessment & Plan  Discussed advanced directive with the patient she wishes to be DO NOT RESUSCITATE CODE STATUS    Hyperglycemia- (present on admission)  Assessment & Plan  Check a hemoglobin A1c and continue to monitor hypoglycemia    Elevated alkaline phosphatase level- (present on admission)  Assessment & Plan  Check a GGT level    Pulmonary edema- (present on admission)  Assessment & Plan  Aggressive diuretic management will be utilized.  Will give her 40 mg IV Lasix every 8 hours and monitor intake output  ECHO ordered.     Hypokalemia- (present on admission)  Assessment & Plan  Potassium slightly low at 3.4 but with diuretics patient will continue to decline in her potassium levels  Given 20 mg of potassium twice daily    Closed head injury- (present on admission)  Assessment & Plan  Patient fell on the stairs today hitting the back of her head.  CAT scan of the head done shows no acute intracranial abnormality  Patient externally does not have any laceration or hematoma at this point.  Continue with pain management and monitor neurological status    Macrocytic anemia- (present on admission)  Assessment & Plan  Monitor H&H if drops below 7 or 21 transfuse    S/P TAVR (transcatheter  aortic valve replacement)- (present on admission)  Assessment & Plan  Outpatient follow-ups with cardiology    Carotid artery stenosis- (present on admission)  Assessment & Plan  Continue aspirin and statin    CAD- (present on admission)  Assessment & Plan  History of coronary artery disease with three-vessel bypass surgery in 2002  Patient also had to have PTCA in 2012 one-vessel.  Medication management optimization including beta-blocker ACE inhibitor statin and aspirin    HTN (hypertension)- (present on admission)  Assessment & Plan  Blood pressure significantly elevated  Apparently she forgot to take her medications this morning  Optimize medication management and monitor blood pressure to keep systolic blood pressure less than 140 diastolic under 90    CKD (chronic kidney disease) stage 3, GFR 30-59 ml/min- (present on admission)  Assessment & Plan  Monitor renal functions avoid nephrotoxic medications    Hyperlipidemia- (present on admission)  Assessment & Plan  Low-fat low-cholesterol diet  Statin  Fasting lipid panel         VTE prophylaxis: SCDs/TEDs    I have performed a physical exam and reviewed and updated ROS and Plan today (7/21/2024). In review of yesterday's note (7/20/2024), there are no changes except as documented above.    Mariella Armendariz MD MPH

## 2024-07-21 NOTE — PROGRESS NOTES
Telemetry Report    Rhythm SR   Rate 60-70  Ectopy R PVC     IL 0.19  QRS 0.08  QT 0.42                          Per telemetry room monitor

## 2024-07-21 NOTE — DIETARY
"Nutrition services: Day 2 of admit.  Silvia Orellana is a 89 y.o. female with admitting DX of acute respiratory failure with hypoxemia.    Consult received for wt loss (34 lbs or more in 6 months), poor PO per admit screen. Visited pt at bedside, pt resting at time of visit. She appeared adequately nourished with age appropriate subcutaneous fat and muscle storage. Lunch tray at bedside was not yet eaten. Pt did not rouse to name.    Assessment:  Height: 162.6 cm (5' 4.02\")  Weight: 71.8 kg (158 lb 4.6 oz)  Body mass index is 27.16 kg/m²., BMI classification: overweight  Diet/Intake: 2 gram sodium; PO <25-75% of meals    Evaluation:   Admitted after ground level fall, shortness of breath.  PMH: bowel habit changes, bradycardia, breast cancer, shortness of breath, CHF, heart burn, diverticulosis, hyperlipidemia, hypertension.  Wt hx per chart review: 160 lbs 5/15/24, 176 lbs 4/25/24, 162 lbs 2/29/24,  198 lbs 3/1/23, 211 lbs 7/25/22. Wt loss of 2% in five months is not significant.  Pt was seen by wound team yesterday. No pressure injuries noted.    Malnutrition Risk: Does not meet criteria per ASPEN guidelines at this time.    Recommendations/Plan:  Encourage intake of meals.  Document intake of all meals as % taken in ADLs to provide interdisciplinary communication across all shifts.   Monitor weight.  Nutrition rep will continue to see patient for ongoing meal and snack preferences.     RD will follow per dept guidelines.          "

## 2024-07-22 LAB
ALBUMIN SERPL BCP-MCNC: 2.7 G/DL (ref 3.2–4.9)
ALBUMIN/GLOB SERPL: 1 G/DL
ALP SERPL-CCNC: 123 U/L (ref 30–99)
ALT SERPL-CCNC: 10 U/L (ref 2–50)
ANION GAP SERPL CALC-SCNC: 10 MMOL/L (ref 7–16)
AST SERPL-CCNC: 24 U/L (ref 12–45)
BILIRUB SERPL-MCNC: 0.5 MG/DL (ref 0.1–1.5)
BUN SERPL-MCNC: 17 MG/DL (ref 8–22)
CALCIUM ALBUM COR SERPL-MCNC: 9.2 MG/DL (ref 8.5–10.5)
CALCIUM SERPL-MCNC: 8.2 MG/DL (ref 8.5–10.5)
CHLORIDE SERPL-SCNC: 92 MMOL/L (ref 96–112)
CO2 SERPL-SCNC: 35 MMOL/L (ref 20–33)
CREAT SERPL-MCNC: 1.03 MG/DL (ref 0.5–1.4)
ERYTHROCYTE [DISTWIDTH] IN BLOOD BY AUTOMATED COUNT: 51.3 FL (ref 35.9–50)
GFR SERPLBLD CREATININE-BSD FMLA CKD-EPI: 51 ML/MIN/1.73 M 2
GLOBULIN SER CALC-MCNC: 2.8 G/DL (ref 1.9–3.5)
GLUCOSE SERPL-MCNC: 109 MG/DL (ref 65–99)
GRAM STN SPEC: NORMAL
HCT VFR BLD AUTO: 28.6 % (ref 37–47)
HGB BLD-MCNC: 9.5 G/DL (ref 12–16)
HGB RETIC QN AUTO: 37 PG/CELL (ref 29–35)
IMM RETICS NFR: 16.1 % (ref 2.6–16.1)
IRON SATN MFR SERPL: 12 % (ref 15–55)
IRON SERPL-MCNC: 22 UG/DL (ref 40–170)
MCH RBC QN AUTO: 32.6 PG (ref 27–33)
MCHC RBC AUTO-ENTMCNC: 33.2 G/DL (ref 32.2–35.5)
MCV RBC AUTO: 98.3 FL (ref 81.4–97.8)
PLATELET # BLD AUTO: 186 K/UL (ref 164–446)
PMV BLD AUTO: 10.2 FL (ref 9–12.9)
POTASSIUM SERPL-SCNC: 4.3 MMOL/L (ref 3.6–5.5)
PROT SERPL-MCNC: 5.5 G/DL (ref 6–8.2)
RBC # BLD AUTO: 2.91 M/UL (ref 4.2–5.4)
RETICS # AUTO: 0.05 M/UL (ref 0.04–0.12)
RETICS/RBC NFR: 1.6 % (ref 0.8–2.6)
SIGNIFICANT IND 70042: NORMAL
SITE SITE: NORMAL
SODIUM SERPL-SCNC: 137 MMOL/L (ref 135–145)
SOURCE SOURCE: NORMAL
TIBC SERPL-MCNC: 180 UG/DL (ref 250–450)
UIBC SERPL-MCNC: 158 UG/DL (ref 110–370)
VIT B12 SERPL-MCNC: 2046 PG/ML (ref 211–911)
WBC # BLD AUTO: 8.7 K/UL (ref 4.8–10.8)

## 2024-07-22 PROCEDURE — L0486 TLSO RIGIDLINED CUST FAB TWO: HCPCS

## 2024-07-22 PROCEDURE — A9270 NON-COVERED ITEM OR SERVICE: HCPCS

## 2024-07-22 PROCEDURE — 700102 HCHG RX REV CODE 250 W/ 637 OVERRIDE(OP): Performed by: HOSPITALIST

## 2024-07-22 PROCEDURE — 87205 SMEAR GRAM STAIN: CPT

## 2024-07-22 PROCEDURE — A9270 NON-COVERED ITEM OR SERVICE: HCPCS | Performed by: HOSPITALIST

## 2024-07-22 PROCEDURE — 36415 COLL VENOUS BLD VENIPUNCTURE: CPT

## 2024-07-22 PROCEDURE — 770001 HCHG ROOM/CARE - MED/SURG/GYN PRIV*

## 2024-07-22 PROCEDURE — 83540 ASSAY OF IRON: CPT

## 2024-07-22 PROCEDURE — 700111 HCHG RX REV CODE 636 W/ 250 OVERRIDE (IP): Mod: JZ | Performed by: HOSPITALIST

## 2024-07-22 PROCEDURE — 82607 VITAMIN B-12: CPT

## 2024-07-22 PROCEDURE — 306637 HCHG MISC ORTHO ITEM RC 0274

## 2024-07-22 PROCEDURE — 83550 IRON BINDING TEST: CPT

## 2024-07-22 PROCEDURE — 85027 COMPLETE CBC AUTOMATED: CPT

## 2024-07-22 PROCEDURE — 99232 SBSQ HOSP IP/OBS MODERATE 35: CPT | Mod: MISDOCU | Performed by: STUDENT IN AN ORGANIZED HEALTH CARE EDUCATION/TRAINING PROGRAM

## 2024-07-22 PROCEDURE — 87070 CULTURE OTHR SPECIMN AEROBIC: CPT

## 2024-07-22 PROCEDURE — 80053 COMPREHEN METABOLIC PANEL: CPT

## 2024-07-22 PROCEDURE — 85046 RETICYTE/HGB CONCENTRATE: CPT

## 2024-07-22 PROCEDURE — 700102 HCHG RX REV CODE 250 W/ 637 OVERRIDE(OP)

## 2024-07-22 RX ADMIN — AMOXICILLIN AND CLAVULANATE POTASSIUM 1 TABLET: 875; 125 TABLET, FILM COATED ORAL at 17:28

## 2024-07-22 RX ADMIN — FUROSEMIDE 40 MG: 10 INJECTION, SOLUTION INTRAVENOUS at 06:25

## 2024-07-22 RX ADMIN — OMEPRAZOLE 40 MG: 20 CAPSULE, DELAYED RELEASE ORAL at 06:24

## 2024-07-22 RX ADMIN — GABAPENTIN 600 MG: 300 CAPSULE ORAL at 12:32

## 2024-07-22 RX ADMIN — NYSTATIN: 100000 POWDER TOPICAL at 06:25

## 2024-07-22 RX ADMIN — GABAPENTIN 600 MG: 300 CAPSULE ORAL at 17:27

## 2024-07-22 RX ADMIN — CITALOPRAM HYDROBROMIDE 20 MG: 20 TABLET ORAL at 06:24

## 2024-07-22 RX ADMIN — OXYCODONE 5 MG: 5 TABLET ORAL at 22:13

## 2024-07-22 RX ADMIN — POTASSIUM CHLORIDE 20 MEQ: 1500 TABLET, EXTENDED RELEASE ORAL at 06:24

## 2024-07-22 RX ADMIN — LISINOPRIL 20 MG: 20 TABLET ORAL at 17:28

## 2024-07-22 RX ADMIN — METOPROLOL SUCCINATE 50 MG: 25 TABLET, EXTENDED RELEASE ORAL at 17:28

## 2024-07-22 RX ADMIN — POTASSIUM CHLORIDE 20 MEQ: 1500 TABLET, EXTENDED RELEASE ORAL at 17:27

## 2024-07-22 RX ADMIN — GABAPENTIN 600 MG: 300 CAPSULE ORAL at 06:24

## 2024-07-22 RX ADMIN — NYSTATIN: 100000 POWDER TOPICAL at 12:32

## 2024-07-22 RX ADMIN — ASPIRIN 325 MG: 325 TABLET ORAL at 17:28

## 2024-07-22 RX ADMIN — OXYCODONE 5 MG: 5 TABLET ORAL at 15:54

## 2024-07-22 RX ADMIN — AMOXICILLIN AND CLAVULANATE POTASSIUM 1 TABLET: 875; 125 TABLET, FILM COATED ORAL at 06:24

## 2024-07-22 RX ADMIN — FUROSEMIDE 40 MG: 10 INJECTION, SOLUTION INTRAVENOUS at 22:06

## 2024-07-22 RX ADMIN — ATORVASTATIN CALCIUM 80 MG: 80 TABLET, FILM COATED ORAL at 22:06

## 2024-07-22 RX ADMIN — FUROSEMIDE 40 MG: 10 INJECTION, SOLUTION INTRAVENOUS at 14:07

## 2024-07-22 RX ADMIN — LISINOPRIL 20 MG: 20 TABLET ORAL at 06:24

## 2024-07-22 RX ADMIN — NYSTATIN: 100000 POWDER TOPICAL at 17:28

## 2024-07-22 RX ADMIN — SENNOSIDES AND DOCUSATE SODIUM 2 TABLET: 50; 8.6 TABLET ORAL at 17:27

## 2024-07-22 ASSESSMENT — PAIN DESCRIPTION - PAIN TYPE
TYPE: ACUTE PAIN

## 2024-07-22 ASSESSMENT — ENCOUNTER SYMPTOMS
NAUSEA: 0
SHORTNESS OF BREATH: 0
DIZZINESS: 0
MYALGIAS: 0
FOCAL WEAKNESS: 0
COUGH: 0
WEIGHT LOSS: 0
MEMORY LOSS: 0
PALPITATIONS: 0
DOUBLE VISION: 0
HEADACHES: 0
WEAKNESS: 1
BLURRED VISION: 0
EYE REDNESS: 0
VOMITING: 0
FEVER: 0
HEMOPTYSIS: 0
BACK PAIN: 1
EYE PAIN: 0
EYE DISCHARGE: 0
TREMORS: 1
TINGLING: 0
SEIZURES: 0
DEPRESSION: 0
ABDOMINAL PAIN: 0
CHILLS: 0

## 2024-07-22 ASSESSMENT — FIBROSIS 4 INDEX: FIB4 SCORE: 3.79

## 2024-07-22 NOTE — PROGRESS NOTES
[facility] Internal Medicine Daily Progress Note    Date of Service  7/23/2024    [facility] Team: [facility] Team   Attending: Adan Oneill M.d.  Senior Resident: Dr. Quincy Jung  Intern:  Dr. Mariella Armendariz  Contact Number: [phone]    Chief Complaint  Silvia Orellana is a 89 y.o. female admitted 7/19/2024 with acute respiratory failure with hypoxemia.    Hospital Course  Eric Orellana is a 89-year-old woman who presented on 7/19/2024 with shortness of breath after a ground-level fall at home.  She was going downstairs and fell backwards, hitting her head.  She did not lose consciousness but had severe pain in the back of her head on her right side.  She was found to be hypoxic and placed on nonbreathing mask which was later titrated to 6 L nasal cannula.  Patient has a past medical history of CHF EF 53%. She lives with daughter and son-in law.  Patient presented to the ED and underwent imaging studies.     CT head- no acute intracranial abnormalities  CT spine -no cervical fracture, large bilateral pleural effusions (Left> Right), L1 vertebral body compression fracture, precarinal enlarged lymph node  Chest x-ray-cardiomegaly with pulmonary edema, moderate left pleural effusion with associated compressive atelectasis  Patient underwent ultrasound-guided Thoracentesis which drained 155 0 mL of straw-colored fluid.   Repeat chest x-ray showed decreased left pleural effusion, left lower lobe airspace disease and pulmonary edema.  CT lower spine-acute L1 burst fracture, right renal atrophy with multiple right renal cysts nonobstructed left renal stone.    Past medical history-CAD breast cancer, CHF, arthritis, hypertension, GERD, MI, stroke.    Surgical history -bilateral hip replacement, lumpectomy, cholecystectomy, multiple CABG, transcatheter aortic valve replacement, TATIANNA.    Family history-no relevant history on file    Social history-smoking: never, alcohol: Occasionally, drugs: Never    Allergies:  None    Interval Problem Update  -Vitals stable. Baseline Oxygen - 6L NC  - Post-thoracentesis CXR visualized left lower lobe airspace disease likely CAP.   - Viral panel : negative, Blood cultures positive for coag-negative staph(prelim report). Negative for MRSA and staph aureus.  - Culture positive for E.coli UTI  - continue Augmentin 875 mg Q12H for appropriate coverage. Day 3/14 antibiotics following a day of cefepime. Reassess   - K = 4.3. Patient continued on Lasix 40mg for heart failure. Monitor labs  -ECHO: significant prosthetic aortic valve stenosis, Mild concentric LVH, normal LVEF = 60%, moderate AR, mild progressive MS, moderate MR, mild-mod TR, severely dilated Left atrium.  -Consulted Neurosurgery for L1 burst fracture - no intervention at this time given her advanced age  -OT/PT eval- TLSO custom brace recommendation placed.  -Outpatient Cardiology f/u for worsening heart failure    I have discussed this patient's plan of care and discharge plan at IDT rounds today with Case Management, Nursing, Nursing leadership, and other members of the IDT team.    Consultants/Specialty  Neurosurgery  OT/PT    Code Status  DNAR/DNI    Review of Systems  Review of Systems   Constitutional:  Positive for malaise/fatigue. Negative for chills, fever and weight loss.   HENT:  Negative for ear discharge, ear pain, hearing loss and tinnitus.    Eyes:  Negative for blurred vision, double vision, pain, discharge and redness.   Respiratory:  Negative for cough, hemoptysis and shortness of breath.    Cardiovascular:  Negative for chest pain, palpitations and leg swelling.   Gastrointestinal:  Negative for abdominal pain, nausea and vomiting.   Genitourinary:  Negative for dysuria.   Musculoskeletal:  Positive for back pain. Negative for myalgias.   Skin:  Negative for itching and rash.   Neurological:  Positive for tremors and weakness. Negative for dizziness, tingling, focal weakness, seizures and headaches.    Psychiatric/Behavioral:  Negative for depression, memory loss and suicidal ideas.         Physical Exam  Temp:  [36.3 °C (97.3 °F)-36.5 °C (97.7 °F)] 36.3 °C (97.3 °F)  Pulse:  [68-74] 68  Resp:  [14-19] 18  BP: (125-146)/(37-47) 138/45  SpO2:  [95 %-97 %] 97 %    Physical Exam  Constitutional:       Appearance: Normal appearance. She is normal weight.   HENT:      Head: Normocephalic and atraumatic.      Nose: Nose normal.      Mouth/Throat:      Mouth: Mucous membranes are moist.   Eyes:      Extraocular Movements: Extraocular movements intact.      Pupils: Pupils are equal, round, and reactive to light.   Cardiovascular:      Rate and Rhythm: Normal rate and regular rhythm.      Pulses: Normal pulses.      Heart sounds: Murmur heard.      No friction rub. No gallop.   Pulmonary:      Effort: Pulmonary effort is normal. No respiratory distress.      Breath sounds: No wheezing or rales.      Comments: Breath sounds diminished B/L  Abdominal:      General: Abdomen is flat. Bowel sounds are normal.      Palpations: Abdomen is soft.   Musculoskeletal:         General: No swelling. Normal range of motion.      Cervical back: Normal range of motion.      Right lower leg: No edema.      Left lower leg: No edema.   Skin:     General: Skin is warm.      Capillary Refill: Capillary refill takes less than 2 seconds.      Coloration: Skin is not jaundiced or pale.      Findings: Bruising (iatrogenic) present.   Neurological:      General: No focal deficit present.      Mental Status: She is alert.      Cranial Nerves: No cranial nerve deficit.      Sensory: No sensory deficit.      Motor: No weakness.   Psychiatric:         Mood and Affect: Mood normal.         Behavior: Behavior normal.         Fluids    Intake/Output Summary (Last 24 hours) at 7/23/2024 1401  Last data filed at 7/23/2024 1200  Gross per 24 hour   Intake 600 ml   Output 1625 ml   Net -1025 ml       Laboratory  Recent Labs     07/21/24  0255 07/22/24  0110  07/23/24  0200   WBC 7.5 8.7 7.1   RBC 2.93* 2.91* 3.02*   HEMOGLOBIN 9.6* 9.5* 9.7*   HEMATOCRIT 29.3* 28.6* 29.4*   .0* 98.3* 97.4   MCH 32.8 32.6 32.1   MCHC 32.8 33.2 33.0   RDW 52.2* 51.3* 50.2*   PLATELETCT 164 186 207   MPV 9.9 10.2 10.4     Recent Labs     07/21/24  0255 07/22/24  0110 07/23/24  0200   SODIUM 138 137 135   POTASSIUM 4.1 4.3 4.2   CHLORIDE 96 92* 92*   CO2 34* 35* 33   GLUCOSE 102* 109* 131*   BUN 13 17 19   CREATININE 0.91 1.03 0.91   CALCIUM 8.5 8.2* 8.2*                   Imaging  EC-ECHOCARDIOGRAM COMPLETE W/ CONT   Final Result      CT-CTA CHEST PULMONARY ARTERY W/ RECONS   Final Result      1.  There is no CT evidence of acute pulmonary embolism.   2.  Mildly enlarged heart with dependent groundglass opacities left greater than right consistent with changes of pulmonary edema.   3.  Bibasilar airspace disease likely due to atelectasis with pneumonitis considered less likely.   4.  Small bilateral dependent pleural effusions, right greater than left            DX-CHEST-PORTABLE (1 VIEW)   Final Result         1. Decreased left pleural effusion.   2. Left lower lobe airspace disease disease, correlate for potential pneumonia.   2. Stable pulmonary edema.      CT-LSPINE W/O PLUS RECONS   Final Result      1.  Acute L1 burst fracture with approximately 30% loss of height and minimal retropulsion.   2.  Multilevel disc and facet joint degenerative changes.   3.  Mild degenerative anterolisthesis of L3 on L4.   4.  At least small right and trace left pleural effusions.   5.  Likely pulmonary edema.   6.  Right renal cortical atrophy with multiple right renal cysts.   7.  Nonobstructing small left renal stone.   8.  Colonic diverticulosis.      US-THORACENTESIS PUNCTURE LEFT   Final Result      1. Ultrasound guided left sided therapeutic thoracentesis.      2. 1550 mL of fluid withdrawn.      DX-CHEST-PORTABLE (1 VIEW)   Final Result      Cardiomegaly with probable pulmonary edema.       Possible moderate left pleural effusion with associated compressive atelectasis. Correlate clinically for infection.      CT-TSPINE W/O PLUS RECONS   Final Result      1.  L1 vertebral body fracture without appreciable height loss. The L1 level is incompletely imaged. Recommend CT or MRI of the lumbar spine for completion of assessment.   2.  Osteopenia   3.  Multilevel multifactorial degenerative changes   4.  LEFT larger than RIGHT pleural effusions with overlying atelectasis   5.  Enlarged precarinal lymph node      CT-CSPINE WITHOUT PLUS RECONS   Final Result      1.  No cervical spine fracture detected.   2.  Large bilateral pleural effusions.      CT-HEAD W/O   Final Result      1.  Chronic microvascular ischemic type changes.   2.  No acute intracranial abnormality.                    Assessment/Plan  Problem Representation:    * Acute respiratory failure with hypoxemia (HCC)- (present on admission)  Assessment & Plan  Patient comes in complaining of respiratory failure.  Patient had to be initially placed on a nonrebreather.  Currently she is down to 6 L by nasal cannula.  Patient was hypoxic on room air  Patient will need to continue with oxygen support to keep oxygen saturations above 90%.    UTI (urinary tract infection)  Assessment & Plan  - Culture positive for E.coli UTI  - continue Augmentin 875 mg Q12H for appropriate coverage. Day 4/14 antibiotics following a day of cefepime. Reassess     Pleural effusion, bilateral- (present on admission)  Assessment & Plan  Resolved  Patient had significant bilateral pleural effusion worse on the left than the right  -Resolved with Urgent thoracentesis ordered through radiology with ultrasound guidance.  -1550 ml straw colored fluid drained, left lower lobe's disease likely pneumonia.    7/22/2024:  blood cultures grew gram+cocci clusters likely staph. Negative for MRSA and staph aureus.  - continue Augmentin 875 mg Q12H for appropriate coverage. Day 3/14  antibiotics following a day of cefepime. Reassess   - CTA chest impression: R/O Pulmonary embolism. Small B/L pleural effusion (right>left)      Closed wedge compression fracture of L1 vertebra (HCC)- (present on admission)  Assessment & Plan  On the T-spine CT there appears to be an L1 compression fracture they recommend doing a CT of the lumbar spine directly to evaluate  -Consulted Neurosurgery for lumbar compression fracture, appreciate recommendations. No intervention at this time given her advanced age  -PT OT evaluation -TLSO custom brace recommendation placed.    Acute on chronic diastolic heart failure (HCC)- (present on admission)  Assessment & Plan  Hx of CHF  -ECHO: significant prosthetic aortic valve stenosis, Mild concentric LVH, normal LVEF = 60%, moderate AR, mild progressive MS, moderate MR, mild-mod TR, severely dilated Left atrium.  - continue lisinopril 20 mg  -continue metoprolol 50 mg  - K = 4.2. Patient continued on Lasix 40mg for heart failure. Monitor labs  -Outpatient cardiology f/u for worsening heart failure    Constipation  Assessment & Plan  -Unable to pass bowels since admission.  -Miralax and senna docusate.    DNR (do not resuscitate)- (present on admission)  Assessment & Plan  Discussed advanced directive with the patient she wishes to be DO NOT RESUSCITATE CODE STATUS    Hyperglycemia- (present on admission)  Assessment & Plan  Check a hemoglobin A1c and continue to monitor hypoglycemia    Elevated alkaline phosphatase level- (present on admission)  Assessment & Plan  Check a GGT level    Pulmonary edema- (present on admission)  Assessment & Plan  Aggressive diuretic management will be utilized.  Will give her 40 mg IV Lasix every 8 hours and monitor intake output  ECHO ordered.     Hypokalemia- (present on admission)  Assessment & Plan  Potassium slightly low at 3.4 but with diuretics patient will continue to decline in her potassium levels  Given 20 mg of potassium twice  daily    Closed head injury- (present on admission)  Assessment & Plan  Patient fell on the stairs today hitting the back of her head.  CAT scan of the head done shows no acute intracranial abnormality  Patient externally does not have any laceration or hematoma at this point.  Continue with pain management and monitor neurological status    Macrocytic anemia- (present on admission)  Assessment & Plan  - Monitor iron, folate and B12 levels for concern of macrocytic anemia      S/P TAVR (transcatheter aortic valve replacement)- (present on admission)  Assessment & Plan  Outpatient follow-ups with cardiology    Carotid artery stenosis- (present on admission)  Assessment & Plan  Continue aspirin and statin    CAD- (present on admission)  Assessment & Plan  History of coronary artery disease with three-vessel bypass surgery in 2002  Patient also had to have PTCA in 2012 one-vessel.  Medication management optimization including beta-blocker ACE inhibitor statin and aspirin    HTN (hypertension)- (present on admission)  Assessment & Plan  Blood pressure significantly elevated  Apparently she forgot to take her medications this morning  Optimize medication management and monitor blood pressure to keep systolic blood pressure less than 140 diastolic under 90    CKD (chronic kidney disease) stage 3, GFR 30-59 ml/min- (present on admission)  Assessment & Plan  Monitor renal functions avoid nephrotoxic medications    Hyperlipidemia- (present on admission)  Assessment & Plan  Low-fat low-cholesterol diet  Statin  Fasting lipid panel         VTE prophylaxis: SCDs/TEDs    I have performed a physical exam and reviewed and updated ROS and Plan today (7/23/2024). In review of yesterday's note (7/22/2024), there are no changes except as documented above.    Mariella Armendariz MD MPH

## 2024-07-22 NOTE — THERAPY
Occupational Therapy Contact Note    Patient Name: Silvia Orellana  Age:  89 y.o., Sex:  female  Medical Record #: [id number]  Today's Date: 7/22/2024    OT order received. Per physician, pt will need a TLSO and has been ordered. Will follow-up for OT eval as able/appropriate once TLSO has been delivered.     HARLEEN Bruno, OTR/L

## 2024-07-22 NOTE — CARE PLAN
The patient is Stable - Low risk of patient condition declining or worsening    Shift Goals  Clinical Goals: Neuro checks, Q2 turns, safety  Patient Goals: Rest, get better  Family Goals: JAMES    Progress made toward(s) clinical / shift goals:    Problem: Knowledge Deficit - Standard  Goal: Patient and family/care givers will demonstrate understanding of plan of care, disease process/condition, diagnostic tests and medications  Description: Target End Date:  1-3 days or as soon as patient condition allows    Document in Patient Education    1.  Patient and family/caregiver oriented to unit, equipment, visitation policy and means for communicating concern  2.  Complete/review Learning Assessment  3.  Assess knowledge level of disease process/condition, treatment plan, diagnostic tests and medications  4.  Explain disease process/condition, treatment plan, diagnostic tests and medications  Outcome: Progressing     Problem: Fall Risk  Goal: Patient will remain free from falls  Description: Target End Date:  Prior to discharge or change in level of care    Document interventions on the Purnima Alonzo Fall Risk Assessment    1.  Assess for fall risk factors  2.  Implement fall precautions  Outcome: Progressing  Note: Pt was able to rest comfortably throughout the night with all of her belongings in reach as well as the call light with the bed in the lowest and locked position.

## 2024-07-22 NOTE — PROGRESS NOTES
Order for custom TLSO brace has been submitted to [facility]. If any questions [facility] can be contacted at  # [phone].

## 2024-07-22 NOTE — DOCUMENTATION QUERY
[facility]                                                                       Query Response Note      PATIENT:               CESAR VELASQUEZ  ACCT #:                  [id number]  MRN:                     [id number]  :                      [id number]  ADMIT DATE:       2024 12:29 PM  DISCH DATE:          RESPONDING  PROVIDER #:        [id number]           QUERY TEXT:    Documentation in the medical record indicates that this patient is being treated for ulcer of the following site(s):        Bilateral heel deep tissue injuries present on admission, Deep tissue Pressure injuries buttocks, coccyx bilateral present on admission    The patient's Clinical Indicators include:  Findings: Bilateral heel deep tissue injuries present on admission, Deep tissue Pressure injuries buttocks, coccyx bilateral present on admission    Treatment: Wound team, offloading, barrier paste,  offloading foam heel dressings    Risk Factors: history of arthritis, cancer, congestive heart failure, respiratory failure    Rosemary Bates RN BSN  Clinical Documentation   [email or web address]  Connect via [email or web address]    Note: If you agree with a diagnosis listed above, please remember to include it in your concurrent daily documentation and onto the Discharge Summary.  Options provided:   -- Bilateral heel deep tissue injuries, deep tissue pressure injuries bilateral buttocks, coccyx present on admission   -- Other explanation, (please specify other explanation)      Query created by: Rosemary Pollard on 2024 8:06 AM    RESPONSE TEXT:    Bilateral heel deep tissue injuries, deep tissue pressure injuries bilateral buttocks, coccyx present on admission          Electronically signed by:  MALIHA VINES 2024 1:10 PM

## 2024-07-23 PROBLEM — K59.00 CONSTIPATION: Status: ACTIVE | Noted: 2024-07-23

## 2024-07-23 LAB
ALBUMIN SERPL BCP-MCNC: 2.7 G/DL (ref 3.2–4.9)
ALBUMIN/GLOB SERPL: 1 G/DL
ALP SERPL-CCNC: 126 U/L (ref 30–99)
ALT SERPL-CCNC: 8 U/L (ref 2–50)
ANION GAP SERPL CALC-SCNC: 10 MMOL/L (ref 7–16)
AST SERPL-CCNC: 20 U/L (ref 12–45)
BASOPHILS # BLD AUTO: 0.4 % (ref 0–1.8)
BASOPHILS # BLD: 0.03 K/UL (ref 0–0.12)
BILIRUB SERPL-MCNC: 0.5 MG/DL (ref 0.1–1.5)
BUN SERPL-MCNC: 19 MG/DL (ref 8–22)
CALCIUM ALBUM COR SERPL-MCNC: 9.2 MG/DL (ref 8.5–10.5)
CALCIUM SERPL-MCNC: 8.2 MG/DL (ref 8.5–10.5)
CHLORIDE SERPL-SCNC: 92 MMOL/L (ref 96–112)
CO2 SERPL-SCNC: 33 MMOL/L (ref 20–33)
CREAT SERPL-MCNC: 0.91 MG/DL (ref 0.5–1.4)
EOSINOPHIL # BLD AUTO: 0.28 K/UL (ref 0–0.51)
EOSINOPHIL NFR BLD: 3.9 % (ref 0–6.9)
ERYTHROCYTE [DISTWIDTH] IN BLOOD BY AUTOMATED COUNT: 50.2 FL (ref 35.9–50)
GFR SERPLBLD CREATININE-BSD FMLA CKD-EPI: 59 ML/MIN/1.73 M 2
GLOBULIN SER CALC-MCNC: 2.7 G/DL (ref 1.9–3.5)
GLUCOSE SERPL-MCNC: 131 MG/DL (ref 65–99)
HCT VFR BLD AUTO: 29.4 % (ref 37–47)
HGB BLD-MCNC: 9.7 G/DL (ref 12–16)
HGB RETIC QN AUTO: 35.7 PG/CELL (ref 29–35)
IMM GRANULOCYTES # BLD AUTO: 0.03 K/UL (ref 0–0.11)
IMM GRANULOCYTES NFR BLD AUTO: 0.4 % (ref 0–0.9)
IMM RETICS NFR: 15.4 % (ref 2.6–16.1)
LYMPHOCYTES # BLD AUTO: 2.13 K/UL (ref 1–4.8)
LYMPHOCYTES NFR BLD: 29.9 % (ref 22–41)
MCH RBC QN AUTO: 32.1 PG (ref 27–33)
MCHC RBC AUTO-ENTMCNC: 33 G/DL (ref 32.2–35.5)
MCV RBC AUTO: 97.4 FL (ref 81.4–97.8)
MONOCYTES # BLD AUTO: 0.7 K/UL (ref 0–0.85)
MONOCYTES NFR BLD AUTO: 9.8 % (ref 0–13.4)
NEUTROPHILS # BLD AUTO: 3.96 K/UL (ref 1.82–7.42)
NEUTROPHILS NFR BLD: 55.6 % (ref 44–72)
NRBC # BLD AUTO: 0 K/UL
NRBC BLD-RTO: 0 /100 WBC (ref 0–0.2)
PLATELET # BLD AUTO: 207 K/UL (ref 164–446)
PMV BLD AUTO: 10.4 FL (ref 9–12.9)
POTASSIUM SERPL-SCNC: 4.2 MMOL/L (ref 3.6–5.5)
PROT SERPL-MCNC: 5.4 G/DL (ref 6–8.2)
RBC # BLD AUTO: 3.02 M/UL (ref 4.2–5.4)
RETICS # AUTO: 0.06 M/UL (ref 0.04–0.12)
RETICS/RBC NFR: 1.9 % (ref 0.8–2.6)
SODIUM SERPL-SCNC: 135 MMOL/L (ref 135–145)
WBC # BLD AUTO: 7.1 K/UL (ref 4.8–10.8)

## 2024-07-23 PROCEDURE — 700111 HCHG RX REV CODE 636 W/ 250 OVERRIDE (IP): Mod: JZ | Performed by: HOSPITALIST

## 2024-07-23 PROCEDURE — 770001 HCHG ROOM/CARE - MED/SURG/GYN PRIV*

## 2024-07-23 PROCEDURE — 36415 COLL VENOUS BLD VENIPUNCTURE: CPT

## 2024-07-23 PROCEDURE — 700102 HCHG RX REV CODE 250 W/ 637 OVERRIDE(OP)

## 2024-07-23 PROCEDURE — 85025 COMPLETE CBC W/AUTO DIFF WBC: CPT

## 2024-07-23 PROCEDURE — 85046 RETICYTE/HGB CONCENTRATE: CPT

## 2024-07-23 PROCEDURE — 700102 HCHG RX REV CODE 250 W/ 637 OVERRIDE(OP): Performed by: HOSPITALIST

## 2024-07-23 PROCEDURE — 99232 SBSQ HOSP IP/OBS MODERATE 35: CPT | Mod: GC | Performed by: INTERNAL MEDICINE

## 2024-07-23 PROCEDURE — A9270 NON-COVERED ITEM OR SERVICE: HCPCS

## 2024-07-23 PROCEDURE — 80053 COMPREHEN METABOLIC PANEL: CPT

## 2024-07-23 PROCEDURE — A9270 NON-COVERED ITEM OR SERVICE: HCPCS | Performed by: HOSPITALIST

## 2024-07-23 RX ORDER — AMOXICILLIN 250 MG
2 CAPSULE ORAL EVERY EVENING
Status: DISCONTINUED | OUTPATIENT
Start: 2024-07-23 | End: 2024-07-25 | Stop reason: HOSPADM

## 2024-07-23 RX ORDER — POLYETHYLENE GLYCOL 3350 17 G/17G
1 POWDER, FOR SOLUTION ORAL DAILY
Status: DISCONTINUED | OUTPATIENT
Start: 2024-07-23 | End: 2024-07-25 | Stop reason: HOSPADM

## 2024-07-23 RX ADMIN — POLYETHYLENE GLYCOL 3350 1 PACKET: 17 POWDER, FOR SOLUTION ORAL at 12:51

## 2024-07-23 RX ADMIN — OXYCODONE 5 MG: 5 TABLET ORAL at 12:49

## 2024-07-23 RX ADMIN — GABAPENTIN 600 MG: 300 CAPSULE ORAL at 12:48

## 2024-07-23 RX ADMIN — FUROSEMIDE 40 MG: 10 INJECTION, SOLUTION INTRAVENOUS at 21:20

## 2024-07-23 RX ADMIN — FUROSEMIDE 40 MG: 10 INJECTION, SOLUTION INTRAVENOUS at 15:10

## 2024-07-23 RX ADMIN — METOPROLOL SUCCINATE 50 MG: 25 TABLET, EXTENDED RELEASE ORAL at 18:22

## 2024-07-23 RX ADMIN — OMEPRAZOLE 40 MG: 20 CAPSULE, DELAYED RELEASE ORAL at 05:07

## 2024-07-23 RX ADMIN — ASPIRIN 325 MG: 325 TABLET ORAL at 18:00

## 2024-07-23 RX ADMIN — FUROSEMIDE 40 MG: 10 INJECTION, SOLUTION INTRAVENOUS at 05:08

## 2024-07-23 RX ADMIN — GABAPENTIN 600 MG: 300 CAPSULE ORAL at 05:07

## 2024-07-23 RX ADMIN — NYSTATIN: 100000 POWDER TOPICAL at 18:17

## 2024-07-23 RX ADMIN — POTASSIUM CHLORIDE 20 MEQ: 1500 TABLET, EXTENDED RELEASE ORAL at 05:07

## 2024-07-23 RX ADMIN — NYSTATIN: 100000 POWDER TOPICAL at 05:08

## 2024-07-23 RX ADMIN — POTASSIUM CHLORIDE 20 MEQ: 1500 TABLET, EXTENDED RELEASE ORAL at 18:23

## 2024-07-23 RX ADMIN — LISINOPRIL 20 MG: 20 TABLET ORAL at 18:21

## 2024-07-23 RX ADMIN — AMOXICILLIN AND CLAVULANATE POTASSIUM 1 TABLET: 875; 125 TABLET, FILM COATED ORAL at 05:07

## 2024-07-23 RX ADMIN — ATORVASTATIN CALCIUM 80 MG: 80 TABLET, FILM COATED ORAL at 21:19

## 2024-07-23 RX ADMIN — AMOXICILLIN AND CLAVULANATE POTASSIUM 1 TABLET: 875; 125 TABLET, FILM COATED ORAL at 18:22

## 2024-07-23 RX ADMIN — LISINOPRIL 20 MG: 20 TABLET ORAL at 05:08

## 2024-07-23 RX ADMIN — SENNOSIDES AND DOCUSATE SODIUM 2 TABLET: 50; 8.6 TABLET ORAL at 18:23

## 2024-07-23 RX ADMIN — GABAPENTIN 600 MG: 300 CAPSULE ORAL at 18:22

## 2024-07-23 RX ADMIN — CITALOPRAM HYDROBROMIDE 20 MG: 20 TABLET ORAL at 05:07

## 2024-07-23 ASSESSMENT — ENCOUNTER SYMPTOMS
WEIGHT LOSS: 0
DIARRHEA: 0
FEVER: 0
NAUSEA: 0
WEAKNESS: 1
DOUBLE VISION: 0
SEIZURES: 0
MYALGIAS: 0
EYE REDNESS: 0
PALPITATIONS: 0
BLURRED VISION: 0
DEPRESSION: 0
HEADACHES: 0
MEMORY LOSS: 0
FOCAL WEAKNESS: 0
CONSTIPATION: 1
TREMORS: 1
ABDOMINAL PAIN: 0
VOMITING: 0
EYE PAIN: 0
DIZZINESS: 0
EYE DISCHARGE: 0
COUGH: 0
BACK PAIN: 1
HEMOPTYSIS: 0
CHILLS: 0
TINGLING: 0
SHORTNESS OF BREATH: 0

## 2024-07-23 ASSESSMENT — PAIN DESCRIPTION - PAIN TYPE
TYPE: ACUTE PAIN

## 2024-07-23 NOTE — PROGRESS NOTES
Pt. Resting in bed. Pt. Would like to get up to eat breakfast, waiting on brace. Personal items and call light left within reach of pt. Bed alarm on for pt. Safety.

## 2024-07-23 NOTE — PROGRESS NOTES
[facility] Internal Medicine Daily Progress Note    Date of Service  7/23/2024    [facility] Team: [facility] Team   Attending: Adan Oneill M.d.  Senior Resident: Dr. Quincy Jung  Intern:  Dr. Mariella Armendariz  Contact Number: [phone]    Chief Complaint  Silvia Orellana is a 89 y.o. female admitted 7/19/2024 with acute respiratory failure with hypoxemia.    Hospital Course  Eric Orellana is a 89-year-old woman who presented on 7/19/2024 with shortness of breath after a ground-level fall at home.  She was going downstairs and fell backwards, hitting her head.  She did not lose consciousness but had severe pain in the back of her head on her right side.  She was found to be hypoxic and placed on nonbreathing mask which was later titrated to 6 L nasal cannula.  Patient has a past medical history of CHF EF 53%. She lives with daughter and son-in law.  Patient presented to the ED and underwent imaging studies.     CT head- no acute intracranial abnormalities  CT spine -no cervical fracture, large bilateral pleural effusions (Left> Right), L1 vertebral body compression fracture, precarinal enlarged lymph node  Chest x-ray-cardiomegaly with pulmonary edema, moderate left pleural effusion with associated compressive atelectasis  Patient underwent ultrasound-guided Thoracentesis which drained 155 0 mL of straw-colored fluid.   Repeat chest x-ray showed decreased left pleural effusion, left lower lobe airspace disease and pulmonary edema.  CT lower spine-acute L1 burst fracture, right renal atrophy with multiple right renal cysts nonobstructed left renal stone.    Past medical history-CAD breast cancer, CHF, arthritis, hypertension, GERD, MI, stroke.    Surgical history -bilateral hip replacement, lumpectomy, cholecystectomy, multiple CABG, transcatheter aortic valve replacement, TATIANNA.    Family history-no relevant history on file    Social history-smoking: never, alcohol: Occasionally, drugs: Never    Allergies:  None    Interval Problem Update  -Vitals stable. Baseline Oxygen - 6L NC  -Unable to pass bowels since admission. Miralax and senna docusate given.  -continue Augmentin 875 mg Q12H for appropriate coverage. Day 4/14 antibiotics following a day of cefepime.   -K = 4.2. Patient continued on Lasix 40mg for heart failure. Monitor labs  -Consulted Neurosurgery for L1 burst fracture - no intervention at this time given her advanced age  -OT/PT eval- TLSO custom brace recommendation placed.    I have discussed this patient's plan of care and discharge plan at IDT rounds today with Case Management, Nursing, Nursing leadership, and other members of the IDT team.    Consultants/Specialty  Neurosurgery  OT/PT    Code Status  DNAR/DNI    Review of Systems  Review of Systems   Constitutional:  Positive for malaise/fatigue. Negative for chills, fever and weight loss.   HENT:  Negative for ear discharge, ear pain, hearing loss and tinnitus.    Eyes:  Negative for blurred vision, double vision, pain, discharge and redness.   Respiratory:  Negative for cough, hemoptysis and shortness of breath.    Cardiovascular:  Negative for chest pain, palpitations and leg swelling.   Gastrointestinal:  Positive for constipation. Negative for abdominal pain, diarrhea, nausea and vomiting.   Genitourinary:  Negative for dysuria.   Musculoskeletal:  Positive for back pain. Negative for myalgias.   Skin:  Negative for itching and rash.   Neurological:  Positive for tremors and weakness. Negative for dizziness, tingling, focal weakness, seizures and headaches.   Psychiatric/Behavioral:  Negative for depression, memory loss and suicidal ideas.         Physical Exam  Temp:  [36.3 °C (97.3 °F)-36.5 °C (97.7 °F)] 36.3 °C (97.3 °F)  Pulse:  [68-74] 68  Resp:  [14-19] 18  BP: (125-146)/(37-47) 138/45  SpO2:  [95 %-97 %] 97 %    Physical Exam  Constitutional:       Appearance: Normal appearance. She is normal weight.   HENT:      Head: Normocephalic and  atraumatic.      Nose: Nose normal.      Mouth/Throat:      Mouth: Mucous membranes are moist.   Eyes:      Extraocular Movements: Extraocular movements intact.      Pupils: Pupils are equal, round, and reactive to light.   Cardiovascular:      Rate and Rhythm: Normal rate and regular rhythm.      Pulses: Normal pulses.      Heart sounds: Normal heart sounds. No murmur heard.     No friction rub. No gallop.   Pulmonary:      Effort: Pulmonary effort is normal. No respiratory distress.      Breath sounds: No wheezing or rales.      Comments: Breath sounds diminished B/L  Abdominal:      General: Abdomen is flat. Bowel sounds are normal.      Palpations: Abdomen is soft.   Musculoskeletal:         General: No swelling. Normal range of motion.      Cervical back: Normal range of motion.      Right lower leg: No edema.      Left lower leg: No edema.   Skin:     General: Skin is warm.      Capillary Refill: Capillary refill takes less than 2 seconds.      Coloration: Skin is not jaundiced or pale.      Findings: Bruising (iatrogenic) present.   Neurological:      General: No focal deficit present.      Mental Status: She is alert.      Cranial Nerves: No cranial nerve deficit.      Sensory: No sensory deficit.      Motor: No weakness.   Psychiatric:         Mood and Affect: Mood normal.         Behavior: Behavior normal.         Fluids    Intake/Output Summary (Last 24 hours) at 7/23/2024 1337  Last data filed at 7/23/2024 1200  Gross per 24 hour   Intake 600 ml   Output 1825 ml   Net -1225 ml       Laboratory  Recent Labs     07/21/24  0255 07/22/24  0110 07/23/24  0200   WBC 7.5 8.7 7.1   RBC 2.93* 2.91* 3.02*   HEMOGLOBIN 9.6* 9.5* 9.7*   HEMATOCRIT 29.3* 28.6* 29.4*   .0* 98.3* 97.4   MCH 32.8 32.6 32.1   MCHC 32.8 33.2 33.0   RDW 52.2* 51.3* 50.2*   PLATELETCT 164 186 207   MPV 9.9 10.2 10.4     Recent Labs     07/21/24  0255 07/22/24  0110 07/23/24  0200   SODIUM 138 137 135   POTASSIUM 4.1 4.3 4.2    CHLORIDE 96 92* 92*   CO2 34* 35* 33   GLUCOSE 102* 109* 131*   BUN 13 17 19   CREATININE 0.91 1.03 0.91   CALCIUM 8.5 8.2* 8.2*                   Imaging  EC-ECHOCARDIOGRAM COMPLETE W/ CONT   Final Result      CT-CTA CHEST PULMONARY ARTERY W/ RECONS   Final Result      1.  There is no CT evidence of acute pulmonary embolism.   2.  Mildly enlarged heart with dependent groundglass opacities left greater than right consistent with changes of pulmonary edema.   3.  Bibasilar airspace disease likely due to atelectasis with pneumonitis considered less likely.   4.  Small bilateral dependent pleural effusions, right greater than left            DX-CHEST-PORTABLE (1 VIEW)   Final Result         1. Decreased left pleural effusion.   2. Left lower lobe airspace disease disease, correlate for potential pneumonia.   2. Stable pulmonary edema.      CT-LSPINE W/O PLUS RECONS   Final Result      1.  Acute L1 burst fracture with approximately 30% loss of height and minimal retropulsion.   2.  Multilevel disc and facet joint degenerative changes.   3.  Mild degenerative anterolisthesis of L3 on L4.   4.  At least small right and trace left pleural effusions.   5.  Likely pulmonary edema.   6.  Right renal cortical atrophy with multiple right renal cysts.   7.  Nonobstructing small left renal stone.   8.  Colonic diverticulosis.      US-THORACENTESIS PUNCTURE LEFT   Final Result      1. Ultrasound guided left sided therapeutic thoracentesis.      2. 1550 mL of fluid withdrawn.      DX-CHEST-PORTABLE (1 VIEW)   Final Result      Cardiomegaly with probable pulmonary edema.      Possible moderate left pleural effusion with associated compressive atelectasis. Correlate clinically for infection.      CT-TSPINE W/O PLUS RECONS   Final Result      1.  L1 vertebral body fracture without appreciable height loss. The L1 level is incompletely imaged. Recommend CT or MRI of the lumbar spine for completion of assessment.   2.  Osteopenia   3.   Multilevel multifactorial degenerative changes   4.  LEFT larger than RIGHT pleural effusions with overlying atelectasis   5.  Enlarged precarinal lymph node      CT-CSPINE WITHOUT PLUS RECONS   Final Result      1.  No cervical spine fracture detected.   2.  Large bilateral pleural effusions.      CT-HEAD W/O   Final Result      1.  Chronic microvascular ischemic type changes.   2.  No acute intracranial abnormality.                    Assessment/Plan  Problem Representation:    * Acute respiratory failure with hypoxemia (HCC)- (present on admission)  Assessment & Plan  Patient comes in complaining of respiratory failure.  Patient had to be initially placed on a nonrebreather.  Currently she is down to 6 L by nasal cannula.  Patient was hypoxic on room air  Patient will need to continue with oxygen support to keep oxygen saturations above 90%.    UTI (urinary tract infection)  Assessment & Plan  - Culture positive for E.coli UTI  - continue Augmentin 875 mg Q12H for appropriate coverage. Day 4/14 antibiotics following a day of cefepime. Reassess     Pleural effusion, bilateral- (present on admission)  Assessment & Plan  Resolved  Patient had significant bilateral pleural effusion worse on the left than the right  -Resolved with Urgent thoracentesis ordered through radiology with ultrasound guidance.  -1550 ml straw colored fluid drained, left lower lobe's disease likely pneumonia.    7/22/2024:  blood cultures grew gram+cocci clusters likely staph. Negative for MRSA and staph aureus.  - continue Augmentin 875 mg Q12H for appropriate coverage. Day 3/14 antibiotics following a day of cefepime. Reassess   - CTA chest impression: R/O Pulmonary embolism. Small B/L pleural effusion (right>left)      Closed wedge compression fracture of L1 vertebra (HCC)- (present on admission)  Assessment & Plan  On the T-spine CT there appears to be an L1 compression fracture they recommend doing a CT of the lumbar spine directly to  evaluate  -Consulted Neurosurgery for lumbar compression fracture, appreciate recommendations. No intervention at this time given her advanced age  -PT OT evaluation -TLSO custom brace recommendation placed.    Acute on chronic diastolic heart failure (HCC)- (present on admission)  Assessment & Plan  Hx of CHF  -ECHO: significant prosthetic aortic valve stenosis, Mild concentric LVH, normal LVEF = 60%, moderate AR, mild progressive MS, moderate MR, mild-mod TR, severely dilated Left atrium.  - continue lisinopril 20 mg  -continue metoprolol 50 mg  - K = 4.2. Patient continued on Lasix 40mg for heart failure. Monitor labs    Constipation  Assessment & Plan  -Unable to pass bowels since admission.  -Miralax and senna docusate.    DNR (do not resuscitate)- (present on admission)  Assessment & Plan  Discussed advanced directive with the patient she wishes to be DO NOT RESUSCITATE CODE STATUS    Hyperglycemia- (present on admission)  Assessment & Plan  Check a hemoglobin A1c and continue to monitor hypoglycemia    Elevated alkaline phosphatase level- (present on admission)  Assessment & Plan  Check a GGT level    Pulmonary edema- (present on admission)  Assessment & Plan  Aggressive diuretic management will be utilized.  Will give her 40 mg IV Lasix every 8 hours and monitor intake output  ECHO ordered.     Hypokalemia- (present on admission)  Assessment & Plan  Potassium slightly low at 3.4 but with diuretics patient will continue to decline in her potassium levels  Given 20 mg of potassium twice daily    Closed head injury- (present on admission)  Assessment & Plan  Patient fell on the stairs today hitting the back of her head.  CAT scan of the head done shows no acute intracranial abnormality  Patient externally does not have any laceration or hematoma at this point.  Continue with pain management and monitor neurological status    Macrocytic anemia- (present on admission)  Assessment & Plan  - Monitor iron, folate  and B12 levels for concern of macrocytic anemia      S/P TAVR (transcatheter aortic valve replacement)- (present on admission)  Assessment & Plan  Outpatient follow-ups with cardiology    Carotid artery stenosis- (present on admission)  Assessment & Plan  Continue aspirin and statin    CAD- (present on admission)  Assessment & Plan  History of coronary artery disease with three-vessel bypass surgery in 2002  Patient also had to have PTCA in 2012 one-vessel.  Medication management optimization including beta-blocker ACE inhibitor statin and aspirin    HTN (hypertension)- (present on admission)  Assessment & Plan  Blood pressure significantly elevated  Apparently she forgot to take her medications this morning  Optimize medication management and monitor blood pressure to keep systolic blood pressure less than 140 diastolic under 90    CKD (chronic kidney disease) stage 3, GFR 30-59 ml/min- (present on admission)  Assessment & Plan  Monitor renal functions avoid nephrotoxic medications    Hyperlipidemia- (present on admission)  Assessment & Plan  Low-fat low-cholesterol diet  Statin  Fasting lipid panel         VTE prophylaxis: SCDs/TEDs    I have performed a physical exam and reviewed and updated ROS and Plan today (7/23/2024). In review of yesterday's note (7/22/2024), there are no changes except as documented above.    Mariella Armendariz MD MPH

## 2024-07-23 NOTE — DISCHARGE PLANNING
Case Management Discharge Planning    Admission Date: 7/19/2024  GMLOS: 4.1  ALOS: 4    6-Clicks ADL Score: 22  6-Clicks Mobility Score: 23      Anticipated Discharge Dispo: Discharge Disposition: Discharged to home/self care (01)    DME Needed: No    Action(s) Taken: Updated Provider/Nurse on Discharge Plan    Escalations Completed: None    Medically Clear: No    Next Steps: Case Management will continue to follow for discharge planning needs.    Barriers to Discharge: Medical clearance    Is the patient up for discharge tomorrow: No    RN Case Manager met with patient at bedside and obtained the information used in this assessment. Patient verified accuracy of facesheet; patient lives in a single story home alone.  Prior to current hospitalization, patient was independent with ADLS/IADLS. Patient drives and is able to attend necessary MD appointments. Patient's PCP is Joshua Rendon and prefers to use [facility] in [place]. Patient has no financial concerns. Patient has support from her daughter. Denies any history of substance use and denies any diagnosis of mental illness.      Care Transition Team Assessment    Information Source: Patient  Orientation Level: Oriented X4  Information Given By: Patient  Informant's Name: Daughter  Who is responsible for making decisions for patient? : Patient    Readmission Evaluation  Is this a readmission?: No    Elopement Risk  Legal Hold: No  Ambulatory or Self Mobile in Wheelchair: No-Not an Elopement Risk  Disoriented: No  Psychiatric Symptoms: None  History of Wandering: No  Elopement this Admit: No  Vocalizing Wanting to Leave: No  Displays Behaviors, Body Language Wanting to Leave: No-Not at Risk for Elopement  Elopement Risk: Not at Risk for Elopement  Wanderguard On: No (See Comments)  Picture of Patient on Inside Chart Front Cover: Yes    Interdisciplinary Discharge Planning  Does Admitting Nurse Feel This Could be a Complex Discharge?: No  Primary Care Physician:  Joshua Rendon  Lives with - Patient's Self Care Capacity: Adult Children  Patient or legal guardian wants to designate a caregiver: No  Support Systems: Friends / Neighbors, Family Member(s)  Housing / Facility: 2 Story House  Do You Take your Prescribed Medications Regularly: Yes  Able to Return to Previous ADL's: Yes  Mobility Issues: Yes  Prior Services: None  Patient Prefers to be Discharged to:: Home  Assistance Needed: Yes  Durable Medical Equipment: Home Oxygen, Walker (Wheelchair)    Discharge Preparedness  What is your plan after discharge?: Home with help  Prior Functional Level: Ambulatory, Drives Self, Independent with Activities of Daily Living, Independent with Medication Management  Difficulity with ADLs: Walking  Difficulity with IADLs: None    Functional Assesment  Prior Functional Level: Ambulatory, Drives Self, Independent with Activities of Daily Living, Independent with Medication Management    Finances  Financial Barriers to Discharge: No  Prescription Coverage: Yes    Vision / Hearing Impairment  Vision Impairment : No  Hearing Impairment : Yes  Hearing Impairment: Left Ear         Advance Directive  Advance Directive?: POLST    Domestic Abuse  Have you ever been the victim of abuse or violence?: No  Possible Abuse/Neglect Reported to:: Not Applicable    Psychological Assessment  History of Substance Abuse: None  History of Psychiatric Problems: No    Discharge Risks or Barriers  Discharge risks or barriers?: No    Anticipated Discharge Information  Discharge Disposition: Discharged to home/self care (01)

## 2024-07-23 NOTE — CARE PLAN
The patient is Stable - Low risk of patient condition declining or worsening    Shift Goals  Clinical Goals: Q4 neuro checks, pain control  Patient Goals: Get better and go home  Family Goals: JAMES    Progress made toward(s) clinical / shift goals:    Problem: Fall Risk  Goal: Patient will remain free from falls  Description: Target End Date:  Prior to discharge or change in level of care    Document interventions on the [facility] Fall Risk Assessment    1.  Assess for fall risk factors  2.  Implement fall precautions  Outcome: Progressing  Note: Pt was able to rest comfortably throughout the night with the bed alarm in place, the bed in the lowest and locked position, and the call light and personal possessions within reach.      Problem: Pain - Standard  Goal: Alleviation of pain or a reduction in pain to the patient’s comfort goal  Description: Target End Date:  Prior to discharge or change in level of care    Document on Vitals flowsheet    1.  Document pain using the appropriate pain scale per order or unit policy  2.  Educate and implement non-pharmacologic comfort measures (i.e. relaxation, distraction, massage, cold/heat therapy, etc.)  3.  Pain management medications as ordered  4.  Reassess pain after pain med administration per policy  5.  If opiods administered assess patient's response to pain medication is appropriate per POSS sedation scale  6.  Follow pain management plan developed in collaboration with patient and interdisciplinary team (including palliative care or pain specialists if applicable)  7/23/2024 0551 by Jessica Langston R.N.  Outcome: Progressing  7/23/2024 0548 by Jessica Langston R.N.  Note: Pharmacological and non-pharmacological measures were utilized to effectively manage the pts pain throughout the night.      Problem: Skin Integrity  Goal: Skin integrity is maintained or improved  Description: Target End Date:  Prior to discharge or change in level of care    Document  interventions on Skin Risk/Aris flowsheet groups and corresponding LDA    1.  Assess and monitor skin integrity, appearance and/or temperature  2.  Assess risk factors for impaired skin integrity and/or pressures ulcers  3.  Implement precautions to protect skin integrity in collaboration with interdisciplinary team  4.  Implement pressure ulcer prevention protocol if at risk for skin breakdown  5.  Confirm wound care consult if at risk for skin breakdown  6.  Ensure patient use of pressure relieving devices  (Low air loss bed, waffle overlay, heel protectors, ROHO cushion, etc)  Outcome: Progressing  Note: Pt was turned q2 hours and skin integrity was maintained by utilizing offloading measures.

## 2024-07-23 NOTE — CONSULTS
[facility] Neurosurgery  Referring MD:     Author: Bridger Diana M.D. Date & Time created: 7/22/2024  8:36 PM     Interval History   89 year old female who fell and was admitted 7/19.  I was called 7/21.  She is on 6L O2 baseline.  CT demontrated L1 compression fracture.   She was admitted with pulmonary edema, acute respiratory failure.  She has illdefined lower thoracic pain. CT demonstrated 30% L1 compression fracture    ROS    Physical Exam  Awake alert   Bilateral IP DF PF DF 5/5  Sensation intact touch bilateral lower extremites  Midline tenderness lower thoracic region    Patient Active Problem List    Diagnosis Date Noted    Weakness 09/29/2017    S/P TAVR (transcatheter aortic valve replacement) 09/25/2017    Hx of CABG 08/23/2017    TIA (transient ischemic attack) 08/23/2017    Stented coronary artery 08/23/2017    Chronic diastolic CHF (congestive heart failure) (HCC) 08/23/2017    CAD 07/21/2015    Carotid artery stenosis 07/21/2015    HTN (hypertension) 04/20/2015    Hyperlipidemia 03/03/2015    Chronic respiratory failure with hypoxia (HCC) 03/01/2023    Postprocedural hematoma of abdominal wall 09/27/2017    Femur fracture (HCC) 08/08/2015    Fall 07/03/2015    Orbital fracture (HCC) 07/02/2015    GIB (gastrointestinal bleeding) 07/02/2015    History of hip fracture 04/20/2015    CKD (chronic kidney disease) stage 3, GFR 30-59 ml/min 03/03/2015    Breast cancer, Left radiation therapy 03/03/2015    Hip pain 03/03/2015    UTI (urinary tract infection) 07/20/2024    Acute respiratory failure with hypoxemia (HCC) 07/19/2024    Macrocytic anemia 07/19/2024    Closed head injury 07/19/2024    Closed wedge compression fracture of L1 vertebra (HCC) 07/19/2024    Pleural effusion, bilateral 07/19/2024    Hypokalemia 07/19/2024    Pulmonary edema 07/19/2024    Elevated alkaline phosphatase level 07/19/2024    Hyperglycemia 07/19/2024    DNR (do not resuscitate) 07/19/2024    Pulmonary hypertension (HCC)  05/15/2024    Acute on chronic diastolic heart failure (HCC) 2024    Chronic hypoxic respiratory failure (HCC) 2024    Chest pain 2024    Dysuria 2024         Temp (24hrs), Av.4 °C (97.6 °F), Min:36.2 °C (97.2 °F), Max:36.5 °C (97.7 °F)    Pulse: 72, Respiration: 14, Blood Pressure : (!) 135/37 (RN notified ), Weight: 71.5 kg (157 lb 10.1 oz), Pulse Oximetry: 95 %, O2 (LPM): 6     Date 24 0700 - 24 0659   Shift 8800-3051 3840-9149 4898-9049 24 Hour Total   INTAKE   P.O.  200  200     P.O.  200  200   Shift Total  200  200   OUTPUT   Urine 1500 500  2000     Number of Times Voided 1 x   1 x     Urine Void (mL) 1500 500  2000   Shift Total 1500 500  2000   NET -1500 -300  -1800         Intake/Output Summary (Last 24 hours) at 2024  Last data filed at 2024  Gross per 24 hour   Intake 200 ml   Output 2500 ml   Net -2300 ml             amoxicillin-clavulanate  1 Tablet      nystatin        aspirin  325 mg      atorvastatin  80 mg      citalopram  20 mg      gabapentin  600 mg      ipratropium-albuterol  3 mL      lisinopril  20 mg      omeprazole  40 mg      labetalol  10 mg      acetaminophen  650 mg      Pharmacy Consult Request  1 Each      oxyCODONE immediate-release  2.5 mg      Or    oxyCODONE immediate-release  5 mg      Or    morphine injection  2 mg      ondansetron  4 mg      ondansetron  4 mg      senna-docusate  2 Tablet      And    polyethylene glycol/lytes  1 Packet      furosemide  40 mg      potassium chloride SA  20 mEq      metoprolol SR  50 mg         Recent Labs     24  0146 24  0255 24  0110   WBC 9.0 7.5 8.7   RBC 3.24* 2.93* 2.91*   HEMOGLOBIN 10.3* 9.6* 9.5*   HEMATOCRIT 31.5* 29.3* 28.6*   MCV 97.2 100.0* 98.3*   MCH 31.8 32.8 32.6   PLATELETCT 187 164 186     Recent Labs     24  1159 24  0255 24  0110   SODIUM 136 138 137   POTASSIUM 3.5* 4.1 4.3   CHLORIDE 92* 96 92*   CO2 34* 34* 35*   GLUCOSE 103*  102* 109*   BUN 13 13 17   CREATININE 0.85 0.91 1.03   CALCIUM 8.4* 8.5 8.2*     L1 compression fracture:  TSLO standard management.  She is very poor surgical candidate given her very advanced age and severe medical co-morbidities.   Neurosurgery will sign off.    She can follow with her Primary Care Provider.

## 2024-07-24 LAB
BACTERIA BLD CULT: NORMAL
BACTERIA SPEC RESP CULT: NORMAL
ERYTHROCYTE [DISTWIDTH] IN BLOOD BY AUTOMATED COUNT: 50.4 FL (ref 35.9–50)
GRAM STN SPEC: NORMAL
HCT VFR BLD AUTO: 30.7 % (ref 37–47)
HGB BLD-MCNC: 10.2 G/DL (ref 12–16)
HGB RETIC QN AUTO: 35.3 PG/CELL (ref 29–35)
IMM RETICS NFR: 15.4 % (ref 2.6–16.1)
MCH RBC QN AUTO: 32.7 PG (ref 27–33)
MCHC RBC AUTO-ENTMCNC: 33.2 G/DL (ref 32.2–35.5)
MCV RBC AUTO: 98.4 FL (ref 81.4–97.8)
PLATELET # BLD AUTO: 223 K/UL (ref 164–446)
PMV BLD AUTO: 10.5 FL (ref 9–12.9)
RBC # BLD AUTO: 3.12 M/UL (ref 4.2–5.4)
RETICS # AUTO: 0.07 M/UL (ref 0.04–0.12)
RETICS/RBC NFR: 2.3 % (ref 0.8–2.6)
SIGNIFICANT IND 70042: NORMAL
SIGNIFICANT IND 70042: NORMAL
SITE SITE: NORMAL
SITE SITE: NORMAL
SOURCE SOURCE: NORMAL
SOURCE SOURCE: NORMAL
WBC # BLD AUTO: 7.2 K/UL (ref 4.8–10.8)

## 2024-07-24 PROCEDURE — A9270 NON-COVERED ITEM OR SERVICE: HCPCS

## 2024-07-24 PROCEDURE — 97163 PT EVAL HIGH COMPLEX 45 MIN: CPT

## 2024-07-24 PROCEDURE — 85027 COMPLETE CBC AUTOMATED: CPT

## 2024-07-24 PROCEDURE — 99222 1ST HOSP IP/OBS MODERATE 55: CPT | Performed by: STUDENT IN AN ORGANIZED HEALTH CARE EDUCATION/TRAINING PROGRAM

## 2024-07-24 PROCEDURE — 770001 HCHG ROOM/CARE - MED/SURG/GYN PRIV*

## 2024-07-24 PROCEDURE — 700102 HCHG RX REV CODE 250 W/ 637 OVERRIDE(OP)

## 2024-07-24 PROCEDURE — A9270 NON-COVERED ITEM OR SERVICE: HCPCS | Performed by: HOSPITALIST

## 2024-07-24 PROCEDURE — 700102 HCHG RX REV CODE 250 W/ 637 OVERRIDE(OP): Performed by: INTERNAL MEDICINE

## 2024-07-24 PROCEDURE — 99232 SBSQ HOSP IP/OBS MODERATE 35: CPT | Mod: GC | Performed by: INTERNAL MEDICINE

## 2024-07-24 PROCEDURE — 700102 HCHG RX REV CODE 250 W/ 637 OVERRIDE(OP): Performed by: HOSPITALIST

## 2024-07-24 PROCEDURE — 97535 SELF CARE MNGMENT TRAINING: CPT

## 2024-07-24 PROCEDURE — 700111 HCHG RX REV CODE 636 W/ 250 OVERRIDE (IP): Mod: JZ | Performed by: HOSPITALIST

## 2024-07-24 PROCEDURE — 97167 OT EVAL HIGH COMPLEX 60 MIN: CPT

## 2024-07-24 PROCEDURE — A9270 NON-COVERED ITEM OR SERVICE: HCPCS | Performed by: INTERNAL MEDICINE

## 2024-07-24 PROCEDURE — 97530 THERAPEUTIC ACTIVITIES: CPT

## 2024-07-24 PROCEDURE — 85046 RETICYTE/HGB CONCENTRATE: CPT

## 2024-07-24 PROCEDURE — 36415 COLL VENOUS BLD VENIPUNCTURE: CPT

## 2024-07-24 RX ORDER — ASPIRIN 81 MG/1
81 TABLET ORAL EVERY EVENING
Status: DISCONTINUED | OUTPATIENT
Start: 2024-07-24 | End: 2024-07-25 | Stop reason: HOSPADM

## 2024-07-24 RX ADMIN — POTASSIUM CHLORIDE 20 MEQ: 1500 TABLET, EXTENDED RELEASE ORAL at 05:18

## 2024-07-24 RX ADMIN — NYSTATIN: 100000 POWDER TOPICAL at 12:16

## 2024-07-24 RX ADMIN — GABAPENTIN 600 MG: 300 CAPSULE ORAL at 17:19

## 2024-07-24 RX ADMIN — SENNOSIDES AND DOCUSATE SODIUM 2 TABLET: 50; 8.6 TABLET ORAL at 17:19

## 2024-07-24 RX ADMIN — FUROSEMIDE 40 MG: 10 INJECTION, SOLUTION INTRAVENOUS at 14:23

## 2024-07-24 RX ADMIN — GABAPENTIN 600 MG: 300 CAPSULE ORAL at 12:16

## 2024-07-24 RX ADMIN — POLYETHYLENE GLYCOL 3350 1 PACKET: 17 POWDER, FOR SOLUTION ORAL at 05:19

## 2024-07-24 RX ADMIN — CITALOPRAM HYDROBROMIDE 20 MG: 20 TABLET ORAL at 05:19

## 2024-07-24 RX ADMIN — FUROSEMIDE 40 MG: 10 INJECTION, SOLUTION INTRAVENOUS at 05:19

## 2024-07-24 RX ADMIN — OMEPRAZOLE 40 MG: 20 CAPSULE, DELAYED RELEASE ORAL at 05:18

## 2024-07-24 RX ADMIN — FUROSEMIDE 40 MG: 10 INJECTION, SOLUTION INTRAVENOUS at 21:48

## 2024-07-24 RX ADMIN — ATORVASTATIN CALCIUM 80 MG: 80 TABLET, FILM COATED ORAL at 21:48

## 2024-07-24 RX ADMIN — AMOXICILLIN AND CLAVULANATE POTASSIUM 1 TABLET: 875; 125 TABLET, FILM COATED ORAL at 05:18

## 2024-07-24 RX ADMIN — GABAPENTIN 600 MG: 300 CAPSULE ORAL at 05:18

## 2024-07-24 RX ADMIN — NYSTATIN: 100000 POWDER TOPICAL at 17:22

## 2024-07-24 RX ADMIN — METOPROLOL SUCCINATE 50 MG: 25 TABLET, EXTENDED RELEASE ORAL at 17:19

## 2024-07-24 RX ADMIN — LISINOPRIL 20 MG: 20 TABLET ORAL at 17:20

## 2024-07-24 RX ADMIN — ASPIRIN 81 MG: 81 TABLET, COATED ORAL at 17:22

## 2024-07-24 RX ADMIN — POTASSIUM CHLORIDE 20 MEQ: 1500 TABLET, EXTENDED RELEASE ORAL at 17:20

## 2024-07-24 RX ADMIN — LISINOPRIL 20 MG: 20 TABLET ORAL at 05:18

## 2024-07-24 RX ADMIN — NYSTATIN: 100000 POWDER TOPICAL at 05:19

## 2024-07-24 RX ADMIN — OXYCODONE 5 MG: 5 TABLET ORAL at 07:37

## 2024-07-24 ASSESSMENT — PATIENT HEALTH QUESTIONNAIRE - PHQ9
9. THOUGHTS THAT YOU WOULD BE BETTER OFF DEAD, OR OF HURTING YOURSELF: NOT AT ALL
SUM OF ALL RESPONSES TO PHQ QUESTIONS 1-9: 0
6. FEELING BAD ABOUT YOURSELF - OR THAT YOU ARE A FAILURE OR HAVE LET YOURSELF OR YOUR FAMILY DOWN: NOT AL ALL
2. FEELING DOWN, DEPRESSED, IRRITABLE, OR HOPELESS: NOT AT ALL
3. TROUBLE FALLING OR STAYING ASLEEP OR SLEEPING TOO MUCH: NOT AT ALL
1. LITTLE INTEREST OR PLEASURE IN DOING THINGS: NOT AT ALL
8. MOVING OR SPEAKING SO SLOWLY THAT OTHER PEOPLE COULD HAVE NOTICED. OR THE OPPOSITE, BEING SO FIGETY OR RESTLESS THAT YOU HAVE BEEN MOVING AROUND A LOT MORE THAN USUAL: NOT AT ALL
SUM OF ALL RESPONSES TO PHQ9 QUESTIONS 1 AND 2: 0
5. POOR APPETITE OR OVEREATING: NOT AT ALL
7. TROUBLE CONCENTRATING ON THINGS, SUCH AS READING THE NEWSPAPER OR WATCHING TELEVISION: NOT AT ALL
4. FEELING TIRED OR HAVING LITTLE ENERGY: NOT AT ALL

## 2024-07-24 ASSESSMENT — ENCOUNTER SYMPTOMS
CHILLS: 0
WEIGHT LOSS: 0
WEAKNESS: 1
EYE REDNESS: 0
EYE DISCHARGE: 0
HEMOPTYSIS: 0
DOUBLE VISION: 0
SEIZURES: 0
PALPITATIONS: 0
FOCAL WEAKNESS: 0
DIZZINESS: 0
MEMORY LOSS: 0
NAUSEA: 0
DEPRESSION: 0
ABDOMINAL PAIN: 0
VOMITING: 0
HEADACHES: 0
FEVER: 0
EYE PAIN: 0
DIARRHEA: 0
TINGLING: 0
BACK PAIN: 1
MYALGIAS: 0
SHORTNESS OF BREATH: 0
COUGH: 0
TREMORS: 1
CONSTIPATION: 1
BLURRED VISION: 0

## 2024-07-24 ASSESSMENT — PAIN DESCRIPTION - PAIN TYPE
TYPE: ACUTE PAIN

## 2024-07-24 ASSESSMENT — COGNITIVE AND FUNCTIONAL STATUS - GENERAL
EATING MEALS: A LITTLE
CLIMB 3 TO 5 STEPS WITH RAILING: A LOT
DRESSING REGULAR UPPER BODY CLOTHING: A LOT
HELP NEEDED FOR BATHING: A LOT
STANDING UP FROM CHAIR USING ARMS: A LOT
MOBILITY SCORE: 12
MOVING FROM LYING ON BACK TO SITTING ON SIDE OF FLAT BED: A LOT
MOVING TO AND FROM BED TO CHAIR: A LOT
SUGGESTED CMS G CODE MODIFIER MOBILITY: CL
TURNING FROM BACK TO SIDE WHILE IN FLAT BAD: A LOT
WALKING IN HOSPITAL ROOM: A LOT
TOILETING: A LOT
DRESSING REGULAR LOWER BODY CLOTHING: A LOT
SUGGESTED CMS G CODE MODIFIER DAILY ACTIVITY: CL
DAILY ACTIVITIY SCORE: 13
PERSONAL GROOMING: A LOT

## 2024-07-24 ASSESSMENT — GAIT ASSESSMENTS: GAIT LEVEL OF ASSIST: UNABLE TO PARTICIPATE

## 2024-07-24 ASSESSMENT — ACTIVITIES OF DAILY LIVING (ADL): TOILETING: INDEPENDENT

## 2024-07-24 NOTE — CARE PLAN
The patient is Stable - Low risk of patient condition declining or worsening    Shift Goals  Clinical Goals: pain control, monitor labs  Patient Goals: discharge home  Family Goals: JAMES    Progress made toward(s) clinical / shift goals:  pt. Monitored for fall risk and skin integrity, education provided. Pain management with medication and rest.      Problem: Fall Risk  Goal: Patient will remain free from falls  Outcome: Progressing  Note: Pt. Remain free of falls, call light in reach of patient, bed in lowest position, treaded socks on pt., pt calls as needed. Pt. Educated on fall risk. Pt. Encourage to use call light for assistance.      Problem: Pain - Standard  Goal: Alleviation of pain or a reduction in pain to the patient’s comfort goal  Outcome: Progressing  Note: Pt. Rested comfortable with pain medication management. Pt. Pain has been documented and pain medication has been administered as ordered, pt. Pain has been reassessed and documented. Personal items and call light left within reach of pt. Bed alarm on for pt. Safety.      Problem: Skin Integrity  Goal: Skin integrity is maintained or improved  Outcome: Progressing  Note: Pt. Has been turned Q2hr, pillows and foam protectors used for offloading. Personal items and call light left within reach of pt. Bed alarm on for pt. Safety.

## 2024-07-24 NOTE — PROGRESS NOTES
[facility] Internal Medicine Daily Progress Note    Date of Service  7/24/2024    [facility] Team: [facility] Team   Attending: Adan Oneill M.d.  Senior Resident: Dr. Quincy Jung  Intern:  Dr. Mariella Armendariz  Contact Number: [phone]    Chief Complaint  Silvia Orellana is a 89 y.o. female admitted 7/19/2024 with acute respiratory failure with hypoxemia.    Hospital Course  Eric Orellana is a 89-year-old woman who presented on 7/19/2024 with shortness of breath after a ground-level fall at home.  She was going downstairs and fell backwards, hitting her head.  She did not lose consciousness but had severe pain in the back of her head on her right side.  She was found to be hypoxic and placed on nonbreathing mask which was later titrated to 6 L nasal cannula.  Patient has a past medical history of CHF EF 53%. She lives with daughter and son-in law.  Patient presented to the ED and underwent imaging studies.     CT head- no acute intracranial abnormalities  CT spine -no cervical fracture, large bilateral pleural effusions (Left> Right), L1 vertebral body compression fracture, precarinal enlarged lymph node  Chest x-ray-cardiomegaly with pulmonary edema, moderate left pleural effusion with associated compressive atelectasis  Patient underwent ultrasound-guided Thoracentesis which drained 155 0 mL of straw-colored fluid.   Repeat chest x-ray showed decreased left pleural effusion, left lower lobe airspace disease and pulmonary edema.  CT lower spine-acute L1 burst fracture, right renal atrophy with multiple right renal cysts nonobstructed left renal stone.    Past medical history-CAD breast cancer, CHF, arthritis, hypertension, GERD, MI, stroke.    Surgical history -bilateral hip replacement, lumpectomy, cholecystectomy, multiple CABG, transcatheter aortic valve replacement, TATIANNA.    Family history-no relevant history on file    Social history-smoking: never, alcohol: Occasionally, drugs: Never    Allergies:  None    Interval Problem Update  -Vitals stable. Baseline Oxygen - 6L NC  -Patient currently on 4 L NC, 97% SpO2.   -Patient has not had pain or urinary symptoms, WBCs are normal. Augmentin discontinued as patient likely has asymptomatic bacteruria. ID recommendations.  -Patient continued on Lasix 40mg for heart failure. Monitor labs  -81 mg ASA  -Consider Eval with cardiology for significant AS, TAVR, HFpEF 60%  -OT/PT eval- TLSO custom brace ready. Neurosurgery recommendation no surgery given her advanced age.    I have discussed this patient's plan of care and discharge plan at IDT rounds today with Case Management, Nursing, Nursing leadership, and other members of the IDT team.    Consultants/Specialty  Neurosurgery  OT/PT    Code Status  DNAR/DNI    Review of Systems  Review of Systems   Constitutional:  Positive for malaise/fatigue. Negative for chills, fever and weight loss.   HENT:  Negative for ear discharge, ear pain, hearing loss and tinnitus.    Eyes:  Negative for blurred vision, double vision, pain, discharge and redness.   Respiratory:  Negative for cough, hemoptysis and shortness of breath.    Cardiovascular:  Negative for chest pain, palpitations and leg swelling.   Gastrointestinal:  Positive for constipation. Negative for abdominal pain, diarrhea, nausea and vomiting.   Genitourinary:  Negative for dysuria.   Musculoskeletal:  Positive for back pain. Negative for myalgias.   Skin:  Negative for itching and rash.   Neurological:  Positive for tremors and weakness. Negative for dizziness, tingling, focal weakness, seizures and headaches.   Psychiatric/Behavioral:  Negative for depression, memory loss and suicidal ideas.         Physical Exam  Temp:  [36.2 °C (97.2 °F)-36.4 °C (97.5 °F)] 36.4 °C (97.5 °F)  Pulse:  [70-75] 75  Resp:  [18] 18  BP: ()/(41-50) 120/46  SpO2:  [95 %-97 %] 95 %    Physical Exam  Constitutional:       Appearance: Normal appearance. She is normal weight.   HENT:       Head: Normocephalic and atraumatic.      Nose: Nose normal.      Mouth/Throat:      Mouth: Mucous membranes are moist.   Eyes:      Extraocular Movements: Extraocular movements intact.      Pupils: Pupils are equal, round, and reactive to light.   Cardiovascular:      Rate and Rhythm: Normal rate and regular rhythm.      Pulses: Normal pulses.      Heart sounds: Normal heart sounds. No murmur heard.     No friction rub. No gallop.   Pulmonary:      Effort: Pulmonary effort is normal. No respiratory distress.      Breath sounds: No wheezing or rales.      Comments: Breath sounds diminished B/L  Abdominal:      General: Abdomen is flat. Bowel sounds are normal.      Palpations: Abdomen is soft.   Musculoskeletal:         General: No swelling. Normal range of motion.      Cervical back: Normal range of motion.      Right lower leg: No edema.      Left lower leg: No edema.   Skin:     General: Skin is warm.      Capillary Refill: Capillary refill takes less than 2 seconds.      Coloration: Skin is not jaundiced or pale.      Findings: Bruising (iatrogenic) present.   Neurological:      General: No focal deficit present.      Mental Status: She is alert.      Cranial Nerves: No cranial nerve deficit.      Sensory: No sensory deficit.      Motor: No weakness.   Psychiatric:         Mood and Affect: Mood normal.         Behavior: Behavior normal.         Thought Content: Thought content normal.         Fluids    Intake/Output Summary (Last 24 hours) at 7/24/2024 1705  Last data filed at 7/24/2024 1600  Gross per 24 hour   Intake 420 ml   Output 1000 ml   Net -580 ml       Laboratory  Recent Labs     07/22/24  0110 07/23/24  0200 07/24/24  0311   WBC 8.7 7.1 7.2   RBC 2.91* 3.02* 3.12*   HEMOGLOBIN 9.5* 9.7* 10.2*   HEMATOCRIT 28.6* 29.4* 30.7*   MCV 98.3* 97.4 98.4*   MCH 32.6 32.1 32.7   MCHC 33.2 33.0 33.2   RDW 51.3* 50.2* 50.4*   PLATELETCT 186 207 223   MPV 10.2 10.4 10.5     Recent Labs     07/22/24  0110  07/23/24  0200   SODIUM 137 135   POTASSIUM 4.3 4.2   CHLORIDE 92* 92*   CO2 35* 33   GLUCOSE 109* 131*   BUN 17 19   CREATININE 1.03 0.91   CALCIUM 8.2* 8.2*                   Imaging  EC-ECHOCARDIOGRAM COMPLETE W/ CONT   Final Result      CT-CTA CHEST PULMONARY ARTERY W/ RECONS   Final Result      1.  There is no CT evidence of acute pulmonary embolism.   2.  Mildly enlarged heart with dependent groundglass opacities left greater than right consistent with changes of pulmonary edema.   3.  Bibasilar airspace disease likely due to atelectasis with pneumonitis considered less likely.   4.  Small bilateral dependent pleural effusions, right greater than left            DX-CHEST-PORTABLE (1 VIEW)   Final Result         1. Decreased left pleural effusion.   2. Left lower lobe airspace disease disease, correlate for potential pneumonia.   2. Stable pulmonary edema.      CT-LSPINE W/O PLUS RECONS   Final Result      1.  Acute L1 burst fracture with approximately 30% loss of height and minimal retropulsion.   2.  Multilevel disc and facet joint degenerative changes.   3.  Mild degenerative anterolisthesis of L3 on L4.   4.  At least small right and trace left pleural effusions.   5.  Likely pulmonary edema.   6.  Right renal cortical atrophy with multiple right renal cysts.   7.  Nonobstructing small left renal stone.   8.  Colonic diverticulosis.      US-THORACENTESIS PUNCTURE LEFT   Final Result      1. Ultrasound guided left sided therapeutic thoracentesis.      2. 1550 mL of fluid withdrawn.      DX-CHEST-PORTABLE (1 VIEW)   Final Result      Cardiomegaly with probable pulmonary edema.      Possible moderate left pleural effusion with associated compressive atelectasis. Correlate clinically for infection.      CT-TSPINE W/O PLUS RECONS   Final Result      1.  L1 vertebral body fracture without appreciable height loss. The L1 level is incompletely imaged. Recommend CT or MRI of the lumbar spine for completion of  assessment.   2.  Osteopenia   3.  Multilevel multifactorial degenerative changes   4.  LEFT larger than RIGHT pleural effusions with overlying atelectasis   5.  Enlarged precarinal lymph node      CT-CSPINE WITHOUT PLUS RECONS   Final Result      1.  No cervical spine fracture detected.   2.  Large bilateral pleural effusions.      CT-HEAD W/O   Final Result      1.  Chronic microvascular ischemic type changes.   2.  No acute intracranial abnormality.                    Assessment/Plan  Problem Representation:    * Acute respiratory failure with hypoxemia (HCC)- (present on admission)  Assessment & Plan  Patient on 4L NC, lower than baseline of 6L NC    Patient comes in complaining of respiratory failure.  Patient had to be initially placed on a nonrebreather.  Currently she is down to 6 L by nasal cannula.  Patient was hypoxic on room air  Patient will need to continue with oxygen support to keep oxygen saturations above 90%.    UTI (urinary tract infection)  Assessment & Plan  Resolved.  - Culture positive for E.coli UTI  -Patient has not had pain or urinary symptoms, WBCs are normal. Augmentin discontinued as patient likely has asymptomatic bacteruria. ID recommendations.    Pleural effusion, bilateral- (present on admission)  Assessment & Plan  Resolved  Patient had significant bilateral pleural effusion worse on the left than the right  -Resolved with Urgent thoracentesis ordered through radiology with ultrasound guidance.  -1550 ml straw colored fluid drained, left lower lobe's disease likely pneumonia.    7/22/2024:  blood cultures grew gram+cocci clusters likely staph. Negative for MRSA and staph aureus.  - continue Augmentin 875 mg Q12H for appropriate coverage. Day 3/14 antibiotics following a day of cefepime. Reassess   - CTA chest impression: R/O Pulmonary embolism. Small B/L pleural effusion (right>left)      Closed wedge compression fracture of L1 vertebra (HCC)- (present on admission)  Assessment &  Plan  On the T-spine CT there appears to be an L1 compression fracture they recommend doing a CT of the lumbar spine directly to evaluate  -Consulted Neurosurgery for lumbar compression fracture, appreciate recommendations. No intervention at this time given her advanced age  -PT OT evaluation -TLSO custom brace ready.    Acute on chronic diastolic heart failure (HCC)- (present on admission)  Assessment & Plan  Hx of CHF  -ECHO: significant prosthetic aortic valve stenosis, Mild concentric LVH, normal LVEF = 60%, moderate AR, mild progressive MS, moderate MR, mild-mod TR, severely dilated Left atrium.  - continue lisinopril 20 mg  -continue metoprolol 50 mg  -Consider Eval with cardiology for significant AS, TAVR, HFpEF 60%    Constipation  Assessment & Plan  -Unable to pass bowels since admission.  -Miralax and senna docusate.    DNR (do not resuscitate)- (present on admission)  Assessment & Plan  Discussed advanced directive with the patient she wishes to be DO NOT RESUSCITATE CODE STATUS    Hyperglycemia- (present on admission)  Assessment & Plan  Check a hemoglobin A1c and continue to monitor hypoglycemia    Elevated alkaline phosphatase level- (present on admission)  Assessment & Plan  Check a GGT level    Pulmonary edema- (present on admission)  Assessment & Plan  Aggressive diuretic management will be utilized.  Will give her 40 mg IV Lasix every 8 hours and monitor intake output  ECHO ordered.     Hypokalemia- (present on admission)  Assessment & Plan  Potassium slightly low at 3.4 but with diuretics patient will continue to decline in her potassium levels  Given 20 mg of potassium twice daily    Closed head injury- (present on admission)  Assessment & Plan  Patient fell on the stairs today hitting the back of her head.  CAT scan of the head done shows no acute intracranial abnormality  Patient externally does not have any laceration or hematoma at this point.  Continue with pain management and monitor  neurological status    Macrocytic anemia- (present on admission)  Assessment & Plan  - Monitor iron, folate and B12 levels for concern of macrocytic anemia      S/P TAVR (transcatheter aortic valve replacement)- (present on admission)  Assessment & Plan  Outpatient follow-ups with cardiology    Carotid artery stenosis- (present on admission)  Assessment & Plan  Continue aspirin 81 and statin    CAD- (present on admission)  Assessment & Plan  History of coronary artery disease with three-vessel bypass surgery in 2002  Patient also had to have PTCA in 2012 one-vessel.  Medication management optimization including beta-blocker ACE inhibitor statin and aspirin    HTN (hypertension)- (present on admission)  Assessment & Plan  Blood pressure significantly elevated  Apparently she forgot to take her medications this morning  Optimize medication management and monitor blood pressure to keep systolic blood pressure less than 140 diastolic under 90    CKD (chronic kidney disease) stage 3, GFR 30-59 ml/min- (present on admission)  Assessment & Plan  Monitor renal functions avoid nephrotoxic medications    Hyperlipidemia- (present on admission)  Assessment & Plan  Low-fat low-cholesterol diet  Statin  Fasting lipid panel         VTE prophylaxis: SCDs/TEDs    I have performed a physical exam and reviewed and updated ROS and Plan today (7/24/2024). In review of yesterday's note (7/23/2024), there are no changes except as documented above.    Mariella Armendariz MD MPH

## 2024-07-24 NOTE — PROGRESS NOTES
Pt. Complains of 10/10 mid back pain. Pt. Pain has been documented and pain medication has been administered as ordered. Repositioned for offloading. ,Personal items and call light left within reach of pt. Bed alarm on for pt. Safety.

## 2024-07-24 NOTE — THERAPY
Physical Therapy Contact Note    Patient Name: Silvia Orellana  Age:  89 y.o., Sex:  female  Medical Record #: [id number]  Today's Date: 7/23/2024 07/23/24 1704   Interdisciplinary Plan of Care Collaboration   IDT Collaboration with  Nursing   Collaboration Comments Attempted to assist pt, TLSO in room. Pt asleep, pt answered some questions but was mumbling and eye closed. Pt stated that she may be up for it tomorrow. Will attempt tomorrow.

## 2024-07-24 NOTE — DIETARY
Nutrition Update:    Day 5 of admit.  Silvia Orellana is a 89 y.o. female with admitting DX of Acute respiratory failure with hypoxemia    Patient being followed to optimize nutrition.    Current Diet: L6/Lo (soft and bite sized, thin liquids)    Problem: Nutritional:  Goal: Achieve adequate nutritional intake  Description: Patient will consume 50% of meals  Outcome: Met    PO per flow sheet has been >50% of meals.    RD will follow per dept guidelines.

## 2024-07-24 NOTE — ANTIMICROBIAL STEWARDSHIP
Antimicrobial Stewardship Rounds Note    Date  7/24/2024    Assessment  Patient chart reviewed during antimicrobial stewardship rounds with antimicrobial stewardship medical director Dr. Wood Lujan. The patient was started on antibiotics for positive blood culture with positive staph later determined to be coagulase negative staph deemed to be a contaminant. Patient did have pleural effusion and pneumonia was suspected, however presentation was more likely due to heart failure exacerbation [place] [address], PCT 0.09, WBC within normal limits and lack of fever. Antibiotics continued for suspected E. Coli cystitis. However, given lack of urinary symptoms, we believe it to be asymptomatic bacteriuria. At this point, they have received 5 days of antibiotic therapy which would be a sufficient duration for a cystitis regardless.    Recommendation  Based on the assessment above, the antimicrobial stewardship team recommends discontinuing antibiotic therapy with Augmentin. Discussed with attending physician Dr. Mariella Armendariz, who agreed. Orders were updated in the chart by Dr. Armendariz.     Kelsey Guaman, Pharmacy Intern  Title: Infectious Disease Student

## 2024-07-24 NOTE — CONSULTS
Reason for Consult:  Asked by Dr Adan Oneill M.D. to see this patient with increased gradient across TAVR  Patient's PCP: Joshua Rendon M.D.    CC:   Chief Complaint   Patient presents with    T-5000 GLF     PT resides at home where she experienced a GF, - LOC, + head strike, + blood thinners.     Shortness of Breath     PT is on 6L of oxygen at baseline, requiring 15L of oxygen to remain adequate oxygenation upon EMS arrival. PT expression some congestion and SOB.        HPI:  Silvia Orellana is a 89-year-old woman with history of CAD status post CABG, hypertension, dyslipidemia, status post TAVR who presented after a ground-level fall.  The patient remains admitted for heart failure exacerbation.  Cardiology is consulted for worsening gradient across TAVR.    The patient reports feeling well currently.  The patient reports that she had a fall while taking the stairs, denies any loss of consciousness.  She denies any lightheadedness prior to the fall.  She does report some shortness of breath.  No chest pain.  No orthopnea, PND or leg swelling.  No palpitations.  No syncope or presyncopal episodes.    Medications / Drug list prior to admission:  No current facility-administered medications on file prior to encounter.     Current Outpatient Medications on File Prior to Encounter   Medication Sig Dispense Refill    aspirin (ASA) 325 MG Tab Take 325 mg by mouth every evening.      Calcium 600 MG Tab Take 600 mg by mouth 2 times a day.      Lutein 40 MG Cap Take 40 mg by mouth every day.      ondansetron (ZOFRAN ODT) 4 MG TABLET DISPERSIBLE Take 1 Tablet by mouth every 8 hours as needed for Nausea/Vomiting. 10 Tablet 0    citalopram (CELEXA) 20 MG Tab Take 20 mg by mouth every day.      HYDROcodone/acetaminophen (NORCO)  MG Tab Take 1 Tablet by mouth every four hours as needed for Moderate Pain.      Ascorbic Acid (VITAMIN C) 1000 MG Tab Take 1,000 mg by mouth every day.      lisinopril (PRINIVIL)  20 MG Tab Take 1 Tablet by mouth 2 times a day. 200 Tablet 3    metoprolol SR (TOPROL XL) 50 MG TABLET SR 24 HR Take 1 Tablet by mouth every day. 100 Tablet 3    Cyanocobalamin (VITAMIN B-12) 5000 MCG SL Tab Place 1 Tab under tongue every morning.      multivitamin (THERAGRAN) Tab Take 1 Tab by mouth every morning.      gabapentin (NEURONTIN) 600 MG tablet Take 600 mg by mouth 3 times a day.  5    ipratropium-albuterol (DUONEB) 0.5-2.5 (3) MG/3ML nebulizer solution Take 3 mL by nebulization 4 times a day. 180 mL 3    omeprazole (PRILOSEC) 40 MG delayed-release capsule Take 40 mg by mouth every day.      atorvastatin (LIPITOR) 80 MG tablet Take 1 Tablet by mouth every evening. 90 Tablet 3    furosemide (LASIX) 20 MG Tab Take 1 Tablet by mouth every day. 100 Tablet 3       Current list of administered Medications:    Current Facility-Administered Medications:     aspirin EC tablet 81 mg, 81 mg, Oral, Q EVENING, Adan Oneill M.D.    senna-docusate (Pericolace Or Senokot S) 8.6-50 MG per tablet 2 Tablet, 2 Tablet, Oral, Q EVENING, 2 Tablet at 07/23/24 1823 **AND** polyethylene glycol/lytes (Miralax) Packet 1 Packet, 1 Packet, Oral, DAILY, Quicny Jung D.SALLY, 1 Packet at 07/24/24 0519    nystatin (Mycostatin) powder, , Topical, TID, Mariella Armendariz M.D., Given at 07/24/24 1216    atorvastatin (Lipitor) tablet 80 mg, 80 mg, Oral, Nightly, Gera Hirsch M.D., 80 mg at 07/23/24 2119    citalopram (CeleXA) tablet 20 mg, 20 mg, Oral, DAILY, Gera Hirsch M.D., 20 mg at 07/24/24 0519    gabapentin (Neurontin) capsule 600 mg, 600 mg, Oral, TID, Gera Hirsch M.D., 600 mg at 07/24/24 1216    ipratropium-albuterol (DUONEB) nebulizer solution, 3 mL, Nebulization, Q6HRS PRN (RT), Gera Hirsch M.D.    lisinopril (Prinivil) tablet 20 mg, 20 mg, Oral, BID, Gera Hirsch M.D., 20 mg at 07/24/24 0518    omeprazole (PriLOSEC) capsule 40 mg, 40 mg, Oral, DAILY, Gera Hirsch M.D., 40 mg at 07/24/24 0518    labetalol  (Normodyne/Trandate) injection 10 mg, 10 mg, Intravenous, Q4HRS PRN, Gera Hirsch M.D.    acetaminophen (Tylenol) tablet 650 mg, 650 mg, Oral, Q6HRS PRN, Gera Hirsch M.D.    Notify provider if pain remains uncontrolled, , , CONTINUOUS **AND** Use the Numeric Rating Scale (NRS), Sapp-Baker Faces (WBF), or FLACC on regular floors and Critical-Care Pain Observation Tool (CPOT) on ICUs/Trauma to assess pain, , , CONTINUOUS **AND** Pulse Ox, , , CONTINUOUS **AND** Pharmacy Consult Request ...Pain Management Review 1 Each, 1 Each, Other, PHARMACY TO DOSE **AND** If patient difficult to arouse and/or has respiratory depression (respiratory rate of 10 or less), stop any opiates that are currently infusing and call a Rapid Response., , , CONTINUOUS, Gera Hirsch M.D.    oxyCODONE immediate-release (Roxicodone) tablet 2.5 mg, 2.5 mg, Oral, Q3HRS PRN, 2.5 mg at 07/20/24 0550 **OR** oxyCODONE immediate-release (Roxicodone) tablet 5 mg, 5 mg, Oral, Q3HRS PRN, 5 mg at 07/24/24 0737 **OR** morphine 4 MG/ML injection 2 mg, 2 mg, Intravenous, Q3HRS PRN, Gera Hirsch M.D.    ondansetron (Zofran) syringe/vial injection 4 mg, 4 mg, Intravenous, Q4HRS PRN, Gera Hirsch M.D.    ondansetron (Zofran ODT) dispertab 4 mg, 4 mg, Oral, Q4HRS PRN, Gera Hirsch M.D.    furosemide (Lasix) injection 40 mg, 40 mg, Intravenous, Q8HRS, Gera Hirsch M.D., 40 mg at 07/24/24 1423    potassium chloride SA (Kdur) tablet 20 mEq, 20 mEq, Oral, BID, Gera Hirsch M.D., 20 mEq at 07/24/24 0518    metoprolol SR (Toprol XL) tablet 50 mg, 50 mg, Oral, Q EVENING, Gera Hirsch M.D., 50 mg at 07/23/24 5162    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    Cataract     patria IOL    CHF  (congestive heart failure) (HCC)     Chronic pain     Dental disorder     dentures    Diverticulosis     Fall     GERD (gastroesophageal reflux disease)     Heart burn     Hx of Clostridium difficile infection 2015    Hyperlipidemia     Hypertension     Indigestion     Myocardial infarct (HCC) 2002    cardiac stent    Pneumonia 2014    Stroke (HCC) 2001; 2012    no deficits, general weakness    Urinary bladder disorder     Urinary incontinence        Past Surgical History:   Procedure Laterality Date    TRANSCATHETER AORTIC VALVE REPLACEMENT N/A 9/25/2017    Procedure: TRANSCATHETER AORTIC VALVE REPLACEMENT;  Surgeon: Sherif Elder M.D.;  Location: SURGERY [facility];  Service: Cardiac    TATIANNA  9/25/2017    Procedure: TTE;  Surgeon: Sherif Elder M.D.;  Location: SURGERY [facility];  Service: Cardiac    ORBITAL FRACTURE ORIF Left 5/23/2016    Procedure: ORBITAL FRACTURE ORIF FOR: LATERAL CANTHOPEXY;  Surgeon: Fabrice Daniel Jr., M.D.;  Location: SURGERY [facility];  Service:     ORIF, FRACTURE, FEMUR Right 8/10/2015    Procedure: FEMUR ORIF;  Surgeon: Umer Guevara M.D.;  Location: SURGERY [facility];  Service:     ORBITAL FRACTURE ORIF Left 7/6/2015    Procedure: ORBITAL FRACTURE ORIF;  Surgeon: Fabrice Daniel Jr., M.D.;  Location: SURGERY [facility];  Service:     PB REVISE TOTAL HIP REPLACEMENT  3/24/2015    R    OTHER CARDIAC SURGERY  2012    cardiac cath with stent placement    PB REVISE ACETABULAR PART OF TOTAL HIP  2010    L    OTHER CARDIAC SURGERY  2002    CABG    GYN SURGERY  1961    hysterectomy    ANGIOGRAM      BLADDER BIOPSY      CHOLECYSTECTOMY      HIP ARTHROPLASTY TOTAL Left     LUMPECTOMY      L    MULTIPLE CORONARY ARTERY BYPASS      ZZZ CARDIAC CATH         Family History   Problem Relation Age of Onset    Heart Attack Neg Hx      Patient family history was personally reviewed, no pertinent family history to current presentation    Social History     Tobacco  Use    Smoking status: Never    Smokeless tobacco: Never   Substance Use Topics    Alcohol use: Yes     Comment: occ    Drug use: No       ALLERGIES:  No Known Allergies    Review of systems:  A complete review of symptoms was reviewed with patient. This is reviewed in H&P and PMH. ALL OTHERS reviewed and negative    Physical exam:  Patient Vitals for the past 24 hrs:   BP Temp Temp src Pulse Resp SpO2   07/24/24 0920 118/47 -- -- -- -- --   07/24/24 0726 96/50 36.2 °C (97.2 °F) Temporal 74 18 97 %   07/23/24 1921 110/41 36.4 °C (97.5 °F) Temporal 70 18 95 %   07/23/24 1546 (!) 115/38 36.3 °C (97.3 °F) Temporal 66 18 95 %     General: No acute distress.   EYES: no jaundice  HEENT: OP clear   Neck: No bruits No JVD.   CVS: RRR, 3/6 systolic murmur  Resp: Decreased breath sounds at bases bilaterally.  Abdomen: Soft, NT, ND,  Skin: Grossly nothing acute no obvious rashes  Neurological: Alert, Moves all extremities, no cranial nerve defects on limited exam  Extremities: Pulse 2+ in b/l LE.  Trace edema. No cyanosis.       Data:  Laboratory studies personally reviewed by me:  Recent Results (from the past 24 hour(s))   RETICULOCYTES COUNT    Collection Time: 07/24/24  3:11 AM   Result Value Ref Range    Reticulocyte Count 2.3 0.8 - 2.6 %    Retic, Absolute 0.07 0.04 - 0.12 M/uL    Imm. Reticulocyte Fraction 15.4 2.6 - 16.1 %    Retic Hgb Equivalent 35.3 (H) 29.0 - 35.0 pg/cell   CBC WITHOUT DIFFERENTIAL    Collection Time: 07/24/24  3:11 AM   Result Value Ref Range    WBC 7.2 4.8 - 10.8 K/uL    RBC 3.12 (L) 4.20 - 5.40 M/uL    Hemoglobin 10.2 (L) 12.0 - 16.0 g/dL    Hematocrit 30.7 (L) 37.0 - 47.0 %    MCV 98.4 (H) 81.4 - 97.8 fL    MCH 32.7 27.0 - 33.0 pg    MCHC 33.2 32.2 - 35.5 g/dL    RDW 50.4 (H) 35.9 - 50.0 fL    Platelet Count 223 164 - 446 K/uL    MPV 10.5 9.0 - 12.9 fL       Imaging:  EC-ECHOCARDIOGRAM COMPLETE W/ CONT   Final Result      CT-CTA CHEST PULMONARY ARTERY W/ RECONS   Final Result      1.  There is  no CT evidence of acute pulmonary embolism.   2.  Mildly enlarged heart with dependent groundglass opacities left greater than right consistent with changes of pulmonary edema.   3.  Bibasilar airspace disease likely due to atelectasis with pneumonitis considered less likely.   4.  Small bilateral dependent pleural effusions, right greater than left            DX-CHEST-PORTABLE (1 VIEW)   Final Result         1. Decreased left pleural effusion.   2. Left lower lobe airspace disease disease, correlate for potential pneumonia.   2. Stable pulmonary edema.      CT-LSPINE W/O PLUS RECONS   Final Result      1.  Acute L1 burst fracture with approximately 30% loss of height and minimal retropulsion.   2.  Multilevel disc and facet joint degenerative changes.   3.  Mild degenerative anterolisthesis of L3 on L4.   4.  At least small right and trace left pleural effusions.   5.  Likely pulmonary edema.   6.  Right renal cortical atrophy with multiple right renal cysts.   7.  Nonobstructing small left renal stone.   8.  Colonic diverticulosis.      US-THORACENTESIS PUNCTURE LEFT   Final Result      1. Ultrasound guided left sided therapeutic thoracentesis.      2. 1550 mL of fluid withdrawn.      DX-CHEST-PORTABLE (1 VIEW)   Final Result      Cardiomegaly with probable pulmonary edema.      Possible moderate left pleural effusion with associated compressive atelectasis. Correlate clinically for infection.      CT-TSPINE W/O PLUS RECONS   Final Result      1.  L1 vertebral body fracture without appreciable height loss. The L1 level is incompletely imaged. Recommend CT or MRI of the lumbar spine for completion of assessment.   2.  Osteopenia   3.  Multilevel multifactorial degenerative changes   4.  LEFT larger than RIGHT pleural effusions with overlying atelectasis   5.  Enlarged precarinal lymph node      CT-CSPINE WITHOUT PLUS RECONS   Final Result      1.  No cervical spine fracture detected.   2.  Large bilateral pleural  effusions.      CT-HEAD W/O   Final Result      1.  Chronic microvascular ischemic type changes.   2.  No acute intracranial abnormality.                       EKG 7/20/2024: personally reviewed by me Sinus rhythm, LVH with secondary repolarization abnormality    Echocardiogram 7/21/2024  CONCLUSIONS  Compared to prior study from 3/2024: there is now evidence of   significant prosthetic aortic valve stenosis.  Mild concentric LVH with preserved LV systolic function, normal  LVEF   60%  Normal RV size and systolic function  Known bioprosthetic aortic valve with significant stenosis (Vmax    4.1m/s, mean gradient 40mmHg, AT > 100ms).   Moderate eccentric aortic insufficiency.  Significant mitral annular calcifications with mild progressive Mitral   stenosis  Moderate mitral regurgitation  Mild-moderate TR  Severely dilated left atrium  No pericardial effusion  IVC not well visualized  Pulmonary hypertension with estimated RVSP 50mmHg    CXR 7/20/2024  IMPRESSION:        1. Decreased left pleural effusion.  2. Left lower lobe airspace disease disease, correlate for potential pneumonia.  2. Stable pulmonary edema.    All pertinent features of laboratory and imaging reviewed including primary images where applicable      Principal Problem:    Acute respiratory failure with hypoxemia (HCC) (POA: Yes)  Active Problems:    Hyperlipidemia (POA: Yes)    CKD (chronic kidney disease) stage 3, GFR 30-59 ml/min (POA: Yes)    HTN (hypertension) (POA: Yes)    CAD (POA: Yes)      Overview: (LIMA--AD, KINDRA--PD, VG--OM 2002) and PCI to VG ostium (3.5x23mm Xience       SANTA 9/2012)     Carotid artery stenosis (Chronic) (POA: Yes)    S/P TAVR (transcatheter aortic valve replacement) (POA: Yes)    Acute on chronic diastolic heart failure (HCC) (POA: Yes)    Macrocytic anemia (POA: Yes)    Closed head injury (POA: Yes)    Closed wedge compression fracture of L1 vertebra (HCC) (POA: Yes)    Pleural effusion, bilateral (POA: Yes)     Hypokalemia (POA: Yes)    Pulmonary edema (POA: Yes)    Elevated alkaline phosphatase level (POA: Yes)    Hyperglycemia (POA: Yes)    DNR (do not resuscitate) (POA: Yes)    UTI (urinary tract infection) (POA: Unknown)    Constipation (POA: Unknown)  Resolved Problems:    * No resolved hospital problems. *      Assessment / Plan:    Elevated gradient across TAVR  Acute on chronic HFpEF  The patient remains volume up on exam.  The patient had a fall, but appears to be mechanical.  She denies any lightheadedness prior to the fall.  -Continue IV Lasix, gentle diuresis given significantly elevated gradient across TAVR  -We will arrange follow-up with structural heart clinic for possible valve in valve versus balloon valvuloplasty        I personally discussed her case with  Dr Adan Oneill M.D.    Future Appointments   Date Time Provider Department Center   7/30/2024  1:45 PM CAESAR Parada None       It is my pleasure to participate in the care of Ms. Orellana.  Please do not hesitate to contact me with questions or concerns.    Lakeshia Muller MD   Cardiologist [facility]    7/24/2024    Please note that this dictation was created using voice recognition software. There may be errors I did not discover before finalizing the note.

## 2024-07-24 NOTE — DISCHARGE PLANNING
DPA sent SNF blanket to [place] per RN CM request.     1158  DPA called [facility] with [facility], requesting a stat KUB due to no BM. RN CM aware.

## 2024-07-24 NOTE — CARE PLAN
The patient is Stable - Low risk of patient condition declining or worsening    Shift Goals  Clinical Goals: pain control  Patient Goals: up to chair  Family Goals: rome    Progress made toward(s) clinical / shift goals:  pain management per order, following skin integrity and fall risk protocols    Problem: Fall Risk  Goal: Patient will remain free from falls  Outcome: Progressing  Note: Pt. Remain free of falls, call light in reach of patient, bed in lowest position, treaded socks on pt., pt calls as needed. Personal items and call light left within reach of pt. Bed alarm on for pt. Safety.      Problem: Pain - Standard  Goal: Alleviation of pain or a reduction in pain to the patient’s comfort goal  Outcome: Progressing  Note: Pt. Pain has been documented and pain medication has been administered as ordered, pt. Pain has been reassessed and documented. Personal items and call light left within reach of pt. Bed alarm on for pt. Safety.      Problem: Skin Integrity  Goal: Skin integrity is maintained or improved  Outcome: Progressing  Note:  Pt educated about the importance of frequent repositioning to prevent skin breakdown. Encouraged on notifying staff for help  Pt verbalized understanding. Appropriate dressings in place. Extra pillows in place for comfort and offloading.  Personal items and call light left within reach of pt. Bed alarm on for pt. Safety.

## 2024-07-24 NOTE — THERAPY
"Physical Therapy   Initial Evaluation     Patient Name: Silvia Orellana  Age:  89 y.o., Sex:  female  Medical Record #: [id number]  Today's Date: 7/24/2024     Precautions  Precautions: Spinal / Back Precautions ;TLSO (Thoracolumbosacral orthosis)    Assessment  Patient is 89 y.o. female with shortness of breath after a ground-level fall at home.  She was going downstairs and fell backwards, hitting her head. 3/10 left-sided chest pain that is reported to be \"dull\" and has not reproducible with palpation.  The patient underwent an IR guided L-thoracentesis. Large bilateral pleural effusions (Left> Right), L1 vertebral body compression fracture. Possible PNA.  Pt lives with children and stays on the second floor. She was independent with mobility prior to admission. Pt with arthritic changes in bilat hands.     She required max Ax2 for mobility to/from EOB and don TLSO. TLSO was long, called [facility] to notify them for possible adjustment. Pt with c/o ringing in her ears and getting hot while at the EOB, /43, diastolic did not improve and her symptoms continued. Assisted pt back to bed. Will continue to follow. Pt below her baseline of function and will benefit from post acute Rehab.       Plan    Physical Therapy Initial Treatment Plan   Treatment Plan : Bed Mobility, Gait Training, Neuro Re-Education / Balance, Therapeutic Activities, Therapeutic Exercise, Stair Training, Equipment, Orthotics Training   Treatment Frequency: 4 Times per Week  Duration: Until Therapy Goals Met    DC Equipment Recommendations: Unable to determine at this time  Discharge Recommendations: Recommend post-acute placement for additional physical therapy services prior to discharge home       Subjective    Pt resting in bed. Agreeable to PT/OT.      Objective       07/24/24 1012   Time In/Time Out   Therapy Start Time 1013   Therapy End Time 1049   Total Therapy Time 36   Initial Contact Note    Initial Contact Note Order Received and " Verified, Physical Therapy Evaluation in Progress with Full Report to Follow.   Precautions   Precautions Spinal / Back Precautions ;TLSO (Thoracolumbosacral orthosis)   Pain 0 - 10 Group   Location Back   Location Orientation Mid   Therapist Pain Assessment During Activity;Post Activity Pain Same as Prior to Activity   Prior Living Situation   Prior Services None   Housing / Facility 2 Story House   Steps In Home 12   Rail Both Rail (Steps in Home)   Lives with - Patient's Self Care Capacity Adult Children   Comments pt lives on the second story and does not go downstairs often   Prior Level of Functional Mobility   Bed Mobility Independent   Transfer Status Independent   Ambulation Independent   Ambulation Distance household   Assistive Devices Used None   Stairs Required Assist   History of Falls   History of Falls Yes   Date of Last Fall   (reason for admission 7/24)   Cognition    Cognition / Consciousness WDL   Level of Consciousness Alert   Comments pleasant, cooperative   Active ROM Lower Body    Active ROM Lower Body  X   Comments limited L knee extension   Strength Lower Body   Lower Body Strength  X   Comments L LE grossly 2/5, R LE grossly 3/5, possibly limited by pain   Coordination Lower Body    Coordination Lower Body  WDL   Other Treatments   Other Treatments Provided donned TLSO at EOB, max A, brace high up on pt, u/a to assess in standing, aziza on brace for possible adjustment   Bed Mobility    Supine to Sit Maximal Assist   Sit to Supine Maximal Assist   Scooting Maximal Assist   Rolling Moderate Assist to Rt.;Moderate Assist to Lt.   Gait Analysis   Gait Level Of Assist Unable to Participate   Functional Mobility   Sit to Stand Unable to Participate   6 Clicks Assessment - How much HELP from from another person do you currently need... (If the patient hasn't done an activity recently, how much help from another person do you think he/she would need if he/she tried?)   Turning from your back to  your side while in a flat bed without using bedrails? 2   Moving from lying on your back to sitting on the side of a flat bed without using bedrails? 2   Moving to and from a bed to a chair (including a wheelchair)? 2   Standing up from a chair using your arms (e.g., wheelchair, or bedside chair)? 2   Walking in hospital room? 2   Climbing 3-5 steps with a railing? 2   6 clicks Mobility Score 12   Activity Tolerance   Sitting Edge of Bed 10 minutes   Comments limted due to pt with dizziness, and weakness   Short Term Goals    Short Term Goal # 1 Pt will perform supine to/from sit with flat bed and no features log roll supervised in 6 visits to progress bed mobility   Short Term Goal # 2 Pt will perform sit to/from stand with FWW supervised in 6 visits to progress transfers   Short Term Goal # 3 Pt will amb with FWW 100ft supervised in 6 visits to progress with functional household mobility.   Short Term Goal # 4 Pt will be able to ascend/descend 12 steps with rail CGA in 6 visits to access bedroom.   Short Term Goal # 5 Pt will be able to don/doff TLSO with min A in 6 visits   Education Group   Education Provided Role of Physical Therapist;Brace Wear and Care;Spine Precautions   Spine Precautions Patient Response Patient;Acceptance;Explanation;Action Demonstration   Role of Physical Therapist Patient Response Patient;Acceptance;Explanation;Verbal Demonstration   Brace Wear & Care Patient Response Patient;Acceptance;Explanation;Verbal Demonstration   Additional Comments Pt will continue to benefit from spinal precautions and TLSO education   Physical Therapy Initial Treatment Plan    Treatment Plan  Bed Mobility;Gait Training;Neuro Re-Education / Balance;Therapeutic Activities;Therapeutic Exercise;Stair Training;Equipment;Orthotics Training    Treatment Frequency 4 Times per Week   Duration Until Therapy Goals Met   Problem List    Problems Pain;Impaired Bed Mobility;Impaired Transfers;Impaired Ambulation;Decreased  Activity Tolerance;Functional Strength Deficit;Functional ROM Deficit;Impaired Balance;Limited Knowledge of Post-Op Precautions   Anticipated Discharge Equipment and Recommendations   DC Equipment Recommendations Unable to determine at this time   Discharge Recommendations Recommend post-acute placement for additional physical therapy services prior to discharge home   Interdisciplinary Plan of Care Collaboration   IDT Collaboration with  Nursing;Occupational Therapist  Patient seen for team evaluation with Physical Therapist for the following reason(s):  Patient required 2 person assistance for safety and to provide effective interventions. Each discipline assisted patient with appropriate and separate goals.  Physical Therapist facilitated sitting balance, activity tolerance while Occupational Therapist simultaneously treated pt according to POC.     Patient Position at End of Therapy In Bed;Bed Alarm On;Call Light within Reach;Tray Table within Reach   Collaboration Comments RN updated

## 2024-07-24 NOTE — THERAPY
Occupational Therapy   Initial Evaluation     Patient Name: Silvia Orellana  Age:  89 y.o., Sex:  female  Medical Record #: [id number]  Today's Date: 7/24/2024     Precautions: (P) Fall Risk, Spinal / Back Precautions , TLSO (Thoracolumbosacral orthosis)  Comments: (P) no wear schedule, custom TLSO ordered    Assessment  Patient is 89 y.o. female with SOB after GLF.  She was going downstairs and fell backwards, hitting her head, now s/p L-thoracentesis, found to have large bilateral pleural effusions, L1 vertebral body compression fx, and possible PNA. Pt seen for physical therapy 2/2 significant mobility deficits and monitoring of symptoms requiring two skilled hands.    Pt limited by weakness, poor endurance, dizziness once EOB, and discomfort with brace. Pt reporting custom TLSO digging into hips and neck. Pt required max A for donning TLSO, max A for LB dressing, and max A for bed mob. Anticipate post acute placement at this time. Will follow for skilled OT services.    Plan    Occupational Therapy Initial Treatment Plan   Treatment Interventions: (P) Self Care / Activities of Daily Living, Adaptive Equipment, Neuro Re-Education / Balance, Therapeutic Exercises, Therapeutic Activity  Treatment Frequency: (P) 4 Times per Week  Duration: (P) Until Therapy Goals Met    DC Equipment Recommendations: (P) Unable to determine at this time  Discharge Recommendations: (P) Recommend post-acute placement for additional occupational therapy services prior to discharge home        07/24/24 1015   Prior Living Situation   Prior Services None   Housing / Facility 2 Story House   Bathroom Set up Walk In Shower;Shower Chair   Equipment Owned Tub / Shower Seat   Lives with - Patient's Self Care Capacity Adult Children   Comments Pt reports her room/bathroom are upstairs, and she normally has no issue with stairs. She lives with her dtr and KANA   Prior Level of ADL Function   Self Feeding Independent   Grooming / Hygiene  Independent   Bathing Independent   Dressing Independent   Toileting Independent   Prior Level of IADL Function   Medication Management Requires Assist   Laundry Requires Assist   Kitchen Mobility Requires Assist   Finances Requires Assist   Home Management Requires Assist   Shopping Requires Assist   History of Falls   History of Falls Yes   Date of Last Fall   (reason for admit)   Precautions   Precautions Fall Risk;Spinal / Back Precautions ;TLSO (Thoracolumbosacral orthosis)   Comments no wear schedule, custom TLSO ordered   Pain 0 - 10 Group   Therapist Pain Assessment   (no complaints of back pain during mobility)   Cognition    Cognition / Consciousness X   Comments pleasant/agreeable, delayed responses   Active ROM Upper Body   Active ROM Upper Body  X   Comments limited UE ROM 2/2 arthritis, this is baseline, bilateral hand deformities   Strength Upper Body   Upper Body Strength  X   Gross Strength Generalized Weakness, Equal Bilaterally.    Coordination Upper Body   Coordination X   Comments limited 2/2 arthritis, provided adaptive cup to compensate   Balance Assessment   Sitting Balance (Static) Fair -   Sitting Balance (Dynamic) Poor +   Weight Shift Sitting Poor   Bed Mobility    Supine to Sit Maximal Assist   Sit to Supine Maximal Assist   Scooting Maximal Assist   ADL Assessment   Eating Moderate Assist  (improved with use of cup with handles and lid, initally required assist with bringing cup<>mouth)   Grooming Moderate Assist   Upper Body Dressing Maximal Assist   Lower Body Dressing Maximal Assist   Comments max A donning TLSO, very uncomfortable, digging into thighs and chin   How much help from another person does the patient currently need...   6 Clicks Daily Activity Score 13   Functional Mobility   Sit to Stand Unable to Participate   Comments limited 2/2 dizziness   Patient / Family Goals   Patient / Family Goal #1 to get better   Short Term Goals   Short Term Goal # 1 Pt will demo ADL txf  min A   Short Term Goal # 2 Pt will don/doff brace mod A   Short Term Goal # 3 Pt will demo LB dressing min A using AD PRN   Education Group   Role of Occupational Therapist Patient Response Patient;Acceptance;Explanation   Brace Wear & Care Patient Response Patient;Acceptance;Explanation;Reinforcement Needed   Occupational Therapy Initial Treatment Plan    Treatment Interventions Self Care / Activities of Daily Living;Adaptive Equipment;Neuro Re-Education / Balance;Therapeutic Exercises;Therapeutic Activity   Treatment Frequency 4 Times per Week   Duration Until Therapy Goals Met   Problem List   Problem List Decreased Active Daily Living Skills;Decreased Upper Extremity Strength Left;Decreased Homemaking Skills;Decreased Upper Extremity Strength Right;Decreased Activity Tolerance;Decreased Functional Mobility;Impaired Postural Control / Balance;Impaired Cognitive Function   Anticipated Discharge Equipment and Recommendations   DC Equipment Recommendations Unable to determine at this time   Discharge Recommendations Recommend post-acute placement for additional occupational therapy services prior to discharge home   Interdisciplinary Plan of Care Collaboration   IDT Collaboration with  Nursing;Physical Therapist   Patient Position at End of Therapy In Bed;Bed Alarm On;Call Light within Reach;Tray Table within Reach;Phone within Reach   Collaboration Comments RN aware   Session Information   Date / Session Number  7/24 1 (1/4, 7/30)

## 2024-07-25 ENCOUNTER — APPOINTMENT (OUTPATIENT)
Dept: RADIOLOGY | Facility: MEDICAL CENTER | Age: 89
DRG: 314 | End: 2024-07-25
Payer: MEDICARE

## 2024-07-25 ENCOUNTER — APPOINTMENT (OUTPATIENT)
Dept: RADIOLOGY | Facility: MEDICAL CENTER | Age: 89
DRG: 314 | End: 2024-07-25
Attending: INTERNAL MEDICINE
Payer: MEDICARE

## 2024-07-25 VITALS
TEMPERATURE: 97.9 F | RESPIRATION RATE: 18 BRPM | BODY MASS INDEX: 26.91 KG/M2 | HEIGHT: 64 IN | HEART RATE: 77 BPM | DIASTOLIC BLOOD PRESSURE: 53 MMHG | WEIGHT: 157.63 LBS | OXYGEN SATURATION: 90 % | SYSTOLIC BLOOD PRESSURE: 117 MMHG

## 2024-07-25 LAB
BACTERIA BLD CULT: NORMAL
BACTERIA BLD CULT: NORMAL
HGB RETIC QN AUTO: 34.5 PG/CELL (ref 29–35)
IMM RETICS NFR: 12.7 % (ref 2.6–16.1)
RETICS # AUTO: 0.07 M/UL (ref 0.04–0.12)
RETICS/RBC NFR: 2.3 % (ref 0.8–2.6)
SIGNIFICANT IND 70042: NORMAL
SIGNIFICANT IND 70042: NORMAL
SITE SITE: NORMAL
SITE SITE: NORMAL
SOURCE SOURCE: NORMAL
SOURCE SOURCE: NORMAL

## 2024-07-25 PROCEDURE — 700102 HCHG RX REV CODE 250 W/ 637 OVERRIDE(OP): Performed by: HOSPITALIST

## 2024-07-25 PROCEDURE — A9270 NON-COVERED ITEM OR SERVICE: HCPCS

## 2024-07-25 PROCEDURE — 85046 RETICYTE/HGB CONCENTRATE: CPT

## 2024-07-25 PROCEDURE — 99239 HOSP IP/OBS DSCHRG MGMT >30: CPT | Mod: GC | Performed by: INTERNAL MEDICINE

## 2024-07-25 PROCEDURE — 71045 X-RAY EXAM CHEST 1 VIEW: CPT

## 2024-07-25 PROCEDURE — 36415 COLL VENOUS BLD VENIPUNCTURE: CPT

## 2024-07-25 PROCEDURE — 700102 HCHG RX REV CODE 250 W/ 637 OVERRIDE(OP)

## 2024-07-25 PROCEDURE — 99232 SBSQ HOSP IP/OBS MODERATE 35: CPT | Mod: GC | Performed by: STUDENT IN AN ORGANIZED HEALTH CARE EDUCATION/TRAINING PROGRAM

## 2024-07-25 PROCEDURE — A9270 NON-COVERED ITEM OR SERVICE: HCPCS | Performed by: HOSPITALIST

## 2024-07-25 PROCEDURE — 700111 HCHG RX REV CODE 636 W/ 250 OVERRIDE (IP): Mod: JZ | Performed by: HOSPITALIST

## 2024-07-25 RX ORDER — POTASSIUM CHLORIDE 1500 MG/1
20 TABLET, EXTENDED RELEASE ORAL DAILY
Qty: 60 TABLET | Refills: 11 | Status: SHIPPED | OUTPATIENT
Start: 2024-07-26 | End: 2024-08-20

## 2024-07-25 RX ORDER — NYSTATIN 100000 [USP'U]/G
1 POWDER TOPICAL 3 TIMES DAILY
Qty: 30 G | Refills: 0 | Status: SHIPPED | OUTPATIENT
Start: 2024-07-25 | End: 2024-08-01

## 2024-07-25 RX ORDER — FUROSEMIDE 40 MG
40 TABLET ORAL
Status: DISCONTINUED | OUTPATIENT
Start: 2024-07-25 | End: 2024-07-25 | Stop reason: HOSPADM

## 2024-07-25 RX ORDER — POTASSIUM CHLORIDE 1500 MG/1
20 TABLET, EXTENDED RELEASE ORAL DAILY
Status: DISCONTINUED | OUTPATIENT
Start: 2024-07-26 | End: 2024-07-25 | Stop reason: HOSPADM

## 2024-07-25 RX ADMIN — FUROSEMIDE 40 MG: 40 TABLET ORAL at 09:57

## 2024-07-25 RX ADMIN — POTASSIUM CHLORIDE 20 MEQ: 1500 TABLET, EXTENDED RELEASE ORAL at 05:12

## 2024-07-25 RX ADMIN — NYSTATIN: 100000 POWDER TOPICAL at 05:12

## 2024-07-25 RX ADMIN — OMEPRAZOLE 40 MG: 20 CAPSULE, DELAYED RELEASE ORAL at 05:11

## 2024-07-25 RX ADMIN — GABAPENTIN 600 MG: 300 CAPSULE ORAL at 12:48

## 2024-07-25 RX ADMIN — CITALOPRAM HYDROBROMIDE 20 MG: 20 TABLET ORAL at 05:12

## 2024-07-25 RX ADMIN — GABAPENTIN 600 MG: 300 CAPSULE ORAL at 05:12

## 2024-07-25 RX ADMIN — LISINOPRIL 20 MG: 20 TABLET ORAL at 05:12

## 2024-07-25 RX ADMIN — POLYETHYLENE GLYCOL 3350 1 PACKET: 17 POWDER, FOR SOLUTION ORAL at 05:11

## 2024-07-25 RX ADMIN — FUROSEMIDE 40 MG: 10 INJECTION, SOLUTION INTRAVENOUS at 05:12

## 2024-07-25 NOTE — PROGRESS NOTES
[facility] Cardiology Follow-up Consult Note  Date of note:    7/25/2024          Patient ID:  Name:   Silvia Orellana   YOB: 1931  Age:   89 y.o.  female   MRN:   [id number]    Chief Complaint   Patient presents with    T-5000 GLF     PT resides at home where she experienced a GF, - LOC, + head strike, + blood thinners.     Shortness of Breath     PT is on 6L of oxygen at baseline, requiring 15L of oxygen to remain adequate oxygenation upon EMS arrival. PT expression some congestion and SOB.        HPI: Silvia Orellana is a 89 year old with history of CAD s/p CABG x3 (LIMA to LAD, KINDRA to PDA, vein graft to OM) 2012, TAVR 2017, hypertension, and dyslipidemia admitted for heart failure exacerbation, echo indicated worsening gradient of TAVR.      Interim Events:  Patient reports she is doing well at this time. She denies shortness of breath, leg swelling. Patient answering questions tangentially.     Past medical, surgical, social, and family history reviewed and unchanged from admission except as noted in assessment and plan.    Medications: Reviewed in MAR  Current Facility-Administered Medications   Medication Dose Frequency Provider Last Rate Last Admin    aspirin EC tablet 81 mg  81 mg Q EVENING Adan Oneill M.D.   81 mg at 07/24/24 1722    senna-docusate (Pericolace Or Senokot S) 8.6-50 MG per tablet 2 Tablet  2 Tablet Q EVENING NEIDA BenitezO.   2 Tablet at 07/24/24 1719    And    polyethylene glycol/lytes (Miralax) Packet 1 Packet  1 Packet DAILY NEIDA BenitezO.   1 Packet at 07/25/24 0511    nystatin (Mycostatin) powder   TID Mariella Armendariz M.D.   Given at 07/25/24 0512    atorvastatin (Lipitor) tablet 80 mg  80 mg Nightly Gera Hirsch M.D.   80 mg at 07/24/24 2148    citalopram (CeleXA) tablet 20 mg  20 mg DAILY Gera Hirsch M.D.   20 mg at 07/25/24 0512    gabapentin (Neurontin) capsule 600 mg  600 mg TID Gera Hirsch M.D.   600 mg at 07/25/24 0512     "ipratropium-albuterol (DUONEB) nebulizer solution  3 mL Q6HRS PRN (RT) Gera Hirsch M.D.        lisinopril (Prinivil) tablet 20 mg  20 mg BID Gera Hirsch M.D.   20 mg at 07/25/24 0512    omeprazole (PriLOSEC) capsule 40 mg  40 mg DAILY SHERIN SchmitzDKayleen   40 mg at 07/25/24 0511    labetalol (Normodyne/Trandate) injection 10 mg  10 mg Q4HRS PRN Gera Hirsch M.D.        acetaminophen (Tylenol) tablet 650 mg  650 mg Q6HRS PRN Gera Hirsch M.D.        Pharmacy Consult Request ...Pain Management Review 1 Each  1 Each PHARMACY TO DOSE Gera Hirsch M.D.        oxyCODONE immediate-release (Roxicodone) tablet 2.5 mg  2.5 mg Q3HRS PRN Gera Hirsch M.D.   2.5 mg at 07/20/24 0550    Or    oxyCODONE immediate-release (Roxicodone) tablet 5 mg  5 mg Q3HRS PRN Gera Hirsch M.D.   5 mg at 07/24/24 0737    ondansetron (Zofran) syringe/vial injection 4 mg  4 mg Q4HRS PRN Gera Hirsch M.D.        ondansetron (Zofran ODT) dispertab 4 mg  4 mg Q4HRS PRN Gera Hirsch M.D.        furosemide (Lasix) injection 40 mg  40 mg Q8HRS Gera Hirsch M.D.   40 mg at 07/25/24 0512    potassium chloride SA (Kdur) tablet 20 mEq  20 mEq BID SHERIN SchmitzDKayleen   20 mEq at 07/25/24 0512    metoprolol SR (Toprol XL) tablet 50 mg  50 mg Q EVENING Gera Hirsch M.D.   50 mg at 07/24/24 1719   Last reviewed on 7/19/2024  2:32 PM by Anita Corbin [facility]   No Known Allergies    Physical Exam  Body mass index is 27.04 kg/m². /53   Pulse 77   Temp 36.6 °C (97.9 °F) (Temporal)   Resp 18   Ht 1.626 m (5' 4.02\")   Wt 71.5 kg (157 lb 10.1 oz)   SpO2 90%    Vitals:    07/24/24 1850 07/25/24 0441 07/25/24 0500 07/25/24 0740   BP:  118/43 118/43 117/53   Pulse: 73 78  77   Resp: 18 18  18   Temp: 36.6 °C (97.9 °F) 36.6 °C (97.9 °F)  36.6 °C (97.9 °F)   TempSrc: Temporal Temporal  Temporal   SpO2: 97% 90%  90%   Weight:       Height:        Oxygen Therapy:  Pulse Oximetry: 90 %, O2 (LPM): 4, O2 Delivery Device: Silicone Nasal " Cannula    General: no apparent distress  Eyes: nl conjunctiva  ENT: OP clear  Neck: no JVD   Lungs: normal respiratory effort, CTAB  Heart: Systolic murmur, no rubs or gallops,   EXT No pitting edema bilateral lower extremities. + pedal pulses. no cyanosis  Abdomen: soft, non tender, non distended,  Neurological: No focal deficits  Psychiatric: Appropriate affect, tangential speech but can be reoriented  Skin: Warm extremities    Labs (personally reviewed and notable for):   Recent Results (from the past 24 hour(s))   RETICULOCYTES COUNT    Collection Time: 07/25/24  3:50 AM   Result Value Ref Range    Reticulocyte Count 2.3 0.8 - 2.6 %    Retic, Absolute 0.07 0.04 - 0.12 M/uL    Imm. Reticulocyte Fraction 12.7 2.6 - 16.1 %    Retic Hgb Equivalent 34.5 29.0 - 35.0 pg/cell       Cardiac Imaging and Procedures Review:    EKG and telemetry tracings personally reviewed    Echocardiogram 7/21/24   Bioprosthetic aortic valve with significant stenosis, Vmax 4.1 m/s, mean gradient 40 mmHg   Mild concentric LVH with preserved LV systolic function, EF of 60%   Moderate eccentric aortic insufficiency.  Significant mitral annular calcifications with mild progressive Mitral   stenosis  Moderate mitral regurgitation  Mild-moderate TR  Severely dilated left atrium  No pericardial effusion  IVC not well visualized  Pulmonary hypertension with estimated RVSP 50mmHg    Impression and Medical Decision Making:    Acute on chronic HFpEF  Elevated gradient across TAVR   Appears more euvolemic today. On 4 L NC, baseline 6 L NC. Has pleural effusions s/p thoracentesis. Repeat Chest Xray today with persistent L greater than R pleural effusion but improved interstitial edema and decreased bibasilar opacities.   Plan:   - Currently on lasix 40 mg PO, continue at time of discharge.  - Continue lisinopril 20 mg BID, metoprolol 50 mg BID   - Outpatient structural heart clinic follow up     Cardiology signing off.     Mara Mallory M.D. PGY-3  [facility]

## 2024-07-25 NOTE — DISCHARGE SUMMARY
[facility] Discharge Summary    Attending: Adan Oneill M.d.  Senior Resident: Dr. Quincy Jung  Intern:  Dr. Mariella Armendariz  Contact Number: [phone]    CHIEF COMPLAINT ON ADMISSION  Chief Complaint   Patient presents with    T-5000 GLF     PT resides at home where she experienced a GF, - LOC, + head strike, + blood thinners.     Shortness of Breath     PT is on 6L of oxygen at baseline, requiring 15L of oxygen to remain adequate oxygenation upon EMS arrival. PT expression some congestion and SOB.        Reason for Admission  Ground-level fall, L1 compression fracture, valvular abnormalities of TAVR with significant worsening stenosis.    Admission Date  7/19/2024    CODE STATUS  DNAR/DNI    HPI & HOSPITAL COURSE  Eric Orellana is a 89-year-old woman who presented on 7/19/2024 with shortness of breath after a ground-level fall at home. She was going downstairs and fell backwards, hitting her head. She did not lose consciousness but had severe pain in the back of her head on her right side. She was found to be hypoxic and placed on nonbreathing mask which was later titrated to 6 L nasal cannula. Patient has a past medical history of CHpEF 53% with known TAVR.  Patient was negative for any viral panel, cultures were negative, and patient was titrated down to better than baseline oxygen demands.  Discharged on home diuretic dose  She was found to have an L1 compression fracture; neurosurgery was consulted which determined that patient was not a surgical candidate for repair.  Urinary cultures were positive for E. coli UTI.  Antibiotics were briefly started but then discontinued after a likely case of asymptomatic bacteriuria after discussion with antibiotic stewardship team.  Patient significant symptomology was thought to be consistent with worsening valvular disease.  Echocardiogram showed worsening aortic stenosis of her TAVR valve.  Cardiology was consulted; requested outpatient  structural cardiology follow-up.  Unclear if patient is a good candidate for this.  Significant discussions regarding goals of care, prognosis.      Therefore, she is discharged in guarded and stable condition to skilled nursing facility.    The patient met 2-midnight criteria for an inpatient stay at the time of discharge.    Discharge Date  7/25/2024    Physical Exam on Day of Discharge  Physical Exam  Vitals and nursing note reviewed.   Constitutional:       Appearance: Normal appearance. She is normal weight.   HENT:      Head: Normocephalic and atraumatic.      Right Ear: External ear normal.      Left Ear: External ear normal.      Nose: Nose normal.      Mouth/Throat:      Mouth: Mucous membranes are moist.   Eyes:      Extraocular Movements: Extraocular movements intact.      Pupils: Pupils are equal, round, and reactive to light.   Cardiovascular:      Rate and Rhythm: Normal rate and regular rhythm.      Pulses: Normal pulses.      Heart sounds: Normal heart sounds. No murmur heard.     No friction rub. No gallop.   Pulmonary:      Effort: Pulmonary effort is normal. No respiratory distress.      Breath sounds: No wheezing or rales.      Comments: Breath sounds diminished B/L  Abdominal:      General: Abdomen is flat. There is no distension.      Palpations: Abdomen is soft.   Musculoskeletal:         General: No swelling. Normal range of motion.      Cervical back: Normal range of motion.      Right lower leg: No edema.      Left lower leg: No edema.   Skin:     General: Skin is warm.      Capillary Refill: Capillary refill takes less than 2 seconds.      Coloration: Skin is not jaundiced or pale.      Findings: Bruising (iatrogenic) present.   Neurological:      General: No focal deficit present.      Mental Status: She is alert and oriented to person, place, and time. Mental status is at baseline.      Cranial Nerves: No cranial nerve deficit.      Sensory: No sensory deficit.      Motor: No weakness.    Psychiatric:         Mood and Affect: Mood normal.         Behavior: Behavior normal.         Thought Content: Thought content normal.         FOLLOW UP ITEMS POST DISCHARGE  Structural heart team follow-up regarding TAVR and aortic stenosis.  Physical therapy, Occupational Therapy needs regarding L1 fracture.      DISCHARGE DIAGNOSES  Principal Problem:    Acute respiratory failure with hypoxemia (HCC) (POA: Yes)  Active Problems:    Hyperlipidemia (POA: Yes)    CKD (chronic kidney disease) stage 3, GFR 30-59 ml/min (POA: Yes)    HTN (hypertension) (POA: Yes)    CAD (POA: Yes)      Overview: (LIMA--AD, KINDRA--PD, VG--OM 2002) and PCI to VG ostium (3.5x23mm Xience       SANTA 9/2012)     Carotid artery stenosis (Chronic) (POA: Yes)    S/P TAVR (transcatheter aortic valve replacement) (POA: Yes)    Acute on chronic diastolic heart failure (HCC) (POA: Yes)    Macrocytic anemia (POA: Yes)    Closed head injury (POA: Yes)    Closed wedge compression fracture of L1 vertebra (HCC) (POA: Yes)    Pleural effusion, bilateral (POA: Yes)    Hypokalemia (POA: Yes)    Pulmonary edema (POA: Yes)    Elevated alkaline phosphatase level (POA: Yes)    Hyperglycemia (POA: Yes)    DNR (do not resuscitate) (POA: Yes)    UTI (urinary tract infection) (POA: Unknown)    Constipation (POA: Unknown)  Resolved Problems:    * No resolved hospital problems. *      FOLLOW UP  Future Appointments   Date Time Provider Department Center   7/30/2024  1:45 PM CAESAR Parada [facility] None     [facility] [address] [address]  [phone]          MEDICATIONS ON DISCHARGE     Medication List        START taking these medications        Instructions   nystatin powder  Commonly known as: Mycostatin   Apply 1 gram topically 3 times a day for 7 days.  Dose: 1 Application     potassium chloride SA 20 MEQ Tbcr  Start taking on: July 26, 2024  Commonly known as: Kdur   Take 1 Tablet by mouth every  day.  Dose: 20 mEq            CONTINUE taking these medications        Instructions   aspirin 325 MG Tabs  Commonly known as: Asa   Take 325 mg by mouth every evening.  Dose: 325 mg     atorvastatin 80 MG tablet  Commonly known as: Lipitor   Take 1 Tablet by mouth every evening.  Dose: 80 mg     Calcium 600 MG Tabs   Take 600 mg by mouth 2 times a day.  Dose: 600 mg     citalopram 20 MG Tabs  Commonly known as: CeleXA   Take 20 mg by mouth every day.  Dose: 20 mg     furosemide 20 MG Tabs  Commonly known as: Lasix   Take 1 Tablet by mouth every day.  Dose: 20 mg     gabapentin 600 MG tablet  Commonly known as: Neurontin   Take 600 mg by mouth 3 times a day.  Dose: 600 mg     HYDROcodone/acetaminophen  MG Tabs  Commonly known as: Norco   Take 1 Tablet by mouth every four hours as needed for Moderate Pain.  Dose: 1 Tablet     ipratropium-albuterol 0.5-2.5 (3) MG/3ML nebulizer solution  Commonly known as: Duoneb   Take 3 mL by nebulization 4 times a day.  Dose: 3 mL     lisinopril 20 MG Tabs  Commonly known as: Prinivil   Take 1 Tablet by mouth 2 times a day.  Dose: 20 mg     metoprolol SR 50 MG Tb24  Commonly known as: Toprol XL   Take 1 Tablet by mouth every day.  Dose: 50 mg     multivitamin Tabs   Take 1 Tab by mouth every morning.  Dose: 1 Tablet     omeprazole 40 MG delayed-release capsule  Commonly known as: PriLOSEC   Take 40 mg by mouth every day.  Dose: 40 mg     ondansetron 4 MG Tbdp  Commonly known as: Zofran ODT   Take 1 Tablet by mouth every 8 hours as needed for Nausea/Vomiting.  Dose: 4 mg     Vitamin B-12 5000 MCG Subl   Place 1 Tab under tongue every morning.  Dose: 1 Tablet     Vitamin C 1000 MG Tabs   Take 1,000 mg by mouth every day.  Dose: 1,000 mg            STOP taking these medications      Lutein 40 MG Caps              Allergies  No Known Allergies    DIET  Orders Placed This Encounter   Procedures    Diet Order Diet: Level 6 - Soft and Bite Sized; Liquid level: Level 0 - Thin      Standing Status:   Standing     Number of Occurrences:   1     Order Specific Question:   Diet:     Answer:   Level 6 - Soft and Bite Sized [23]     Order Specific Question:   Liquid level     Answer:   Level 0 - Thin       ACTIVITY  As tolerated and directed by skilled nursing.  Weight bearing as tolerated    CONSULTATIONS  cardiology,  Neurosurgery    PROCEDURES  N/A    LABORATORY  Lab Results   Component Value Date    SODIUM 135 07/23/2024    POTASSIUM 4.2 07/23/2024    CHLORIDE 92 (L) 07/23/2024    CO2 33 07/23/2024    GLUCOSE 131 (H) 07/23/2024    BUN 19 07/23/2024    CREATININE 0.91 07/23/2024        Lab Results   Component Value Date    WBC 7.2 07/24/2024    HEMOGLOBIN 10.2 (L) 07/24/2024    HEMATOCRIT 30.7 (L) 07/24/2024    PLATELETCT 223 07/24/2024        Total time of the discharge process exceeds 45 minutes.

## 2024-07-25 NOTE — DISCHARGE INSTRUCTIONS
HF Patient Discharge Instructions  Monitor your weight daily, and maintain a weight chart, to track your weight changes.   Activity as tolerated, unless your Doctor has ordered otherwise. Other activity order:   Follow a low fat, low cholesterol, low salt diet unless instructed otherwise by your Doctor. Read the labels on the back of food products and track your intake of fat, cholesterol and salt.   Fluid Restriction No. If a Fluid Restriction has been ordered by your Doctor, measure fluids with a measuring cup to ensure that you are not exceeding the restriction.   No smoking.  Oxygen 4L. If your Doctor has ordered that you wear Oxygen at home, it is important to wear it as ordered.  Did you receive an explanation from staff on the importance of taking each of your medications and why it is necessary to keep taking them unless your doctor says to stop? Yes  Were all of your questions answered about how to manage your heart failure and what to do if you have increased signs and symptoms after you go home? Yes  Do you feel like your heart failure care team involved you in the care treatment plan and allowed you to make decisions regarding your care while in the hospital and addressed any discharge needs you might have? Yes    See the educational handout provided at discharge for more information on monitoring your daily weight, activity and diet. This also explains more about Heart Failure, symptoms of a flare-up and some of the tests that you have undergone.     Warning Signs of a Flare-Up include:  Swelling in the ankles or lower legs.  Shortness of breath, while at rest, or while doing normal activities.   Shortness of breath at night when in bed, or coughing in bed.   Requiring more pillows to sleep at night, or needing to sit up at night to sleep.  Feeling weak, dizzy or fatigued.     When to call your Doctor:  Call your Primary Care Physician or Cardiologist if:   You experience any pain radiating to your  jaw or neck.  You have any difficulty breathing.  You experience weight gain of 3 lbs in a day or 5 lbs in a week.   You feel any palpitations or irregular heartbeats.  You become dizzy or lose consciousness.   If you have had an angiogram or had a pacemaker or AICD placed, and experience:  Bleeding, drainage or swelling at the surgical / puncture site.  Fever greater than 100.0 F  Shock from internal defibrillator.  Cool and / or numb extremities.     Please access the [facility] My HF Guide/Heart Failure Interactive Workbook:   [email or web address]

## 2024-07-25 NOTE — DISCHARGE PLANNING
Potential diagnosis for rehab, TBI.  Ongoing medical management as well as therapy need. Anticipate post acute services to facilitate a successful transition to community. Please place a PM&R referral to assist with discharge planning. Medicare  insurance provider. Potential return to community support daughter/son in law

## 2024-07-25 NOTE — CARE PLAN
The patient is Stable - Low risk of patient condition declining or worsening    Shift Goals  Clinical Goals: (P) q4 neuro, q2 turns  Patient Goals: (P) rest, waiting for discharge  Family Goals: [place]    Progress made toward(s) clinical / shift goals:    Problem: Fall Risk  Goal: Patient will remain free from falls  Description: Target End Date:  Prior to discharge or change in level of care    Document interventions on the [facility] Fall Risk Assessment    1.  Assess for fall risk factors  2.  Implement fall precautions  Outcome: Progressing  Note: Patient remains safe in bed with call light and personal belongings within reach.  Fall precautions in place.      Problem: Skin Integrity  Goal: Skin integrity is maintained or improved  Description: Target End Date:  Prior to discharge or change in level of care    Document interventions on Skin Risk/Aris flowsheet groups and corresponding LDA    1.  Assess and monitor skin integrity, appearance and/or temperature  2.  Assess risk factors for impaired skin integrity and/or pressures ulcers  3.  Implement precautions to protect skin integrity in collaboration with interdisciplinary team  4.  Implement pressure ulcer prevention protocol if at risk for skin breakdown  5.  Confirm wound care consult if at risk for skin breakdown  6.  Ensure patient use of pressure relieving devices  (Low air loss bed, waffle overlay, heel protectors, ROHO cushion, etc)  Outcome: Progressing  Note: Pt on q2 turn team.  Skin preventions in place.

## 2024-07-25 NOTE — DISCHARGE PLANNING
DC Transport Scheduled    Transport Company Scheduled:  [employer]  Spoke with Sarah at [facility] to schedule transport.    Scheduled Date: 7/25/2024  Scheduled Time: 1400    Destination: [facility]   Destination address: [address] [place] [place] [address]    Notified care team of scheduled transport via [employer].     If there are any changes needed to the DC transportation scheduled, please contact [facility] at ext. [phone] between the hours of 5778-6012 Mon-Fri. If outside those hours, contact the ED Case Manager at ext. [phone].

## 2024-07-30 ENCOUNTER — APPOINTMENT (OUTPATIENT)
Dept: CARDIOLOGY | Facility: MEDICAL CENTER | Age: 89
End: 2024-07-30
Payer: MEDICARE

## 2024-07-30 ENCOUNTER — OFFICE VISIT (OUTPATIENT)
Dept: CARDIOLOGY | Facility: MEDICAL CENTER | Age: 89
End: 2024-07-30
Payer: MEDICARE

## 2024-07-30 VITALS
SYSTOLIC BLOOD PRESSURE: 132 MMHG | BODY MASS INDEX: 27.06 KG/M2 | RESPIRATION RATE: 16 BRPM | DIASTOLIC BLOOD PRESSURE: 50 MMHG | HEIGHT: 64 IN | OXYGEN SATURATION: 97 % | HEART RATE: 69 BPM

## 2024-07-30 DIAGNOSIS — Z95.2 S/P TAVR (TRANSCATHETER AORTIC VALVE REPLACEMENT): ICD-10-CM

## 2024-07-30 PROCEDURE — 99213 OFFICE O/P EST LOW 20 MIN: CPT

## 2024-07-30 ASSESSMENT — ENCOUNTER SYMPTOMS
NERVOUS/ANXIOUS: 0
DEPRESSION: 0
NEUROLOGICAL NEGATIVE: 1
PND: 0
PALPITATIONS: 0
SHORTNESS OF BREATH: 1
MUSCULOSKELETAL NEGATIVE: 1
GASTROINTESTINAL NEGATIVE: 1
EYES NEGATIVE: 1
ORTHOPNEA: 0

## 2024-08-19 ENCOUNTER — TELEPHONE (OUTPATIENT)
Dept: CARDIOLOGY | Facility: MEDICAL CENTER | Age: 89
End: 2024-08-19
Payer: MEDICARE

## 2024-08-19 NOTE — TELEPHONE ENCOUNTER
"    Caller: Guadalupe -Daughter    Topic/issue: Daughter just wanted to let Dr. Elder and Shelia Sanchez know that her Mom passed away on 08/16/24 (updated chart). She said her Mom thought the world of everyone there and Guadalupe wanted to thank you and didn't want you to just get a \"cold\" notification, she wanted to let you know herself.    Callback Number: [phone] - No return call requested.    Thank you,  Sal"

## (undated) DEVICE — Device

## (undated) DEVICE — CABLE TEMPORARY PACING

## (undated) DEVICE — KIT ROOM DECONTAMINATION

## (undated) DEVICE — SUTURE OHS

## (undated) DEVICE — SET EXTENSION WITH 2 PORTS (48EA/CA) ***PART #2C8610 IS A SUBSTITUTE*****

## (undated) DEVICE — KIT ANESTHESIA W/CIRCUIT & 3/LT BAG W/FILTER (20EA/CA)

## (undated) DEVICE — SUCTION INSTRUMENT YANKAUER OPEN TIP W/O VENT (50EA/CA)

## (undated) DEVICE — SENSOR SPO2 NEO LNCS ADHESIVE (20/BX) SEE USER NOTES

## (undated) DEVICE — GUIDEWIRE STARTER STRAIGHT FIXED CORE .035 150CM 4 STRAIGHT PTFE/HEPARIN COATED (10/BX)

## (undated) DEVICE — CHLORAPREP 26 ML APPLICATOR - ORANGE TINT(25/CA)

## (undated) DEVICE — GUIDEWIRE STARTER J CURVED FIXED CORE .035 260CM 10 3MM PTFE/HEPARIN COATED (5EA/BX)

## (undated) DEVICE — KIT INTROPERCUTANEOUS SHEATH - 8.5 FR W/MAX BARRIER AND BIOPATCH  (5/CA)

## (undated) DEVICE — INTRODUCER SHEATH 6FR 2.5CM - DILATOR PROTRUDING (10/BX)

## (undated) DEVICE — PROTECTOR ULNA NERVE - (36PR/CA)

## (undated) DEVICE — STOPCOCK 3-WAY W/SWIVEL LEVER LOCK (50EA/CA)

## (undated) DEVICE — GLOVE BIOGEL SZ 8 SURGICAL PF LTX - (50PR/BX 4BX/CA)

## (undated) DEVICE — SYRINGE DISP. 50CC LS - (40/BX)

## (undated) DEVICE — CANISTER SUCTION 3000ML MECHANICAL FILTER AUTO SHUTOFF MEDI-VAC NONSTERILE LF DISP  (40EA/CA)

## (undated) DEVICE — SET BIFURCATED BLOOD - (48EA/CS)

## (undated) DEVICE — COVER LIGHT HANDLE FLEXIBLE - SOFT (2EA/PK 80PK/CA)

## (undated) DEVICE — TRANSDUCER BIFURCATED MONITORING KIT (10EA/CA)

## (undated) DEVICE — MICRODRIP PRIMARY VENTED 60 (48EA/CA) THIS WAS PART #2C8428 WHICH WAS DISCONTINUED

## (undated) DEVICE — BLANKET UNDERBODY FULL ACCES - (5/CA)

## (undated) DEVICE — SOD. CHL. INJ. 0.9% 1000 ML - (14EA/CA 60CA/PF)

## (undated) DEVICE — TUBING PRSS MNTR 72IN M/ M LL - (25/BX) MONIT. LINE W/MALE L/L

## (undated) DEVICE — ELECTRODE DUAL RETURN W/ CORD - (50/PK)

## (undated) DEVICE — INTRODUCER CATHETER  DILATOR PROTRUDING 8FR 2.5CM (10EA/BX)

## (undated) DEVICE — SYRINGE DISP. 60 CC LL - (30/BX, 12BX/CA)**WHEN THESE ARE GONE ORDER #500206**

## (undated) DEVICE — HEAD HOLDER JUNIOR/ADULT

## (undated) DEVICE — SET FLUID WARMING STANDARD FLOW - (10/CA)

## (undated) DEVICE — SUCTION INSTRUMENT YANKAUER BULBOUS TIP W/O VENT (50EA/CA)

## (undated) DEVICE — CONTAINER, SPECIMEN, STERILE

## (undated) DEVICE — DERMABOND ADVANCED - (12EA/BX)

## (undated) DEVICE — SLEEVE, VASO, THIGH, MED

## (undated) DEVICE — CATHETER PIGTAIL 6FR 145 (5EA/BX)

## (undated) DEVICE — BLADE SURGICAL #11 - (50/BX)

## (undated) DEVICE — KIT RADIAL ARTERY 20GA W/MAX BARRIER AND BIOPATCH  (5EA/CA) #10740 IS FOR THE SET RADIAL ARTERIAL

## (undated) DEVICE — SPONGE XRAY 8X4 STERL. 12PL - (10EA/TY 80TY/CA)

## (undated) DEVICE — TUBE CONNECT SUCTION CLEAR 120 X 1/4" (50EA/CA)"

## (undated) DEVICE — DRESSING TRANSPARENT FILM TEGADERM 4 X 4.75" (50EA/BX)"

## (undated) DEVICE — TUBING PRSS MNTR 48IN NAMIC - (25/CA)

## (undated) DEVICE — GLOVE SURGICAL LATEX POWDER FREE STIRLE SZ 8 ENCORE MICROPTIC (200PR/CA)

## (undated) DEVICE — GOWN SURGEONS X-LARGE - DISP. (30/CA)

## (undated) DEVICE — GLOVE SZ 7 BIOGEL PI MICRO - PF LF (50PR/BX 4BX/CA)

## (undated) DEVICE — SET LEADWIRE 5 LEAD BEDSIDE DISPOSABLE ECG (1SET OF 5/EA)

## (undated) DEVICE — SODIUM CHL. INJ. 0.9% 500ML (24EA/CA 50CA/PF)

## (undated) DEVICE — SYS DLV COST CLS RM TEMP - INJECTATE (CO-SET II) (10EA/CA)

## (undated) DEVICE — GLOVE BIOGEL ECLIPSE PF LATEX SIZE 8.0  (50PR/BX)

## (undated) DEVICE — NEEDLE PERCUTANEOUS THINWALL 7CM 18GA BSDN 18-7.0

## (undated) DEVICE — MASK ANESTHESIA ADULT  - (100/CA)

## (undated) DEVICE — GLOVE BIOGEL SZ 7.5 SURGICAL PF LTX - (50PR/BX 4BX/CA)

## (undated) DEVICE — STOPCOCK IV 400 PSI 3W ROT (50EA/BX)

## (undated) DEVICE — SUTURE 0 ETHIBOND MO6 C/R - (12/BX) 8-18 INCH ETHICON

## (undated) DEVICE — GLOVE BIOGEL INDICATOR SZ 8 SURGICAL PF LTX - (50/BX 4BX/CA)

## (undated) DEVICE — ELECTRODE RADIOLUCNT SOLID GEL DEFIB PADS (12EA/CA)

## (undated) DEVICE — WIRE GUIDE LUNDQST.035X180 - TSMG-35-180-4-LES ORDER BY BOX (5EA/BX)

## (undated) DEVICE — GLOVE SZ 6.5 BIOGEL PI MICRO - PF LF (50PR/BX)

## (undated) DEVICE — IV SET, NON-VENT PLUMB PUMP

## (undated) DEVICE — SUTURE 4-0 VICRYL PLUS SH - UNDYED 27 INCH (36PK/BX)

## (undated) DEVICE — CATHETER 6FR AL1 100CM (5/BX)

## (undated) DEVICE — TUBING CLEARLINK DUO-VENT - C-FLO (48EA/CA)

## (undated) DEVICE — LACTATED RINGERS INJ 1000 ML - (14EA/CA 60CA/PF)

## (undated) DEVICE — DEVICE CLOSER PERCLOSE - (10/BX) PROGLIDE SYSTEM

## (undated) DEVICE — DRAPE MAYO STAND - (30/CA)

## (undated) DEVICE — PACK TAVR (3EA/CA)

## (undated) DEVICE — WIRE VERSACORE .035 MOD J 145CM (5EA/BX)

## (undated) DEVICE — WIRE GUIDE AES .035 260CM WITH 3MM J TIP"

## (undated) DEVICE — GOWN WARMING STANDARD FLEX - (30/CA)

## (undated) DEVICE — ARMBOARD  SMALL IV 9 INLONG - (25EA/CA)

## (undated) DEVICE — ELECTRODE 850 FOAM ADHESIVE - HYDROGEL RADIOTRNSPRNT (50/PK)

## (undated) DEVICE — BAG RESUSCITATION DISPOSABLE - WITH MASK (10 EA/CA)

## (undated) DEVICE — SODIUM CHL IRRIGATION 0.9% 1000ML (12EA/CA)

## (undated) DEVICE — SYRINGE SAFETY 3 ML 18 GA X 1 1/2 BLUNT LL (100/BX 8BX/CA)